# Patient Record
Sex: MALE | Race: WHITE | Employment: OTHER | URBAN - NONMETROPOLITAN AREA
[De-identification: names, ages, dates, MRNs, and addresses within clinical notes are randomized per-mention and may not be internally consistent; named-entity substitution may affect disease eponyms.]

---

## 2017-01-10 RX ORDER — METOPROLOL TARTRATE 50 MG/1
TABLET, FILM COATED ORAL
Qty: 180 TABLET | Refills: 0 | Status: ON HOLD | OUTPATIENT
Start: 2017-01-10 | End: 2019-07-24 | Stop reason: SDUPTHER

## 2017-03-15 ENCOUNTER — OFFICE VISIT (OUTPATIENT)
Dept: CARDIOLOGY | Age: 82
End: 2017-03-15

## 2017-03-15 VITALS
BODY MASS INDEX: 32.96 KG/M2 | WEIGHT: 210 LBS | SYSTOLIC BLOOD PRESSURE: 128 MMHG | DIASTOLIC BLOOD PRESSURE: 62 MMHG | HEIGHT: 67 IN | HEART RATE: 76 BPM

## 2017-03-15 DIAGNOSIS — I50.22 CHRONIC SYSTOLIC (CONGESTIVE) HEART FAILURE (HCC): Primary | ICD-10-CM

## 2017-03-15 DIAGNOSIS — I10 ESSENTIAL HYPERTENSION: ICD-10-CM

## 2017-03-15 DIAGNOSIS — I48.0 PAROXYSMAL ATRIAL FIBRILLATION (HCC): ICD-10-CM

## 2017-03-15 PROCEDURE — G8417 CALC BMI ABV UP PARAM F/U: HCPCS | Performed by: PHYSICIAN ASSISTANT

## 2017-03-15 PROCEDURE — 99213 OFFICE O/P EST LOW 20 MIN: CPT | Performed by: PHYSICIAN ASSISTANT

## 2017-03-15 PROCEDURE — G8484 FLU IMMUNIZE NO ADMIN: HCPCS | Performed by: PHYSICIAN ASSISTANT

## 2017-03-15 PROCEDURE — 1036F TOBACCO NON-USER: CPT | Performed by: PHYSICIAN ASSISTANT

## 2017-03-15 PROCEDURE — 1123F ACP DISCUSS/DSCN MKR DOCD: CPT | Performed by: PHYSICIAN ASSISTANT

## 2017-03-15 PROCEDURE — G8427 DOCREV CUR MEDS BY ELIG CLIN: HCPCS | Performed by: PHYSICIAN ASSISTANT

## 2017-03-15 PROCEDURE — 4040F PNEUMOC VAC/ADMIN/RCVD: CPT | Performed by: PHYSICIAN ASSISTANT

## 2017-03-15 RX ORDER — OXYCODONE HYDROCHLORIDE AND ACETAMINOPHEN 5; 325 MG/1; MG/1
1 TABLET ORAL EVERY 4 HOURS PRN
COMMUNITY
End: 2018-03-16 | Stop reason: SDUPTHER

## 2017-03-15 RX ORDER — TRIAMCINOLONE ACETONIDE 1 MG/G
CREAM TOPICAL 2 TIMES DAILY
COMMUNITY
End: 2017-07-13 | Stop reason: ALTCHOICE

## 2017-03-15 RX ORDER — INDOMETHACIN 50 MG/1
50 CAPSULE ORAL PRN
Status: ON HOLD | COMMUNITY
End: 2017-07-17 | Stop reason: HOSPADM

## 2017-03-27 ENCOUNTER — OFFICE VISIT (OUTPATIENT)
Dept: CARDIOLOGY | Age: 82
End: 2017-03-27

## 2017-03-27 VITALS
SYSTOLIC BLOOD PRESSURE: 116 MMHG | BODY MASS INDEX: 32.96 KG/M2 | HEART RATE: 72 BPM | WEIGHT: 210 LBS | DIASTOLIC BLOOD PRESSURE: 64 MMHG | HEIGHT: 67 IN

## 2017-03-27 DIAGNOSIS — I10 ESSENTIAL HYPERTENSION: Primary | ICD-10-CM

## 2017-03-27 DIAGNOSIS — I48.20 CHRONIC ATRIAL FIBRILLATION (HCC): ICD-10-CM

## 2017-03-27 DIAGNOSIS — I25.10 CORONARY ARTERY DISEASE INVOLVING NATIVE CORONARY ARTERY OF NATIVE HEART WITHOUT ANGINA PECTORIS: ICD-10-CM

## 2017-03-27 PROCEDURE — 99214 OFFICE O/P EST MOD 30 MIN: CPT | Performed by: NUCLEAR MEDICINE

## 2017-03-27 PROCEDURE — G8427 DOCREV CUR MEDS BY ELIG CLIN: HCPCS | Performed by: NUCLEAR MEDICINE

## 2017-03-27 PROCEDURE — G8598 ASA/ANTIPLAT THER USED: HCPCS | Performed by: NUCLEAR MEDICINE

## 2017-03-27 PROCEDURE — 1123F ACP DISCUSS/DSCN MKR DOCD: CPT | Performed by: NUCLEAR MEDICINE

## 2017-03-27 PROCEDURE — G8484 FLU IMMUNIZE NO ADMIN: HCPCS | Performed by: NUCLEAR MEDICINE

## 2017-03-27 PROCEDURE — 4040F PNEUMOC VAC/ADMIN/RCVD: CPT | Performed by: NUCLEAR MEDICINE

## 2017-03-27 PROCEDURE — G8417 CALC BMI ABV UP PARAM F/U: HCPCS | Performed by: NUCLEAR MEDICINE

## 2017-03-27 PROCEDURE — 1036F TOBACCO NON-USER: CPT | Performed by: NUCLEAR MEDICINE

## 2017-06-19 ENCOUNTER — TELEPHONE (OUTPATIENT)
Dept: CARDIOLOGY | Age: 82
End: 2017-06-19

## 2017-07-05 ENCOUNTER — TELEPHONE (OUTPATIENT)
Dept: CARDIOLOGY | Age: 82
End: 2017-07-05

## 2017-07-12 ENCOUNTER — TELEPHONE (OUTPATIENT)
Dept: CARDIOLOGY | Age: 82
End: 2017-07-12

## 2017-07-17 ENCOUNTER — ANESTHESIA (OUTPATIENT)
Dept: ENDOSCOPY | Age: 82
End: 2017-07-17
Payer: MEDICARE

## 2017-07-17 ENCOUNTER — HOSPITAL ENCOUNTER (OUTPATIENT)
Age: 82
Setting detail: OUTPATIENT SURGERY
Discharge: HOME | End: 2017-07-17
Attending: INTERNAL MEDICINE | Admitting: INTERNAL MEDICINE
Payer: MEDICARE

## 2017-07-17 ENCOUNTER — ANESTHESIA EVENT (OUTPATIENT)
Dept: ENDOSCOPY | Age: 82
End: 2017-07-17
Payer: MEDICARE

## 2017-07-17 VITALS
BODY MASS INDEX: 32.25 KG/M2 | HEART RATE: 78 BPM | TEMPERATURE: 96.8 F | SYSTOLIC BLOOD PRESSURE: 130 MMHG | HEIGHT: 68 IN | DIASTOLIC BLOOD PRESSURE: 66 MMHG | OXYGEN SATURATION: 97 % | WEIGHT: 212.8 LBS | RESPIRATION RATE: 20 BRPM

## 2017-07-17 VITALS — SYSTOLIC BLOOD PRESSURE: 95 MMHG | DIASTOLIC BLOOD PRESSURE: 45 MMHG | OXYGEN SATURATION: 100 %

## 2017-07-17 PROCEDURE — 7100000001 HC PACU RECOVERY - ADDTL 15 MIN: Performed by: INTERNAL MEDICINE

## 2017-07-17 PROCEDURE — 7100000000 HC PACU RECOVERY - FIRST 15 MIN: Performed by: INTERNAL MEDICINE

## 2017-07-17 PROCEDURE — 3609010400 HC COLONOSCOPY POLYPECTOMY HOT BIOPSY: Performed by: INTERNAL MEDICINE

## 2017-07-17 PROCEDURE — 6360000002 HC RX W HCPCS: Performed by: NURSE ANESTHETIST, CERTIFIED REGISTERED

## 2017-07-17 PROCEDURE — 3700000000 HC ANESTHESIA ATTENDED CARE: Performed by: INTERNAL MEDICINE

## 2017-07-17 PROCEDURE — 2580000003 HC RX 258: Performed by: INTERNAL MEDICINE

## 2017-07-17 PROCEDURE — 3700000001 HC ADD 15 MINUTES (ANESTHESIA): Performed by: INTERNAL MEDICINE

## 2017-07-17 PROCEDURE — 88305 TISSUE EXAM BY PATHOLOGIST: CPT

## 2017-07-17 PROCEDURE — 2500000003 HC RX 250 WO HCPCS: Performed by: NURSE ANESTHETIST, CERTIFIED REGISTERED

## 2017-07-17 PROCEDURE — 3609013000 HC EGD TRANSORAL CONTROL BLEEDING ANY METHOD: Performed by: INTERNAL MEDICINE

## 2017-07-17 RX ORDER — LIDOCAINE HYDROCHLORIDE 20 MG/ML
INJECTION, SOLUTION EPIDURAL; INFILTRATION; INTRACAUDAL; PERINEURAL PRN
Status: DISCONTINUED | OUTPATIENT
Start: 2017-07-17 | End: 2017-07-17 | Stop reason: SDUPTHER

## 2017-07-17 RX ORDER — SODIUM CHLORIDE 450 MG/100ML
INJECTION, SOLUTION INTRAVENOUS CONTINUOUS
Status: CANCELLED | OUTPATIENT
Start: 2017-07-17

## 2017-07-17 RX ORDER — SODIUM CHLORIDE 450 MG/100ML
INJECTION, SOLUTION INTRAVENOUS CONTINUOUS
Status: DISCONTINUED | OUTPATIENT
Start: 2017-07-17 | End: 2017-07-17 | Stop reason: HOSPADM

## 2017-07-17 RX ORDER — PROPOFOL 10 MG/ML
INJECTION, EMULSION INTRAVENOUS PRN
Status: DISCONTINUED | OUTPATIENT
Start: 2017-07-17 | End: 2017-07-17 | Stop reason: SDUPTHER

## 2017-07-17 RX ORDER — ASPIRIN 81 MG/1
81 TABLET ORAL DAILY
Qty: 1 TABLET | Refills: 0 | Status: SHIPPED | OUTPATIENT
Start: 2017-07-21

## 2017-07-17 RX ADMIN — LIDOCAINE HYDROCHLORIDE 80 MG: 20 INJECTION, SOLUTION EPIDURAL; INFILTRATION; INTRACAUDAL; PERINEURAL at 11:10

## 2017-07-17 RX ADMIN — PROPOFOL 300 MG: 10 INJECTION, EMULSION INTRAVENOUS at 11:10

## 2017-07-17 RX ADMIN — SODIUM CHLORIDE: 4.5 INJECTION, SOLUTION INTRAVENOUS at 10:01

## 2017-07-17 ASSESSMENT — PAIN SCALES - GENERAL
PAINLEVEL_OUTOF10: 0
PAINLEVEL_OUTOF10: 0

## 2017-07-17 ASSESSMENT — PAIN - FUNCTIONAL ASSESSMENT: PAIN_FUNCTIONAL_ASSESSMENT: 0-10

## 2017-09-11 PROBLEM — S81.802A OPEN WOUND OF LEFT LOWER LEG: Status: ACTIVE | Noted: 2017-09-11

## 2017-09-11 PROBLEM — R60.0 BILATERAL LEG EDEMA: Status: ACTIVE | Noted: 2017-09-11

## 2017-09-11 PROBLEM — I73.9 PAD (PERIPHERAL ARTERY DISEASE) (HCC): Status: ACTIVE | Noted: 2017-09-11

## 2017-09-20 ENCOUNTER — HOSPITAL ENCOUNTER (OUTPATIENT)
Dept: WOUND CARE | Age: 82
Discharge: HOME | End: 2017-09-20
Payer: MEDICARE

## 2017-09-20 VITALS
HEIGHT: 68 IN | OXYGEN SATURATION: 97 % | TEMPERATURE: 98 F | WEIGHT: 225 LBS | RESPIRATION RATE: 18 BRPM | BODY MASS INDEX: 34.1 KG/M2 | DIASTOLIC BLOOD PRESSURE: 60 MMHG | HEART RATE: 75 BPM | SYSTOLIC BLOOD PRESSURE: 111 MMHG

## 2017-09-20 DIAGNOSIS — R60.0 BILATERAL LEG EDEMA: Primary | ICD-10-CM

## 2017-09-20 DIAGNOSIS — S81.802A OPEN WOUND OF LEFT LOWER LEG, INITIAL ENCOUNTER: ICD-10-CM

## 2017-09-20 PROCEDURE — 29580 STRAPPING UNNA BOOT: CPT

## 2017-09-20 PROCEDURE — A6456 ZINC PASTE BAND W >=3"<5"/YD: HCPCS

## 2017-09-20 PROCEDURE — G0463 HOSPITAL OUTPT CLINIC VISIT: HCPCS

## 2017-09-20 PROCEDURE — A6206 CONTACT LAYER <= 16 SQ IN: HCPCS

## 2017-09-20 PROCEDURE — A6197 ALGINATE DRSG >16 <=48 SQ IN: HCPCS

## 2017-09-20 PROCEDURE — A6454 SELF-ADHER BAND W>=3" <5"/YD: HCPCS

## 2017-09-22 ENCOUNTER — HOSPITAL ENCOUNTER (OUTPATIENT)
Dept: WOUND CARE | Age: 82
Discharge: HOME | End: 2017-09-22
Payer: MEDICARE

## 2017-09-22 VITALS
RESPIRATION RATE: 18 BRPM | TEMPERATURE: 98.3 F | DIASTOLIC BLOOD PRESSURE: 49 MMHG | OXYGEN SATURATION: 93 % | HEART RATE: 72 BPM | SYSTOLIC BLOOD PRESSURE: 95 MMHG

## 2017-09-22 DIAGNOSIS — R60.0 BILATERAL LEG EDEMA: ICD-10-CM

## 2017-09-22 DIAGNOSIS — S81.802A OPEN WOUND OF LEFT LOWER LEG, INITIAL ENCOUNTER: ICD-10-CM

## 2017-09-22 PROCEDURE — 29580 STRAPPING UNNA BOOT: CPT

## 2017-09-22 PROCEDURE — A6454 SELF-ADHER BAND W>=3" <5"/YD: HCPCS

## 2017-09-22 PROCEDURE — A6456 ZINC PASTE BAND W >=3"<5"/YD: HCPCS

## 2017-09-22 PROCEDURE — A6197 ALGINATE DRSG >16 <=48 SQ IN: HCPCS

## 2017-09-22 ASSESSMENT — PAIN SCALES - GENERAL: PAINLEVEL_OUTOF10: 0

## 2017-09-25 ENCOUNTER — HOSPITAL ENCOUNTER (OUTPATIENT)
Dept: WOUND CARE | Age: 82
Discharge: HOME | End: 2017-09-25
Payer: MEDICARE

## 2017-09-25 VITALS
OXYGEN SATURATION: 97 % | DIASTOLIC BLOOD PRESSURE: 62 MMHG | SYSTOLIC BLOOD PRESSURE: 128 MMHG | BODY MASS INDEX: 34.1 KG/M2 | RESPIRATION RATE: 18 BRPM | TEMPERATURE: 97.6 F | HEART RATE: 66 BPM | WEIGHT: 225 LBS | HEIGHT: 68 IN

## 2017-09-25 DIAGNOSIS — I83.019 VENOUS ULCER OF RIGHT LEG (HCC): Primary | ICD-10-CM

## 2017-09-25 DIAGNOSIS — R60.0 BILATERAL LEG EDEMA: ICD-10-CM

## 2017-09-25 DIAGNOSIS — I83.029 VENOUS ULCER OF LEFT LEG (HCC): ICD-10-CM

## 2017-09-25 DIAGNOSIS — L97.929 VENOUS ULCER OF LEFT LEG (HCC): ICD-10-CM

## 2017-09-25 DIAGNOSIS — S81.802A OPEN WOUND OF LEFT LOWER LEG, INITIAL ENCOUNTER: ICD-10-CM

## 2017-09-25 DIAGNOSIS — L97.919 VENOUS ULCER OF RIGHT LEG (HCC): Primary | ICD-10-CM

## 2017-09-25 PROCEDURE — A6456 ZINC PASTE BAND W >=3"<5"/YD: HCPCS

## 2017-09-25 PROCEDURE — A6454 SELF-ADHER BAND W>=3" <5"/YD: HCPCS

## 2017-09-25 PROCEDURE — 29580 STRAPPING UNNA BOOT: CPT

## 2017-09-27 ENCOUNTER — OFFICE VISIT (OUTPATIENT)
Dept: UROLOGY | Age: 82
End: 2017-09-27
Payer: MEDICARE

## 2017-09-27 VITALS
WEIGHT: 231 LBS | DIASTOLIC BLOOD PRESSURE: 60 MMHG | BODY MASS INDEX: 37.12 KG/M2 | HEIGHT: 66 IN | SYSTOLIC BLOOD PRESSURE: 118 MMHG

## 2017-09-27 DIAGNOSIS — R33.9 URINARY RETENTION: Primary | ICD-10-CM

## 2017-09-27 LAB — POST VOID RESIDUAL (PVR): 147 ML

## 2017-09-27 PROCEDURE — 99213 OFFICE O/P EST LOW 20 MIN: CPT | Performed by: NURSE PRACTITIONER

## 2017-09-27 PROCEDURE — 1123F ACP DISCUSS/DSCN MKR DOCD: CPT | Performed by: NURSE PRACTITIONER

## 2017-09-27 PROCEDURE — 1036F TOBACCO NON-USER: CPT | Performed by: NURSE PRACTITIONER

## 2017-09-27 PROCEDURE — G8427 DOCREV CUR MEDS BY ELIG CLIN: HCPCS | Performed by: NURSE PRACTITIONER

## 2017-09-27 PROCEDURE — G8598 ASA/ANTIPLAT THER USED: HCPCS | Performed by: NURSE PRACTITIONER

## 2017-09-27 PROCEDURE — G8417 CALC BMI ABV UP PARAM F/U: HCPCS | Performed by: NURSE PRACTITIONER

## 2017-09-27 PROCEDURE — 4040F PNEUMOC VAC/ADMIN/RCVD: CPT | Performed by: NURSE PRACTITIONER

## 2017-09-27 PROCEDURE — 51798 US URINE CAPACITY MEASURE: CPT | Performed by: NURSE PRACTITIONER

## 2017-09-27 RX ORDER — INDOMETHACIN 50 MG/1
50 CAPSULE ORAL 3 TIMES DAILY PRN
Status: ON HOLD | COMMUNITY
End: 2018-07-30 | Stop reason: SINTOL

## 2017-09-27 RX ORDER — TAMSULOSIN HYDROCHLORIDE 0.4 MG/1
0.4 CAPSULE ORAL DAILY
Qty: 90 CAPSULE | Refills: 3 | Status: SHIPPED | OUTPATIENT
Start: 2017-09-27 | End: 2018-12-29 | Stop reason: SDUPTHER

## 2017-09-27 ASSESSMENT — ENCOUNTER SYMPTOMS
NAUSEA: 0
ABDOMINAL PAIN: 0
VOMITING: 0

## 2017-09-28 ENCOUNTER — HOSPITAL ENCOUNTER (OUTPATIENT)
Dept: WOUND CARE | Age: 82
Discharge: HOME | End: 2017-09-28
Payer: MEDICARE

## 2017-09-28 VITALS
RESPIRATION RATE: 18 BRPM | OXYGEN SATURATION: 99 % | SYSTOLIC BLOOD PRESSURE: 114 MMHG | HEIGHT: 68 IN | HEART RATE: 62 BPM | TEMPERATURE: 97.5 F | WEIGHT: 225 LBS | BODY MASS INDEX: 34.1 KG/M2 | DIASTOLIC BLOOD PRESSURE: 57 MMHG

## 2017-09-28 DIAGNOSIS — S81.802A OPEN WOUND OF LEFT LOWER LEG, INITIAL ENCOUNTER: ICD-10-CM

## 2017-09-28 DIAGNOSIS — R60.0 BILATERAL LEG EDEMA: ICD-10-CM

## 2017-09-28 PROCEDURE — A6456 ZINC PASTE BAND W >=3"<5"/YD: HCPCS

## 2017-09-28 PROCEDURE — 29580 STRAPPING UNNA BOOT: CPT

## 2017-09-28 PROCEDURE — A6454 SELF-ADHER BAND W>=3" <5"/YD: HCPCS

## 2017-10-02 ENCOUNTER — HOSPITAL ENCOUNTER (OUTPATIENT)
Dept: WOUND CARE | Age: 82
Discharge: HOME | End: 2017-10-02
Payer: MEDICARE

## 2017-10-02 VITALS
TEMPERATURE: 97.8 F | OXYGEN SATURATION: 94 % | SYSTOLIC BLOOD PRESSURE: 150 MMHG | RESPIRATION RATE: 20 BRPM | HEART RATE: 70 BPM | DIASTOLIC BLOOD PRESSURE: 65 MMHG

## 2017-10-02 DIAGNOSIS — R60.0 BILATERAL LEG EDEMA: ICD-10-CM

## 2017-10-02 DIAGNOSIS — S81.802A OPEN WOUND OF LEFT LOWER LEG, INITIAL ENCOUNTER: ICD-10-CM

## 2017-10-02 PROCEDURE — 29580 STRAPPING UNNA BOOT: CPT

## 2017-10-02 PROCEDURE — A6456 ZINC PASTE BAND W >=3"<5"/YD: HCPCS

## 2017-10-02 PROCEDURE — A6454 SELF-ADHER BAND W>=3" <5"/YD: HCPCS

## 2017-10-02 ASSESSMENT — PAIN SCALES - GENERAL: PAINLEVEL_OUTOF10: 0

## 2017-10-02 NOTE — IP AVS SNAPSHOT
After Visit Summary  (Discharge Instructions)    Medication List for Home    Based on the information you provided to us as well as any changes during this visit, the following is your updated medication list.  Compare this with your prescription bottles at home. If you have any questions or concerns, contact your primary care physician's office. Daily Medication List (This medication list can be shared with any healthcare provider who is helping you manage your medications)      ASK your doctor about these medications if you have questions        Last Dose    Next Dose Due AM NOON PM NIGHT    allopurinol 300 MG tablet   Commonly known as:  ZYLOPRIM   Take 300 mg by mouth daily                                         aspirin 81 MG EC tablet   Take 1 tablet by mouth daily                                         CALCIUM 600/VITAMIN D PO   Take 1 tablet by mouth 2 times daily                                         cloNIDine 0.1 MG tablet   Commonly known as:  CATAPRES   Take 0.1 mg by mouth as needed for High Blood Pressure Takes if BP is greater than 175                                         cyclobenzaprine 10 MG tablet   Commonly known as:  FLEXERIL   Take 10 mg by mouth every 8 hours as needed. docusate sodium 100 MG capsule   Commonly known as:  COLACE   Take 300 mg by mouth 2 times daily                                         furosemide 40 MG tablet   Commonly known as:  LASIX   Take 1 tablet by mouth daily                                         hydrOXYzine 25 MG tablet   Commonly known as:  ATARAX   Take 25 mg by mouth 4 times daily as needed for Itching                                         indapamide 1.25 MG tablet   Commonly known as:  LOZOL   Take 1.25 mg by mouth every morning.                                          indomethacin 50 MG capsule   Commonly known as:  INDOCIN   Take 50 mg by mouth 2 times daily (with meals) levothyroxine 50 MCG tablet   Commonly known as:  SYNTHROID   Take 100 mcg by mouth Daily                                         loperamide 2 MG capsule   Commonly known as:  IMODIUM   Take 2 mg by mouth as needed for Diarrhea                                         magnesium oxide 400 (241.3 Mg) MG Tabs tablet   Commonly known as:  MAG-OX   Take 1 tablet by mouth daily                                         metoprolol tartrate 50 MG tablet   Commonly known as:  LOPRESSOR   TAKE 1 TABLET TWICE A DAY                                         NONFORMULARY   Indications: KAYLA RED                                         oxyCODONE-acetaminophen 5-325 MG per tablet   Commonly known as:  PERCOCET   Take 1 tablet by mouth every 4 hours as needed for Pain .                                         potassium chloride 10 MEQ extended release tablet   Commonly known as:  K-TAB   Take 4 tablets by mouth daily                                         pravastatin 80 MG tablet   Commonly known as:  PRAVACHOL   Take 80 mg by mouth nightly. quinapril 40 MG tablet   Commonly known as:  ACCUPRIL   Take 40 mg by mouth daily. rivaroxaban 20 MG Tabs tablet   Commonly known as:  XARELTO   TAKE 1 TABLET DAILY                                         tamsulosin 0.4 MG capsule   Commonly known as:  FLOMAX   Take 1 capsule by mouth daily                                         VITAMIN B 12 PO   Take by mouth                                         vitamin C 500 MG tablet   Take 500 mg by mouth daily. Allergies as of 10/2/2017        Reactions    Pcn [Penicillins] Swelling      Immunizations as of 10/2/2017     No immunizations on file.       Last Vitals          Most Recent Value    Temp  97.8 °F (36.6 °C)    Pulse  70    Resp  20    BP  (!)  150/65         After Visit Summary This summary was created for you. Thank you for entrusting your care to us. The following information includes details about your hospital/visit stay along with steps you should take to help with your recovery once you leave the hospital.  In this packet, you will find information about the topics listed below:    · Instructions about your medications including a list of your home medications  · A summary of your hospital visit  · Follow-up appointments once you have left the hospital  · Your care plan at home      You may receive a survey regarding the care you received during your stay. Your input is valuable to us. We encourage you to complete and return your survey in the envelope provided. We hope you will choose us in the future for your healthcare needs. Patient Information     Patient Name ARNIE Dowd 1935      Care Provided at:     Name Address Phone       [address] [phone]            Your Visit    Here you will find information about your visit, including the reason for your visit. Please take this sheet with you when you visit your doctor or other health care provider in the future. It will help determine the best possible medical care for you at that time. If you have any questions once you leave the hospital, please call the department phone number listed below. Why you were here     Your primary diagnosis was:  Not on File      Visit Information     Date & Time Department Dept. Phone    10/2/2017 Angely Walker [phone]       Follow-up Appointments    Below is a list of your follow-up and future appointments. This may not be a complete list as you may have made appointments directly with providers that we are not aware of or your providers may have made some for you. Please call your providers to confirm appointments. This section may also include educational information about certain health topics that may be of help to you. Discharge Instructions       Visit Discharge/Physician Orders:   -Keep unna boots dry and clean. Wound Location: Bilateral Lower Legs     Do not shower, take baths or get wound wet, unless otherwise instructed by your Wound Care doctor. Keep all dressings clean & dry. Dressing orders:    Left Leg: Aquacel Ag applied to wound bed. Leg wrapped with unna boot and coban (start at base of toes and wrap up to 1-2 inches below knee). Right Leg: Leg wrapped with unna boot and coban (start at base of toes and wrap up to 1-2 inches below knee). *If unna boot becomes too tight- raise/elevate legs above the level of the heart for 15-20 minutes if swelling does not go down then carefully cut off unna boot and call clinic or go to local ER or family physician. If unna boot becomes wet or starts to roll down then carefully remove unna boot and call clinic. Follow up with Dr. Meng Tipton on Wednesday October 4th, 2017  At 10:45 am    Keep next scheduled appointment. Please give 24 hour notice if unable to keep appointment. [phone]    If you experience any of the following, please call the Wound Care Service during business hours: [phone]. *Increase in pain   *Temperature over 101   *Increase in drainage from your wound or a foul odor   *Uncontrolled swelling   *Need for compression bandage changes due to slippage, breakthrough drainage    If you need medical attention outside of business hours, please contact your Primary Care Doctor or go to the nearest emergency room. Important information for a smoker       SMOKING: QUIT SMOKING. THIS IS THE MOST IMPORTANT ACTION YOU CAN TAKE TO IMPROVE YOUR CURRENT AND FUTURE HEALTH. Call the [address] [place] [address] [facility] [place] NOW ([phone])    Smoking harms nonsmokers.  When nonsmokers are around people who smoke, they absorb nicotine, carbon monoxide, and other ingredients of tobacco smoke. DO NOT SMOKE AROUND CHILDREN     Children exposed to secondhand smoke are at an increased risk of:  Sudden Infant Death Syndrome (SIDS), acute respiratory infections, inflammation of the middle ear, and severe asthma. Over a longer time, it causes heart disease and lung cancer. There is no safe level of exposure to secondhand smoke. MyChart Signup     Our records indicate that you have declined MyChart signup. View your information online  ? Review your current list of  medications, immunization, and allergies. ? Review your future test results online . ? Review your discharge instructions provided by your caregivers at discharge    Certain functionality such as prescription refills, scheduling appointments or sending messages to your provider are not activated if your provider does not use [email or web address] in his/her office    For questions regarding your MyChart account call [phone]. If you have a clinical question, please call your doctor's office. The information on all pages of the After Visit Summary has been reviewed with me, the patient and/or responsible adult, by my health care provider(s). I had the opportunity to ask questions regarding this information. I understand I should dispose of my armband safely at home to protect my health information. A complete copy of the After Visit Summary has been given to me, the patient and/or responsible adult.            Patient Signature/Responsible Adult:____________________    Clinician Signature:_____________________    Date:_____________________    Time:_____________________

## 2017-10-02 NOTE — PLAN OF CARE
Problem: Wound:  Goal: Will show signs of wound healing; wound closure and no evidence of infection  Will show signs of wound healing; wound closure and no evidence of infection   Outcome: Ongoing  Patient presents to wound clinic for follow up of bilateral lower leg wounds. Right leg wound is healed. No s/s of infection. Patient afebrile. Aquacel ag applied to open wounds on left lower leg and bilateral lower legs wrapped with unna boot and coban- leave intact until f/u appt. Follow up with Dr. Meng Tipton on Wednesday. Comments:   Care plan reviewed with patient and wife. Patient and wife verbalize understanding of the plan of care and contribute to goal setting.

## 2017-10-04 ENCOUNTER — HOSPITAL ENCOUNTER (OUTPATIENT)
Dept: WOUND CARE | Age: 82
Discharge: HOME | End: 2017-10-04
Payer: MEDICARE

## 2017-10-04 VITALS
SYSTOLIC BLOOD PRESSURE: 144 MMHG | HEART RATE: 72 BPM | RESPIRATION RATE: 18 BRPM | TEMPERATURE: 98 F | DIASTOLIC BLOOD PRESSURE: 68 MMHG | OXYGEN SATURATION: 98 %

## 2017-10-04 DIAGNOSIS — S81.802D OPEN WOUND OF LEFT LOWER LEG, SUBSEQUENT ENCOUNTER: Primary | ICD-10-CM

## 2017-10-04 PROCEDURE — 29580 STRAPPING UNNA BOOT: CPT

## 2017-10-04 PROCEDURE — A6456 ZINC PASTE BAND W >=3"<5"/YD: HCPCS

## 2017-10-04 PROCEDURE — A6454 SELF-ADHER BAND W>=3" <5"/YD: HCPCS

## 2017-10-04 NOTE — IP AVS SNAPSHOT
After Visit Summary  (Discharge Instructions)    Medication List for Home    Based on the information you provided to us as well as any changes during this visit, the following is your updated medication list.  Compare this with your prescription bottles at home. If you have any questions or concerns, contact your primary care physician's office. Daily Medication List (This medication list can be shared with any healthcare provider who is helping you manage your medications)      ASK your doctor about these medications if you have questions        Last Dose    Next Dose Due AM NOON PM NIGHT    allopurinol 300 MG tablet   Commonly known as:  ZYLOPRIM   Take 300 mg by mouth daily                                         aspirin 81 MG EC tablet   Take 1 tablet by mouth daily                                         CALCIUM 600/VITAMIN D PO   Take 1 tablet by mouth 2 times daily                                         cloNIDine 0.1 MG tablet   Commonly known as:  CATAPRES   Take 0.1 mg by mouth as needed for High Blood Pressure Takes if BP is greater than 175                                         cyclobenzaprine 10 MG tablet   Commonly known as:  FLEXERIL   Take 10 mg by mouth every 8 hours as needed. docusate sodium 100 MG capsule   Commonly known as:  COLACE   Take 300 mg by mouth 2 times daily                                         furosemide 40 MG tablet   Commonly known as:  LASIX   Take 1 tablet by mouth daily                                         hydrOXYzine 25 MG tablet   Commonly known as:  ATARAX   Take 25 mg by mouth 4 times daily as needed for Itching                                         indapamide 1.25 MG tablet   Commonly known as:  LOZOL   Take 1.25 mg by mouth every morning.                                          indomethacin 50 MG capsule   Commonly known as:  INDOCIN   Take 50 mg by mouth 2 times daily (with meals) This summary was created for you. Thank you for entrusting your care to us. The following information includes details about your hospital/visit stay along with steps you should take to help with your recovery once you leave the hospital.  In this packet, you will find information about the topics listed below:    · Instructions about your medications including a list of your home medications  · A summary of your hospital visit  · Follow-up appointments once you have left the hospital  · Your care plan at home      You may receive a survey regarding the care you received during your stay. Your input is valuable to us. We encourage you to complete and return your survey in the envelope provided. We hope you will choose us in the future for your healthcare needs. Patient Information     Patient Name ARNIE BIRCH 1935      Care Provided at:     Name Address Phone       [address] [phone]            Your Visit    Here you will find information about your visit, including the reason for your visit. Please take this sheet with you when you visit your doctor or other health care provider in the future. It will help determine the best possible medical care for you at that time. If you have any questions once you leave the hospital, please call the department phone number listed below. Why you were here     Your primary diagnosis was:  Not on File      Visit Information     Date & Time Department Dept. Phone    10/4/2017 Angely Walker [phone]       Follow-up Appointments    Below is a list of your follow-up and future appointments. This may not be a complete list as you may have made appointments directly with providers that we are not aware of or your providers may have made some for you. Please call your providers to confirm appointments. It is important to keep your appointments. Please bring your current insurance card, photo ID, co-pay, and all medication bottles to your appointment. If self-pay, payment is expected at the time of service. Future Appointments     10/9/2017 1:00 PM     Appointment with [address]ano at [address] ([phone])   [place] [address] [address]       10/12/2017 1:00 PM     Appointment with [address] Bejarano at [address] ([phone])   [place] [address] [address]       10/16/2017 1:00 PM     Appointment with [address] Bejarano at [address] ([phone])   [place] [address] [address]       10/18/2017 11:00 AM     Appointment with Marvin Figueroa MD at [address] ([phone])   Please arrive 15 minutes prior to appointment time, bring insurance card and photo ID.    [place] [address] [address]  [address] [address]       11/2/2017 11:45 AM     Appointment with Kristen Aly MD at [facility] ([phone])   Please arrive 15 minutes prior to appointment, bring photo ID and insurance card. Please arrive 15 minutes prior to appointment, bring photo ID and insurance card. [address] [facility] [id number]       11/16/2017 2:30 PM     Appointment with Jaquan Naylor MD at [place] [address]. ([phone])   Please arrive 15 minutes prior to appointment time, bring insurance card and photo ID. [address]  [address] [facility]       10/3/2018 12:45 PM     Appointment with Ace Brown NP at [facility] ([phone])   Please arrive 15 minutes prior to appointment time, bring insurance card and photo ID. Please arrive 15 minutes prior to appointment time, bring insurance card and photo ID.   [address]  [facility].  [address] [place] [id number]         Preventive Care        Date Due    Tetanus Combination Vaccine (1 - Tdap) 6/13/1954    Zoster Vaccine 6/13/1997 Pneumococcal Vaccines (two) for all adults aged 72 and over (1 of 2 - PCV13) 6/13/2002    Yearly Flu Vaccine (1) 9/1/2017                 Care Plan Once You Return Home    This section includes instructions you will need to follow once you leave the hospital.  Your care team will discuss these with you, so you and those caring for you know how to best care for your health needs at home. This section may also include educational information about certain health topics that may be of help to you. Discharge Instructions       Visit Discharge/Physician Orders: PT evaluation    Wound Location: lt lower leg    Cleanse wound with normal saline. Do not shower, take baths or get wound wet, unless otherwise instructed by your Wound Care doctor. Keep all dressings clean & dry. Dressing orders:   Left Leg: Aquacel Ag applied to wound bed. Leg wrapped with unna boot, cast padding, and coban (start at base of toes and wrap up to 1-2 inches below knee). Change twice per week in clinic. Right Leg: may wear compression sock    *If unna boot becomes too tight- raise/elevate legs above the level of the heart for 15-20 minutes if swelling does not go down then carefully cut off unna boot and call clinic or go to local ER or family physician. If unna boot becomes wet or starts to roll down then carefully remove unna boot and call clinic. Follow up visit 2 Weeks. Oct 18 at 11:00 am - Dr. Sheyla Galdamez  RN visits: Oct 9 at 1:00 pm                  Oct 12 at 1:00 pm                  Oct 16 at 1:00 pm    Keep next scheduled appointment. Please give 24 hour notice if unable to keep appointment. [phone]    If you experience any of the following, please call the Wound Care Service during business hours: [phone].    *Increase in pain   *Temperature over 101   *Increase in drainage from your wound or a foul odor   *Uncontrolled swelling   *Need for compression bandage changes due to slippage, breakthrough drainage

## 2017-10-04 NOTE — PROGRESS NOTES
[facility]          Progress Note and Procedure Note      Osmar Lewis RECORD NUMBER:  [id number]  AGE: [de-identified] y.o. GENDER: male  : 1935  EPISODE DATE:  10/4/2017    Subjective:     SUBJECTIVE     Chief Complaint   Patient presents with    Wound Check     BLE wounds           HISTORY OF PRESENT ILLNESS   He was seen for follow up visit/  Has left leg wound. With multiple superficial wound. he is on compression wrap. He wants to be referred to physical therapy due to weakness on both lower extremities.   He denies any fever or chills    PAST MEDICAL HISTORY         Diagnosis Date    Atrial fibrillation (HCC)     Bell's palsy     CAD (coronary artery disease)     Cancer (HCC)     skin CA removed with dry ice    DDD (degenerative disc disease)     DVT (deep venous thrombosis) (HCC)     Gout     Hernia     Hx of blood clots     left leg DVT    Hyperlipidemia     Hypertension     Irregular cardiac rhythm 2016    Movement disorder     back pain    UTI (urinary tract infection)          PAST SURGICAL HISTORY     Past Surgical History:   Procedure Laterality Date    BACK SURGERY      lower    CARDIAC CATHETERIZATION      ok    CARDIAC CATHETERIZATION      ok    CHOLECYSTECTOMY      COLON SURGERY      6-8 in of descending colon removed    COLOSTOMY      COLOSTOMY      reversed    EYE SURGERY      band    HERNIA REPAIR      NECK SURGERY  2016    three   [address] OTHER SURGICAL HISTORY  2016    ACDF C4-5    [place] COLSC FLX W/REMOVAL LESION BY HOT BX FORCEPS Left 2017    COLONOSCOPY POLYPECTOMY HOT BIOPSY performed by Elías Shaffer MD at  [facility]    UPPER GASTROINTESTINAL ENDOSCOPY N/A 2017    EGD CONTROL HEMORRHAGE performed by Elías Shaffer MD at  [facility]     FAMILY HISTORY         Family History   Problem Relation Age of Onset    Diabetes Mother     Cancer Father     Heart Disease Father     COPD Sister SOCIAL HISTORY       Social History   Substance Use Topics    Smoking status: Former Smoker     Quit date: 8/14/1991    Smokeless tobacco: Never Used    Alcohol use No     ALLERGIES         Allergies   Allergen Reactions    Pcn [Penicillins] Swelling     MEDICATIONS         Current Outpatient Prescriptions on File Prior to Encounter   Medication Sig Dispense Refill    Cyanocobalamin (VITAMIN B 12 PO) Take by mouth      indomethacin (INDOCIN) 50 MG capsule Take 50 mg by mouth 2 times daily (with meals)      NONFORMULARY Indications: KAYLA RED      tamsulosin (FLOMAX) 0.4 MG capsule Take 1 capsule by mouth daily 90 capsule 3    aspirin 81 MG EC tablet Take 1 tablet by mouth daily 1 tablet 0    rivaroxaban (XARELTO) 20 MG TABS tablet TAKE 1 TABLET DAILY 90 tablet 3    Calcium Carbonate-Vitamin D (CALCIUM 600/VITAMIN D PO) Take 1 tablet by mouth 2 times daily      oxyCODONE-acetaminophen (PERCOCET) 5-325 MG per tablet Take 1 tablet by mouth every 4 hours as needed for Pain .       metoprolol tartrate (LOPRESSOR) 50 MG tablet TAKE 1 TABLET TWICE A  tablet 0    furosemide (LASIX) 40 MG tablet Take 1 tablet by mouth daily 90 tablet 2    levothyroxine (SYNTHROID) 50 MCG tablet Take 100 mcg by mouth Daily       cloNIDine (CATAPRES) 0.1 MG tablet Take 0.1 mg by mouth as needed for High Blood Pressure Takes if BP is greater than 175      hydrOXYzine (ATARAX) 25 MG tablet Take 25 mg by mouth 4 times daily as needed for Itching      loperamide (IMODIUM) 2 MG capsule Take 2 mg by mouth as needed for Diarrhea      potassium chloride (K-TAB) 10 MEQ extended release tablet Take 4 tablets by mouth daily 360 tablet 3    docusate sodium (COLACE) 100 MG capsule Take 300 mg by mouth 2 times daily       magnesium oxide (MAG-OX) 400 (241.3 MG) MG TABS tablet Take 1 tablet by mouth daily 30 tablet 0    allopurinol (ZYLOPRIM) 300 MG tablet Take 300 mg by mouth daily      quinapril (ACCUPRIL) 40 MG tablet Take 40 mg by mouth daily.  pravastatin (PRAVACHOL) 80 MG tablet Take 80 mg by mouth nightly.  indapamide (LOZOL) 1.25 MG tablet Take 1.25 mg by mouth every morning.  cyclobenzaprine (FLEXERIL) 10 MG tablet Take 10 mg by mouth every 8 hours as needed.  Ascorbic Acid (VITAMIN C) 500 MG tablet Take 500 mg by mouth daily. No current facility-administered medications on file prior to encounter. REVIEW OF SYSTEMS:   Noncontributory    PHYSICAL EXAM      oral temperature is 98 °F (36.7 °C). His blood pressure is 144/68 (abnormal) and his pulse is 72. His respiration is 18 and oxygen saturation is 98%. Wt Readings from Last 3 Encounters:   09/28/17 225 lb (102.1 kg)   09/27/17 231 lb (104.8 kg)   09/25/17 225 lb (102.1 kg)       General Appearance  Awake, alert, oriented,  not  In acute distress  HEENT - normocephalic, atraumatic, pink conjunctiva,  anicteric sclera  Neck - Supple, no mass  Neurologic - Oriented  Skin - No bruising or bleeding  Extremities -no edema on both lower extremities, he has open superficial wound on the left leg. Pulses are weak on both lower extremities  Wound 09/20/17 Leg Left; Lower #1 (Active)   Wound Type Wound 10/2/2017  1:18 PM   Dressing Status Intact 10/4/2017 11:07 AM   Dressing Changed Changed/New 10/4/2017 11:07 AM   Wound Cleansed Rinsed/Irrigated with saline 10/4/2017 11:07 AM   Wound Length (cm) 4.3 cm 10/4/2017 11:07 AM   Wound Width (cm) 4 cm 10/4/2017 11:07 AM   Wound Depth (cm)  0.1 10/4/2017 11:07 AM   Calculated Wound Size (cm^2) (l*w) 17.2 cm^2 10/4/2017 11:07 AM   Change in Wound Size % (l*w) -473.33 10/4/2017 11:07 AM   Wound Assessment Yellow;Pink 10/2/2017  1:18 PM   Margins Attached edges 10/4/2017 11:07 AM   [place]oda-wound Assessment Dry;Clean;Edema 10/4/2017 11:07 AM   [place]%Wound Bed 10 10/4/2017 11:07 AM   Red%Wound Bed 30 9/20/2017  9:13 AM   Yellow%Wound Bed 30 10/4/2017 11:07 AM   Other%Wound Bed 60 10/4/2017 11:07 AM N98.223F           Plan:     Patient examined and evaluated    Treatment: No orders of the defined types were placed in this encounter. Please see attached Discharge Instructions    Written patient dismissal instructions given to patient and signed by patient or POA. Discharge Instructions       Visit Discharge/Physician Orders: PT evaluation    Home Care:    Wound Location: lt lo  Cleanse wound with normal saline. Do not shower, take baths or get wound wet, unless otherwise instructed by your Wound Care doctor. Keep all dressings clean & dry. Dressing orders:   Left Leg: Aquacel Ag applied to wound bed. Leg wrapped with unna boot, cast padding, and coban (start at base of toes and wrap up to 1-2 inches below knee). Right Leg: may wear compression sock    *If unna boot becomes too tight- raise/elevate legs above the level of the heart for 15-20 minutes if swelling does not go down then carefully cut off unna boot and call clinic or go to local ER or family physician. If unna boot becomes wet or starts to roll down then carefully remove unna boot and call clinic. Follow up visit 2 Weeks. Keep next scheduled appointment. Please give 24 hour notice if unable to keep appointment. [phone]    If you experience any of the following, please call the Wound Care Service during business hours: [phone]. *Increase in pain   *Temperature over 101   *Increase in drainage from your wound or a foul odor   *Uncontrolled swelling   *Need for compression bandage changes due to slippage, breakthrough drainage    If you need medical attention outside of business hours, please contact your Primary Care Doctor or go to the nearest emergency room.                    Electronically signed by Harvey Key MD on 10/4/2017 at 11:23 AM

## 2017-10-04 NOTE — IP AVS SNAPSHOT
Patient Information     Patient Name Osmar Guillermo 1935         This is your updated medication list to keep with you all times      ASK your doctor about these medications     allopurinol 300 MG tablet   Commonly known as:  ZYLOPRIM       aspirin 81 MG EC tablet   Take 1 tablet by mouth daily       CALCIUM 600/VITAMIN D PO       cloNIDine 0.1 MG tablet   Commonly known as:  CATAPRES       cyclobenzaprine 10 MG tablet   Commonly known as:  FLEXERIL       docusate sodium 100 MG capsule   Commonly known as:  COLACE       furosemide 40 MG tablet   Commonly known as:  LASIX   Take 1 tablet by mouth daily       hydrOXYzine 25 MG tablet   Commonly known as:  ATARAX       indapamide 1.25 MG tablet   Commonly known as:  LOZOL       indomethacin 50 MG capsule   Commonly known as:  INDOCIN       levothyroxine 50 MCG tablet   Commonly known as:  SYNTHROID       loperamide 2 MG capsule   Commonly known as:  IMODIUM       magnesium oxide 400 (241.3 Mg) MG Tabs tablet   Commonly known as:  MAG-OX   Take 1 tablet by mouth daily       metoprolol tartrate 50 MG tablet   Commonly known as:  LOPRESSOR   TAKE 1 TABLET TWICE A DAY       NONFORMULARY       oxyCODONE-acetaminophen 5-325 MG per tablet   Commonly known as:  PERCOCET       potassium chloride 10 MEQ extended release tablet   Commonly known as:  K-TAB   Take 4 tablets by mouth daily       pravastatin 80 MG tablet   Commonly known as:  PRAVACHOL       quinapril 40 MG tablet   Commonly known as:  ACCUPRIL       rivaroxaban 20 MG Tabs tablet   Commonly known as:  XARELTO   TAKE 1 TABLET DAILY       tamsulosin 0.4 MG capsule   Commonly known as:  FLOMAX   Take 1 capsule by mouth daily       VITAMIN B 12 PO       vitamin C 500 MG tablet

## 2017-10-09 ENCOUNTER — HOSPITAL ENCOUNTER (OUTPATIENT)
Dept: WOUND CARE | Age: 82
Discharge: HOME | End: 2017-10-09
Payer: MEDICARE

## 2017-10-09 VITALS
TEMPERATURE: 97.7 F | OXYGEN SATURATION: 96 % | HEART RATE: 62 BPM | DIASTOLIC BLOOD PRESSURE: 63 MMHG | RESPIRATION RATE: 18 BRPM | SYSTOLIC BLOOD PRESSURE: 134 MMHG

## 2017-10-09 PROCEDURE — 29580 STRAPPING UNNA BOOT: CPT

## 2017-10-12 ENCOUNTER — HOSPITAL ENCOUNTER (OUTPATIENT)
Dept: WOUND CARE | Age: 82
Discharge: HOME | End: 2017-10-12
Payer: MEDICARE

## 2017-10-12 ENCOUNTER — HOSPITAL ENCOUNTER (OUTPATIENT)
Dept: PHYSICAL THERAPY | Age: 82
Setting detail: THERAPIES SERIES
Discharge: HOME | End: 2017-10-12
Payer: MEDICARE

## 2017-10-12 PROCEDURE — A6454 SELF-ADHER BAND W>=3" <5"/YD: HCPCS

## 2017-10-12 PROCEDURE — G8979 MOBILITY GOAL STATUS: HCPCS

## 2017-10-12 PROCEDURE — 97162 PT EVAL MOD COMPLEX 30 MIN: CPT

## 2017-10-12 PROCEDURE — 29580 STRAPPING UNNA BOOT: CPT

## 2017-10-12 PROCEDURE — A6456 ZINC PASTE BAND W >=3"<5"/YD: HCPCS

## 2017-10-12 PROCEDURE — G8978 MOBILITY CURRENT STATUS: HCPCS

## 2017-10-12 ASSESSMENT — PAIN DESCRIPTION - ORIENTATION: ORIENTATION: LOWER;RIGHT;LEFT

## 2017-10-12 ASSESSMENT — PAIN DESCRIPTION - PAIN TYPE: TYPE: CHRONIC PAIN

## 2017-10-12 ASSESSMENT — PAIN DESCRIPTION - LOCATION: LOCATION: BACK

## 2017-10-12 NOTE — PROGRESS NOTES
Present: Yes  Comments: Patient has follow up with Dr. Sandra Forbes next Wednesday 10/18/17. Subjective: Patient reports that he is wobbly when he stands or walks. He can only walk a little bit like a few steps (5-6 steps) Patient is able to walk <10 feet with quad cane but legs get weak. Wife reports that leg strength has not come back since low back surgery fall of 10/12/17. Patient has had to use 2 wheeled walker or quad cane for ambulation due to increasing weakness in legs. He was doing aquatherapy up to last Spring but had to stop going to aquatherapy due to wound on left lower leg. Patient has had a fall when his feet slipped on carpet 2 months ago. But no recent fall. \"I am really careful\". Pain:  Patient Currently in Pain: Yes  Pain Assessment: 0-10  Pain Type: Chronic pain  Pain Location: Back  Pain Orientation: Lower, Right, Left  Pain Radiating Towards: bilateral low back pain lumbar level. Intermittent numbness bilateral lower extremities to feet. Effect of Pain on Daily Activities: Notes increased back pain with standing and walking. Pain wakes him up at night and has to sit at side of bed. Sitting only position that does not bother his back. Social/Functional History:    Lives With: Spouse  Type of Home: House  Home Layout: Multi-level  Home Access: Level entry  Home Equipment: Quad cane     Bathroom Shower/Tub: Tub/Shower unit, Shower chair with back  Bathroom Toilet: Standard  Bathroom Equipment: Grab bars in shower  Bathroom Accessibility: Accessible       ADL Assistance: Needs assistance  Bath: Moderate assistance  Homemaking Assistance: Needs assistance       Active : No  Occupation: Retired  Leisure & Hobbies: draws    Objective                            Observation/Palpation  Observation: Patient limited with walking so place in w/c to take into PT clinic. Patient did bring quad cane with him. Not unna boot on left lower leg.   Compression stocking on right lower Patient is seated in a hard, armless chair   1 SITTING BALANCE 1 - Steady, safe   2. RISES FROM CHAIR 0 - Unable to without help   3. ATTEMPTS TO RISE 0 - Unable to without help   4. IMMEDIATE STANDING BALANCE (FIRST 5 SECONDS) 0 - Unsteady (staggers, moves feet, trunk sway)   5. STANDING BALANCE 1 - Steady but wide stance and uses support   6. NUDGED 0 - Begins to fall   7. EYES CLOSED 0 - Unsteady   8. TURNING 360 DEGREES 0 - Discontinuous steps and 0 - Unsteady (grabs,staggers)   9. SITTING DOWN 0 - Unsafe (misjudged distance,falls into chair)   BALANCE SCORE 2/16       GAIT SECTION Patient stands with therapist, walks across room (+/- aids), fist at usual pace, then at rapid pace. 1.  INDICATION OF GAIT (Immediately after told to \"go\" 0 - Any hesitancy or multiple attempts   2. STEP LENGTH AND HEIGHT 1 - Step through Right and 1 - Step through Left   3. FOOT CLEARANCE no heelstrike   4. STEP SYMMETRY 1 - Right and left step length appear equal   5. STEP CONTINUITY 1 - Steps appear continuous   6. PATH 1 - Mild/moderate deviation or uses walking aid   7. TRUNK 0 - Marked sway or uses walking aid   8. WALKING TIME 1 - Heels almost touching while walking   GAIT SCORE 6/12   TOTAL SCORE 8/28   Risk Indicators:  Less than/equal to 18 = high risk  19-23 Moderate risk  Greater than/equal to 24 = low risk              Activity Tolerance:  Activity Tolerance: Patient limited by fatigue    Assessment: Body structures, Functions, Activity limitations: Decreased functional mobility , Decreased ADL status, Decreased ROM, Decreased strength, Decreased safe awareness, Decreased endurance, Decreased balance  Assessment: Patient demonstrates high risk for fall due to decreased strength, balance and endurance. Note patient requires +1 ambulation with RW and transfers. Note decreased gait speed with gait deviations and limited endurance and decreased LE strength. Tinetti score 8/28.   Will follow 2x per week for presentation, and decision making during this evaluation, the evaluation of Corey Pennington  is of medium complexity. Goals:  Patient goals : To be able to walk and stand better. To be less wobbly and improve strength. Short term goals  Time Frame for Short term goals: 3 weeks   Short term goal 1: Patient to demonstrate increased LE strength at hip and knee m to 4/5 with ability to do chair sit to stand test 3x ini 15 seconds with use of UEs on armrests of chair. Short term goal 2: Patient to demonstrate increased LUE strength from 3+/5 to 4-/5 with ability to lift arm and use more efficiently when completing transfers. Short term goal 3: Patient to demonstrate increased walking distance using roller walker SBA of one person for 50 feet. . Gait speed from 0.38m/sec to 0.5m/second   Short term goal 4: Tinetti balance and gait score improved from 8/28 to 12/28. Short term goal 5: Patient modified independent in bed mobility supine<>sit in order to get in and out of bed with more ease and less assistance. Long term goals  Time Frame for Long term goals : 6 weeks   Long term goal 1: Patient to demonstrate increased walking distance using roller walker SBA of one person for 75 feet. GAit speed to 0.8 m/second  Long term goal 2: Tinetti balance and giat score improved from 12/28 to 15 /28. Long term goal 3: Patient independent in home ex program in order to meet above goals    PT G-Codes  Functional Assessment Tool Used: Tinetti   Score: 8/28  Functional Limitation: Mobility: Walking and moving around  Mobility: Walking and Moving Around Current Status (): At least 60 percent but less than 80 percent impaired, limited or restricted  Mobility: Walking and Moving Around Goal Status ():  At least 40 percent but less than 60 percent impaired, limited or restricted    Jimmy Reyes, [address] [place] [address]

## 2017-10-12 NOTE — PLAN OF CARE
Problem: Wound:  Goal: Will show signs of wound healing; wound closure and no evidence of infection  Will show signs of wound healing; wound closure and no evidence of infection  Outcome: Ongoing  Pt presents to clinic with ongoing care of left lower leg. No s/s of infection. Afebrile. Wound smaller in size. Aquacel ag applied to wound bed followed by jeremy Souza. Pt to leave dry and intact until follow up appt on Monday (nurse visit). Pt follows up with Dr. Tanja Mohs on Wednesday. Comments: Care plan reviewed with patient and pt wife. Patient and pt wife verbalize understanding of the plan of care and contribute to goal setting.

## 2017-10-16 ENCOUNTER — HOSPITAL ENCOUNTER (OUTPATIENT)
Dept: WOUND CARE | Age: 82
Discharge: HOME | End: 2017-10-16
Payer: MEDICARE

## 2017-10-16 ENCOUNTER — HOSPITAL ENCOUNTER (OUTPATIENT)
Dept: PHYSICAL THERAPY | Age: 82
Setting detail: THERAPIES SERIES
Discharge: HOME | End: 2017-10-16
Payer: MEDICARE

## 2017-10-16 VITALS
DIASTOLIC BLOOD PRESSURE: 66 MMHG | RESPIRATION RATE: 18 BRPM | TEMPERATURE: 97.8 F | HEIGHT: 68 IN | SYSTOLIC BLOOD PRESSURE: 136 MMHG | OXYGEN SATURATION: 95 % | WEIGHT: 225 LBS | HEART RATE: 70 BPM | BODY MASS INDEX: 34.1 KG/M2

## 2017-10-16 PROCEDURE — A6454 SELF-ADHER BAND W>=3" <5"/YD: HCPCS

## 2017-10-16 PROCEDURE — 97110 THERAPEUTIC EXERCISES: CPT

## 2017-10-16 PROCEDURE — 29580 STRAPPING UNNA BOOT: CPT

## 2017-10-16 PROCEDURE — A6456 ZINC PASTE BAND W >=3"<5"/YD: HCPCS

## 2017-10-16 PROCEDURE — A6197 ALGINATE DRSG >16 <=48 SQ IN: HCPCS

## 2017-10-16 NOTE — PROGRESS NOTES
[place]     Time In: 1231  Time Out: 1851  Minutes: 50  Timed Code Treatment Minutes: 44 Minutes     Date: 10/16/2017  Patient Name: Elizabeth Juarez,  Gender:  male        CSN: [id number]   : 1935  ([de-identified] y.o.)       Referring Practitioner: Dr. Rafael Tompkins. Alan      Diagnosis: Deconditioning with weakness in extremities  Treatment Diagnosis: deconditioned with BUE/BLE weakness effecting walking, balance. Additional Pertinent Hx: low back surgery, neck surgery 3x. open wound on left lower leg with Unna boot on leg since 17. Afib, CAD, DDD. General:  PT Visit Information  Onset Date: 10/12/17  PT Insurance Information: Medicare $3700 cap per calendar year for PT and ST. $3700 cap per calendar year for OT. aquatic therapy covered, modalities covered. HP, CP and iontophoresis not covered. no precert  Total # of Visits to Date: 2  Plan of Care/Certification Expiration Date: 17  Progress Note Counter: 2/10 for PN               Subjective:  Family / Caregiver Present: Yes  Comments: Patient has follow up with Dr. Steph Allison next Wednesday 10/18/17. Subjective: Pt reporting only having pain when he is up and walking and has no pain while sitting. Pain:  Patient Currently in Pain: Denies (7/10 in low back when up and walking or standing)         Objective  Exercises  Exercise 1: NuStep seat9/arm10 Level 1 x 5 minutes  Exercise 2: Seated on edge of mat table: LAQ, marching, hamstring curl and hip abd with red theraband, hip add with ball x 10  Exercise 3: Sit to stand from chair x 5 with cues for hand placement. Exercise 4: // bars marching x 10 and 3 way hip x 5 each. Patient unable to perform a heel raise. Exercise 5: Gait with rolling walker CGA x 1 with another following in a w/c. 80-90 feet x 2 Pt needing cues to keep head up during gait but had good speed and step length. Patient modified independent in bed mobility supine<>sit in order to get in and out of bed with more ease and less assistance. Long term goals  Time Frame for Long term goals : 6 weeks   Long term goal 1: Patient to demonstrate increased walking distance using roller walker SBA of one person for 75 feet. GAit speed to 0.8 m/second  Long term goal 2: Tinetti balance and giat score improved from 12/28 to 15 /28.    Long term goal 3: Patient independent in home ex program in order to meet above goals         Kenneth Lopez, [id number]

## 2017-10-18 ENCOUNTER — HOSPITAL ENCOUNTER (OUTPATIENT)
Dept: WOUND CARE | Age: 82
Discharge: HOME | End: 2017-10-18
Payer: MEDICARE

## 2017-10-18 VITALS
RESPIRATION RATE: 18 BRPM | SYSTOLIC BLOOD PRESSURE: 132 MMHG | OXYGEN SATURATION: 99 % | DIASTOLIC BLOOD PRESSURE: 66 MMHG | TEMPERATURE: 97.7 F | HEART RATE: 72 BPM

## 2017-10-18 DIAGNOSIS — I73.9 PAD (PERIPHERAL ARTERY DISEASE) (HCC): Primary | ICD-10-CM

## 2017-10-18 DIAGNOSIS — S81.802D OPEN WOUND OF LEFT LOWER LEG, SUBSEQUENT ENCOUNTER: ICD-10-CM

## 2017-10-18 DIAGNOSIS — R60.0 BILATERAL LEG EDEMA: ICD-10-CM

## 2017-10-18 PROCEDURE — 29580 STRAPPING UNNA BOOT: CPT

## 2017-10-18 PROCEDURE — A6456 ZINC PASTE BAND W >=3"<5"/YD: HCPCS

## 2017-10-18 PROCEDURE — A6454 SELF-ADHER BAND W>=3" <5"/YD: HCPCS

## 2017-10-18 ASSESSMENT — PAIN SCALES - GENERAL: PAINLEVEL_OUTOF10: 0

## 2017-10-18 NOTE — PROGRESS NOTES
[facility]          Progress Note and Procedure Note      Osmar Lewis RECORD NUMBER:  [id number]  AGE: [de-identified] y.o. GENDER: male  : 1935  EPISODE DATE:  10/18/2017    Subjective:     SUBJECTIVE     Chief Complaint   Patient presents with    Wound Check     left lower leg wound           HISTORY OF PRESENT ILLNESS   He was seen for follow up visit  He Has left leg wound. The wound is slowly getting smaller. He has no pain. He reports of having sensitivities Skin has been scratching. He is doing physical therapy.     PAST MEDICAL HISTORY         Diagnosis Date    Atrial fibrillation ([address]     Bell's palsy     CAD (coronary artery disease)     Cancer (HCC)     skin CA removed with dry ice    DDD (degenerative disc disease)     DVT (deep venous thrombosis) (HCC)     Gout     Hernia     Hx of blood clots     left leg DVT    Hyperlipidemia     Hypertension     Irregular cardiac rhythm 2016    Movement disorder     back pain    UTI (urinary tract infection)          PAST SURGICAL HISTORY     Past Surgical History:   Procedure Laterality Date    BACK SURGERY      lower    CARDIAC CATHETERIZATION      ok    CARDIAC CATHETERIZATION      ok    CHOLECYSTECTOMY      COLON SURGERY      6-8 in of descending colon removed    COLOSTOMY      COLOSTOMY      reversed    EYE SURGERY      band    HERNIA REPAIR      NECK SURGERY  2016    three   [address] OTHER SURGICAL HISTORY  2016    ACDF C4-5    RI COLSC FLX W/REMOVAL LESION BY HOT BX FORCEPS Left 2017    COLONOSCOPY POLYPECTOMY HOT BIOPSY performed by Bert Kaba MD at [facility]    UPPER GASTROINTESTINAL ENDOSCOPY N/A 2017    EGD CONTROL HEMORRHAGE performed by Bert Kaba MD at [facility]     FAMILY HISTORY         Family History   Problem Relation Age of Onset    Diabetes Mother     Cancer Father     Heart Disease Father     COPD Sister      SOCIAL HISTORY  indapamide (LOZOL) 1.25 MG tablet Take 1.25 mg by mouth every morning.  cyclobenzaprine (FLEXERIL) 10 MG tablet Take 10 mg by mouth every 8 hours as needed.  Ascorbic Acid (VITAMIN C) 500 MG tablet Take 500 mg by mouth daily.  loperamide (IMODIUM) 2 MG capsule Take 2 mg by mouth as needed for Diarrhea       No current facility-administered medications on file prior to encounter. REVIEW OF SYSTEMS:   Noncontributory    PHYSICAL EXAM      oral temperature is 97.7 °F (36.5 °C). His blood pressure is 132/66 and his pulse is 72. His respiration is 18 and oxygen saturation is 99%. Wt Readings from Last 3 Encounters:   10/16/17 225 lb (102.1 kg)   09/28/17 225 lb (102.1 kg)   09/27/17 231 lb (104.8 kg)       General Appearance  Awake, alert, oriented,  not  In acute distress  HEENT - normocephalic, atraumatic, pink conjunctiva,  anicteric sclera  Neck - Supple, no mass  Neurologic - Oriented  Skin - No bruising or bleeding  Extremities -no edema on both lower extremities, he has open  wound on the left leg. Pulses are weak on both lower extremities. The wound measurement is smaller. Wound 09/20/17 Leg Left; Lower #1 (Active)   Wound Type Wound 10/18/2017 11:14 AM   Dressing Status Intact 10/18/2017 11:14 AM   Dressing Changed Changed/New 10/18/2017 11:14 AM   Wound Cleansed Rinsed/Irrigated with saline 10/16/2017  1:40 PM   Wound Length (cm) 3.8 cm 10/18/2017 11:14 AM   Wound Width (cm) 3.6 cm 10/18/2017 11:14 AM   Wound Depth (cm)  0.05 10/18/2017 11:14 AM   Calculated Wound Size (cm^2) (l*w) 13.68 cm^2 10/18/2017 11:14 AM   Change in Wound Size % (l*w) -356 10/18/2017 11:14 AM   Wound Assessment Yellow;Red 10/18/2017 11:14 AM   Margins Attached edges 10/18/2017 11:14 AM   Rhoda-wound Assessment Dry 10/18/2017 11:14 AM   Aspers%Wound Bed 10 10/12/2017  1:35 PM   Red%Wound Bed 20 10/18/2017 11:14 AM   Yellow%Wound Bed 10 10/18/2017 11:14 AM   Other%Wound Bed 70 10/18/2017 11:14 AM Drainage Amount Scant 10/18/2017 11:14 AM   Drainage Description Serosanguinous 10/18/2017 11:14 AM   Odor None 10/18/2017 11:14 AM   Op First Treatment Date 09/20/17 9/20/2017  9:13 AM   Number of days: 28             Assessment:   Nonhealing leg wound: We'll continue compression wrap. Follow-up visit will be scheduled. Continue physical therapy   Patient Active Problem List   Diagnosis Code    Cervical spinal cord compression ([facility] G95.20    Essential hypertension I10    Leukocytosis D72.829    Hypokalemia E87.6    Spinal stenosis, lumbar region, with neurogenic claudication M48.062    Idiopathic hypotension I95.0    Chronic atrial fibrillation ([facility]) I48.2    Hypokalemia E87.6    Prostatism N40.0    Elevated TSH R94.6    PAD (peripheral artery disease) ([facility]) I73.9    Bilateral leg edema R60.0    Open wound of left lower leg S81.802A           Plan:     Patient examined and evaluated    Treatment: No orders of the defined types were placed in this encounter. Please see attached Discharge Instructions    Written patient dismissal instructions given to patient and signed by patient or POA. Discharge Instructions       Visit Discharge/Physician Orders:  - Try hydrocortisone to dry itching spots for a few days. Can get over the counter.  -Make appt with family doctor to review medications for skin reaction.  -Keep unna boot dry and clean. Wound Location: Bilateral Lower Legs     Do not shower, take baths or get wound wet, unless otherwise instructed by your Wound Care doctor. Keep all dressings clean & dry. Dressing orders:    Left Leg: Aquacel Ag applied to wound bed. Leg wrapped with unna boot and coban (start at base of toes and wrap up to 1-2 inches below knee). Change twice weekly in wound clinic. Right Leg: Wear compression hose to right lower leg.     *If unna boot becomes too tight- raise/elevate legs above the level of the heart for 15-20 minutes if swelling does not go down then carefully cut off unna boot and call clinic or go to local ER or family physician. If unna boot becomes wet or starts to roll down then carefully remove unna boot and call clinic. Appointments :     Nurse Visits:  - Monday October 23rd, 2017 at  - Thursday October 26th, 2017 at  - Monday October 30th, 2017 at   - Thursday November 2nd, 2017 at  - Monday November 6th, 2017 at  - Thursday November 9th, 2017 at  - Monday November 13th, 2017 at     Follow up with Dr. Jackie Valencia: Wednesday November 15th, 2017 at       Keep next scheduled appointment. Please give 24 hour notice if unable to keep appointment. [phone]    If you experience any of the following, please call the Wound Care Service during business hours: [phone]. *Increase in pain   *Temperature over 101   *Increase in drainage from your wound or a foul odor   *Uncontrolled swelling   *Need for compression bandage changes due to slippage, breakthrough drainage    If you need medical attention outside of business hours, please contact your Primary Care Doctor or go to the nearest emergency room.                    Electronically signed by Herber Turner MD on 10/18/2017 at 11:42 AM

## 2017-10-19 ENCOUNTER — HOSPITAL ENCOUNTER (OUTPATIENT)
Dept: PHYSICAL THERAPY | Age: 82
Setting detail: THERAPIES SERIES
Discharge: HOME | End: 2017-10-19
Payer: MEDICARE

## 2017-10-19 PROCEDURE — 97110 THERAPEUTIC EXERCISES: CPT

## 2017-10-19 ASSESSMENT — PAIN DESCRIPTION - LOCATION: LOCATION: BACK

## 2017-10-19 ASSESSMENT — PAIN DESCRIPTION - PAIN TYPE: TYPE: CHRONIC PAIN

## 2017-10-19 ASSESSMENT — PAIN SCALES - GENERAL: PAINLEVEL_OUTOF10: 5

## 2017-10-19 ASSESSMENT — PAIN DESCRIPTION - ORIENTATION: ORIENTATION: LOWER

## 2017-10-19 NOTE — PROGRESS NOTES
unsteady. Exercise 7: Rocker Board F/B x 10 and Balance 30 seconds with bilateral UE support and CGA. Activity Tolerance:  Activity Tolerance: Patient Tolerated treatment well    Assessment:  Assessment: Progressed with more walking and standing exercises with good tolerance. Pt needing few cues for less UE support while in // bars and during gait with fair compliance. Pt denies pain during session but did report having more fatigue at end of session. Prognosis: Fair       Patient Education:  Patient Education: Monitor fatigue/response to session. Plan:  Times per week: 2x per week  Plan weeks: 6 weeks  Specific instructions for Next Treatment: Nustep, LE strengthening seated position LAQS, leg curls with theraband, sit to stand transitions. Balance in // bars and LE strengthening. Gait training with roller walker with +2 assist.    Current Treatment Recommendations: Strengthening, ROM, Balance Training, Functional Mobility Training, Transfer Training, Home Exercise Program, Gait Training, Endurance Training, Patient/Caregiver Education & Training    Goals:  Patient goals : To be able to walk and stand better. To be less wobbly and improve strength. Short term goals  Time Frame for Short term goals: 3 weeks   Short term goal 1: Patient to demonstrate increased LE strength at hip and knee m to 4/5 with ability to do chair sit to stand test 3x ini 15 seconds with use of UEs on armrests of chair. Short term goal 2: Patient to demonstrate increased LUE strength from 3+/5 to 4-/5 with ability to lift arm and use more efficiently when completing transfers. Short term goal 3: Patient to demonstrate increased walking distance using roller walker SBA of one person for 50 feet. . Gait speed from 0.38m/sec to 0.5m/second   Short term goal 4: Tinetti balance and gait score improved from 8/28 to 12/28.    Short term goal 5: Patient modified independent in bed mobility supine<>sit in order to get in and out of bed with more ease and less assistance. Long term goals  Time Frame for Long term goals : 6 weeks   Long term goal 1: Patient to demonstrate increased walking distance using roller walker SBA of one person for 75 feet. GAit speed to 0.8 m/second  Long term goal 2: Tinetti balance and giat score improved from 12/28 to 15 /28.    Long term goal 3: Patient independent in home ex program in order to meet above goals         Ngozi Parson, [id number]

## 2017-10-23 ENCOUNTER — HOSPITAL ENCOUNTER (OUTPATIENT)
Dept: WOUND CARE | Age: 82
Discharge: HOME | End: 2017-10-23
Payer: MEDICARE

## 2017-10-23 VITALS
TEMPERATURE: 98.2 F | DIASTOLIC BLOOD PRESSURE: 59 MMHG | RESPIRATION RATE: 18 BRPM | HEART RATE: 77 BPM | OXYGEN SATURATION: 97 % | SYSTOLIC BLOOD PRESSURE: 137 MMHG

## 2017-10-23 DIAGNOSIS — I73.9 PAD (PERIPHERAL ARTERY DISEASE) (HCC): ICD-10-CM

## 2017-10-23 DIAGNOSIS — R60.0 BILATERAL LEG EDEMA: ICD-10-CM

## 2017-10-23 DIAGNOSIS — S81.802D OPEN WOUND OF LEFT LOWER LEG, SUBSEQUENT ENCOUNTER: ICD-10-CM

## 2017-10-23 PROCEDURE — 29580 STRAPPING UNNA BOOT: CPT

## 2017-10-23 PROCEDURE — A6454 SELF-ADHER BAND W>=3" <5"/YD: HCPCS

## 2017-10-23 PROCEDURE — A6446 CONFORM BAND S W>=3" <5"/YD: HCPCS

## 2017-10-23 ASSESSMENT — PAIN SCALES - GENERAL: PAINLEVEL_OUTOF10: 0

## 2017-10-23 NOTE — PLAN OF CARE
Problem: Wound:  Goal: Will show signs of wound healing; wound closure and no evidence of infection  Will show signs of wound healing; wound closure and no evidence of infection  Outcome: Ongoing  Pt presents to clinic with ongoing care of left leg wound. Wound measures smaller in size. No s/s of infection. Pt afebrile. No odor noted from wound bed. Aquacel ag applied to wound bed followed by jeremy Souza. Twice weekly dressing changes in wound clinic. Pt to follow up on Thursday for dsg change. Comments: Care plan reviewed with patient. Patient verbalizes understanding of the plan of care and contribute to goal setting.

## 2017-10-24 ENCOUNTER — HOSPITAL ENCOUNTER (OUTPATIENT)
Dept: PHYSICAL THERAPY | Age: 82
Setting detail: THERAPIES SERIES
Discharge: HOME | End: 2017-10-24
Payer: MEDICARE

## 2017-10-24 PROCEDURE — 97110 THERAPEUTIC EXERCISES: CPT

## 2017-10-24 ASSESSMENT — PAIN DESCRIPTION - LOCATION: LOCATION: BACK

## 2017-10-24 ASSESSMENT — PAIN DESCRIPTION - PAIN TYPE: TYPE: CHRONIC PAIN

## 2017-10-24 ASSESSMENT — PAIN DESCRIPTION - ORIENTATION: ORIENTATION: LOWER

## 2017-10-24 ASSESSMENT — PAIN SCALES - GENERAL: PAINLEVEL_OUTOF10: 7

## 2017-10-24 NOTE — PROGRESS NOTES
with blue foam in chair x 5 without  hands  Exercise 5: Gait with rolling walker CGA x 1 with another following in a w/c. `25feet x 1 and 20 feet x1  Pt needing cues to keep head up during gait  and to relax shoulders with fair compliance. Exercise 8: issued and instructed in seated home ex program.          Activity Tolerance:  Activity Tolerance: Patient Tolerated treatment well  Activity Tolerance: Reported of back pain while sitting on Nustep seat following cycling on it. Assessment: Body structures, Functions, Activity limitations: Decreased functional mobility , Decreased ADL status, Decreased ROM, Decreased strength, Decreased safe awareness, Decreased endurance, Decreased balance  Assessment: Concentrated on home ex program today and issued printed ex program of seated ex for patient to follow 2x per day. Patient demonstrated decreased walking distance today with tremoring noted at UEs and fatigue at LEs. Prognosis: Fair  Discharge Recommendations: Continue to assess pending progress    Patient Education:  Patient Education: Issued home ex program of seated ex covered in session. Patient to do these 2x per day. Plan:  Times per week: 2x per week  Plan weeks: 6 weeks  Specific instructions for Next Treatment: Nustep, LE strengthening seated position LAQS, leg curls with theraband, sit to stand transitions. Balance in // bars and LE strengthening. Gait training with roller walker with +2 assist.    Current Treatment Recommendations: Strengthening, ROM, Balance Training, Functional Mobility Training, Transfer Training, Home Exercise Program, Gait Training, Endurance Training, Patient/Caregiver Education & Training    Goals:  Patient goals : To be able to walk and stand better. To be less wobbly and improve strength.      Short term goals  Time Frame for Short term goals: 3 weeks   Short term goal 1: Patient to demonstrate increased LE strength at hip and knee m to 4/5 with ability to do chair sit to stand test 3x ini 15 seconds with use of UEs on armrests of chair. Short term goal 2: Patient to demonstrate increased LUE strength from 3+/5 to 4-/5 with ability to lift arm and use more efficiently when completing transfers. Short term goal 3: Patient to demonstrate increased walking distance using roller walker SBA of one person for 50 feet. . Gait speed from 0.38m/sec to 0.5m/second   Short term goal 4: Tinetti balance and gait score improved from 8/28 to 12/28. Short term goal 5: Patient modified independent in bed mobility supine<>sit in order to get in and out of bed with more ease and less assistance. Long term goals  Time Frame for Long term goals : 6 weeks   Long term goal 1: Patient to demonstrate increased walking distance using roller walker SBA of one person for 75 feet. GAit speed to 0.8 m/second  Long term goal 2: Tinetti balance and giat score improved from 12/28 to 15 /28.    Long term goal 3: Patient independent in home ex program in order to meet above [place] [id number] [place], [address] [facility]

## 2017-10-26 ENCOUNTER — HOSPITAL ENCOUNTER (OUTPATIENT)
Dept: WOUND CARE | Age: 82
Discharge: HOME | End: 2017-10-26
Payer: MEDICARE

## 2017-10-26 VITALS
DIASTOLIC BLOOD PRESSURE: 67 MMHG | BODY MASS INDEX: 34.25 KG/M2 | HEART RATE: 70 BPM | RESPIRATION RATE: 18 BRPM | TEMPERATURE: 97.6 F | WEIGHT: 226 LBS | HEIGHT: 68 IN | OXYGEN SATURATION: 99 % | SYSTOLIC BLOOD PRESSURE: 139 MMHG

## 2017-10-26 DIAGNOSIS — S81.802D OPEN WOUND OF LEFT LOWER LEG, SUBSEQUENT ENCOUNTER: ICD-10-CM

## 2017-10-26 DIAGNOSIS — I73.9 PAD (PERIPHERAL ARTERY DISEASE) (HCC): ICD-10-CM

## 2017-10-26 DIAGNOSIS — R60.0 BILATERAL LEG EDEMA: ICD-10-CM

## 2017-10-26 PROCEDURE — A6197 ALGINATE DRSG >16 <=48 SQ IN: HCPCS

## 2017-10-26 PROCEDURE — 29581 APPL MULTLAYER CMPRN SYS LEG: CPT

## 2017-10-26 PROCEDURE — 29580 STRAPPING UNNA BOOT: CPT

## 2017-10-26 PROCEDURE — A6454 SELF-ADHER BAND W>=3" <5"/YD: HCPCS

## 2017-10-26 PROCEDURE — A6456 ZINC PASTE BAND W >=3"<5"/YD: HCPCS

## 2017-10-26 NOTE — PLAN OF CARE
Problem: Wound:  Goal: Will show signs of wound healing; wound closure and no evidence of infection  Will show signs of wound healing; wound closure and no evidence of infection  Outcome: Ongoing  Pt presents to clinic with ongoing care of left lower leg wound. No s/s of infection. Pt afebrile. Aquacel ag applied to wound bed followed by unna boot and coban from base of toes to 2 inches below bend of knee. Pt to follow up on Monday for unna boot change. Pt wears compression stocking to right lower leg. Comments: Care plan reviewed with patient and pt wife. Patient and pt wife verbalize understanding of the plan of care and contribute to goal setting.

## 2017-10-27 ENCOUNTER — HOSPITAL ENCOUNTER (OUTPATIENT)
Dept: PHYSICAL THERAPY | Age: 82
Setting detail: THERAPIES SERIES
Discharge: HOME | End: 2017-10-27
Payer: MEDICARE

## 2017-10-27 PROCEDURE — 97110 THERAPEUTIC EXERCISES: CPT

## 2017-10-27 ASSESSMENT — PAIN SCALES - GENERAL: PAINLEVEL_OUTOF10: 7

## 2017-10-27 ASSESSMENT — PAIN DESCRIPTION - ORIENTATION: ORIENTATION: LOWER

## 2017-10-27 ASSESSMENT — PAIN DESCRIPTION - PAIN TYPE: TYPE: CHRONIC PAIN

## 2017-10-27 ASSESSMENT — PAIN DESCRIPTION - LOCATION: LOCATION: BACK

## 2017-10-27 NOTE — PROGRESS NOTES
[place]     Time In: 5022  Time Out: 1510  Minutes: 55  Timed Code Treatment Minutes: 55 Minutes     Date: 10/27/2017  Patient Name: Anthony Ng,  Gender:  male        CSN: [id number]   : 1935  ([de-identified] y.o.)       Referring Practitioner: Dr. Myesha Barrow. Alan      Diagnosis: Deconditioning with weakness in extremities  Treatment Diagnosis: deconditioned with BUE/BLE weakness effecting walking, balance. Additional Pertinent Hx: low back surgery, neck surgery 3x. open wound on left lower leg with Unna boot on leg since 17. Afib, CAD, DDD. General:  PT Visit Information  Onset Date: 10/12/17  PT Insurance Information: Medicare $3700 cap per calendar year for PT and ST. $3700 cap per calendar year for OT. aquatic therapy covered, modalities covered. HP, CP and iontophoresis not covered. no precert  Total # of Visits to Date: 5  Plan of Care/Certification Expiration Date: 17  Progress Note Counter: 5/10 for PN                Subjective:  Patient assessed for rehabilitation services?: Yes  Family / Caregiver Present: Yes  Comments: Patient has follow up with  [facility] next Wednesday  11/15/18      Subjective: Patient and wfe report that he has had a rash on his back, shoulders, and legs. It is getting worse. He has an appoitnment with dermatologist on 17. Patient feels therapy is going well. He still has difficulty standing or walking for any distance due to back pain. Endurance is better.       Pain:  Patient Currently in Pain: Yes  Pain Assessment: 0-10  Pain Level: 7 (walking and standing can increase upto 9/10 )  Pain Type: Chronic pain  Pain Location: Back  Pain Orientation: Lower      Objective         Exercises  Exercise 2: Seated  LAQ 2# wt x10,  ,hamstring curl red band x10 and hip abd with red theraband x15, hip add with ball x 15, seated hip extension with theraband red at ball of foot and pushing down toward floor. Exercise 3: Sit to stand from w/c  with blue foam in chair x 5 without  hands with therapist SBA to CGA   Exercise 5: Gait with rolling walker CGA x 1 with another following in a w/c. `50 feet x1, 170 feet x1. Pt needing cues to keep head up during gait  and to relax shoulders with fair compliance. Exercise 9: standing reaching arms overhead x5 and maintaining balance. 2 persons SBA   Exercise 10: UE strengthening:  red band horizontal abduction, shoulder extension, rows, bicep curls x10 , shoulder flexion x10 and forward press x10          Activity Tolerance:  Activity Tolerance: Patient Tolerated treatment well  Activity Tolerance: able to progress to UE strengthening program with theraband. Limited standing ex due to back pain. Assessment: Body structures, Functions, Activity limitations: Decreased functional mobility , Decreased ADL status, Decreased ROM, Decreased strength, Decreased safe awareness, Decreased endurance, Decreased balance  Assessment: Progressed session with UE strengthening ex with red theraband and LE strengthening with theraband with leg press. Added 2# wt for LAQs. Ambulated 170 feet x1 with roller walker with CGA of one person and +1 following with w/c. Noting improved endurance today. Prognosis: Fair  Discharge Recommendations: Continue to assess pending progress    Patient Education:  Patient Education: Issued home ex program for UE strengthening with red theraband in seated position. Patient to continue also with seated LE strengthening ex as issued previous session. Plan:  Times per week: 2x per week  Plan weeks: 6 weeks  Specific instructions for Next Treatment: Nustep, LE strengthening seated position LAQS, leg curls with theraband, sit to stand transitions. Balance in // bars and progress in UE/  LE strengthening.  Gait training with roller walker with +2 assist.    Current Treatment Recommendations:

## 2017-10-30 ENCOUNTER — HOSPITAL ENCOUNTER (OUTPATIENT)
Dept: WOUND CARE | Age: 82
Discharge: HOME | End: 2017-10-30
Payer: MEDICARE

## 2017-10-30 ENCOUNTER — HOSPITAL ENCOUNTER (OUTPATIENT)
Dept: PHYSICAL THERAPY | Age: 82
Setting detail: THERAPIES SERIES
Discharge: HOME | End: 2017-10-30
Payer: MEDICARE

## 2017-10-30 VITALS
HEART RATE: 75 BPM | WEIGHT: 226 LBS | RESPIRATION RATE: 16 BRPM | BODY MASS INDEX: 34.36 KG/M2 | TEMPERATURE: 98 F | SYSTOLIC BLOOD PRESSURE: 135 MMHG | DIASTOLIC BLOOD PRESSURE: 79 MMHG

## 2017-10-30 DIAGNOSIS — S81.802A OPEN WOUND OF LEFT LOWER LEG, INITIAL ENCOUNTER: Primary | ICD-10-CM

## 2017-10-30 PROCEDURE — 97110 THERAPEUTIC EXERCISES: CPT

## 2017-10-30 PROCEDURE — 29580 STRAPPING UNNA BOOT: CPT

## 2017-10-30 PROCEDURE — A6456 ZINC PASTE BAND W >=3"<5"/YD: HCPCS

## 2017-10-30 ASSESSMENT — PAIN SCALES - GENERAL: PAINLEVEL_OUTOF10: 7

## 2017-11-02 ENCOUNTER — OFFICE VISIT (OUTPATIENT)
Dept: CARDIOLOGY CLINIC | Age: 82
End: 2017-11-02
Payer: MEDICARE

## 2017-11-02 VITALS
DIASTOLIC BLOOD PRESSURE: 90 MMHG | HEART RATE: 76 BPM | SYSTOLIC BLOOD PRESSURE: 152 MMHG | HEIGHT: 68 IN | BODY MASS INDEX: 33.83 KG/M2 | WEIGHT: 223.2 LBS

## 2017-11-02 DIAGNOSIS — I10 ESSENTIAL HYPERTENSION: ICD-10-CM

## 2017-11-02 DIAGNOSIS — I48.0 PAROXYSMAL ATRIAL FIBRILLATION (HCC): ICD-10-CM

## 2017-11-02 DIAGNOSIS — I25.10 CORONARY ARTERY DISEASE INVOLVING NATIVE CORONARY ARTERY OF NATIVE HEART WITHOUT ANGINA PECTORIS: Primary | ICD-10-CM

## 2017-11-02 PROCEDURE — 4040F PNEUMOC VAC/ADMIN/RCVD: CPT | Performed by: NUCLEAR MEDICINE

## 2017-11-02 PROCEDURE — G8427 DOCREV CUR MEDS BY ELIG CLIN: HCPCS | Performed by: NUCLEAR MEDICINE

## 2017-11-02 PROCEDURE — G8417 CALC BMI ABV UP PARAM F/U: HCPCS | Performed by: NUCLEAR MEDICINE

## 2017-11-02 PROCEDURE — G8598 ASA/ANTIPLAT THER USED: HCPCS | Performed by: NUCLEAR MEDICINE

## 2017-11-02 PROCEDURE — 1123F ACP DISCUSS/DSCN MKR DOCD: CPT | Performed by: NUCLEAR MEDICINE

## 2017-11-02 PROCEDURE — G8484 FLU IMMUNIZE NO ADMIN: HCPCS | Performed by: NUCLEAR MEDICINE

## 2017-11-02 PROCEDURE — 1036F TOBACCO NON-USER: CPT | Performed by: NUCLEAR MEDICINE

## 2017-11-02 PROCEDURE — 99213 OFFICE O/P EST LOW 20 MIN: CPT | Performed by: NUCLEAR MEDICINE

## 2017-11-02 NOTE — PROGRESS NOTES
date: 8/14/1991    Smokeless tobacco: Never Used    Alcohol use No      Current Outpatient Prescriptions   Medication Sig Dispense Refill    Cyanocobalamin (VITAMIN B 12 PO) Take by mouth      indomethacin (INDOCIN) 50 MG capsule Take 50 mg by mouth 2 times daily (with meals)      NONFORMULARY Indications: KAYLA RED      tamsulosin (FLOMAX) 0.4 MG capsule Take 1 capsule by mouth daily 90 capsule 3    aspirin 81 MG EC tablet Take 1 tablet by mouth daily 1 tablet 0    rivaroxaban (XARELTO) 20 MG TABS tablet TAKE 1 TABLET DAILY 90 tablet 3    Calcium Carbonate-Vitamin D (CALCIUM 600/VITAMIN D PO) Take 1 tablet by mouth 2 times daily      oxyCODONE-acetaminophen (PERCOCET) 5-325 MG per tablet Take 1 tablet by mouth every 4 hours as needed for Pain .  metoprolol tartrate (LOPRESSOR) 50 MG tablet TAKE 1 TABLET TWICE A  tablet 0    furosemide (LASIX) 40 MG tablet Take 1 tablet by mouth daily 90 tablet 2    levothyroxine (SYNTHROID) 50 MCG tablet Take 100 mcg by mouth Daily       hydrOXYzine (ATARAX) 25 MG tablet Take 25 mg by mouth 4 times daily as needed for Itching      loperamide (IMODIUM) 2 MG capsule Take 2 mg by mouth as needed for Diarrhea      potassium chloride (K-TAB) 10 MEQ extended release tablet Take 4 tablets by mouth daily 360 tablet 3    docusate sodium (COLACE) 100 MG capsule Take 300 mg by mouth 2 times daily       magnesium oxide (MAG-OX) 400 (241.3 MG) MG TABS tablet Take 1 tablet by mouth daily 30 tablet 0    allopurinol (ZYLOPRIM) 300 MG tablet Take 300 mg by mouth daily      quinapril (ACCUPRIL) 40 MG tablet Take 40 mg by mouth daily.  pravastatin (PRAVACHOL) 80 MG tablet Take 80 mg by mouth nightly.  indapamide (LOZOL) 1.25 MG tablet Take 1.25 mg by mouth every morning.  cyclobenzaprine (FLEXERIL) 10 MG tablet Take 10 mg by mouth every 8 hours as needed.  Ascorbic Acid (VITAMIN C) 500 MG tablet Take 500 mg by mouth daily.         cloNIDine (CATAPRES) 0.1 MG tablet Take 0.1 mg by mouth as needed for High Blood Pressure Takes if BP is greater than 175       No current facility-administered medications for this visit. Allergies   Allergen Reactions    Pcn [Penicillins] Swelling     Health Maintenance   Topic Date Due    DTaP/Tdap/Td vaccine (1 - Tdap) [birth year]/1956    Zostavax vaccine  06/13/1997    Pneumococcal low/med risk (1 of 2 - PCV13) 06/13/2002    Flu vaccine (1) 09/01/2017       Subjective:  Review of Systems  General:   No fever, no chills, some fatigue or weight loss  Pulmonary:    some dyspnea, no wheezing  Cardiac:    Denies recent chest pain,   GI:     No nausea or vomiting, no abdominal pain  Neuro:     No dizziness or light headedness,   Musculoskeletal:  No recent active issues  Extremities:   No edema, good peripheral pulses      Objective:  Physical Exam  BP (!) 152/90   Pulse 76   Ht 5' 8\" (1.727 m)   Wt 223 lb 3.2 oz (101.2 kg)   BMI 33.94 kg/m²   General:   Well developed, well nourished  Lungs:    Clear to auscultation  Heart:    Normal S1 S2, Slight murmur. no rubs, no gallops  Abdomen:   Soft, non tender, no organomegalies, positive bowel sounds  Extremities:   No edema, no cyanosis, good peripheral pulses  Neurological:   Awake, alert, oriented. No obvious focal deficits  Musculoskelatal:  No obvious deformities    Assessment:     1. Coronary artery disease involving native coronary artery of native heart without angina pectoris     2. Essential hypertension     3. Paroxysmal atrial fibrillation (HCC)     cardiac stable for now     Plan:  No Follow-up on file. As above  Continue risk factor modification and medical management  Thank you for allowing me to participate in the care of your patient. Please don't hesitate to contact me regarding any further issues related to the patient care    Orders Placed:  No orders of the defined types were placed in this encounter.       Medications Prescribed:  No orders of the defined types were placed in this encounter. Discussed use, benefit, and side effects of prescribed medications. All patient questions answered. Pt voiced understanding. Instructed to continue current medications, diet and exercise. Continue risk factor modification and medical management. Patient agreed with treatment plan. Follow up as directed.     Electronically signed by Karyna Reyes MD on 11/2/2017 at 11:44 AM

## 2017-11-03 ENCOUNTER — HOSPITAL ENCOUNTER (OUTPATIENT)
Dept: PHYSICAL THERAPY | Age: 82
Setting detail: THERAPIES SERIES
Discharge: HOME | End: 2017-11-03
Payer: MEDICARE

## 2017-11-03 PROCEDURE — 97110 THERAPEUTIC EXERCISES: CPT

## 2017-11-03 ASSESSMENT — PAIN DESCRIPTION - PAIN TYPE: TYPE: CHRONIC PAIN

## 2017-11-03 ASSESSMENT — PAIN DESCRIPTION - LOCATION: LOCATION: BACK

## 2017-11-03 ASSESSMENT — PAIN SCALES - GENERAL: PAINLEVEL_OUTOF10: 7

## 2017-11-03 ASSESSMENT — PAIN DESCRIPTION - ORIENTATION: ORIENTATION: LOWER

## 2017-11-03 NOTE — PROGRESS NOTES
1 SITTING BALANCE 1 - Steady, safe   2. RISES FROM CHAIR 1 - Able, uses arms to help   3. ATTEMPTS TO RISE 1 - Able, requires > 1 attempt   4. IMMEDIATE STANDING BALANCE (FIRST 5 SECONDS) 1 - Steady but wide stance and uses support   5. STANDING BALANCE 2 - Narrow stance without support   6. NUDGED 2 - Steady   7. EYES CLOSED 0 - Unsteady   8. TURNING 360 DEGREES 0   9. SITTING DOWN 1 - Uses arms or not a smooth motion   BALANCE SCORE 9/16       GAIT SECTION Patient stands with therapist, walks across room (+/- aids), fist at usual pace, then at rapid pace. 1.  INDICATION OF GAIT (Immediately after told to \"go\" 1 - No hesitancy   2. STEP LENGTH AND HEIGHT 1 - Step through Right and 1 - Step through Left   3. FOOT CLEARANCE 1 - Left foot clears floor and 1 - Right foot clears floor   4. STEP SYMMETRY 1 - Right and left step length appear equal   5. STEP CONTINUITY 1 - Steps appear continuous   6. PATH 1 - Mild/moderate deviation or uses walking aid   7. TRUNK 0 - Marked sway or uses walking aid   8. WALKING TIME 1 - Heels almost touching while walking   GAIT SCORE 9/12   TOTAL SCORE 18/28   Risk Indicators:  Less than/equal to 18 = high risk  19-23 Moderate risk  Greater than/equal to 24 = low risk            Exercises  Exercise 1: NuStep seat9/arm10 Level 2 x  6  minutes         Activity Tolerance:  Activity Tolerance: Patient Tolerated treatment well    Assessment: Body structures, Functions, Activity limitations: Decreased functional mobility , Decreased ADL status, Decreased ROM, Decreased strength, Decreased safe awareness, Decreased endurance, Decreased balance  Assessment: progress note today. Tinetti score improved to 18/28. Note increased LE strength with increased sit to stand transfers, increased gait speed with roller walker. Will follow wx per week for 3 more weeks. Patient Education:  Patient Education: Continue HEP as issued. Will follow 3 more weeks. modified independent in bed mobility supine<>sit in order to get in and out of bed with more ease and less assistance. Long term goals  Time Frame for Long term goals : 6 weeks ONGOING Address at 6 weeks. Long term goal 1: Patient to demonstrate increased walking distance using roller walker SBA of one person for 75 feet. GAit speed to 0.8 m/second  Long term goal 2: Tinetti balance and gait score improved from 12/28 to 15 /28. GOAL MET Tinetti 18/28. Revise goal: Tinetti balance and gait score to 20/28.    Long term goal 3: Patient independent in home ex program in order to meet above [place] [id number] [place], [address]

## 2017-11-06 ENCOUNTER — HOSPITAL ENCOUNTER (OUTPATIENT)
Dept: WOUND CARE | Age: 82
Discharge: HOME | End: 2017-11-06
Payer: MEDICARE

## 2017-11-06 ENCOUNTER — HOSPITAL ENCOUNTER (OUTPATIENT)
Dept: PHYSICAL THERAPY | Age: 82
Setting detail: THERAPIES SERIES
Discharge: HOME | End: 2017-11-06
Payer: MEDICARE

## 2017-11-06 VITALS
DIASTOLIC BLOOD PRESSURE: 77 MMHG | RESPIRATION RATE: 16 BRPM | SYSTOLIC BLOOD PRESSURE: 164 MMHG | TEMPERATURE: 98.2 F | HEART RATE: 78 BPM | OXYGEN SATURATION: 99 %

## 2017-11-06 DIAGNOSIS — R60.0 BILATERAL LEG EDEMA: ICD-10-CM

## 2017-11-06 DIAGNOSIS — S81.802D OPEN WOUND OF LEFT LOWER LEG, SUBSEQUENT ENCOUNTER: ICD-10-CM

## 2017-11-06 DIAGNOSIS — I73.9 PAD (PERIPHERAL ARTERY DISEASE) (HCC): ICD-10-CM

## 2017-11-06 PROCEDURE — A6456 ZINC PASTE BAND W >=3"<5"/YD: HCPCS

## 2017-11-06 PROCEDURE — A6454 SELF-ADHER BAND W>=3" <5"/YD: HCPCS

## 2017-11-06 PROCEDURE — 97110 THERAPEUTIC EXERCISES: CPT

## 2017-11-06 PROCEDURE — 29580 STRAPPING UNNA BOOT: CPT

## 2017-11-06 ASSESSMENT — PAIN SCALES - GENERAL
PAINLEVEL_OUTOF10: 0
PAINLEVEL_OUTOF10: 7

## 2017-11-06 NOTE — PROGRESS NOTES
clinic with quad cane,  used rolling walker in clinic,  took him out to car with wheelchair. no LOB, buckling legs in clinic         Activity Tolerance:  Activity Tolerance: Patient Tolerated treatment well    Assessment:  Assessment: very talkactive,   min c/os fatigue legs after rx       Patient Education:  Patient Education: continue home ex                      Plan:  Times per week: 2x per week  Plan weeks: 6 weeks  Specific instructions for Next Treatment: Nustep, LE strengthening seated position LAQS, leg curls with theraband, sit to stand transitions. Balance in // bars and progress in UE/  LE strengthening. Gait training with roller walker with +2 assist.    Current Treatment Recommendations: Strengthening, ROM, Balance Training, Functional Mobility Training, Transfer Training, Home Exercise Program, Gait Training, Endurance Training, Patient/Caregiver Education & Training  Plan Comment: continue home per POC    Goals:  Patient goals : To be able to walk and stand better. To be less wobbly and improve strength. GOAL PARTIALLY MET Patient improving with increased walking, increased balance. Notes legs wobbly if standing or walking too long. Short term goals  Time Frame for Short term goals: 3 weeks 11/3/17. Short term goal 1: Patient to demonstrate increased LE strength at hip and knee m to 4/5 with ability to do chair sit to stand test 3x ini 15 seconds with use of UEs on armrests of chair. GOAL MET Patient able to stand 3x in 15 seconds form w/c with 2 nch cusion and Strength at LEs 4/5 hip and knee m.  see long term goal for revision. Revise Chair stand test to 5x in 15 seconds. using armrest of w/c with 2 inch cusion. Short term goal 2: Patient to demonstrate increased  RUE strength from 3+/5 to 4-/5 with ability to lift arm and use more efficiently when completing transfers. GOAL NOT MET right shoulder m 3+/5, left shoulder 4/5 to 5/5.    Short term goal 3: Patient to demonstrate increased walking distance using roller walker SBA of one person for 50 feet. . Gait speed from 0.38m/sec to 0.5m/second  GOAL MET . 76 m/sec for gait speed. using roller walker. Patient is ambulating max distances from 1670 feet to 200 feet. Short term goal 4: Tinetti balance and gait score improved from 8/28 to 12/28. GOAL MET exceeded goal Tinettin 18/28. see long term goal   Short term goal 5: Patient modified independent in bed mobility supine<>sit in order to get in and out of bed with more ease and less assistance. Long term goals  Time Frame for Long term goals : 6 weeks ONGOING Address at 6 weeks. Long term goal 1: Patient to demonstrate increased walking distance using roller walker SBA of one person for 75 feet. GAit speed to 0.8 m/second  Long term goal 2: Tinetti balance and gait score improved from 12/28 to 15 /28. GOAL MET Tinetti 18/28. Revise goal: Tinetti balance and gait score to 20/28.    Long term goal 3: Patient independent in home ex program in order to meet above goals         Tai Hernandez PTA

## 2017-11-06 NOTE — PLAN OF CARE
Problem: Wound:  Goal: Will show signs of wound healing; wound closure and no evidence of infection  Will show signs of wound healing; wound closure and no evidence of infection  Outcome: Ongoing  Pt presents to clinic with ongoing care of left leg wound. Wound smaller in size. No s/s of infection. Afebrile. Small open area noted to right medial foot. Pt states he see's podiatrist for that. Pt had been using betadine to wound area per podiatrist. Aquacel ag, unna boot, coban applied to right lower leg wound. Assisted pt with application and education on compression hose application to right lower leg. Pt to follow up on Thursday for unna boot change. Follows up on Nov 13th with  [facility]. Comments: Care plan reviewed with patient and pt wife. Patient and pt wife verbalize understanding of the plan of care and contribute to goal setting.

## 2017-11-09 ENCOUNTER — HOSPITAL ENCOUNTER (OUTPATIENT)
Dept: PHYSICAL THERAPY | Age: 82
Setting detail: THERAPIES SERIES
Discharge: HOME | End: 2017-11-09
Payer: MEDICARE

## 2017-11-09 PROCEDURE — 97110 THERAPEUTIC EXERCISES: CPT

## 2017-11-09 ASSESSMENT — PAIN DESCRIPTION - LOCATION: LOCATION: BACK

## 2017-11-09 NOTE — PROGRESS NOTES
[facility] [place]     Time In: 1489  Time Out: 6687  Minutes: 47  Timed Code Treatment Minutes: 52 Minutes     Date: 2017  Patient Name: Betina Chicas,  Gender:  male        CSN: [id number]   : 1935  ([de-identified] y.o.)       Referring Practitioner: Dr. Kristie Jason. Alan      Diagnosis: Deconditioning with weakness in extremities  Treatment Diagnosis: deconditioned with BUE/BLE weakness effecting walking, balance. Additional Pertinent Hx: low back surgery, neck surgery 3x. open wound on left lower leg with Unna boot on leg since 17. Afib, CAD, DDD. General:  PT Visit Information  Onset Date: 10/12/17  PT Insurance Information: Medicare $3700 cap per calendar year for PT and ST. $3700 cap per calendar year for OT. aquatic therapy covered, modalities covered. HP, CP and iontophoresis not covered. no precert  Total # of Visits to Date: 5  Plan of Care/Certification Expiration Date: 17  Progress Note Counter: 5 for PN. Subjective:  Family / Caregiver Present: Yes  Comments: Patient has follow up with Dr. Swetha Pope next Wednesday  11/15/18      Subjective: Pt reporting that he had a rough night last night but did not give any details when asked. When asked about pain pt report he always has pain but did not rate pain. Reporting HEP going well. Pain:  Patient Currently in Pain: Yes  Pain Level:  (would not give pain level)  Pain Location: Back      Objective  Exercises  Exercise 1: NuStep seat9/arm10 Level 2 x  6  minutes  Exercise 2: Seated  LAQ 2# wt x15,  ,hamstring curl red band x15 and hip abd with red theraband x15, hip add with ball x 15, seated hip extension with theraband red at ball of foot and pushing down toward floor.    Exercise 3: Sit to stand from chair  with blue foam in chair x 5 without  hands with therapist SBA to CGA of 2  Exercise 5: Gait with rolling walker CGA with increased walking, increased balance. Notes legs wobbly if standing or walking too long. Short term goals  Time Frame for Short term goals: 3 weeks 11/3/17. Short term goal 1: Patient to demonstrate increased LE strength at hip and knee m to 4/5 with ability to do chair sit to stand test 3x ini 15 seconds with use of UEs on armrests of chair. GOAL MET Patient able to stand 3x in 15 seconds form w/c with 2 nch cusion and Strength at LEs 4/5 hip and knee m.  see long term goal for revision. Revise Chair stand test to 5x in 15 seconds. using armrest of w/c with 2 inch cusion. Short term goal 2: Patient to demonstrate increased  RUE strength from 3+/5 to 4-/5 with ability to lift arm and use more efficiently when completing transfers. GOAL NOT MET right shoulder m 3+/5, left shoulder 4/5 to 5/5. Short term goal 3: Patient to demonstrate increased walking distance using roller walker SBA of one person for 50 feet. . Gait speed from 0.38m/sec to 0.5m/second  GOAL MET . 76 m/sec for gait speed. using roller walker. Patient is ambulating max distances from 1670 feet to 200 feet. Short term goal 4: Tinetti balance and gait score improved from 8/28 to 12/28. GOAL MET exceeded goal Tinettin 18/28. see long term goal   Short term goal 5: Patient modified independent in bed mobility supine<>sit in order to get in and out of bed with more ease and less assistance. Long term goals  Time Frame for Long term goals : 6 weeks ONGOING Address at 6 weeks. Long term goal 1: Patient to demonstrate increased walking distance using roller walker SBA of one person for 75 feet. GAit speed to 0.8 m/second  Long term goal 2: Tinetti balance and gait score improved from 12/28 to 15 /28. GOAL MET Tinetti 18/28. Revise goal: Tinetti balance and gait score to 20/28.    Long term goal 3: Patient independent in home ex program in order to meet above goals         Jeri Olsen, [id number]

## 2017-11-10 ENCOUNTER — HOSPITAL ENCOUNTER (EMERGENCY)
Age: 82
Discharge: HOME | End: 2017-11-10
Payer: MEDICARE

## 2017-11-10 VITALS
HEART RATE: 72 BPM | SYSTOLIC BLOOD PRESSURE: 143 MMHG | BODY MASS INDEX: 34.21 KG/M2 | DIASTOLIC BLOOD PRESSURE: 80 MMHG | OXYGEN SATURATION: 97 % | TEMPERATURE: 96.8 F | RESPIRATION RATE: 18 BRPM | WEIGHT: 225 LBS

## 2017-11-10 DIAGNOSIS — R04.0 EPISTAXIS: Primary | ICD-10-CM

## 2017-11-10 PROCEDURE — 99214 OFFICE O/P EST MOD 30 MIN: CPT

## 2017-11-10 PROCEDURE — 6370000000 HC RX 637 (ALT 250 FOR IP): Performed by: STUDENT IN AN ORGANIZED HEALTH CARE EDUCATION/TRAINING PROGRAM

## 2017-11-10 PROCEDURE — 99282 EMERGENCY DEPT VISIT SF MDM: CPT

## 2017-11-10 PROCEDURE — 99213 OFFICE O/P EST LOW 20 MIN: CPT | Performed by: NURSE PRACTITIONER

## 2017-11-10 RX ORDER — SODIUM CHLORIDE/ALOE VERA
1 GEL (GRAM) NASAL PRN
Qty: 1 TUBE | Refills: 0 | Status: SHIPPED | OUTPATIENT
Start: 2017-11-10 | End: 2019-03-02

## 2017-11-10 RX ADMIN — Medication: at 10:42

## 2017-11-10 ASSESSMENT — ENCOUNTER SYMPTOMS
COUGH: 0
SORE THROAT: 0
BACK PAIN: 0
RHINORRHEA: 0
VOMITING: 0
WHEEZING: 0
EYE REDNESS: 0
DIARRHEA: 0
EYE DISCHARGE: 0
ABDOMINAL PAIN: 0
NAUSEA: 0
SHORTNESS OF BREATH: 0

## 2017-11-10 NOTE — ED TRIAGE NOTES
Pt to room 1 with his wife and c/o waking up with a severe nose bleed at 0200 this morning. He is on Xarelto for A-fib and a hx of blood clots/DVT and has been for a few years and states he has never had any problems with bleeding in the past. He also takes an 81 mg Aspirin daily.

## 2017-11-10 NOTE — ED NOTES
Patient denies any further bleeding after medication placed by CATRACHO Tong.       Justino Leiva, RN  11/10/17 112

## 2017-11-10 NOTE — ED PROVIDER NOTES
[facility]  Urgent Care Encounter      CHIEF COMPLAINT       Chief Complaint   Patient presents with    Epistaxis       Nurses Notes reviewed and I agree except as noted in the HPI. HISTORY OF PRESENT ILLNESS   Osmar QUETA Vargas is a [de-identified] y.o. male who presents Woke @ 0200 with nosebleed from left nares. Tried pressure, ice pack and no relief. Put kleenex packing x 3 and still can't get to stop. On Xarelto for AF x 2 years. The history is provided by the patient. No  was used. Epistaxis   Location:  L nare  Severity:  Severe  Duration:  6 hours  Timing:  Constant  Progression:  Unchanged  Chronicity:  New  Context: anticoagulants and aspirin use    Relieved by:  Applying pressure and nasal tampon  Associated symptoms: blood in oropharynx    Associated symptoms: no cough, no dizziness, no fever and no headaches    Risk factors: no frequent nosebleeds          REVIEW OF SYSTEMS     Review of Systems   Constitutional: Negative for activity change, chills, fatigue and fever. HENT: Positive for nosebleeds. Respiratory: Negative for cough. Cardiovascular: Negative for palpitations. Skin: Positive for wound. Seeing    Neurological: Negative for dizziness, light-headedness and headaches. PAST MEDICAL HISTORY         Diagnosis Date    Atrial fibrillation ([address]     Bell's palsy     CAD (coronary artery disease)     Cancer (HCC)     skin CA removed with dry ice    DDD (degenerative disc disease)     DVT (deep venous thrombosis) (HCC)     Gout     Hernia     Hx of blood clots     left leg DVT    Hyperlipidemia     Hypertension     Irregular cardiac rhythm 07/2016    Movement disorder     back pain    UTI (urinary tract infection)        SURGICAL HISTORY     Patient  has a past surgical history that includes Colon surgery; colostomy; colostomy; Cholecystectomy; Neck surgery (06/29/2016);  Eye surgery; hernia repair; other surgical history (06/29/2016); Cardiac catheterization; Cardiac catheterization; Neck surgery; back surgery; colsc flx w/removal lesion by hot bx forceps (Left, 7/17/2017); and Upper gastrointestinal endoscopy (N/A, 7/17/2017). CURRENT MEDICATIONS       Previous Medications    ALLOPURINOL (ZYLOPRIM) 300 MG TABLET    Take 300 mg by mouth daily    ASCORBIC ACID (VITAMIN C) 500 MG TABLET    Take 500 mg by mouth daily. ASPIRIN 81 MG EC TABLET    Take 1 tablet by mouth daily    CALCIUM CARBONATE-VITAMIN D (CALCIUM 600/VITAMIN D PO)    Take 1 tablet by mouth 2 times daily    CLONIDINE (CATAPRES) 0.1 MG TABLET    Take 0.1 mg by mouth as needed for High Blood Pressure Takes if BP is greater than 175    CRANBERRY 1000 MG CAPS    Take 1 tablet by mouth daily    CYANOCOBALAMIN (VITAMIN B 12 PO)    Take by mouth    CYCLOBENZAPRINE (FLEXERIL) 10 MG TABLET    Take 10 mg by mouth every 8 hours as needed. DOCUSATE SODIUM (COLACE) 100 MG CAPSULE    Take 300 mg by mouth 2 times daily     FUROSEMIDE (LASIX) 40 MG TABLET    Take 1 tablet by mouth daily    HYDROXYZINE (ATARAX) 25 MG TABLET    Take 25 mg by mouth 4 times daily as needed for Itching    INDAPAMIDE (LOZOL) 1.25 MG TABLET    Take 1.25 mg by mouth every morning. INDOMETHACIN (INDOCIN) 50 MG CAPSULE    Take 50 mg by mouth 2 times daily (with meals)    LEVOTHYROXINE (SYNTHROID) 50 MCG TABLET    Take 100 mcg by mouth Daily     LOPERAMIDE (IMODIUM) 2 MG CAPSULE    Take 2 mg by mouth as needed for Diarrhea    MAGNESIUM OXIDE (MAG-OX) 400 (241.3 MG) MG TABS TABLET    Take 1 tablet by mouth daily    METOPROLOL TARTRATE (LOPRESSOR) 50 MG TABLET    TAKE 1 TABLET TWICE A DAY    NONFORMULARY    Indications: KAYLA RED    OXYCODONE-ACETAMINOPHEN (PERCOCET) 5-325 MG PER TABLET    Take 1 tablet by mouth every 4 hours as needed for Pain .     POTASSIUM CHLORIDE (K-TAB) 10 MEQ EXTENDED RELEASE TABLET    Take 4 tablets by mouth daily    PRAVASTATIN (PRAVACHOL) 80 MG TABLET    Take 80 mg by mouth nightly. QUINAPRIL (ACCUPRIL) 40 MG TABLET    Take 40 mg by mouth daily. RIVAROXABAN (XARELTO) 20 MG TABS TABLET    TAKE 1 TABLET DAILY    TAMSULOSIN (FLOMAX) 0.4 MG CAPSULE    Take 1 capsule by mouth daily       ALLERGIES     Patient is is allergic to pcn [penicillins]. FAMILY HISTORY     Patient's family history includes COPD in his sister; Cancer in his father; Diabetes in his mother; Heart Disease in his father. SOCIAL HISTORY     Patient  reports that he quit smoking about 26 years ago. He has never used smokeless tobacco. He reports that he does not drink alcohol or use drugs. PHYSICAL EXAM     ED TRIAGE VITALS  BP: 131/67, Temp: 96.8 °F (36 °C), Pulse: 91, Resp: 18, SpO2: 99 %  Physical Exam   Constitutional: He is oriented to person, place, and time. He appears well-developed and well-nourished. No distress. HENT:   Packing left nares. Mild blood    Neck: Neck supple. Cardiovascular: Normal rate, regular rhythm and normal heart sounds. Pulmonary/Chest: Effort normal and breath sounds normal.   Musculoskeletal: Normal range of motion. Neurological: He is alert and oriented to person, place, and time. Skin: Skin is warm and dry. DIAGNOSTIC RESULTS   Labs:No results found for this visit on 11/10/17.     IMAGING:  No orders to display     URGENT CARE COURSE:     Vitals:    11/10/17 0811   BP: 131/67   Pulse: 91   Resp: 18   Temp: 96.8 °F (36 °C)   TempSrc: Temporal   SpO2: 99%   Weight: 225 lb (102.1 kg)       Medications - No data to display  PROCEDURES:  None  FINAL IMPRESSION      1. Epistaxis        DISPOSITION/PLAN   DISPOSITION Decision to Transfer  PATIENT REFERRED TO:  [facility] [address] [id number]    go now    DISCHARGE MEDICATIONS:  New Prescriptions    No medications on file     Current Discharge Medication List          Ed Vasquez, [address], [address] [facility]  11/10/17 0141

## 2017-11-10 NOTE — ED NOTES
Patient transferred to ED from urgent care for uncontrollable nose bleed.       Samy Nascimento RN  11/10/17 6382

## 2017-11-10 NOTE — ED PROVIDER NOTES
[facility] EMERGENCY DEPT      CHIEF COMPLAINT       Chief Complaint   Patient presents with    Epistaxis       Nurses Notes reviewed and I agree except as noted in the HPI. HISTORY OF PRESENT ILLNESS    Osmar Dickerson is a [de-identified] y.o. male who presents to the ED with complaints of epistaxis. The patient woke up this morning at 2:00 AM with bleeding from the left nostril. The patient's wife packed the nose 3 times and removed 2 clots. The patient went to urgent care this morning, but was advised to come here to the ED for treatment because we have the proper equipment. He presents here with packing still in the nostril. The patient is on Xarelto and Aspirin with a history of DVT. He also has a history of anemia. He is not on any iron supplements. He denies headache, lightheadedness, and dizziness. There are no other complaints or symptoms at this time. Location/Symptom: epistaxis - left nostril  Timing/Onset: 2:00 AM this morning  Context/Setting: on Xarelto and Aspirin, history of anemia   Duration: constant - packing changed 3 times   Modifying Factors: packing   Severity: moderate    REVIEW OF SYSTEMS     Review of Systems   Constitutional: Negative for appetite change, chills, fatigue and fever. HENT: Positive for nosebleeds (left). Negative for congestion, ear pain, rhinorrhea and sore throat. Eyes: Negative for discharge, redness and visual disturbance. Respiratory: Negative for cough, shortness of breath and wheezing. Cardiovascular: Negative for chest pain, palpitations and leg swelling. Gastrointestinal: Negative for abdominal pain, diarrhea, nausea and vomiting. Genitourinary: Negative for decreased urine volume, difficulty urinating and dysuria. Musculoskeletal: Negative for arthralgias, back pain, joint swelling and neck pain. Skin: Negative for pallor and rash. Allergic/Immunologic: Negative for environmental allergies.    Neurological: Negative for dizziness, syncope, weakness, light-headedness and headaches. Hematological: Negative for adenopathy. Psychiatric/Behavioral: Negative for agitation, confusion, dysphoric mood and suicidal ideas. The patient is not nervous/anxious. PAST MEDICAL HISTORY    has a past medical history of Atrial fibrillation ([address] Mclean's palsy; CAD (coronary artery disease); Cancer ([address] DDD (degenerative disc disease); DVT (deep venous thrombosis) ([address] Gout; Hernia; Hx of blood clots; Hyperlipidemia; Hypertension; Irregular cardiac rhythm; Movement disorder; and UTI (urinary tract infection). SURGICAL HISTORY      has a past surgical history that includes Colon surgery; colostomy; colostomy; Cholecystectomy; Neck surgery (06/29/2016); Eye surgery; hernia repair; other surgical history (06/29/2016); Cardiac catheterization; Cardiac catheterization; Neck surgery; back surgery; colsc flx w/removal lesion by hot bx forceps (Left, 7/17/2017); and Upper gastrointestinal endoscopy (N/A, 7/17/2017). CURRENT MEDICATIONS       Discharge Medication List as of 11/10/2017 11:28 AM      CONTINUE these medications which have NOT CHANGED    Details   Cranberry 1000 MG CAPS Take 1 tablet by mouth dailyHistorical Med      Cyanocobalamin (VITAMIN B 12 PO) Take by mouthHistorical Med      indomethacin (INDOCIN) 50 MG capsule Take 50 mg by mouth 2 times daily (with meals)Historical Med      NONFORMULARY Indications: KAYLA REDHistorical Med      tamsulosin (FLOMAX) 0.4 MG capsule Take 1 capsule by mouth daily, Disp-90 capsule, R-3Normal      aspirin 81 MG EC tablet Take 1 tablet by mouth daily, Disp-1 tablet, R-0Normal      rivaroxaban (XARELTO) 20 MG TABS tablet TAKE 1 TABLET DAILY, Disp-90 tablet, R-3Normal      Calcium Carbonate-Vitamin D (CALCIUM 600/VITAMIN D PO) Take 1 tablet by mouth 2 times dailyHistorical Med      oxyCODONE-acetaminophen (PERCOCET) 5-325 MG per tablet Take 1 tablet by mouth every 4 hours as needed for Pain . Historical Med metoprolol tartrate (LOPRESSOR) 50 MG tablet TAKE 1 TABLET TWICE A DAY, Disp-180 tablet, R-0      furosemide (LASIX) 40 MG tablet Take 1 tablet by mouth daily, Disp-90 tablet, R-2      levothyroxine (SYNTHROID) 50 MCG tablet Take 100 mcg by mouth Daily Historical Med      hydrOXYzine (ATARAX) 25 MG tablet Take 25 mg by mouth 4 times daily as needed for Itching      loperamide (IMODIUM) 2 MG capsule Take 2 mg by mouth as needed for Diarrhea      potassium chloride (K-TAB) 10 MEQ extended release tablet Take 4 tablets by mouth daily, Disp-360 tablet, R-3      docusate sodium (COLACE) 100 MG capsule Take 300 mg by mouth 2 times daily Historical Med      magnesium oxide (MAG-OX) 400 (241.3 MG) MG TABS tablet Take 1 tablet by mouth daily, Disp-30 tablet, R-0      allopurinol (ZYLOPRIM) 300 MG tablet Take 300 mg by mouth daily      quinapril (ACCUPRIL) 40 MG tablet Take 40 mg by mouth daily. pravastatin (PRAVACHOL) 80 MG tablet Take 80 mg by mouth nightly. indapamide (LOZOL) 1.25 MG tablet Take 1.25 mg by mouth every morning. cyclobenzaprine (FLEXERIL) 10 MG tablet Take 10 mg by mouth every 8 hours as needed. Ascorbic Acid (VITAMIN C) 500 MG tablet Take 500 mg by mouth daily. cloNIDine (CATAPRES) 0.1 MG tablet Take 0.1 mg by mouth as needed for High Blood Pressure Takes if BP is greater than 175             ALLERGIES     is allergic to pcn [penicillins]. FAMILY HISTORY     indicated that his mother is . He indicated that his father is . He indicated that his sister is . family history includes COPD in his sister; Cancer in his father; Diabetes in his mother; Heart Disease in his father. SOCIAL HISTORY      reports that he quit smoking about 26 years ago. He has never used smokeless tobacco. He reports that he does not drink alcohol or use drugs. PHYSICAL EXAM     INITIAL VITALS:  weight is 225 lb (102.1 kg).  His temporal temperature is 96.8 °F (36 °C). His blood pressure is 143/80 (abnormal) and his pulse is 72. His respiration is 18 and oxygen saturation is 97%. Physical Exam   Constitutional: He is oriented to person, place, and time. He appears well-developed and well-nourished. HENT:   Head: Normocephalic and atraumatic. Right Ear: External ear normal.   Left Ear: External ear normal.   Dry blood present in the right nare. No active bleeding from the right. Bright, red patch of raw skin on septum anterior. No active bleeding from the left. Eyes: Conjunctivae are normal. Right eye exhibits no discharge. Left eye exhibits no discharge. No scleral icterus. Neck: Normal range of motion. Neck supple. No JVD present. Cardiovascular: Normal rate, regular rhythm and normal heart sounds. Pulmonary/Chest: Effort normal. No stridor. No respiratory distress. Abdominal: Soft. He exhibits no distension. Musculoskeletal: Normal range of motion. He exhibits no edema. Neurological: He is alert and oriented to person, place, and time. He exhibits normal muscle tone. GCS eye subscore is 4. GCS verbal subscore is 5. GCS motor subscore is 6. Skin: Skin is warm and dry. He is not diaphoretic. No erythema. Psychiatric: He has a normal mood and affect. His behavior is normal.   Nursing note and vitals reviewed. DIFFERENTIAL DIAGNOSIS:   Including but not limited to epistaxis, anemia     DIAGNOSTIC RESULTS     EKG: All EKG's are interpreted by the Emergency Department Physician who either signs or Co-signs this chart in the absence of a cardiologist.    RADIOLOGY: non-plain film images(s) such as CT, Ultrasound and MRI are read by the radiologist.  Plain radiographic images are visualized and preliminarily interpreted by the emergency physician unless otherwise stated below.     No orders to display       LABS:   Labs Reviewed - No data to display    EMERGENCY DEPARTMENT COURSE:   Vitals:    Vitals:    11/10/17 0811 11/10/17 0950 11/10/17 1127   BP: 131/67 (!) 143/84 (!) 143/80   Pulse: 91 77 72   Resp: 18 20 18   Temp: 96.8 °F (36 °C)     TempSrc: Temporal     SpO2: 99% 98% 97%   Weight: 225 lb (102.1 kg)       10:30 PM: Packing removed, clot 5 cm removed. No active bleeding with removal. Used a dose of Afrin in the room, observing to see if the bleeding has stopped. The epistaxis was successfully resolved with pressure and Afrin. The patient will be discharged home with saline nasal gel and remaining Afrin bottle and agrees to follow-up with Dr. Sera Warner as scheduled. I have given the patient strict written and verbal instructions about care at home, follow-up, and sign and symptoms of worsening of condition and they did verbalize understanding. CRITICAL CARE:   None    CONSULTS:  None    PROCEDURES:  None    FINAL IMPRESSION      1. Epistaxis          DISPOSITION/PLAN   Discharge     PATIENT REFERRED TO:  Shruthi Arellano MD  [address]  [facility] [address] [facility]  [phone]      for follow up appointment, already scheduled    [facility] EMERGENCY DEPT  [address] [address]  [phone]    If symptoms worsen      DISCHARGE MEDICATIONS:  Discharge Medication List as of 11/10/2017 11:28 AM      START taking these medications    Details   saline nasal gel (AYR) GEL 1 g by Nasal route as needed (dryness, nosebleed prevention), Nasal, PRN Starting Fri 11/10/2017, Until Wed 11/15/2017, Disp-1 Tube, R-0, Print             (Please note that portions of this note were completed with a voice recognition program.  Efforts were made to edit the dictations but occasionally words are mis-transcribed.)    Scribe:  Signed by: Thomas Jones, 11/10/17 9:37 AM Scribing for and in the presence of Raj Mruphy PA-C.     Provider:  I personally performed the services described in the documentation, reviewed and edited the documentation which was dictated to the scribe in my presence, and it accurately records my words and actions.     Bacilio Ortega PA-C 11/10/17 7:00 PM              Yann Mckeon PA-C  11/10/17 1905

## 2017-11-13 ENCOUNTER — HOSPITAL ENCOUNTER (OUTPATIENT)
Dept: WOUND CARE | Age: 82
Discharge: HOME | End: 2017-11-13
Payer: MEDICARE

## 2017-11-13 ENCOUNTER — HOSPITAL ENCOUNTER (OUTPATIENT)
Dept: PHYSICAL THERAPY | Age: 82
Setting detail: THERAPIES SERIES
Discharge: HOME | End: 2017-11-13
Payer: MEDICARE

## 2017-11-13 VITALS
TEMPERATURE: 96 F | OXYGEN SATURATION: 94 % | SYSTOLIC BLOOD PRESSURE: 113 MMHG | HEART RATE: 77 BPM | DIASTOLIC BLOOD PRESSURE: 54 MMHG | RESPIRATION RATE: 18 BRPM

## 2017-11-13 DIAGNOSIS — I73.9 PAD (PERIPHERAL ARTERY DISEASE) (HCC): ICD-10-CM

## 2017-11-13 DIAGNOSIS — R60.0 BILATERAL LEG EDEMA: ICD-10-CM

## 2017-11-13 DIAGNOSIS — S81.802D OPEN WOUND OF LEFT LOWER LEG, SUBSEQUENT ENCOUNTER: ICD-10-CM

## 2017-11-13 PROCEDURE — A6456 ZINC PASTE BAND W >=3"<5"/YD: HCPCS

## 2017-11-13 PROCEDURE — 29580 STRAPPING UNNA BOOT: CPT

## 2017-11-13 PROCEDURE — 97110 THERAPEUTIC EXERCISES: CPT

## 2017-11-13 PROCEDURE — 97116 GAIT TRAINING THERAPY: CPT

## 2017-11-13 ASSESSMENT — PAIN SCALES - GENERAL
PAINLEVEL_OUTOF10: 0
PAINLEVEL_OUTOF10: 7

## 2017-11-13 ASSESSMENT — PAIN DESCRIPTION - PAIN TYPE: TYPE: CHRONIC PAIN

## 2017-11-13 ASSESSMENT — PAIN DESCRIPTION - LOCATION: LOCATION: BACK

## 2017-11-13 ASSESSMENT — PAIN DESCRIPTION - ORIENTATION: ORIENTATION: LOWER

## 2017-11-13 NOTE — PLAN OF CARE
Problem: Wound:  Goal: Will show signs of wound healing; wound closure and no evidence of infection  Will show signs of wound healing; wound closure and no evidence of infection  Outcome: Ongoing  Pt presents to clinic with ongoing care of left lower leg wound. NO s/s of infection noted. Pt afebrile. Wound smaller in size. Afebrile. Aquacel ag to wound bed followd by unna boot to left lower leg. Pt wears compression hose to right lower leg. Pt to follow up on Wednesday with Dr. Wm Robert. Comments: Care plan reviewed with patient and pt wife. Patient and pt wife verbalize understanding of the plan of care and contribute to goal setting.

## 2017-11-15 ENCOUNTER — HOSPITAL ENCOUNTER (OUTPATIENT)
Dept: PHYSICAL THERAPY | Age: 82
Setting detail: THERAPIES SERIES
Discharge: HOME | End: 2017-11-15
Payer: MEDICARE

## 2017-11-15 ENCOUNTER — HOSPITAL ENCOUNTER (OUTPATIENT)
Dept: WOUND CARE | Age: 82
Discharge: HOME | End: 2017-11-15
Payer: MEDICARE

## 2017-11-15 VITALS
OXYGEN SATURATION: 95 % | DIASTOLIC BLOOD PRESSURE: 54 MMHG | RESPIRATION RATE: 18 BRPM | TEMPERATURE: 98.3 F | SYSTOLIC BLOOD PRESSURE: 110 MMHG | HEART RATE: 79 BPM

## 2017-11-15 DIAGNOSIS — S81.802D OPEN WOUND OF LEFT LOWER LEG, SUBSEQUENT ENCOUNTER: ICD-10-CM

## 2017-11-15 DIAGNOSIS — I73.9 PAD (PERIPHERAL ARTERY DISEASE) (HCC): Primary | ICD-10-CM

## 2017-11-15 PROCEDURE — 97116 GAIT TRAINING THERAPY: CPT

## 2017-11-15 PROCEDURE — 97110 THERAPEUTIC EXERCISES: CPT

## 2017-11-15 PROCEDURE — A6454 SELF-ADHER BAND W>=3" <5"/YD: HCPCS

## 2017-11-15 PROCEDURE — 29580 STRAPPING UNNA BOOT: CPT

## 2017-11-15 PROCEDURE — A6206 CONTACT LAYER <= 16 SQ IN: HCPCS

## 2017-11-15 PROCEDURE — A6456 ZINC PASTE BAND W >=3"<5"/YD: HCPCS

## 2017-11-15 ASSESSMENT — PAIN DESCRIPTION - PAIN TYPE: TYPE: CHRONIC PAIN

## 2017-11-15 ASSESSMENT — PAIN DESCRIPTION - DIRECTION: RADIATING_TOWARDS: R SHOULDER

## 2017-11-15 ASSESSMENT — PAIN SCALES - GENERAL: PAINLEVEL_OUTOF10: 7

## 2017-11-15 ASSESSMENT — PAIN DESCRIPTION - LOCATION: LOCATION: BACK

## 2017-11-15 ASSESSMENT — PAIN DESCRIPTION - ORIENTATION: ORIENTATION: LOWER

## 2017-11-15 NOTE — PROGRESS NOTES
[facility]          Progress Note and Procedure Note      Osmar Lewis RECORD NUMBER:  [id number]  AGE: [de-identified] y.o. GENDER: male  : 1935  EPISODE DATE:  11/15/2017    Subjective:     SUBJECTIVE     Chief Complaint   Patient presents with    Dressing Change     left lower leg           HISTORY OF PRESENT ILLNESS   He was seen for follow up visit  He Has left leg wound. The wound is slowly getting smaller. He has no pain. He has no drainage from the wound.   He denies any fever or chills  PAST MEDICAL HISTORY         Diagnosis Date    Atrial fibrillation (HCC)     Bell's palsy     CAD (coronary artery disease)     Cancer (HCC)     skin CA removed with dry ice    DDD (degenerative disc disease)     DVT (deep venous thrombosis) (HCC)     Gout     Hernia     Hx of blood clots     left leg DVT    Hyperlipidemia     Hypertension     Irregular cardiac rhythm 2016    Movement disorder     back pain    UTI (urinary tract infection)          PAST SURGICAL HISTORY     Past Surgical History:   Procedure Laterality Date    BACK SURGERY      lower    CARDIAC CATHETERIZATION      ok    CARDIAC CATHETERIZATION      ok    CHOLECYSTECTOMY      COLON SURGERY      6-8 in of descending colon removed    COLOSTOMY      COLOSTOMY      reversed    EYE SURGERY      band    HERNIA REPAIR      NECK SURGERY  2016    three   [address] OTHER SURGICAL HISTORY  2016    ACDF C4-5    RI COLSC FLX W/REMOVAL LESION BY HOT BX FORCEPS Left 2017    COLONOSCOPY POLYPECTOMY HOT BIOPSY performed by Chelita Patnoja MD at [facility]    UPPER GASTROINTESTINAL ENDOSCOPY N/A 2017    EGD CONTROL HEMORRHAGE performed by Chelita Pantoja MD at [facility]     FAMILY HISTORY         Family History   Problem Relation Age of Onset    Diabetes Mother     Cancer Father     Heart Disease Father     COPD Sister      SOCIAL HISTORY       Social History   Substance Use allopurinol (ZYLOPRIM) 300 MG tablet Take 300 mg by mouth daily      quinapril (ACCUPRIL) 40 MG tablet Take 40 mg by mouth daily.  pravastatin (PRAVACHOL) 80 MG tablet Take 80 mg by mouth nightly.  indapamide (LOZOL) 1.25 MG tablet Take 1.25 mg by mouth every morning.  cyclobenzaprine (FLEXERIL) 10 MG tablet Take 10 mg by mouth every 8 hours as needed.  Ascorbic Acid (VITAMIN C) 500 MG tablet Take 500 mg by mouth daily. No current facility-administered medications on file prior to encounter. REVIEW OF SYSTEMS:   Noncontributory    PHYSICAL EXAM      oral temperature is 98.3 °F (36.8 °C). His blood pressure is 110/54 (abnormal) and his pulse is 79. His respiration is 18 and oxygen saturation is 95%. Wt Readings from Last 3 Encounters:   11/10/17 225 lb (102.1 kg)   11/02/17 223 lb 3.2 oz (101.2 kg)   10/30/17 226 lb (102.5 kg)       General Appearance  Awake, alert, oriented,  not  In acute distress  HEENT - normocephalic, atraumatic, pink conjunctiva,  anicteric sclera  Neck - Supple, no mass  Neurologic - Oriented  Skin - No bruising or bleeding  Extremities -no edema on both lower extremities, he has open  wound on the left leg. The skin around the shin is irritated related to the compression wrap  Overall the wound is improving, wound measurement was noted  Wound 09/20/17 Leg Left; Lower #1 (Active)   Wound Type Wound 11/15/2017 11:40 AM   Dressing Status Intact 11/15/2017 11:40 AM   Dressing Changed Changed/New 11/15/2017 11:40 AM   Wound Cleansed Rinsed/Irrigated with saline 11/15/2017 11:40 AM   Wound Length (cm) 1 cm 11/15/2017 11:40 AM   Wound Width (cm) 0.7 cm 11/15/2017 11:40 AM   Wound Depth (cm)  0.05 11/15/2017 11:40 AM   Calculated Wound Size (cm^2) (l*w) 0.7 cm^2 11/15/2017 11:40 AM   Change in Wound Size % (l*w) 76.67 11/15/2017 11:40 AM   Wound Assessment Red 11/15/2017 11:40 AM   Drainage Amount Small 11/15/2017 11:40 AM   Drainage Description Serosanguinous 11/15/2017 11:40 AM   Odor None 11/15/2017 11:40 AM   Margins Attached edges 11/13/2017  1:50 PM   Rhoda-wound Assessment Dry 11/15/2017 11:40 AM   [place]%Wound Bed 10 10/12/2017  1:35 PM   Red%Wound Bed 100 11/15/2017 11:40 AM   Yellow%Wound Bed 50 10/30/2017  1:22 PM   Other%Wound Bed 75 10/23/2017  1:22 PM   Op First Treatment Date 09/20/17 9/20/2017  9:13 AM   Number of days: 56               Assessment:   Nonhealing leg wound: We'll continue compression wrap. Will add padding to prevent from getting new wounds related to the wap. Follow-up visit will be scheduled In 2 weeks  Continue physical therapy   Patient Active Problem List   Diagnosis Code    Cervical spinal cord compression ([facility] G95.20    Essential hypertension I10    Leukocytosis D72.829    Hypokalemia E87.6    Spinal stenosis, lumbar region, with neurogenic claudication M48.062    Idiopathic hypotension I95.0    Chronic atrial fibrillation (HCC) I48.2    Hypokalemia E87.6    Prostatism N40.0    Elevated TSH R94.6    PAD (peripheral artery disease) (HCC) I73.9    Bilateral leg edema R60.0    Open wound of left lower leg S81.802A           Plan:     Patient examined and evaluated    Treatment:   Orders Placed This Encounter   Procedures    Misc nursing order (specify)     Standing Status:   Standing     Number of Occurrences:   1     Standing Expiration Date:   1/15/2018     Scheduling Instructions:      Dressing orders:            Left Leg: Remove old unna boot, clean leg with warm soapy water. Pat dry. Cleanse wound with normal saline or wound cleanser and gauze. Pat dry with clean gauze. Apply aquacel Ag to wound bed. Wrap leg with unna boot, cast padding and coban (start at base of toes and wrap up to 1-2 inches below knee). Change weekly in wound clinic. Right Leg: Wear compression hose to right lower leg.      Please see attached Discharge Instructions    Written patient dismissal instructions given to patient and signed by patient or POA. Discharge Instructions       Visit Discharge/Physician Orders:    Wound Location: left lower leg    Cleanse wound with normal saline. Do not shower, take baths or get wound wet, unless otherwise instructed by your Wound Care doctor. Keep all dressings clean & dry. Dressing orders:    Left Leg: Aquacel Ag applied to wound bed. Leg wrapped with unna boot, cast padding and coban (start at base of toes and wrap up to 1-2 inches below knee). Change weekly in wound clinic. Right Leg: Wear compression hose to right lower leg. *If unna boot becomes too tight- raise/elevate legs above the level of the heart for 15-20 minutes if swelling does not go down then carefully cut off unna boot and call clinic or go to local ER or family physician. If unna boot becomes wet or starts to roll down then carefully remove unna boot and call clinic. Appointments :     Nurse Visits: 11/22/17 at     Follow up with Dr. Cong Castanon: 11/29/17 at     Keep next scheduled appointment. Please give 24 hour notice if unable to keep appointment. [phone]    If you experience any of the following, please call the Wound Care Service during business hours: [phone]. *Increase in pain   *Temperature over 101   *Increase in drainage from your wound or a foul odor   *Uncontrolled swelling   *Need for compression bandage changes due to slippage, breakthrough drainage    If you need medical attention outside of business hours, please contact your Primary Care Doctor or go to the nearest emergency room.                          Electronically signed by Rai Guallpa MD on 11/15/2017 at 11:47 AM

## 2017-11-15 NOTE — PROGRESS NOTES
with rolling walker Strong CGA x 1 with another following in a w/c. ~200 feet x1.and pt requested to sit down due ot \"hip hurting. \"- Second trial ~ 130 'x1 -pt easily distratced, not following commands for direction changes/anxious- pt needing many cues to stay inside the walker and yonatan with turning. Exercise 10: UE strengthening:  red band horizontal abduction, shoulder extension, rows, bicep curls x10 , NT>shoulder flexion x10 and forward press x10 )  Exercise 11: sitting EOB, cues for abdominal brace,  holding red ball--rows, trunk twists, flex x15 each  Exercise 13: Brought  quad cane,  used rolling walker in clinic,  no LOB, buckling legs in clinic         Activity Tolerance:  Activity Tolerance: Other, Patient Tolerated treatment well  Activity Tolerance: Pt anxious and did not stay on task-easily distracted. Pt denies hitting head with fall at home. Assessment: Body structures, Functions, Activity limitations: Decreased functional mobility , Decreased ADL status, Decreased ROM, Decreased strength, Decreased safe awareness, Decreased endurance, Decreased balance  Assessment: Pt initially stated he \"fell at wound clinic. \" Later in session, pt and wife stated\" no-he fell at home before wound clinic. I got him up ok at home. \" Clarified \"fall\" and pt stated\" I did it at home. Pt denies hitting his head or any pain complaints, but very anxious and distrcted. Pt did not stay on task  throughout session. Limited exs due to pt and wife talking over each other and just very talkive , in general. Pt required fairly constant tactile and verbal cues to perform exs correctly. Monitor any pain , pace self at home and contact , if any pain concerns. Prognosis: Fair  Discharge Recommendations: Continue to assess pending progress  Fairly constant cues for arm placement sit<>stand.  While therapist was retrieving gait belt, pt's wife stood pt from transport chair and A transferring pt to [facility] without being told to do so. Discussed safety issues of her walking and trasnferrrng  pt from w/c> [employer] without AD and gait belt, yonatan after falling before arriving to therapy. Patient Education:  Patient Education: Continue with HEP and monitoring response to progressions and walking more. Plan:  Times per week: 2x per week  Plan weeks: 6 weeks  Specific instructions for Next Treatment: Nustep, LE strengthening seated position LAQS, leg curls with theraband, sit to stand transitions. Balance in // bars and progress in UE/  LE strengthening. Gait training with roller walker with +2 assist.    Current Treatment Recommendations: Strengthening, ROM, Balance Training, Functional Mobility Training, Transfer Training, Home Exercise Program, Gait Training, Endurance Training, Patient/Caregiver Education & Training  Plan Comment: continue  per POC    Goals:  Patient goals : To be able to walk and stand better. To be less wobbly and improve strength. GOAL PARTIALLY MET Patient improving with increased walking, increased balance. Notes legs wobbly if standing or walking too long. Short term goals  Time Frame for Short term goals: 3 weeks 11/3/17. Short term goal 1: Patient to demonstrate increased LE strength at hip and knee m to 4/5 with ability to do chair sit to stand test 3x ini 15 seconds with use of UEs on armrests of chair. GOAL MET Patient able to stand 3x in 15 seconds form w/c with 2 nch cusion and Strength at LEs 4/5 hip and knee m.  see long term goal for revision. Revise Chair stand test to 5x in 15 seconds. using armrest of w/c with 2 inch cusion. Short term goal 2: Patient to demonstrate increased  RUE strength from 3+/5 to 4-/5 with ability to lift arm and use more efficiently when completing transfers. GOAL NOT MET right shoulder m 3+/5, left shoulder 4/5 to 5/5. Short term goal 3: Patient to demonstrate increased walking distance using roller walker SBA of one person for 50 feet. . Gait speed from 0.38m/sec to 0.5m/second  GOAL MET . 76 m/sec for gait speed. using roller walker. Patient is ambulating max distances from 1670 feet to 200 feet. Short term goal 4: Tinetti balance and gait score improved from 8/28 to 12/28. GOAL MET exceeded goal Tinettin 18/28. see long term goal   Short term goal 5: Patient modified independent in bed mobility supine<>sit in order to get in and out of bed with more ease and less assistance. Long term goals  Time Frame for Long term goals : 6 weeks ONGOING Address at 6 weeks. Long term goal 1: Patient to demonstrate increased walking distance using roller walker SBA of one person for 75 feet. GAit speed to 0.8 m/second  Long term goal 2: Tinetti balance and gait score improved from 12/28 to 15 /28. GOAL MET Tinetti 18/28. Revise goal: Tinetti balance and gait score to 20/28.    Long term goal 3: Patient independent in home ex program in order to meet above goals         Emmit Lines  [id number]

## 2017-11-15 NOTE — PLAN OF CARE
Problem: Wound:  Goal: Will show signs of wound healing; wound closure and no evidence of infection  Will show signs of wound healing; wound closure and no evidence of infection  Outcome: Ongoing  Pt presents to clinic with ongoing care of left lower leg wound. No s/s of infection. Afebrile. Mepitel ag, unna boot, cast padding, coban applied. Pt to come to wound clinic next Wednesday for nurse visit dressing change and 2 week follow up with dr Waldemar Gottron. Plan on bringing compression hose to dr. Waldemar Gottron appt. Comments: Care plan reviewed with patient and pt wife. Patient and pt wife verbalize understanding of the plan of care and contribute to goal setting.

## 2017-11-20 ENCOUNTER — OFFICE VISIT (OUTPATIENT)
Dept: ONCOLOGY | Age: 82
End: 2017-11-20
Payer: MEDICARE

## 2017-11-20 ENCOUNTER — HOSPITAL ENCOUNTER (OUTPATIENT)
Dept: PHYSICAL THERAPY | Age: 82
Setting detail: THERAPIES SERIES
Discharge: HOME | End: 2017-11-20
Payer: MEDICARE

## 2017-11-20 ENCOUNTER — HOSPITAL ENCOUNTER (OUTPATIENT)
Dept: INFUSION THERAPY | Age: 82
Discharge: HOME | End: 2017-11-20
Payer: MEDICARE

## 2017-11-20 VITALS
WEIGHT: 217.4 LBS | TEMPERATURE: 96.9 F | RESPIRATION RATE: 20 BRPM | BODY MASS INDEX: 32.95 KG/M2 | SYSTOLIC BLOOD PRESSURE: 138 MMHG | DIASTOLIC BLOOD PRESSURE: 70 MMHG | HEIGHT: 68 IN | OXYGEN SATURATION: 97 % | HEART RATE: 82 BPM

## 2017-11-20 DIAGNOSIS — D64.9 NORMOCYTIC ANEMIA: Primary | ICD-10-CM

## 2017-11-20 DIAGNOSIS — D64.9 NORMOCYTIC ANEMIA: ICD-10-CM

## 2017-11-20 LAB
BASINOPHIL, AUTOMATED: 1 % (ref 0–12)
EOSINOPHILS RELATIVE PERCENT: 5 % (ref 0–12)
FERRITIN: 54 NG/ML (ref 22–322)
FOLATE: > 20 NG/ML (ref 4.8–24.2)
HCT VFR BLD CALC: 35.4 % (ref 42–52)
HEMOGLOBIN: 11.4 GM/DL (ref 14–18)
IRON: 83 UG/DL (ref 65–195)
LD: 258 U/L (ref 100–190)
LYMPHOCYTES # BLD: 25 % (ref 15–47)
MCH RBC QN AUTO: 27.8 PG (ref 27–31)
MCHC RBC AUTO-ENTMCNC: 32.2 GM/DL (ref 33–37)
MCV RBC AUTO: 86 FL (ref 80–94)
MONOCYTES: 7 % (ref 0–12)
PDW BLD-RTO: 16.2 % (ref 11.5–14.5)
PLATELET # BLD: 303 THOU/MM3 (ref 130–400)
PMV BLD AUTO: 6.8 MCM (ref 7.4–10.4)
RBC # BLD: 4.09 MILL/MM3 (ref 4.7–6.1)
SEG NEUTROPHILS: 63 % (ref 43–75)
VITAMIN B-12: 1968 PG/ML (ref 211–911)
WBC # BLD: 10 THOU/MM3 (ref 4.8–10.8)

## 2017-11-20 PROCEDURE — 1036F TOBACCO NON-USER: CPT | Performed by: INTERNAL MEDICINE

## 2017-11-20 PROCEDURE — 1123F ACP DISCUSS/DSCN MKR DOCD: CPT | Performed by: INTERNAL MEDICINE

## 2017-11-20 PROCEDURE — 99203 OFFICE O/P NEW LOW 30 MIN: CPT | Performed by: INTERNAL MEDICINE

## 2017-11-20 PROCEDURE — G8417 CALC BMI ABV UP PARAM F/U: HCPCS | Performed by: INTERNAL MEDICINE

## 2017-11-20 PROCEDURE — 82607 VITAMIN B-12: CPT

## 2017-11-20 PROCEDURE — 82746 ASSAY OF FOLIC ACID SERUM: CPT

## 2017-11-20 PROCEDURE — 84160 ASSAY OF PROTEIN ANY SOURCE: CPT

## 2017-11-20 PROCEDURE — 82728 ASSAY OF FERRITIN: CPT

## 2017-11-20 PROCEDURE — G8598 ASA/ANTIPLAT THER USED: HCPCS | Performed by: INTERNAL MEDICINE

## 2017-11-20 PROCEDURE — 83615 LACTATE (LD) (LDH) ENZYME: CPT

## 2017-11-20 PROCEDURE — 84165 PROTEIN E-PHORESIS SERUM: CPT

## 2017-11-20 PROCEDURE — 82784 ASSAY IGA/IGD/IGG/IGM EACH: CPT

## 2017-11-20 PROCEDURE — 86334 IMMUNOFIX E-PHORESIS SERUM: CPT

## 2017-11-20 PROCEDURE — G8484 FLU IMMUNIZE NO ADMIN: HCPCS | Performed by: INTERNAL MEDICINE

## 2017-11-20 PROCEDURE — 4040F PNEUMOC VAC/ADMIN/RCVD: CPT | Performed by: INTERNAL MEDICINE

## 2017-11-20 PROCEDURE — 36415 COLL VENOUS BLD VENIPUNCTURE: CPT

## 2017-11-20 PROCEDURE — 83540 ASSAY OF IRON: CPT

## 2017-11-20 PROCEDURE — G8427 DOCREV CUR MEDS BY ELIG CLIN: HCPCS | Performed by: INTERNAL MEDICINE

## 2017-11-20 PROCEDURE — 85025 COMPLETE CBC W/AUTO DIFF WBC: CPT

## 2017-11-20 PROCEDURE — 99211 OFF/OP EST MAY X REQ PHY/QHP: CPT

## 2017-11-20 RX ORDER — RANITIDINE 150 MG/1
150 TABLET ORAL DAILY PRN
COMMUNITY
End: 2018-11-09 | Stop reason: ALTCHOICE

## 2017-11-20 NOTE — PROGRESS NOTES
Joan Manuel 62  PHYSICAL THERAPY MISSED TREATMENT NOTE  OUTPATIENT REHABILITATION    Date: 2017  Patient Name: Skylar Tavarez        MRN: [id number]   : 1935  ([de-identified] y.o.)  Gender: male           PT Visit Information  Canceled Appointment: 1    REASON FOR MISSED TREATMENT:  Patient cancelled appointment today due to being at [address] [place] [address] appointment. Re evaluation rescheduled for next week.  Brook Gautam, [address]

## 2017-11-20 NOTE — PROGRESS NOTES
Date    BACK SURGERY      lower    CARDIAC CATHETERIZATION      ok    CARDIAC CATHETERIZATION      ok    CATARACT REMOVAL Bilateral 2010    CHOLECYSTECTOMY      COLON SURGERY      6-8 in of descending colon removed    COLOSTOMY      COLOSTOMY      reversed    EYE SURGERY      band    HERNIA REPAIR      NECK SURGERY  06/29/2016    three   [address] OTHER SURGICAL HISTORY  06/29/2016    ACDF C4-5    [place] COLSC FLX W/REMOVAL LESION BY HOT BX FORCEPS Left 7/17/2017    COLONOSCOPY POLYPECTOMY HOT BIOPSY performed by Carri Stevens MD at [facility]    UPPER GASTROINTESTINAL ENDOSCOPY N/A 7/17/2017    EGD CONTROL HEMORRHAGE performed by Carri Stevens MD at [facility]      Family History   Problem Relation Age of Onset    Diabetes Mother     Cancer Father     Heart Disease Father     COPD Sister     Cancer Sister     High Blood Pressure Sister       Social History   Substance Use Topics    Smoking status: Former Smoker     Quit date: 8/14/1991    Smokeless tobacco: Never Used    Alcohol use No      Current Outpatient Prescriptions   Medication Sig Dispense Refill    GALO PO Take 800 mg by mouth 2 times daily Cherry Gel Cap      ranitidine (ZANTAC) 150 MG tablet Take 150 mg by mouth daily as needed for Heartburn      Pseudoephedrine HCl (SUDAFED PO) Take by mouth as needed      Cranberry 1000 MG CAPS Take 1 tablet by mouth daily      Cyanocobalamin (VITAMIN B 12 PO) Take by mouth      indomethacin (INDOCIN) 50 MG capsule Take 50 mg by mouth 3 times daily as needed (for gout) With food      NONFORMULARY Take 1 capsule by mouth daily       tamsulosin (FLOMAX) 0.4 MG capsule Take 1 capsule by mouth daily 90 capsule 3    aspirin 81 MG EC tablet Take 1 tablet by mouth daily 1 tablet 0    rivaroxaban (XARELTO) 20 MG TABS tablet TAKE 1 TABLET DAILY (Patient taking differently: nightly TAKE 1 TABLET DAILY) 90 tablet 3    Calcium Carbonate-Vitamin D (CALCIUM 600/VITAMIN D PO) Standing Status:   Future     Number of Occurrences:   1     Standing Expiration Date:   11/20/2018        Medications Prescribed:   No orders of the defined types were placed in this encounter. Discussed use, benefit, and side effects of prescribed medications. All patient questions answered. Pt voiced understanding. Instructed to continue current medications, diet and exercise. Patient agreed with treatment plan. Follow up as directed.     Electronically signed by Shruthi Arellano MD on 11/20/17 at 1:16 PM

## 2017-11-22 ENCOUNTER — HOSPITAL ENCOUNTER (OUTPATIENT)
Dept: WOUND CARE | Age: 82
Discharge: HOME | End: 2017-11-22
Payer: MEDICARE

## 2017-11-22 VITALS
OXYGEN SATURATION: 98 % | RESPIRATION RATE: 20 BRPM | TEMPERATURE: 97 F | DIASTOLIC BLOOD PRESSURE: 53 MMHG | SYSTOLIC BLOOD PRESSURE: 111 MMHG | HEART RATE: 67 BPM

## 2017-11-22 DIAGNOSIS — I73.9 PAD (PERIPHERAL ARTERY DISEASE) (HCC): ICD-10-CM

## 2017-11-22 DIAGNOSIS — S81.802D OPEN WOUND OF LEFT LOWER LEG, SUBSEQUENT ENCOUNTER: ICD-10-CM

## 2017-11-22 PROCEDURE — A6456 ZINC PASTE BAND W >=3"<5"/YD: HCPCS

## 2017-11-22 PROCEDURE — A6454 SELF-ADHER BAND W>=3" <5"/YD: HCPCS

## 2017-11-22 PROCEDURE — 29580 STRAPPING UNNA BOOT: CPT

## 2017-11-22 ASSESSMENT — PAIN SCALES - GENERAL: PAINLEVEL_OUTOF10: 0

## 2017-11-23 LAB — IMMUNOFIXATION WITH QUANT: NORMAL

## 2017-11-28 ENCOUNTER — HOSPITAL ENCOUNTER (OUTPATIENT)
Dept: PHYSICAL THERAPY | Age: 82
Setting detail: THERAPIES SERIES
Discharge: HOME | End: 2017-11-28
Payer: MEDICARE

## 2017-11-28 PROCEDURE — G8980 MOBILITY D/C STATUS: HCPCS

## 2017-11-28 PROCEDURE — G8979 MOBILITY GOAL STATUS: HCPCS

## 2017-11-28 PROCEDURE — 97110 THERAPEUTIC EXERCISES: CPT

## 2017-11-28 ASSESSMENT — PAIN SCALES - GENERAL: PAINLEVEL_OUTOF10: 5

## 2017-11-28 ASSESSMENT — PAIN DESCRIPTION - LOCATION: LOCATION: BACK;SHOULDER

## 2017-11-28 ASSESSMENT — PAIN DESCRIPTION - PAIN TYPE: TYPE: CHRONIC PAIN

## 2017-11-28 ASSESSMENT — PAIN DESCRIPTION - ORIENTATION: ORIENTATION: RIGHT

## 2017-11-28 NOTE — PROGRESS NOTES
FROM CHAIR 1 - Able, uses arms to help   3. ATTEMPTS TO RISE 2 - Able to rise, 1 attempt   4. IMMEDIATE STANDING BALANCE (FIRST 5 SECONDS) 2 - Steady without walker or other support   5. STANDING BALANCE 1 - Steady but wide stance and uses support   6. NUDGED 2 - Steady   7. EYES CLOSED 0 - Unsteady   8. TURNING 360 DEGREES 0 - Discontinuous steps and 0 - Unsteady (grabs,staggers)   9. SITTING DOWN 1 - Uses arms or not a smooth motion   BALANCE SCORE 8/16       GAIT SECTION Patient stands with therapist, walks across room (+/- aids), fist at usual pace, then at rapid pace. 1.  INDICATION OF GAIT (Immediately after told to \"go\" 1 - No hesitancy   2. STEP LENGTH AND HEIGHT 1 - Step through Right and 1 - Step through Left   3. FOOT CLEARANCE 1 - Left foot clears floor and 1 - Right foot clears floor   4. STEP SYMMETRY 1 - Right and left step length appear equal   5. STEP CONTINUITY 1 - Steps appear continuous   6. PATH 1 - Mild/moderate deviation or uses walking aid   7. TRUNK 0 - Marked sway or uses walking aid   8. WALKING TIME 1 - Heels almost touching while walking   GAIT SCORE 10/12   TOTAL SCORE 18/28   Risk Indicators:  Less than/equal to 18 = high risk  19-23 Moderate risk  Greater than/equal to 24 = low risk                         Activity Tolerance:  Activity Tolerance: Patient Tolerated treatment well  Activity Tolerance: Note good progress in sessions. Refer to goal summary for status    Assessment:  Assessment: Note Tinetti score 18/28, Gait speed 0.86 m/seconds,  Walking distance with roller walker 220 feet with SBA of one person. Patient independent in ex program.  Will discharge at this time. Prognosis: Good       Patient Education:  Patient Education: Continue with HEP and monitoring response to progressions and walking                       Plan:  Plan Comment: Discharge from PT. Patient to continue with home ex program.     Goals:  Patient goals :  To be able to walk and stand program in order to meet above goals GOAL MET      PT G-Codes  Functional Assessment Tool Used: Tinetti  Score: 18/28  Functional Limitation: Mobility: Walking and moving around  Mobility: Walking and Moving Around Goal Status (): At least 40 percent but less than 60 percent impaired, limited or restricted  Mobility: Walking and Moving Around Discharge Status ():  At least 20 percent but less than 40 percent impaired, limited or restricted    josseline [facility], [address] [facility]

## 2017-11-29 ENCOUNTER — HOSPITAL ENCOUNTER (OUTPATIENT)
Dept: WOUND CARE | Age: 82
Discharge: HOME | End: 2017-11-29
Payer: MEDICARE

## 2017-11-29 VITALS
DIASTOLIC BLOOD PRESSURE: 71 MMHG | SYSTOLIC BLOOD PRESSURE: 137 MMHG | OXYGEN SATURATION: 98 % | RESPIRATION RATE: 20 BRPM | TEMPERATURE: 97.5 F | HEART RATE: 70 BPM

## 2017-11-29 PROCEDURE — 99212 OFFICE O/P EST SF 10 MIN: CPT

## 2017-11-29 ASSESSMENT — PAIN DESCRIPTION - PAIN TYPE: TYPE: ACUTE PAIN

## 2017-11-29 NOTE — PLAN OF CARE
Problem: Wound:  Goal: Will show signs of wound healing; wound closure and no evidence of infection  Will show signs of wound healing; wound closure and no evidence of infection   Outcome: Completed Date Met: 11/29/17  Follow up visit. Wound improving. No dressing needed. Compression hose applied to lt lower leg. D/C'd from service this date. Comments: Care plan reviewed with patient and wife. Patient and wife verbalize understanding of the plan of care and contribute to goal setting.

## 2017-12-04 ENCOUNTER — HOSPITAL ENCOUNTER (OUTPATIENT)
Dept: INFUSION THERAPY | Age: 82
Discharge: HOME | End: 2017-12-04
Payer: MEDICARE

## 2017-12-04 ENCOUNTER — OFFICE VISIT (OUTPATIENT)
Dept: ONCOLOGY | Age: 82
End: 2017-12-04
Payer: MEDICARE

## 2017-12-04 VITALS
RESPIRATION RATE: 18 BRPM | HEART RATE: 79 BPM | DIASTOLIC BLOOD PRESSURE: 75 MMHG | HEIGHT: 68 IN | WEIGHT: 224.6 LBS | TEMPERATURE: 97.3 F | SYSTOLIC BLOOD PRESSURE: 119 MMHG | OXYGEN SATURATION: 97 % | BODY MASS INDEX: 34.04 KG/M2

## 2017-12-04 DIAGNOSIS — D64.9 NORMOCYTIC ANEMIA: Primary | ICD-10-CM

## 2017-12-04 PROCEDURE — 1123F ACP DISCUSS/DSCN MKR DOCD: CPT | Performed by: INTERNAL MEDICINE

## 2017-12-04 PROCEDURE — G8484 FLU IMMUNIZE NO ADMIN: HCPCS | Performed by: INTERNAL MEDICINE

## 2017-12-04 PROCEDURE — 1036F TOBACCO NON-USER: CPT | Performed by: INTERNAL MEDICINE

## 2017-12-04 PROCEDURE — 99211 OFF/OP EST MAY X REQ PHY/QHP: CPT

## 2017-12-04 PROCEDURE — 99213 OFFICE O/P EST LOW 20 MIN: CPT | Performed by: INTERNAL MEDICINE

## 2017-12-04 PROCEDURE — 4040F PNEUMOC VAC/ADMIN/RCVD: CPT | Performed by: INTERNAL MEDICINE

## 2017-12-04 PROCEDURE — G8427 DOCREV CUR MEDS BY ELIG CLIN: HCPCS | Performed by: INTERNAL MEDICINE

## 2017-12-04 PROCEDURE — G8417 CALC BMI ABV UP PARAM F/U: HCPCS | Performed by: INTERNAL MEDICINE

## 2017-12-04 PROCEDURE — G8598 ASA/ANTIPLAT THER USED: HCPCS | Performed by: INTERNAL MEDICINE

## 2017-12-20 ASSESSMENT — ENCOUNTER SYMPTOMS
NAUSEA: 0
BLOOD IN STOOL: 0
CONSTIPATION: 1
EYE DISCHARGE: 0
SORE THROAT: 0
RECTAL PAIN: 0
SHORTNESS OF BREATH: 0
CONSTIPATION: 1
BLOOD IN STOOL: 0
DIARRHEA: 0
COLOR CHANGE: 0
SHORTNESS OF BREATH: 0
COUGH: 0
CHEST TIGHTNESS: 0
BACK PAIN: 1
WHEEZING: 0
COUGH: 0
EYE DISCHARGE: 0
FACIAL SWELLING: 0
VOMITING: 0
FACIAL SWELLING: 0
ABDOMINAL PAIN: 0
COLOR CHANGE: 0
WHEEZING: 0
CHEST TIGHTNESS: 0
NAUSEA: 0
SORE THROAT: 0
TROUBLE SWALLOWING: 0
BACK PAIN: 1
RECTAL PAIN: 0
TROUBLE SWALLOWING: 0
DIARRHEA: 0
ABDOMINAL DISTENTION: 0
ABDOMINAL PAIN: 0
VOMITING: 0
ABDOMINAL DISTENTION: 0

## 2017-12-20 NOTE — PROGRESS NOTES
 Movement disorder     back pain    Myocardial infarct     Thyroid disease     UTI (urinary tract infection)       Past Surgical History:   Procedure Laterality Date    BACK SURGERY      lower    CARDIAC CATHETERIZATION      ok    CARDIAC CATHETERIZATION      ok    CATARACT REMOVAL Bilateral 2010    CHOLECYSTECTOMY      COLON SURGERY      6-8 in of descending colon removed    COLOSTOMY      COLOSTOMY      reversed    EYE SURGERY      band    HERNIA REPAIR      NECK SURGERY  06/29/2016    three   [address] OTHER SURGICAL HISTORY  06/29/2016    ACDF C4-5    [place] COLSC FLX W/REMOVAL LESION BY HOT BX FORCEPS Left 7/17/2017    COLONOSCOPY POLYPECTOMY HOT BIOPSY performed by Val Gustafson MD at [address] [facility] [address] ENDOSCOPY N/A 7/17/2017    EGD CONTROL HEMORRHAGE performed by Val Gustafson MD at [address] [facility]      Family History   Problem Relation Age of Onset    Diabetes Mother     Cancer Father     Heart Disease Father     COPD Sister     Cancer Sister     High Blood Pressure Sister       Social History   Substance Use Topics    Smoking status: Former Smoker     Quit date: 8/14/1991    Smokeless tobacco: Never Used    Alcohol use No      Current Outpatient Prescriptions   Medication Sig Dispense Refill    GALO PO Take 800 mg by mouth 2 times daily Cherry Gel Cap      ranitidine (ZANTAC) 150 MG tablet Take 150 mg by mouth daily as needed for Heartburn      Pseudoephedrine HCl (SUDAFED PO) Take by mouth as needed      Cranberry 1000 MG CAPS Take 1 tablet by mouth daily      Cyanocobalamin (VITAMIN B 12 PO) Take by mouth      indomethacin (INDOCIN) 50 MG capsule Take 50 mg by mouth 3 times daily as needed (for gout) With food      NONFORMULARY Take 1 capsule by mouth daily       tamsulosin (FLOMAX) 0.4 MG capsule Take 1 capsule by mouth daily 90 capsule 3    aspirin 81 MG EC tablet Take 1 tablet by mouth daily 1 tablet 0    rivaroxaban (XARELTO) 20 MG TABS tablet TAKE 1 TABLET DAILY (Patient taking differently: nightly TAKE 1 TABLET DAILY) 90 tablet 3    Calcium Carbonate-Vitamin D (CALCIUM 600/VITAMIN D PO) Take 1 tablet by mouth 2 times daily      oxyCODONE-acetaminophen (PERCOCET) 5-325 MG per tablet Take 1 tablet by mouth every 4 hours as needed for Pain .  metoprolol tartrate (LOPRESSOR) 50 MG tablet TAKE 1 TABLET TWICE A  tablet 0    furosemide (LASIX) 40 MG tablet Take 1 tablet by mouth daily 90 tablet 2    levothyroxine (SYNTHROID) 50 MCG tablet Take 100 mcg by mouth Daily       cloNIDine (CATAPRES) 0.1 MG tablet Take 0.1 mg by mouth as needed for High Blood Pressure Takes if BP is greater than 175      hydrOXYzine (ATARAX) 25 MG tablet Take 25 mg by mouth 4 times daily as needed for Itching      loperamide (IMODIUM) 2 MG capsule Take 2 mg by mouth as needed for Diarrhea      potassium chloride (K-TAB) 10 MEQ extended release tablet Take 4 tablets by mouth daily 360 tablet 3    docusate sodium (COLACE) 100 MG capsule Take 300 mg by mouth 2 times daily       magnesium oxide (MAG-OX) 400 (241.3 MG) MG TABS tablet Take 1 tablet by mouth daily 30 tablet 0    allopurinol (ZYLOPRIM) 300 MG tablet Take 300 mg by mouth daily      quinapril (ACCUPRIL) 40 MG tablet Take 40 mg by mouth daily.  pravastatin (PRAVACHOL) 80 MG tablet Take 80 mg by mouth nightly.  indapamide (LOZOL) 1.25 MG tablet Take 1.25 mg by mouth every morning.  cyclobenzaprine (FLEXERIL) 10 MG tablet Take 10 mg by mouth every 8 hours as needed.  Ascorbic Acid (VITAMIN C) 500 MG tablet Take 500 mg by mouth daily.  saline nasal gel (AYR) GEL 1 g by Nasal route as needed (dryness, nosebleed prevention) 1 Tube 0     No current facility-administered medications for this visit.        Allergies   Allergen Reactions    Pcn [Penicillins] Swelling      Health Maintenance   Topic Date Due    DTaP/Tdap/Td vaccine (1 - Tdap) 06[birth year]/1956   

## 2018-03-02 DIAGNOSIS — D64.9 ANEMIA, UNSPECIFIED TYPE: Primary | ICD-10-CM

## 2018-03-05 ENCOUNTER — OFFICE VISIT (OUTPATIENT)
Dept: ONCOLOGY | Age: 83
End: 2018-03-05
Payer: MEDICARE

## 2018-03-05 ENCOUNTER — HOSPITAL ENCOUNTER (OUTPATIENT)
Dept: INFUSION THERAPY | Age: 83
Discharge: HOME | End: 2018-03-05
Payer: MEDICARE

## 2018-03-05 VITALS
WEIGHT: 235.8 LBS | TEMPERATURE: 96.8 F | SYSTOLIC BLOOD PRESSURE: 135 MMHG | BODY MASS INDEX: 35.74 KG/M2 | HEIGHT: 68 IN | DIASTOLIC BLOOD PRESSURE: 71 MMHG | HEART RATE: 66 BPM | RESPIRATION RATE: 18 BRPM | OXYGEN SATURATION: 96 %

## 2018-03-05 DIAGNOSIS — D64.9 ANEMIA, UNSPECIFIED TYPE: ICD-10-CM

## 2018-03-05 DIAGNOSIS — D64.9 ANEMIA, UNSPECIFIED TYPE: Primary | ICD-10-CM

## 2018-03-05 LAB
BASINOPHIL, AUTOMATED: 0 % (ref 0–12)
EOSINOPHILS RELATIVE PERCENT: 7 % (ref 0–12)
HCT VFR BLD CALC: 31.8 % (ref 42–52)
HEMOGLOBIN: 10.4 GM/DL (ref 14–18)
LYMPHOCYTES # BLD: 26 % (ref 15–47)
MCH RBC QN AUTO: 30.3 PG (ref 27–31)
MCHC RBC AUTO-ENTMCNC: 32.6 GM/DL (ref 33–37)
MCV RBC AUTO: 93 FL (ref 80–94)
MONOCYTES: 8 % (ref 0–12)
PDW BLD-RTO: 14.2 % (ref 11.5–14.5)
PLATELET # BLD: 217 THOU/MM3 (ref 130–400)
PMV BLD AUTO: 7 FL (ref 7.4–10.4)
RBC # BLD: 3.43 MILL/MM3 (ref 4.7–6.1)
SEG NEUTROPHILS: 59 % (ref 43–75)
WBC # BLD: 8.1 THOU/MM3 (ref 4.8–10.8)

## 2018-03-05 PROCEDURE — G8484 FLU IMMUNIZE NO ADMIN: HCPCS | Performed by: INTERNAL MEDICINE

## 2018-03-05 PROCEDURE — 85025 COMPLETE CBC W/AUTO DIFF WBC: CPT

## 2018-03-05 PROCEDURE — 1036F TOBACCO NON-USER: CPT | Performed by: INTERNAL MEDICINE

## 2018-03-05 PROCEDURE — 99213 OFFICE O/P EST LOW 20 MIN: CPT | Performed by: INTERNAL MEDICINE

## 2018-03-05 PROCEDURE — 4040F PNEUMOC VAC/ADMIN/RCVD: CPT | Performed by: INTERNAL MEDICINE

## 2018-03-05 PROCEDURE — 36415 COLL VENOUS BLD VENIPUNCTURE: CPT

## 2018-03-05 PROCEDURE — G8417 CALC BMI ABV UP PARAM F/U: HCPCS | Performed by: INTERNAL MEDICINE

## 2018-03-05 PROCEDURE — G8427 DOCREV CUR MEDS BY ELIG CLIN: HCPCS | Performed by: INTERNAL MEDICINE

## 2018-03-05 PROCEDURE — 1123F ACP DISCUSS/DSCN MKR DOCD: CPT | Performed by: INTERNAL MEDICINE

## 2018-03-05 PROCEDURE — 99211 OFF/OP EST MAY X REQ PHY/QHP: CPT

## 2018-03-05 ASSESSMENT — ENCOUNTER SYMPTOMS
NAUSEA: 0
RECTAL PAIN: 0
ABDOMINAL DISTENTION: 0
ABDOMINAL PAIN: 0
BACK PAIN: 1
FACIAL SWELLING: 0
WHEEZING: 0
EYE DISCHARGE: 0
CHEST TIGHTNESS: 0
VOMITING: 0
DIARRHEA: 0
BLOOD IN STOOL: 0
SHORTNESS OF BREATH: 0
SORE THROAT: 0
COLOR CHANGE: 0
COUGH: 0
TROUBLE SWALLOWING: 0
CONSTIPATION: 1

## 2018-03-05 NOTE — PROGRESS NOTES
leg DVT    Hyperlipidemia     Hypertension     Irregular cardiac rhythm 07/2016    Movement disorder     back pain    Myocardial infarct     Thyroid disease     UTI (urinary tract infection)       Past Surgical History:   Procedure Laterality Date    BACK SURGERY      lower    CARDIAC CATHETERIZATION      ok    CARDIAC CATHETERIZATION      ok    CATARACT REMOVAL Bilateral 2010    CHOLECYSTECTOMY      COLON SURGERY      6-8 in of descending colon removed    COLOSTOMY      COLOSTOMY      reversed    EYE SURGERY      band    HERNIA REPAIR      NECK SURGERY  06/29/2016    three   [address] OTHER SURGICAL HISTORY  06/29/2016    ACDF C4-5    [place] COLSC FLX W/REMOVAL LESION BY HOT BX FORCEPS Left 7/17/2017    COLONOSCOPY POLYPECTOMY HOT BIOPSY performed by Julianne Oliva MD at [facility] ENDOSCOPY N/A 7/17/2017    EGD CONTROL HEMORRHAGE performed by Julianne Oliva MD at [facility]      Family History   Problem Relation Age of Onset    Diabetes Mother     Cancer Father     Heart Disease Father     COPD Sister     Cancer Sister     High Blood Pressure Sister       Social History   Substance Use Topics    Smoking status: Former Smoker     Quit date: 8/14/1991    Smokeless tobacco: Never Used    Alcohol use No      Current Outpatient Prescriptions   Medication Sig Dispense Refill    GALO PO Take 800 mg by mouth 2 times daily Cherry Gel Cap      ranitidine (ZANTAC) 150 MG tablet Take 150 mg by mouth daily as needed for Heartburn      Pseudoephedrine HCl (SUDAFED PO) Take by mouth as needed      Cranberry 1000 MG CAPS Take 1 tablet by mouth daily      Cyanocobalamin (VITAMIN B 12 PO) Take by mouth      indomethacin (INDOCIN) 50 MG capsule Take 50 mg by mouth 3 times daily as needed (for gout) With food      NONFORMULARY Take 1 capsule by mouth daily       tamsulosin (FLOMAX) 0.4 MG capsule Take 1 capsule by mouth daily 90 capsule 3    aspirin 81 MG EC tablet Take 1 tablet by mouth daily 1 tablet 0    rivaroxaban (XARELTO) 20 MG TABS tablet TAKE 1 TABLET DAILY (Patient taking differently: nightly TAKE 1 TABLET DAILY) 90 tablet 3    Calcium Carbonate-Vitamin D (CALCIUM 600/VITAMIN D PO) Take 1 tablet by mouth 2 times daily      oxyCODONE-acetaminophen (PERCOCET) 5-325 MG per tablet Take 1 tablet by mouth every 4 hours as needed for Pain .  metoprolol tartrate (LOPRESSOR) 50 MG tablet TAKE 1 TABLET TWICE A  tablet 0    furosemide (LASIX) 40 MG tablet Take 1 tablet by mouth daily 90 tablet 2    levothyroxine (SYNTHROID) 50 MCG tablet Take 100 mcg by mouth Daily       cloNIDine (CATAPRES) 0.1 MG tablet Take 0.1 mg by mouth as needed for High Blood Pressure Takes if BP is greater than 175      hydrOXYzine (ATARAX) 25 MG tablet Take 25 mg by mouth 4 times daily as needed for Itching      loperamide (IMODIUM) 2 MG capsule Take 2 mg by mouth as needed for Diarrhea      potassium chloride (K-TAB) 10 MEQ extended release tablet Take 4 tablets by mouth daily 360 tablet 3    docusate sodium (COLACE) 100 MG capsule Take 300 mg by mouth 2 times daily       magnesium oxide (MAG-OX) 400 (241.3 MG) MG TABS tablet Take 1 tablet by mouth daily 30 tablet 0    allopurinol (ZYLOPRIM) 300 MG tablet Take 300 mg by mouth daily      quinapril (ACCUPRIL) 40 MG tablet Take 40 mg by mouth daily.  pravastatin (PRAVACHOL) 80 MG tablet Take 80 mg by mouth nightly.  indapamide (LOZOL) 1.25 MG tablet Take 1.25 mg by mouth every morning.  cyclobenzaprine (FLEXERIL) 10 MG tablet Take 10 mg by mouth every 8 hours as needed.  Ascorbic Acid (VITAMIN C) 500 MG tablet Take 500 mg by mouth daily.  saline nasal gel (AYR) GEL 1 g by Nasal route as needed (dryness, nosebleed prevention) 1 Tube 0     No current facility-administered medications for this visit.        Allergies   Allergen Reactions    Pcn [Penicillins] [facility] with normal MCV of 86, and normal platelet count. Iron studies showed normal ferritin, normal iron,  normal folate and normal vitamin B12. Most likely his normocytic anemia is secondary to chronic disease. Hypothyroidism can lead to normocytic anemia. In addition if the patient has low grade myelodysplastic features he is still blood transfusion independent. I will hold on bone marrow biopsy. The patient will follow up with his PCP I will see him only on an as needed basis    Plan:   Return if symptoms worsen or fail to improve. Orders Placed:   No orders of the defined types were placed in this encounter. Medications Prescribed:   No orders of the defined types were placed in this encounter. Discussed use, benefit, and side effects of prescribed medications. All patient questions answered. Pt voiced understanding. Instructed to continue current medications, diet and exercise. Patient agreed with treatment plan. Follow up as directed.     Electronically signed by Marilin Laguna MD on 11/20/17 at 1:16 PM

## 2018-03-16 ENCOUNTER — TELEPHONE (OUTPATIENT)
Dept: CARDIOLOGY CLINIC | Age: 83
End: 2018-03-16

## 2018-03-16 ENCOUNTER — OFFICE VISIT (OUTPATIENT)
Dept: PHYSICAL MEDICINE AND REHAB | Age: 83
End: 2018-03-16
Payer: MEDICARE

## 2018-03-16 VITALS
SYSTOLIC BLOOD PRESSURE: 149 MMHG | BODY MASS INDEX: 34.86 KG/M2 | HEART RATE: 72 BPM | WEIGHT: 230 LBS | DIASTOLIC BLOOD PRESSURE: 77 MMHG | HEIGHT: 68 IN

## 2018-03-16 DIAGNOSIS — M47.816 LUMBAR SPONDYLOSIS: Primary | ICD-10-CM

## 2018-03-16 DIAGNOSIS — G89.4 CHRONIC PAIN SYNDROME: ICD-10-CM

## 2018-03-16 PROCEDURE — 99205 OFFICE O/P NEW HI 60 MIN: CPT | Performed by: PAIN MEDICINE

## 2018-03-16 PROCEDURE — G8417 CALC BMI ABV UP PARAM F/U: HCPCS | Performed by: PAIN MEDICINE

## 2018-03-16 PROCEDURE — G8484 FLU IMMUNIZE NO ADMIN: HCPCS | Performed by: PAIN MEDICINE

## 2018-03-16 PROCEDURE — 4040F PNEUMOC VAC/ADMIN/RCVD: CPT | Performed by: PAIN MEDICINE

## 2018-03-16 PROCEDURE — 1036F TOBACCO NON-USER: CPT | Performed by: PAIN MEDICINE

## 2018-03-16 PROCEDURE — 1123F ACP DISCUSS/DSCN MKR DOCD: CPT | Performed by: PAIN MEDICINE

## 2018-03-16 PROCEDURE — G8427 DOCREV CUR MEDS BY ELIG CLIN: HCPCS | Performed by: PAIN MEDICINE

## 2018-03-16 RX ORDER — OXYCODONE HYDROCHLORIDE AND ACETAMINOPHEN 5; 325 MG/1; MG/1
1 TABLET ORAL EVERY 8 HOURS PRN
Qty: 90 TABLET | Refills: 0 | Status: SHIPPED | OUTPATIENT
Start: 2018-03-16 | End: 2018-05-02 | Stop reason: SDUPTHER

## 2018-03-16 ASSESSMENT — ENCOUNTER SYMPTOMS
DIARRHEA: 1
COLOR CHANGE: 0
ABDOMINAL PAIN: 0
SHORTNESS OF BREATH: 0
PHOTOPHOBIA: 0
NAUSEA: 0
COUGH: 0
RHINORRHEA: 0
SORE THROAT: 0
BACK PAIN: 1
VOMITING: 0
SINUS PRESSURE: 0
CONSTIPATION: 1
CHEST TIGHTNESS: 0
WHEEZING: 0
EYE PAIN: 0

## 2018-03-16 NOTE — TELEPHONE ENCOUNTER
PATIENT IS HAVING A LUMBAR FACET WITH DR Dora Arciniega. THEY ARE ASKING TO HOLD XARELTO AND ASA    CAN PATIENT BE CLEARED?

## 2018-03-19 ENCOUNTER — TELEPHONE (OUTPATIENT)
Dept: PHYSICAL MEDICINE AND REHAB | Age: 83
End: 2018-03-19

## 2018-03-22 ENCOUNTER — APPOINTMENT (OUTPATIENT)
Dept: CT IMAGING | Age: 83
End: 2018-03-22
Payer: MEDICARE

## 2018-03-22 ENCOUNTER — HOSPITAL ENCOUNTER (EMERGENCY)
Age: 83
Discharge: LEFT BEFORE DISCHARGE | End: 2018-03-22
Attending: EMERGENCY MEDICINE
Payer: MEDICARE

## 2018-03-22 VITALS
OXYGEN SATURATION: 98 % | DIASTOLIC BLOOD PRESSURE: 71 MMHG | HEART RATE: 81 BPM | WEIGHT: 225 LBS | RESPIRATION RATE: 19 BRPM | SYSTOLIC BLOOD PRESSURE: 156 MMHG | BODY MASS INDEX: 34.21 KG/M2 | TEMPERATURE: 97.9 F

## 2018-03-22 DIAGNOSIS — M54.6 ACUTE BILATERAL THORACIC BACK PAIN: Primary | ICD-10-CM

## 2018-03-22 DIAGNOSIS — R77.8 ELEVATED TROPONIN: ICD-10-CM

## 2018-03-22 LAB
ANION GAP SERPL CALCULATED.3IONS-SCNC: 14 MEQ/L (ref 8–16)
ANISOCYTOSIS: ABNORMAL
BASOPHILS # BLD: 0.2 %
BASOPHILS ABSOLUTE: 0 THOU/MM3 (ref 0–0.1)
BUN BLDV-MCNC: 26 MG/DL (ref 7–22)
CALCIUM SERPL-MCNC: 10 MG/DL (ref 8.5–10.5)
CHLORIDE BLD-SCNC: 99 MEQ/L (ref 98–111)
CO2: 27 MEQ/L (ref 23–33)
CREAT SERPL-MCNC: 0.8 MG/DL (ref 0.4–1.2)
EKG ATRIAL RATE: 70 BPM
EKG Q-T INTERVAL: 418 MS
EKG QRS DURATION: 102 MS
EKG QTC CALCULATION (BAZETT): 482 MS
EKG R AXIS: 64 DEGREES
EKG T AXIS: 42 DEGREES
EKG VENTRICULAR RATE: 80 BPM
EOSINOPHIL # BLD: 3.7 %
EOSINOPHILS ABSOLUTE: 0.4 THOU/MM3 (ref 0–0.4)
GFR SERPL CREATININE-BSD FRML MDRD: > 90 ML/MIN/1.73M2
GLUCOSE BLD-MCNC: 117 MG/DL (ref 70–108)
HCT VFR BLD CALC: 34.5 % (ref 42–52)
HEMOGLOBIN: 11.1 GM/DL (ref 14–18)
LYMPHOCYTES # BLD: 15.3 %
LYMPHOCYTES ABSOLUTE: 1.8 THOU/MM3 (ref 1–4.8)
MCH RBC QN AUTO: 30.1 PG (ref 27–31)
MCHC RBC AUTO-ENTMCNC: 32.1 GM/DL (ref 33–37)
MCV RBC AUTO: 93.5 FL (ref 80–94)
MONOCYTES # BLD: 8.5 %
MONOCYTES ABSOLUTE: 1 THOU/MM3 (ref 0.4–1.3)
NUCLEATED RED BLOOD CELLS: 0 /100 WBC
OSMOLALITY CALCULATION: 285.2 MOSMOL/KG (ref 275–300)
PDW BLD-RTO: 18.4 % (ref 11.5–14.5)
PLATELET # BLD: 323 THOU/MM3 (ref 130–400)
PMV BLD AUTO: 7.2 FL (ref 7.4–10.4)
POTASSIUM SERPL-SCNC: 4.6 MEQ/L (ref 3.5–5.2)
RBC # BLD: 3.69 MILL/MM3 (ref 4.7–6.1)
SEG NEUTROPHILS: 72.3 %
SEGMENTED NEUTROPHILS ABSOLUTE COUNT: 8.6 THOU/MM3 (ref 1.8–7.7)
SODIUM BLD-SCNC: 140 MEQ/L (ref 135–145)
TROPONIN T: 0.02 NG/ML
TROPONIN T: 0.04 NG/ML
WBC # BLD: 11.9 THOU/MM3 (ref 4.8–10.8)

## 2018-03-22 PROCEDURE — 99214 OFFICE O/P EST MOD 30 MIN: CPT | Performed by: NURSE PRACTITIONER

## 2018-03-22 PROCEDURE — 76376 3D RENDER W/INTRP POSTPROCES: CPT

## 2018-03-22 PROCEDURE — 71250 CT THORAX DX C-: CPT

## 2018-03-22 PROCEDURE — 93010 ELECTROCARDIOGRAM REPORT: CPT | Performed by: NUCLEAR MEDICINE

## 2018-03-22 PROCEDURE — 84484 ASSAY OF TROPONIN QUANT: CPT

## 2018-03-22 PROCEDURE — 6370000000 HC RX 637 (ALT 250 FOR IP): Performed by: EMERGENCY MEDICINE

## 2018-03-22 PROCEDURE — 6360000002 HC RX W HCPCS: Performed by: EMERGENCY MEDICINE

## 2018-03-22 PROCEDURE — 96372 THER/PROPH/DIAG INJ SC/IM: CPT

## 2018-03-22 PROCEDURE — 85025 COMPLETE CBC W/AUTO DIFF WBC: CPT

## 2018-03-22 PROCEDURE — 99284 EMERGENCY DEPT VISIT MOD MDM: CPT

## 2018-03-22 PROCEDURE — 99215 OFFICE O/P EST HI 40 MIN: CPT

## 2018-03-22 PROCEDURE — 36415 COLL VENOUS BLD VENIPUNCTURE: CPT

## 2018-03-22 PROCEDURE — 93005 ELECTROCARDIOGRAM TRACING: CPT | Performed by: EMERGENCY MEDICINE

## 2018-03-22 PROCEDURE — 80048 BASIC METABOLIC PNL TOTAL CA: CPT

## 2018-03-22 RX ORDER — CYCLOBENZAPRINE HCL 10 MG
5 TABLET ORAL 3 TIMES DAILY
Status: DISCONTINUED | OUTPATIENT
Start: 2018-03-22 | End: 2018-03-22

## 2018-03-22 RX ORDER — ASPIRIN 81 MG/1
324 TABLET, CHEWABLE ORAL ONCE
Status: COMPLETED | OUTPATIENT
Start: 2018-03-22 | End: 2018-03-22

## 2018-03-22 RX ORDER — ORPHENADRINE CITRATE 30 MG/ML
60 INJECTION INTRAMUSCULAR; INTRAVENOUS ONCE
Status: COMPLETED | OUTPATIENT
Start: 2018-03-22 | End: 2018-03-22

## 2018-03-22 RX ORDER — OXYCODONE HYDROCHLORIDE AND ACETAMINOPHEN 5; 325 MG/1; MG/1
1 TABLET ORAL ONCE
Status: COMPLETED | OUTPATIENT
Start: 2018-03-22 | End: 2018-03-22

## 2018-03-22 RX ORDER — OXYCODONE HYDROCHLORIDE AND ACETAMINOPHEN 5; 325 MG/1; MG/1
1 TABLET ORAL ONCE
Status: DISCONTINUED | OUTPATIENT
Start: 2018-03-22 | End: 2018-03-22 | Stop reason: SDUPTHER

## 2018-03-22 RX ADMIN — OXYCODONE HYDROCHLORIDE AND ACETAMINOPHEN 1 TABLET: 5; 325 TABLET ORAL at 21:31

## 2018-03-22 RX ADMIN — ASPIRIN 81 MG 243 MG: 81 TABLET ORAL at 19:24

## 2018-03-22 RX ADMIN — ORPHENADRINE CITRATE 60 MG: 30 INJECTION INTRAMUSCULAR; INTRAVENOUS at 18:11

## 2018-03-22 ASSESSMENT — PAIN DESCRIPTION - PAIN TYPE
TYPE: ACUTE PAIN

## 2018-03-22 ASSESSMENT — PAIN DESCRIPTION - LOCATION
LOCATION: BACK
LOCATION: CHEST;BACK

## 2018-03-22 ASSESSMENT — ENCOUNTER SYMPTOMS
COUGH: 0
VOMITING: 0
STRIDOR: 0
ABDOMINAL PAIN: 0
NAUSEA: 0
EYE REDNESS: 0
BACK PAIN: 1
SHORTNESS OF BREATH: 0
EYE DISCHARGE: 0
COLOR CHANGE: 0
WHEEZING: 0
SORE THROAT: 0
DIARRHEA: 0
EYE PAIN: 0
CONSTIPATION: 0
RHINORRHEA: 0

## 2018-03-22 ASSESSMENT — PAIN SCALES - GENERAL
PAINLEVEL_OUTOF10: 7
PAINLEVEL_OUTOF10: 7
PAINLEVEL_OUTOF10: 10

## 2018-03-22 ASSESSMENT — PAIN DESCRIPTION - ORIENTATION: ORIENTATION: LEFT

## 2018-03-22 ASSESSMENT — PAIN DESCRIPTION - DESCRIPTORS: DESCRIPTORS: RADIATING;DULL

## 2018-03-22 ASSESSMENT — PAIN DESCRIPTION - FREQUENCY: FREQUENCY: CONTINUOUS

## 2018-03-23 ENCOUNTER — TELEPHONE (OUTPATIENT)
Dept: CARDIOLOGY CLINIC | Age: 83
End: 2018-03-23

## 2018-03-23 LAB
EKG ATRIAL RATE: 340 BPM
EKG Q-T INTERVAL: 420 MS
EKG QRS DURATION: 102 MS
EKG QTC CALCULATION (BAZETT): 490 MS
EKG R AXIS: 39 DEGREES
EKG T AXIS: 30 DEGREES
EKG VENTRICULAR RATE: 82 BPM

## 2018-03-23 PROCEDURE — 93010 ELECTROCARDIOGRAM REPORT: CPT | Performed by: NUCLEAR MEDICINE

## 2018-03-26 ENCOUNTER — OFFICE VISIT (OUTPATIENT)
Dept: CARDIOLOGY CLINIC | Age: 83
End: 2018-03-26
Payer: MEDICARE

## 2018-03-26 VITALS
SYSTOLIC BLOOD PRESSURE: 105 MMHG | DIASTOLIC BLOOD PRESSURE: 64 MMHG | HEIGHT: 68 IN | HEART RATE: 76 BPM | WEIGHT: 229 LBS | BODY MASS INDEX: 34.71 KG/M2

## 2018-03-26 DIAGNOSIS — I48.0 PAROXYSMAL ATRIAL FIBRILLATION (HCC): ICD-10-CM

## 2018-03-26 DIAGNOSIS — R06.02 SOB (SHORTNESS OF BREATH): Primary | ICD-10-CM

## 2018-03-26 DIAGNOSIS — R07.89 ATYPICAL CHEST PAIN: ICD-10-CM

## 2018-03-26 DIAGNOSIS — R77.8 ELEVATED TROPONIN: ICD-10-CM

## 2018-03-26 PROCEDURE — 4040F PNEUMOC VAC/ADMIN/RCVD: CPT | Performed by: PHYSICIAN ASSISTANT

## 2018-03-26 PROCEDURE — 99213 OFFICE O/P EST LOW 20 MIN: CPT | Performed by: PHYSICIAN ASSISTANT

## 2018-03-26 PROCEDURE — 1036F TOBACCO NON-USER: CPT | Performed by: PHYSICIAN ASSISTANT

## 2018-03-26 PROCEDURE — G8484 FLU IMMUNIZE NO ADMIN: HCPCS | Performed by: PHYSICIAN ASSISTANT

## 2018-03-26 PROCEDURE — G8417 CALC BMI ABV UP PARAM F/U: HCPCS | Performed by: PHYSICIAN ASSISTANT

## 2018-03-26 PROCEDURE — 1123F ACP DISCUSS/DSCN MKR DOCD: CPT | Performed by: PHYSICIAN ASSISTANT

## 2018-03-26 PROCEDURE — G8427 DOCREV CUR MEDS BY ELIG CLIN: HCPCS | Performed by: PHYSICIAN ASSISTANT

## 2018-03-26 NOTE — PROGRESS NOTES
Allergen Reactions    Pcn [Penicillins] Swelling       Current Outpatient Prescriptions   Medication Sig Dispense Refill    oxyCODONE-acetaminophen (PERCOCET) 5-325 MG per tablet Take 1 tablet by mouth every 8 hours as needed for Pain for up to 30 days Fill date 3/25/2018. 90 tablet 0    CHERRY PO Take 800 mg by mouth 2 times daily Cherry Gel Cap      ranitidine (ZANTAC) 150 MG tablet Take 150 mg by mouth daily as needed for Heartburn      Pseudoephedrine HCl (SUDAFED PO) Take by mouth as needed      Cranberry 1000 MG CAPS Take 1 tablet by mouth daily      indomethacin (INDOCIN) 50 MG capsule Take 50 mg by mouth 3 times daily as needed (for gout) With food      NONFORMULARY Take 1 capsule by mouth daily       tamsulosin (FLOMAX) 0.4 MG capsule Take 1 capsule by mouth daily 90 capsule 3    aspirin 81 MG EC tablet Take 1 tablet by mouth daily 1 tablet 0    rivaroxaban (XARELTO) 20 MG TABS tablet TAKE 1 TABLET DAILY (Patient taking differently: nightly TAKE 1 TABLET DAILY) 90 tablet 3    Calcium Carbonate-Vitamin D (CALCIUM 600/VITAMIN D PO) Take 1 tablet by mouth 2 times daily      metoprolol tartrate (LOPRESSOR) 50 MG tablet TAKE 1 TABLET TWICE A  tablet 0    furosemide (LASIX) 40 MG tablet Take 1 tablet by mouth daily 90 tablet 2    levothyroxine (SYNTHROID) 50 MCG tablet Take 100 mcg by mouth Daily       cloNIDine (CATAPRES) 0.1 MG tablet Take 0.1 mg by mouth as needed for High Blood Pressure Takes if BP is greater than 175      hydrOXYzine (ATARAX) 25 MG tablet Take 25 mg by mouth 4 times daily as needed for Itching      loperamide (IMODIUM) 2 MG capsule Take 2 mg by mouth as needed for Diarrhea      potassium chloride (K-TAB) 10 MEQ extended release tablet Take 4 tablets by mouth daily 360 tablet 3    docusate sodium (COLACE) 100 MG capsule Take 300 mg by mouth 2 times daily       magnesium oxide (MAG-OX) 400 (241.3 MG) MG TABS tablet Take 1 tablet by mouth daily 30 tablet 0    Standing Status:   Future     Standing Expiration Date:   3/26/2019     Order Specific Question:   Reason for Exam?     Answer:   Shortness of breath     Continue Dr Chau Campbell current treatment plan    We will do a Lexiscan stress test    Continue same current medications and with constant vigilance to changes in symptoms and also any potential side effects. Return for care if become worse or seek medical attention immediately. The patient is educated on symptoms of heart disease that include chest pain and passing out, dizziness, etc and to report them if there is any change or go to emergency room.        Follow up with Dr Arthur Sanderson as scheduled or sooner if needed  (Please note that portions of this note were completed with a voice recognition program.  Efforts were made to edit the dictation but occasionally words are mis-transcribed.)      Nazanin Wilkins PA-C

## 2018-04-02 RX ORDER — LEVOTHYROXINE SODIUM 0.1 MG/1
100 TABLET ORAL DAILY
COMMUNITY

## 2018-04-06 ENCOUNTER — HOSPITAL ENCOUNTER (OUTPATIENT)
Dept: NON INVASIVE DIAGNOSTICS | Age: 83
Discharge: HOME | End: 2018-04-06
Payer: MEDICARE

## 2018-04-06 DIAGNOSIS — R06.02 SOB (SHORTNESS OF BREATH): ICD-10-CM

## 2018-04-06 PROCEDURE — A9500 TC99M SESTAMIBI: HCPCS | Performed by: NUCLEAR MEDICINE

## 2018-04-06 PROCEDURE — 93017 CV STRESS TEST TRACING ONLY: CPT

## 2018-04-06 PROCEDURE — 6360000002 HC RX W HCPCS

## 2018-04-06 PROCEDURE — 3430000000 HC RX DIAGNOSTIC RADIOPHARMACEUTICAL: Performed by: NUCLEAR MEDICINE

## 2018-04-06 PROCEDURE — 78452 HT MUSCLE IMAGE SPECT MULT: CPT

## 2018-04-06 RX ADMIN — Medication 9.5 MILLICURIE: at 13:25

## 2018-04-06 RX ADMIN — Medication 32.5 MILLICURIE: at 14:15

## 2018-04-09 ENCOUNTER — APPOINTMENT (OUTPATIENT)
Dept: GENERAL RADIOLOGY | Age: 83
End: 2018-04-09
Attending: PAIN MEDICINE
Payer: MEDICARE

## 2018-04-09 ENCOUNTER — ANESTHESIA (OUTPATIENT)
Dept: OPERATING ROOM | Age: 83
End: 2018-04-09
Payer: MEDICARE

## 2018-04-09 ENCOUNTER — HOSPITAL ENCOUNTER (OUTPATIENT)
Age: 83
Setting detail: OUTPATIENT SURGERY
Discharge: HOME | End: 2018-04-09
Attending: PAIN MEDICINE | Admitting: PAIN MEDICINE
Payer: MEDICARE

## 2018-04-09 ENCOUNTER — ANESTHESIA EVENT (OUTPATIENT)
Dept: OPERATING ROOM | Age: 83
End: 2018-04-09
Payer: MEDICARE

## 2018-04-09 VITALS
WEIGHT: 223.4 LBS | RESPIRATION RATE: 16 BRPM | DIASTOLIC BLOOD PRESSURE: 66 MMHG | OXYGEN SATURATION: 94 % | SYSTOLIC BLOOD PRESSURE: 118 MMHG | HEART RATE: 68 BPM | TEMPERATURE: 97.7 F | HEIGHT: 68 IN | BODY MASS INDEX: 33.86 KG/M2

## 2018-04-09 VITALS
OXYGEN SATURATION: 98 % | DIASTOLIC BLOOD PRESSURE: 56 MMHG | SYSTOLIC BLOOD PRESSURE: 97 MMHG | TEMPERATURE: 97.7 F | RESPIRATION RATE: 19 BRPM

## 2018-04-09 PROCEDURE — 3209999900 FLUORO FOR SURGICAL PROCEDURES

## 2018-04-09 PROCEDURE — 6360000002 HC RX W HCPCS: Performed by: PAIN MEDICINE

## 2018-04-09 PROCEDURE — 7100000010 HC PHASE II RECOVERY - FIRST 15 MIN: Performed by: PAIN MEDICINE

## 2018-04-09 PROCEDURE — 3700000001 HC ADD 15 MINUTES (ANESTHESIA): Performed by: PAIN MEDICINE

## 2018-04-09 PROCEDURE — 3700000000 HC ANESTHESIA ATTENDED CARE: Performed by: PAIN MEDICINE

## 2018-04-09 PROCEDURE — 3600000058 HC PAIN LEVEL 5 BASE: Performed by: PAIN MEDICINE

## 2018-04-09 PROCEDURE — 64495 INJ PARAVERT F JNT L/S 3 LEV: CPT | Performed by: PAIN MEDICINE

## 2018-04-09 PROCEDURE — 64493 INJ PARAVERT F JNT L/S 1 LEV: CPT | Performed by: PAIN MEDICINE

## 2018-04-09 PROCEDURE — 6360000002 HC RX W HCPCS: Performed by: NURSE ANESTHETIST, CERTIFIED REGISTERED

## 2018-04-09 PROCEDURE — 3600000059 HC PAIN LEVEL 5 ADDL 15 MIN: Performed by: PAIN MEDICINE

## 2018-04-09 PROCEDURE — 2500000003 HC RX 250 WO HCPCS: Performed by: PAIN MEDICINE

## 2018-04-09 PROCEDURE — 7100000011 HC PHASE II RECOVERY - ADDTL 15 MIN: Performed by: PAIN MEDICINE

## 2018-04-09 PROCEDURE — 64494 INJ PARAVERT F JNT L/S 2 LEV: CPT | Performed by: PAIN MEDICINE

## 2018-04-09 RX ORDER — LIDOCAINE HYDROCHLORIDE 10 MG/ML
INJECTION, SOLUTION INFILTRATION; PERINEURAL PRN
Status: DISCONTINUED | OUTPATIENT
Start: 2018-04-09 | End: 2018-04-09 | Stop reason: HOSPADM

## 2018-04-09 RX ORDER — BUPIVACAINE HYDROCHLORIDE 2.5 MG/ML
INJECTION, SOLUTION EPIDURAL; INFILTRATION; INTRACAUDAL PRN
Status: DISCONTINUED | OUTPATIENT
Start: 2018-04-09 | End: 2018-04-09 | Stop reason: HOSPADM

## 2018-04-09 RX ORDER — BETAMETHASONE SODIUM PHOSPHATE AND BETAMETHASONE ACETATE 3; 3 MG/ML; MG/ML
INJECTION, SUSPENSION INTRA-ARTICULAR; INTRALESIONAL; INTRAMUSCULAR; SOFT TISSUE PRN
Status: DISCONTINUED | OUTPATIENT
Start: 2018-04-09 | End: 2018-04-09 | Stop reason: HOSPADM

## 2018-04-09 RX ADMIN — PROPOFOL 150 MG: 10 INJECTION, EMULSION INTRAVENOUS at 13:47

## 2018-04-09 ASSESSMENT — PULMONARY FUNCTION TESTS
PIF_VALUE: 0

## 2018-04-09 ASSESSMENT — PAIN SCALES - GENERAL
PAINLEVEL_OUTOF10: 0
PAINLEVEL_OUTOF10: 0

## 2018-04-09 ASSESSMENT — PAIN - FUNCTIONAL ASSESSMENT: PAIN_FUNCTIONAL_ASSESSMENT: 0-10

## 2018-04-10 ENCOUNTER — TELEPHONE (OUTPATIENT)
Dept: CARDIOLOGY CLINIC | Age: 83
End: 2018-04-10

## 2018-05-02 ENCOUNTER — OFFICE VISIT (OUTPATIENT)
Dept: PHYSICAL MEDICINE AND REHAB | Age: 83
End: 2018-05-02
Payer: MEDICARE

## 2018-05-02 VITALS
SYSTOLIC BLOOD PRESSURE: 136 MMHG | HEART RATE: 72 BPM | WEIGHT: 223.33 LBS | HEIGHT: 68 IN | BODY MASS INDEX: 33.85 KG/M2 | DIASTOLIC BLOOD PRESSURE: 66 MMHG

## 2018-05-02 DIAGNOSIS — G89.4 CHRONIC PAIN SYNDROME: ICD-10-CM

## 2018-05-02 DIAGNOSIS — M47.816 LUMBAR SPONDYLOSIS: Primary | ICD-10-CM

## 2018-05-02 PROCEDURE — 99213 OFFICE O/P EST LOW 20 MIN: CPT | Performed by: NURSE PRACTITIONER

## 2018-05-02 PROCEDURE — 4040F PNEUMOC VAC/ADMIN/RCVD: CPT | Performed by: NURSE PRACTITIONER

## 2018-05-02 PROCEDURE — G8417 CALC BMI ABV UP PARAM F/U: HCPCS | Performed by: NURSE PRACTITIONER

## 2018-05-02 PROCEDURE — G8427 DOCREV CUR MEDS BY ELIG CLIN: HCPCS | Performed by: NURSE PRACTITIONER

## 2018-05-02 PROCEDURE — 1036F TOBACCO NON-USER: CPT | Performed by: NURSE PRACTITIONER

## 2018-05-02 PROCEDURE — 1123F ACP DISCUSS/DSCN MKR DOCD: CPT | Performed by: NURSE PRACTITIONER

## 2018-05-02 RX ORDER — OXYCODONE HYDROCHLORIDE AND ACETAMINOPHEN 5; 325 MG/1; MG/1
1 TABLET ORAL EVERY 8 HOURS PRN
Qty: 90 TABLET | Refills: 0 | Status: SHIPPED | OUTPATIENT
Start: 2018-05-02 | End: 2018-06-05 | Stop reason: SDUPTHER

## 2018-05-02 ASSESSMENT — ENCOUNTER SYMPTOMS: BACK PAIN: 1

## 2018-05-15 ENCOUNTER — HOSPITAL ENCOUNTER (OUTPATIENT)
Age: 83
Setting detail: OUTPATIENT SURGERY
Discharge: HOME | End: 2018-05-15
Attending: PAIN MEDICINE | Admitting: PAIN MEDICINE
Payer: MEDICARE

## 2018-05-15 ENCOUNTER — ANESTHESIA EVENT (OUTPATIENT)
Dept: OPERATING ROOM | Age: 83
End: 2018-05-15
Payer: MEDICARE

## 2018-05-15 ENCOUNTER — ANESTHESIA (OUTPATIENT)
Dept: OPERATING ROOM | Age: 83
End: 2018-05-15
Payer: MEDICARE

## 2018-05-15 ENCOUNTER — APPOINTMENT (OUTPATIENT)
Dept: GENERAL RADIOLOGY | Age: 83
End: 2018-05-15
Attending: PAIN MEDICINE
Payer: MEDICARE

## 2018-05-15 VITALS
OXYGEN SATURATION: 97 % | SYSTOLIC BLOOD PRESSURE: 114 MMHG | RESPIRATION RATE: 18 BRPM | DIASTOLIC BLOOD PRESSURE: 55 MMHG

## 2018-05-15 VITALS
OXYGEN SATURATION: 90 % | RESPIRATION RATE: 16 BRPM | SYSTOLIC BLOOD PRESSURE: 72 MMHG | BODY MASS INDEX: 33.71 KG/M2 | HEIGHT: 68 IN | WEIGHT: 222.4 LBS | DIASTOLIC BLOOD PRESSURE: 38 MMHG | TEMPERATURE: 97 F | HEART RATE: 64 BPM

## 2018-05-15 PROCEDURE — 7100000011 HC PHASE II RECOVERY - ADDTL 15 MIN: Performed by: PAIN MEDICINE

## 2018-05-15 PROCEDURE — 64493 INJ PARAVERT F JNT L/S 1 LEV: CPT | Performed by: PAIN MEDICINE

## 2018-05-15 PROCEDURE — 64495 INJ PARAVERT F JNT L/S 3 LEV: CPT | Performed by: PAIN MEDICINE

## 2018-05-15 PROCEDURE — 6360000002 HC RX W HCPCS: Performed by: PAIN MEDICINE

## 2018-05-15 PROCEDURE — 6360000002 HC RX W HCPCS: Performed by: NURSE ANESTHETIST, CERTIFIED REGISTERED

## 2018-05-15 PROCEDURE — 3209999900 FLUORO FOR SURGICAL PROCEDURES

## 2018-05-15 PROCEDURE — 3700000001 HC ADD 15 MINUTES (ANESTHESIA): Performed by: PAIN MEDICINE

## 2018-05-15 PROCEDURE — 64494 INJ PARAVERT F JNT L/S 2 LEV: CPT | Performed by: PAIN MEDICINE

## 2018-05-15 PROCEDURE — 2500000003 HC RX 250 WO HCPCS: Performed by: PAIN MEDICINE

## 2018-05-15 PROCEDURE — 3700000000 HC ANESTHESIA ATTENDED CARE: Performed by: PAIN MEDICINE

## 2018-05-15 PROCEDURE — 7100000010 HC PHASE II RECOVERY - FIRST 15 MIN: Performed by: PAIN MEDICINE

## 2018-05-15 PROCEDURE — 3600000059 HC PAIN LEVEL 5 ADDL 15 MIN: Performed by: PAIN MEDICINE

## 2018-05-15 PROCEDURE — 3600000058 HC PAIN LEVEL 5 BASE: Performed by: PAIN MEDICINE

## 2018-05-15 RX ORDER — FENTANYL CITRATE 50 UG/ML
INJECTION, SOLUTION INTRAMUSCULAR; INTRAVENOUS PRN
Status: DISCONTINUED | OUTPATIENT
Start: 2018-05-15 | End: 2018-05-15 | Stop reason: SDUPTHER

## 2018-05-15 RX ORDER — PROPOFOL 10 MG/ML
INJECTION, EMULSION INTRAVENOUS PRN
Status: DISCONTINUED | OUTPATIENT
Start: 2018-05-15 | End: 2018-05-15 | Stop reason: SDUPTHER

## 2018-05-15 RX ORDER — BUPIVACAINE HYDROCHLORIDE 2.5 MG/ML
INJECTION, SOLUTION EPIDURAL; INFILTRATION; INTRACAUDAL PRN
Status: DISCONTINUED | OUTPATIENT
Start: 2018-05-15 | End: 2018-05-15 | Stop reason: HOSPADM

## 2018-05-15 RX ORDER — LIDOCAINE HYDROCHLORIDE 10 MG/ML
INJECTION, SOLUTION INFILTRATION; PERINEURAL PRN
Status: DISCONTINUED | OUTPATIENT
Start: 2018-05-15 | End: 2018-05-15 | Stop reason: HOSPADM

## 2018-05-15 RX ORDER — BETAMETHASONE SODIUM PHOSPHATE AND BETAMETHASONE ACETATE 3; 3 MG/ML; MG/ML
INJECTION, SUSPENSION INTRA-ARTICULAR; INTRALESIONAL; INTRAMUSCULAR; SOFT TISSUE PRN
Status: DISCONTINUED | OUTPATIENT
Start: 2018-05-15 | End: 2018-05-15 | Stop reason: HOSPADM

## 2018-05-15 RX ADMIN — PROPOFOL 30 MG: 10 INJECTION, EMULSION INTRAVENOUS at 12:40

## 2018-05-15 RX ADMIN — FENTANYL CITRATE 25 MCG: 50 INJECTION INTRAMUSCULAR; INTRAVENOUS at 12:43

## 2018-05-15 RX ADMIN — PROPOFOL 70 MG: 10 INJECTION, EMULSION INTRAVENOUS at 12:43

## 2018-05-15 RX ADMIN — FENTANYL CITRATE 25 MCG: 50 INJECTION INTRAMUSCULAR; INTRAVENOUS at 12:40

## 2018-05-15 ASSESSMENT — PAIN SCALES - WONG BAKER: WONGBAKER_NUMERICALRESPONSE: 0

## 2018-05-15 ASSESSMENT — PAIN - FUNCTIONAL ASSESSMENT: PAIN_FUNCTIONAL_ASSESSMENT: 0-10

## 2018-05-22 ENCOUNTER — HOSPITAL ENCOUNTER (OUTPATIENT)
Dept: INTERVENTIONAL RADIOLOGY/VASCULAR | Age: 83
Discharge: HOME | End: 2018-05-22
Payer: MEDICARE

## 2018-05-22 DIAGNOSIS — I73.9 PVD (PERIPHERAL VASCULAR DISEASE) (HCC): ICD-10-CM

## 2018-05-22 DIAGNOSIS — R20.0 BILATERAL LEG NUMBNESS: ICD-10-CM

## 2018-05-22 PROCEDURE — 93925 LOWER EXTREMITY STUDY: CPT

## 2018-06-05 ENCOUNTER — TELEPHONE (OUTPATIENT)
Dept: CARDIOLOGY CLINIC | Age: 83
End: 2018-06-05

## 2018-06-05 ENCOUNTER — OFFICE VISIT (OUTPATIENT)
Dept: PHYSICAL MEDICINE AND REHAB | Age: 83
End: 2018-06-05
Payer: MEDICARE

## 2018-06-05 VITALS
SYSTOLIC BLOOD PRESSURE: 103 MMHG | WEIGHT: 225 LBS | HEART RATE: 71 BPM | HEIGHT: 68 IN | BODY MASS INDEX: 34.1 KG/M2 | DIASTOLIC BLOOD PRESSURE: 58 MMHG

## 2018-06-05 DIAGNOSIS — G89.4 CHRONIC PAIN SYNDROME: ICD-10-CM

## 2018-06-05 DIAGNOSIS — M47.816 SPONDYLOSIS OF LUMBAR REGION WITHOUT MYELOPATHY OR RADICULOPATHY: Primary | ICD-10-CM

## 2018-06-05 DIAGNOSIS — M47.816 LUMBAR SPONDYLOSIS: ICD-10-CM

## 2018-06-05 PROCEDURE — G8427 DOCREV CUR MEDS BY ELIG CLIN: HCPCS | Performed by: NURSE PRACTITIONER

## 2018-06-05 PROCEDURE — 1036F TOBACCO NON-USER: CPT | Performed by: NURSE PRACTITIONER

## 2018-06-05 PROCEDURE — 4040F PNEUMOC VAC/ADMIN/RCVD: CPT | Performed by: NURSE PRACTITIONER

## 2018-06-05 PROCEDURE — 1123F ACP DISCUSS/DSCN MKR DOCD: CPT | Performed by: NURSE PRACTITIONER

## 2018-06-05 PROCEDURE — 99213 OFFICE O/P EST LOW 20 MIN: CPT | Performed by: NURSE PRACTITIONER

## 2018-06-05 PROCEDURE — G8417 CALC BMI ABV UP PARAM F/U: HCPCS | Performed by: NURSE PRACTITIONER

## 2018-06-05 RX ORDER — OXYCODONE HYDROCHLORIDE AND ACETAMINOPHEN 5; 325 MG/1; MG/1
1 TABLET ORAL EVERY 8 HOURS PRN
Qty: 90 TABLET | Refills: 0 | Status: SHIPPED | OUTPATIENT
Start: 2018-06-08 | End: 2018-07-05 | Stop reason: SDUPTHER

## 2018-06-05 ASSESSMENT — ENCOUNTER SYMPTOMS: BACK PAIN: 1

## 2018-06-07 RX ORDER — SODIUM CHLORIDE 450 MG/100ML
INJECTION, SOLUTION INTRAVENOUS CONTINUOUS
Status: CANCELLED | OUTPATIENT
Start: 2018-06-07

## 2018-06-07 RX ORDER — MIDAZOLAM HYDROCHLORIDE 1 MG/ML
1 INJECTION INTRAMUSCULAR; INTRAVENOUS ONCE
Status: CANCELLED | OUTPATIENT
Start: 2018-06-07 | End: 2018-06-07

## 2018-06-07 RX ORDER — FENTANYL CITRATE 50 UG/ML
50 INJECTION, SOLUTION INTRAMUSCULAR; INTRAVENOUS ONCE
Status: CANCELLED | OUTPATIENT
Start: 2018-06-07 | End: 2018-06-07

## 2018-06-08 ENCOUNTER — HOSPITAL ENCOUNTER (OUTPATIENT)
Dept: INTERVENTIONAL RADIOLOGY/VASCULAR | Age: 83
Discharge: HOME | End: 2018-06-08
Attending: RADIOLOGY | Admitting: RADIOLOGY
Payer: MEDICARE

## 2018-06-08 VITALS
HEIGHT: 68 IN | WEIGHT: 220 LBS | SYSTOLIC BLOOD PRESSURE: 138 MMHG | RESPIRATION RATE: 16 BRPM | DIASTOLIC BLOOD PRESSURE: 70 MMHG | TEMPERATURE: 97.9 F | OXYGEN SATURATION: 99 % | BODY MASS INDEX: 33.34 KG/M2 | HEART RATE: 68 BPM

## 2018-06-08 DIAGNOSIS — I73.9 PVD (PERIPHERAL VASCULAR DISEASE) (HCC): ICD-10-CM

## 2018-06-08 LAB
CREAT SERPL-MCNC: 0.7 MG/DL (ref 0.4–1.2)
GFR SERPL CREATININE-BSD FRML MDRD: > 90 ML/MIN/1.73M2
HCT VFR BLD CALC: 32.8 % (ref 42–52)
HEMOGLOBIN: 10.8 GM/DL (ref 14–18)
MCH RBC QN AUTO: 30.9 PG (ref 27–31)
MCHC RBC AUTO-ENTMCNC: 33 GM/DL (ref 33–37)
MCV RBC AUTO: 93.6 FL (ref 80–94)
PDW BLD-RTO: 18.4 % (ref 11.5–14.5)
PLATELET # BLD: 243 THOU/MM3 (ref 130–400)
PMV BLD AUTO: 7.7 FL (ref 7.4–10.4)
RBC # BLD: 3.5 MILL/MM3 (ref 4.7–6.1)
WBC # BLD: 7.9 THOU/MM3 (ref 4.8–10.8)

## 2018-06-08 PROCEDURE — 2580000003 HC RX 258: Performed by: RADIOLOGY

## 2018-06-08 PROCEDURE — 75716 ARTERY X-RAYS ARMS/LEGS: CPT | Performed by: RADIOLOGY

## 2018-06-08 PROCEDURE — 6370000000 HC RX 637 (ALT 250 FOR IP)

## 2018-06-08 PROCEDURE — 2500000003 HC RX 250 WO HCPCS

## 2018-06-08 PROCEDURE — 36246 INS CATH ABD/L-EXT ART 2ND: CPT | Performed by: RADIOLOGY

## 2018-06-08 PROCEDURE — 36415 COLL VENOUS BLD VENIPUNCTURE: CPT

## 2018-06-08 PROCEDURE — 6370000000 HC RX 637 (ALT 250 FOR IP): Performed by: RADIOLOGY

## 2018-06-08 PROCEDURE — 6360000002 HC RX W HCPCS: Performed by: RADIOLOGY

## 2018-06-08 PROCEDURE — C1769 GUIDE WIRE: HCPCS

## 2018-06-08 PROCEDURE — 6360000004 HC RX CONTRAST MEDICATION: Performed by: RADIOLOGY

## 2018-06-08 PROCEDURE — 85027 COMPLETE CBC AUTOMATED: CPT

## 2018-06-08 PROCEDURE — C1760 CLOSURE DEV, VASC: HCPCS

## 2018-06-08 PROCEDURE — 82565 ASSAY OF CREATININE: CPT

## 2018-06-08 PROCEDURE — C1894 INTRO/SHEATH, NON-LASER: HCPCS

## 2018-06-08 PROCEDURE — G0269 OCCLUSIVE DEVICE IN VEIN ART: HCPCS | Performed by: RADIOLOGY

## 2018-06-08 PROCEDURE — 6360000002 HC RX W HCPCS

## 2018-06-08 PROCEDURE — 75625 CONTRAST EXAM ABDOMINL AORTA: CPT | Performed by: RADIOLOGY

## 2018-06-08 RX ORDER — MIDAZOLAM HYDROCHLORIDE 1 MG/ML
1 INJECTION INTRAMUSCULAR; INTRAVENOUS ONCE
Status: COMPLETED | OUTPATIENT
Start: 2018-06-08 | End: 2018-06-08

## 2018-06-08 RX ORDER — SODIUM CHLORIDE 450 MG/100ML
INJECTION, SOLUTION INTRAVENOUS CONTINUOUS
Status: DISCONTINUED | OUTPATIENT
Start: 2018-06-08 | End: 2018-06-08 | Stop reason: HOSPADM

## 2018-06-08 RX ORDER — DIAPER,BRIEF,INFANT-TODD,DISP
EACH MISCELLANEOUS ONCE
Status: COMPLETED | OUTPATIENT
Start: 2018-06-08 | End: 2018-06-08

## 2018-06-08 RX ORDER — FENTANYL CITRATE 50 UG/ML
50 INJECTION, SOLUTION INTRAMUSCULAR; INTRAVENOUS ONCE
Status: COMPLETED | OUTPATIENT
Start: 2018-06-08 | End: 2018-06-08

## 2018-06-08 RX ADMIN — BACITRACIN ZINC 0.9 G: 500 OINTMENT TOPICAL at 08:50

## 2018-06-08 RX ADMIN — SODIUM CHLORIDE: 4.5 INJECTION, SOLUTION INTRAVENOUS at 07:17

## 2018-06-08 RX ADMIN — IOVERSOL 110 ML: 678 INJECTION INTRA-ARTERIAL; INTRAVENOUS at 08:50

## 2018-06-08 RX ADMIN — MIDAZOLAM 1 MG: 1 INJECTION INTRAMUSCULAR; INTRAVENOUS at 08:22

## 2018-06-08 RX ADMIN — FENTANYL CITRATE 50 MCG: 50 INJECTION, SOLUTION INTRAMUSCULAR; INTRAVENOUS at 08:23

## 2018-06-08 ASSESSMENT — PAIN SCALES - GENERAL
PAINLEVEL_OUTOF10: 7
PAINLEVEL_OUTOF10: 7

## 2018-06-08 ASSESSMENT — PAIN DESCRIPTION - LOCATION: LOCATION: GENERALIZED

## 2018-06-08 ASSESSMENT — PAIN DESCRIPTION - DESCRIPTORS: DESCRIPTORS: ACHING

## 2018-06-08 ASSESSMENT — PAIN DESCRIPTION - PAIN TYPE: TYPE: CHRONIC PAIN

## 2018-06-09 ENCOUNTER — HOSPITAL ENCOUNTER (EMERGENCY)
Age: 83
Discharge: HOME | End: 2018-06-09
Payer: MEDICARE

## 2018-06-09 VITALS
OXYGEN SATURATION: 97 % | HEIGHT: 68 IN | BODY MASS INDEX: 34.1 KG/M2 | WEIGHT: 225 LBS | SYSTOLIC BLOOD PRESSURE: 137 MMHG | HEART RATE: 98 BPM | TEMPERATURE: 98.1 F | RESPIRATION RATE: 16 BRPM | DIASTOLIC BLOOD PRESSURE: 62 MMHG

## 2018-06-09 DIAGNOSIS — H65.00 ACUTE SEROUS OTITIS MEDIA, RECURRENCE NOT SPECIFIED, UNSPECIFIED LATERALITY: Primary | ICD-10-CM

## 2018-06-09 DIAGNOSIS — J02.9 ACUTE PHARYNGITIS, UNSPECIFIED ETIOLOGY: ICD-10-CM

## 2018-06-09 PROCEDURE — 99213 OFFICE O/P EST LOW 20 MIN: CPT

## 2018-06-09 PROCEDURE — 99213 OFFICE O/P EST LOW 20 MIN: CPT | Performed by: NURSE PRACTITIONER

## 2018-06-09 RX ORDER — AZITHROMYCIN 250 MG/1
TABLET, FILM COATED ORAL
Qty: 6 TABLET | Refills: 0 | Status: SHIPPED | OUTPATIENT
Start: 2018-06-09 | End: 2018-07-12 | Stop reason: ALTCHOICE

## 2018-06-09 ASSESSMENT — ENCOUNTER SYMPTOMS
PHOTOPHOBIA: 0
ABDOMINAL DISTENTION: 0
EYE DISCHARGE: 0
CHEST TIGHTNESS: 0
COUGH: 1
APNEA: 0
SINUS PRESSURE: 1
BACK PAIN: 0
CONSTIPATION: 0
NAUSEA: 0
FACIAL SWELLING: 0
EYE PAIN: 0
SORE THROAT: 1
DIARRHEA: 0
SHORTNESS OF BREATH: 0

## 2018-06-09 ASSESSMENT — PAIN DESCRIPTION - LOCATION: LOCATION: THROAT

## 2018-06-09 ASSESSMENT — PAIN DESCRIPTION - DESCRIPTORS: DESCRIPTORS: SORE

## 2018-06-09 ASSESSMENT — PAIN SCALES - GENERAL: PAINLEVEL_OUTOF10: 7

## 2018-06-09 ASSESSMENT — PAIN DESCRIPTION - PAIN TYPE: TYPE: ACUTE PAIN

## 2018-06-11 ENCOUNTER — TELEPHONE (OUTPATIENT)
Dept: CARDIOLOGY CLINIC | Age: 83
End: 2018-06-11

## 2018-06-11 DIAGNOSIS — I48.20 CHRONIC ATRIAL FIBRILLATION (HCC): ICD-10-CM

## 2018-06-11 DIAGNOSIS — I73.9 PAD (PERIPHERAL ARTERY DISEASE) (HCC): Primary | ICD-10-CM

## 2018-06-12 ENCOUNTER — HOSPITAL ENCOUNTER (OUTPATIENT)
Age: 83
Setting detail: OUTPATIENT SURGERY
Discharge: HOME | End: 2018-06-12
Attending: PAIN MEDICINE | Admitting: PAIN MEDICINE
Payer: MEDICARE

## 2018-06-12 ENCOUNTER — ANESTHESIA (OUTPATIENT)
Dept: OPERATING ROOM | Age: 83
End: 2018-06-12
Payer: MEDICARE

## 2018-06-12 ENCOUNTER — APPOINTMENT (OUTPATIENT)
Dept: GENERAL RADIOLOGY | Age: 83
End: 2018-06-12
Attending: PAIN MEDICINE
Payer: MEDICARE

## 2018-06-12 ENCOUNTER — ANESTHESIA EVENT (OUTPATIENT)
Dept: OPERATING ROOM | Age: 83
End: 2018-06-12
Payer: MEDICARE

## 2018-06-12 VITALS
DIASTOLIC BLOOD PRESSURE: 68 MMHG | OXYGEN SATURATION: 97 % | RESPIRATION RATE: 17 BRPM | SYSTOLIC BLOOD PRESSURE: 142 MMHG

## 2018-06-12 VITALS
HEIGHT: 68 IN | TEMPERATURE: 97.3 F | OXYGEN SATURATION: 96 % | BODY MASS INDEX: 35.01 KG/M2 | SYSTOLIC BLOOD PRESSURE: 154 MMHG | HEART RATE: 67 BPM | RESPIRATION RATE: 18 BRPM | DIASTOLIC BLOOD PRESSURE: 72 MMHG | WEIGHT: 231 LBS

## 2018-06-12 PROCEDURE — 7100000000 HC PACU RECOVERY - FIRST 15 MIN: Performed by: PAIN MEDICINE

## 2018-06-12 PROCEDURE — 7100000010 HC PHASE II RECOVERY - FIRST 15 MIN: Performed by: PAIN MEDICINE

## 2018-06-12 PROCEDURE — 3600000056 HC PAIN LEVEL 4 BASE: Performed by: PAIN MEDICINE

## 2018-06-12 PROCEDURE — 6360000002 HC RX W HCPCS: Performed by: PAIN MEDICINE

## 2018-06-12 PROCEDURE — 7100000001 HC PACU RECOVERY - ADDTL 15 MIN: Performed by: PAIN MEDICINE

## 2018-06-12 PROCEDURE — 3600000057 HC PAIN LEVEL 4 ADDL 15 MIN: Performed by: PAIN MEDICINE

## 2018-06-12 PROCEDURE — 2500000003 HC RX 250 WO HCPCS: Performed by: NURSE ANESTHETIST, CERTIFIED REGISTERED

## 2018-06-12 PROCEDURE — 7100000011 HC PHASE II RECOVERY - ADDTL 15 MIN: Performed by: PAIN MEDICINE

## 2018-06-12 PROCEDURE — 2580000003 HC RX 258: Performed by: NURSE ANESTHETIST, CERTIFIED REGISTERED

## 2018-06-12 PROCEDURE — 64636 DESTROY L/S FACET JNT ADDL: CPT | Performed by: PAIN MEDICINE

## 2018-06-12 PROCEDURE — 3700000000 HC ANESTHESIA ATTENDED CARE: Performed by: PAIN MEDICINE

## 2018-06-12 PROCEDURE — 2500000003 HC RX 250 WO HCPCS: Performed by: PAIN MEDICINE

## 2018-06-12 PROCEDURE — 3700000001 HC ADD 15 MINUTES (ANESTHESIA): Performed by: PAIN MEDICINE

## 2018-06-12 PROCEDURE — 3209999900 FLUORO FOR SURGICAL PROCEDURES

## 2018-06-12 PROCEDURE — 64635 DESTROY LUMB/SAC FACET JNT: CPT | Performed by: PAIN MEDICINE

## 2018-06-12 PROCEDURE — 6360000002 HC RX W HCPCS: Performed by: NURSE ANESTHETIST, CERTIFIED REGISTERED

## 2018-06-12 RX ORDER — LIDOCAINE HYDROCHLORIDE 10 MG/ML
INJECTION, SOLUTION EPIDURAL; INFILTRATION; INTRACAUDAL; PERINEURAL PRN
Status: DISCONTINUED | OUTPATIENT
Start: 2018-06-12 | End: 2018-06-12 | Stop reason: HOSPADM

## 2018-06-12 RX ORDER — BUPIVACAINE HYDROCHLORIDE 2.5 MG/ML
INJECTION, SOLUTION EPIDURAL; INFILTRATION; INTRACAUDAL PRN
Status: DISCONTINUED | OUTPATIENT
Start: 2018-06-12 | End: 2018-06-12 | Stop reason: HOSPADM

## 2018-06-12 RX ORDER — FENTANYL CITRATE 50 UG/ML
INJECTION, SOLUTION INTRAMUSCULAR; INTRAVENOUS PRN
Status: DISCONTINUED | OUTPATIENT
Start: 2018-06-12 | End: 2018-06-12 | Stop reason: SDUPTHER

## 2018-06-12 RX ORDER — PROPOFOL 10 MG/ML
INJECTION, EMULSION INTRAVENOUS PRN
Status: DISCONTINUED | OUTPATIENT
Start: 2018-06-12 | End: 2018-06-12 | Stop reason: SDUPTHER

## 2018-06-12 RX ORDER — BETAMETHASONE SODIUM PHOSPHATE AND BETAMETHASONE ACETATE 3; 3 MG/ML; MG/ML
INJECTION, SUSPENSION INTRA-ARTICULAR; INTRALESIONAL; INTRAMUSCULAR; SOFT TISSUE PRN
Status: DISCONTINUED | OUTPATIENT
Start: 2018-06-12 | End: 2018-06-12 | Stop reason: HOSPADM

## 2018-06-12 RX ORDER — 0.9 % SODIUM CHLORIDE 0.9 %
VIAL (ML) INJECTION PRN
Status: DISCONTINUED | OUTPATIENT
Start: 2018-06-12 | End: 2018-06-12 | Stop reason: SDUPTHER

## 2018-06-12 RX ORDER — LIDOCAINE HYDROCHLORIDE 10 MG/ML
INJECTION, SOLUTION INFILTRATION; PERINEURAL PRN
Status: DISCONTINUED | OUTPATIENT
Start: 2018-06-12 | End: 2018-06-12 | Stop reason: SDUPTHER

## 2018-06-12 RX ADMIN — PROPOFOL 50 MG: 10 INJECTION, EMULSION INTRAVENOUS at 12:33

## 2018-06-12 RX ADMIN — SODIUM CHLORIDE 5 ML: 9 INJECTION, SOLUTION INTRAMUSCULAR; INTRAVENOUS; SUBCUTANEOUS at 12:33

## 2018-06-12 RX ADMIN — LIDOCAINE HYDROCHLORIDE 20 MG: 10 INJECTION, SOLUTION INFILTRATION; PERINEURAL at 12:33

## 2018-06-12 RX ADMIN — SODIUM CHLORIDE 10 ML: 9 INJECTION, SOLUTION INTRAMUSCULAR; INTRAVENOUS; SUBCUTANEOUS at 12:36

## 2018-06-12 RX ADMIN — FENTANYL CITRATE 50 MCG: 50 INJECTION INTRAMUSCULAR; INTRAVENOUS at 12:21

## 2018-06-12 RX ADMIN — SODIUM CHLORIDE 5 ML: 9 INJECTION, SOLUTION INTRAMUSCULAR; INTRAVENOUS; SUBCUTANEOUS at 12:21

## 2018-06-12 RX ADMIN — FENTANYL CITRATE 50 MCG: 50 INJECTION INTRAMUSCULAR; INTRAVENOUS at 12:36

## 2018-06-12 ASSESSMENT — PULMONARY FUNCTION TESTS
PIF_VALUE: 0

## 2018-06-12 ASSESSMENT — PAIN - FUNCTIONAL ASSESSMENT: PAIN_FUNCTIONAL_ASSESSMENT: 0-10

## 2018-06-12 ASSESSMENT — PAIN SCALES - GENERAL: PAINLEVEL_OUTOF10: 0

## 2018-07-05 ENCOUNTER — OFFICE VISIT (OUTPATIENT)
Dept: PHYSICAL MEDICINE AND REHAB | Age: 83
End: 2018-07-05
Payer: MEDICARE

## 2018-07-05 VITALS — DIASTOLIC BLOOD PRESSURE: 57 MMHG | SYSTOLIC BLOOD PRESSURE: 114 MMHG | HEART RATE: 62 BPM

## 2018-07-05 DIAGNOSIS — G89.4 CHRONIC PAIN SYNDROME: ICD-10-CM

## 2018-07-05 DIAGNOSIS — M47.816 SPONDYLOSIS OF LUMBAR REGION WITHOUT MYELOPATHY OR RADICULOPATHY: Primary | ICD-10-CM

## 2018-07-05 DIAGNOSIS — M47.816 LUMBAR SPONDYLOSIS: ICD-10-CM

## 2018-07-05 PROCEDURE — 1036F TOBACCO NON-USER: CPT | Performed by: NURSE PRACTITIONER

## 2018-07-05 PROCEDURE — 99213 OFFICE O/P EST LOW 20 MIN: CPT | Performed by: NURSE PRACTITIONER

## 2018-07-05 PROCEDURE — 1123F ACP DISCUSS/DSCN MKR DOCD: CPT | Performed by: NURSE PRACTITIONER

## 2018-07-05 PROCEDURE — G8427 DOCREV CUR MEDS BY ELIG CLIN: HCPCS | Performed by: NURSE PRACTITIONER

## 2018-07-05 PROCEDURE — 4040F PNEUMOC VAC/ADMIN/RCVD: CPT | Performed by: NURSE PRACTITIONER

## 2018-07-05 PROCEDURE — G8417 CALC BMI ABV UP PARAM F/U: HCPCS | Performed by: NURSE PRACTITIONER

## 2018-07-05 RX ORDER — OXYCODONE HYDROCHLORIDE AND ACETAMINOPHEN 5; 325 MG/1; MG/1
1 TABLET ORAL EVERY 8 HOURS PRN
Qty: 90 TABLET | Refills: 0 | Status: SHIPPED | OUTPATIENT
Start: 2018-07-10 | End: 2018-08-21 | Stop reason: SDUPTHER

## 2018-07-05 ASSESSMENT — ENCOUNTER SYMPTOMS: BACK PAIN: 1

## 2018-07-05 NOTE — PROGRESS NOTES
Medical History  Osmar  has a past medical history of Atrial fibrillation ([facility] [address] Mclean's palsy; CAD (coronary artery disease); Cancer ([facility] [address] DDD (degenerative disc disease); DVT (deep venous thrombosis) ([facility] [address] GERD (gastroesophageal reflux disease); Gout; Hernia; Hx of blood clots; Hyperlipidemia; Hypertension; Irregular cardiac rhythm; Movement disorder; Myocardial infarct; Thyroid disease; and UTI (urinary tract infection). Past Surgical History  The patient  has a past surgical history that includes Colon surgery; colostomy; colostomy; Cholecystectomy; Neck surgery (06/29/2016); Eye surgery; hernia repair; other surgical history (06/29/2016); Cardiac catheterization; Cardiac catheterization; Neck surgery; back surgery; pr colsc flx w/removal lesion by hot bx forceps (Left, 7/17/2017); Upper gastrointestinal endoscopy (N/A, 7/17/2017); Cataract removal (Bilateral, 2010); other surgical history (Bilateral, 04/09/2018); pr inj dx/ther agnt paravert facet joint, lumbar/sac, 2nd level (Bilateral, 4/9/2018); Nerve Surgery (Bilateral, 05/15/2018); pr inj dx/ther agnt paravert facet joint, lumbar/sac, 2nd level (Bilateral, 5/15/2018); and pr radiofreq neurotomy lumbar or sacral, w image guide,ea addl level (Left, 6/12/2018). Family History  This patient's family history includes COPD in his sister; Cancer in his father and sister; Diabetes in his mother; Heart Disease in his father; High Blood Pressure in his sister. Social History  Osmar  reports that he quit smoking about 26 years ago. He has never used smokeless tobacco. He reports that he does not drink alcohol or use drugs. Medications    Current Outpatient Prescriptions:     [START ON 7/10/2018] oxyCODONE-acetaminophen (PERCOCET) 5-325 MG per tablet, Take 1 tablet by mouth every 8 hours as needed for Pain for up to 30 days. ., Disp: 90 tablet, Rfl: 0    azithromycin (ZITHROMAX Z-CHRISTIANO) 250 MG tablet, 2 tablets day 1 then1 tablet days 2 - 5., Disp: 6 tablet, inappropriate. His speech is not rapid and/or pressured, not delayed, not tangential and not slurred. He is not agitated, not aggressive, not hyperactive, not slowed, not withdrawn, not actively hallucinating and not combative. Thought content is not paranoid and not delusional. Cognition and memory are not impaired. He does not express impulsivity or inappropriate judgment. He does not exhibit a depressed mood. He expresses no homicidal and no suicidal ideation. He expresses no suicidal plans and no homicidal plans. He is communicative. He exhibits normal recent memory and normal remote memory. He is attentive. Nursing note and vitals reviewed. CORAL test: negative   Yeomans test: negative   Gaenslen test: negative      Assessment:     1. Spondylosis of lumbar region without myelopathy or radiculopathy    2. Chronic pain syndrome    3. Lumbar spondylosis            Plan:      · OARRS reviewed. · Discussed long term side effects of medications. · Discussed tolerance, dependency and addiction. · Previous UDS reviewed  · Patient told can not receive any pain medications from any other source. · Discussed with patient that they may not be pain free. · No evidence of abuse, diversion or aberrant behavior. · Medications and/or procedures improve function and quality of life. · Procedure notes reviewed in detail. · Receiving good relief in left lower back from L-RFA left side. Pain is mostly in right lower back. · Plan L-Facet RFA @ L3-4, L4-5, L5-S1 Right Side. For longer term pain relief. Procedure and risks discussed in detail with patient. · Medications are effective, patient is compliant. Continue Percocet 5/325 TID prn. Ordered refill   · Will need to be cleared to be off blood thinners 5 days prior to procedure.      Previous Treatments tried:  · PT: Yes,  any benefit? No, how many weeks? 6, last date done: several months ago  · NSAIDs: Yes,  any benefit?  Yes,  how long

## 2018-07-06 ENCOUNTER — TELEPHONE (OUTPATIENT)
Dept: PHYSICAL MEDICINE AND REHAB | Age: 83
End: 2018-07-06

## 2018-07-12 ENCOUNTER — OFFICE VISIT (OUTPATIENT)
Dept: CARDIOLOGY CLINIC | Age: 83
End: 2018-07-12
Payer: MEDICARE

## 2018-07-12 VITALS
BODY MASS INDEX: 35.46 KG/M2 | HEART RATE: 70 BPM | WEIGHT: 234 LBS | SYSTOLIC BLOOD PRESSURE: 118 MMHG | HEIGHT: 68 IN | DIASTOLIC BLOOD PRESSURE: 70 MMHG

## 2018-07-12 DIAGNOSIS — I73.9 PAD (PERIPHERAL ARTERY DISEASE) (HCC): Primary | ICD-10-CM

## 2018-07-12 DIAGNOSIS — I25.10 CORONARY ARTERY DISEASE INVOLVING NATIVE CORONARY ARTERY OF NATIVE HEART WITHOUT ANGINA PECTORIS: ICD-10-CM

## 2018-07-12 DIAGNOSIS — I48.20 CHRONIC ATRIAL FIBRILLATION (HCC): ICD-10-CM

## 2018-07-12 DIAGNOSIS — Z01.818 PRE-OP EXAM: ICD-10-CM

## 2018-07-12 PROCEDURE — 93000 ELECTROCARDIOGRAM COMPLETE: CPT | Performed by: NUCLEAR MEDICINE

## 2018-07-12 PROCEDURE — 1123F ACP DISCUSS/DSCN MKR DOCD: CPT | Performed by: NUCLEAR MEDICINE

## 2018-07-12 PROCEDURE — 1101F PT FALLS ASSESS-DOCD LE1/YR: CPT | Performed by: NUCLEAR MEDICINE

## 2018-07-12 PROCEDURE — 99213 OFFICE O/P EST LOW 20 MIN: CPT | Performed by: NUCLEAR MEDICINE

## 2018-07-12 PROCEDURE — G8427 DOCREV CUR MEDS BY ELIG CLIN: HCPCS | Performed by: NUCLEAR MEDICINE

## 2018-07-12 PROCEDURE — G8417 CALC BMI ABV UP PARAM F/U: HCPCS | Performed by: NUCLEAR MEDICINE

## 2018-07-12 PROCEDURE — G8598 ASA/ANTIPLAT THER USED: HCPCS | Performed by: NUCLEAR MEDICINE

## 2018-07-12 PROCEDURE — 4040F PNEUMOC VAC/ADMIN/RCVD: CPT | Performed by: NUCLEAR MEDICINE

## 2018-07-12 PROCEDURE — 1036F TOBACCO NON-USER: CPT | Performed by: NUCLEAR MEDICINE

## 2018-07-12 NOTE — PROGRESS NOTES
[facility]  [facility]  [address] [facility].  [address] [facility] [address]  Dept: [phone]  Dept Fax: [phone]  Loc: [phone]    Visit Date: 7/12/2018    Wilmer Carmona is a 82 y.o. male who presents today for:  Chief Complaint   Patient presents with    Cardiac Clearance    Atrial Fibrillation    Coronary Artery Disease    Hypertension   severe back issues  Lately with DVT   Seeing Kala for opinion   known moderate CAD  Cath 2014  Stress test lately was okay   No chest pain   Does have dyspnea but very limited  Known A fib         HPI:  HPI  Past Medical History:   Diagnosis Date    Atrial fibrillation ([address]     Bell's palsy     CAD (coronary artery disease)     Cancer ([facility]     skin CA removed with dry ice    DDD (degenerative disc disease)     DVT (deep venous thrombosis) (HCC)     GERD (gastroesophageal reflux disease)     Gout     Hernia     Hx of blood clots     left leg DVT    Hyperlipidemia     Hypertension     Irregular cardiac rhythm 07/2016    Movement disorder     back pain    Myocardial infarct     Thyroid disease     UTI (urinary tract infection)       Past Surgical History:   Procedure Laterality Date    BACK SURGERY      lower    CARDIAC CATHETERIZATION      ok    CARDIAC CATHETERIZATION      ok    CATARACT REMOVAL Bilateral 2010    CHOLECYSTECTOMY      COLON SURGERY      6-8 in of descending colon removed    COLOSTOMY      COLOSTOMY      reversed    EYE SURGERY      band    EYE SURGERY  2010    bilatera cataracts    HERNIA REPAIR      NECK SURGERY  06/29/2016    three    NECK SURGERY      NERVE SURGERY Bilateral 05/15/2018    LUMBAR FACET MBB L3-4, L4-5, L5-S1    OTHER SURGICAL HISTORY  06/29/2016    ACDF C4-5    OTHER SURGICAL HISTORY Bilateral 04/09/2018    Lumbar facet medial branch block at L3-4, L45, L5-S1    HI COLSC FLX W/REMOVAL LESION BY HOT BX FORCEPS Left 7/17/2017    COLONOSCOPY POLYPECTOMY HOT BIOPSY performed by Jarad Colbert MD at [facility] DX/THER AGNT PARAVERT FACET JOINT, LUMBAR/SAC, 2ND LEVEL Bilateral 4/9/2018    LUMBAR FACET MBB @ L3-4,L4-5 and L5-S1, BILATERAL performed by Rubi Alvarado MD at [address] [facility] DX/THER AGNT PARAVERT FACET JOINT, LUMBAR/SAC, 2ND LEVEL Bilateral 5/15/2018    bilateral L-facet MBB # 2 @ L3-4, L4-5, L5-S1. performed by Rubi Alvarado MD at [address] [place] [address] IMAGE [address] [facility] LEVEL Left 6/12/2018    LUMBAR RFA  L3-4,  L4-5,  L5-S1  LEFT SIDE performed by Rubi Alvarado MD at [address] [facility] N/A 7/17/2017    EGD CONTROL HEMORRHAGE performed by Jarad Colbert MD at [facility]     Family History   Problem Relation Age of Onset    Diabetes Mother     Cancer Father     Heart Disease Father     COPD Sister     Cancer Sister     High Blood Pressure Sister      Social History   Substance Use Topics    Smoking status: Former Smoker     Quit date: 8/14/1991    Smokeless tobacco: Never Used    Alcohol use No      Current Outpatient Prescriptions   Medication Sig Dispense Refill    Fexofenadine HCl (ALLEGRA PO) Take by mouth as needed      Calcium Carbonate Antacid (TUMS PO) Take by mouth as needed      oxyCODONE-acetaminophen (PERCOCET) 5-325 MG per tablet Take 1 tablet by mouth every 8 hours as needed for Pain for up to 30 days. . 90 tablet 0    levothyroxine (SYNTHROID) 100 MCG tablet Take 100 mcg by mouth Daily      magnesium hydroxide (MILK OF MAGNESIA) 400 MG/5ML suspension Take 30 mLs by mouth daily as needed for Constipation      ranitidine (ZANTAC) 150 MG tablet Take 150 mg by mouth daily as needed for Heartburn      Pseudoephedrine HCl (SUDAFED PO) Take by mouth as needed      Cranberry 1000 MG CAPS Take 500 mg by mouth 2 times daily       indomethacin (INDOCIN) 50 MG capsule Take 50 mg by mouth 3 times Answer: Other       Medications Prescribed:  No orders of the defined types were placed in this encounter. Discussed use, benefit, and side effects of prescribed medications. All patient questions answered. Pt voiced understanding. Instructed to continue current medications, diet and exercise. Continue risk factor modification and medical management. Patient agreed with treatment plan. Follow up as directed.     Electronically signed by Joel Bagley MD on 7/12/2018 at 1:22 PM

## 2018-07-17 ENCOUNTER — OFFICE VISIT (OUTPATIENT)
Dept: CARDIOTHORACIC SURGERY | Age: 83
End: 2018-07-17
Payer: MEDICARE

## 2018-07-17 VITALS
HEART RATE: 70 BPM | HEIGHT: 68 IN | SYSTOLIC BLOOD PRESSURE: 131 MMHG | BODY MASS INDEX: 35.58 KG/M2 | WEIGHT: 234.8 LBS | DIASTOLIC BLOOD PRESSURE: 78 MMHG

## 2018-07-17 DIAGNOSIS — I70.235 ATHEROSCLEROSIS OF NATIVE ARTERY OF BOTH LOWER EXTREMITIES WITH BILATERAL ULCERATION OF OTHER PART OF FEET (HCC): Primary | ICD-10-CM

## 2018-07-17 DIAGNOSIS — E11.52 TYPE 2 DIABETES MELLITUS WITH DIABETIC PERIPHERAL ANGIOPATHY AND GANGRENE, WITHOUT LONG-TERM CURRENT USE OF INSULIN (HCC): ICD-10-CM

## 2018-07-17 DIAGNOSIS — I70.245 ATHEROSCLEROSIS OF NATIVE ARTERY OF BOTH LOWER EXTREMITIES WITH BILATERAL ULCERATION OF OTHER PART OF FEET (HCC): Primary | ICD-10-CM

## 2018-07-17 DIAGNOSIS — I73.9 PAD (PERIPHERAL ARTERY DISEASE) (HCC): ICD-10-CM

## 2018-07-17 DIAGNOSIS — I35.1: ICD-10-CM

## 2018-07-17 DIAGNOSIS — I51.7: ICD-10-CM

## 2018-07-17 PROCEDURE — 1036F TOBACCO NON-USER: CPT | Performed by: THORACIC SURGERY (CARDIOTHORACIC VASCULAR SURGERY)

## 2018-07-17 PROCEDURE — G8598 ASA/ANTIPLAT THER USED: HCPCS | Performed by: THORACIC SURGERY (CARDIOTHORACIC VASCULAR SURGERY)

## 2018-07-17 PROCEDURE — 1101F PT FALLS ASSESS-DOCD LE1/YR: CPT | Performed by: THORACIC SURGERY (CARDIOTHORACIC VASCULAR SURGERY)

## 2018-07-17 PROCEDURE — G8417 CALC BMI ABV UP PARAM F/U: HCPCS | Performed by: THORACIC SURGERY (CARDIOTHORACIC VASCULAR SURGERY)

## 2018-07-17 PROCEDURE — 4040F PNEUMOC VAC/ADMIN/RCVD: CPT | Performed by: THORACIC SURGERY (CARDIOTHORACIC VASCULAR SURGERY)

## 2018-07-17 PROCEDURE — G8427 DOCREV CUR MEDS BY ELIG CLIN: HCPCS | Performed by: THORACIC SURGERY (CARDIOTHORACIC VASCULAR SURGERY)

## 2018-07-17 PROCEDURE — 1123F ACP DISCUSS/DSCN MKR DOCD: CPT | Performed by: THORACIC SURGERY (CARDIOTHORACIC VASCULAR SURGERY)

## 2018-07-17 PROCEDURE — 99204 OFFICE O/P NEW MOD 45 MIN: CPT | Performed by: THORACIC SURGERY (CARDIOTHORACIC VASCULAR SURGERY)

## 2018-07-18 ASSESSMENT — ENCOUNTER SYMPTOMS
ALLERGIC/IMMUNOLOGIC NEGATIVE: 1
RESPIRATORY NEGATIVE: 1
GASTROINTESTINAL NEGATIVE: 1
EYES NEGATIVE: 1

## 2018-07-18 NOTE — PROGRESS NOTES
arteries veins nerves stroke cardiovascular event. Potential benefits and alternatives were also explained. Mr. Ekta Mckee very well appeared to understand, all of his questions were answered to his satisfaction and he is agreeable        Current Outpatient Prescriptions:     Fexofenadine HCl (ALLEGRA PO), Take by mouth as needed, Disp: , Rfl:     Calcium Carbonate Antacid (TUMS PO), Take by mouth as needed, Disp: , Rfl:     oxyCODONE-acetaminophen (PERCOCET) 5-325 MG per tablet, Take 1 tablet by mouth every 8 hours as needed for Pain for up to 30 days. ., Disp: 90 tablet, Rfl: 0    levothyroxine (SYNTHROID) 100 MCG tablet, Take 100 mcg by mouth Daily, Disp: , Rfl:     magnesium hydroxide (MILK OF MAGNESIA) 400 MG/5ML suspension, Take 30 mLs by mouth daily as needed for Constipation, Disp: , Rfl:     ranitidine (ZANTAC) 150 MG tablet, Take 150 mg by mouth daily as needed for Heartburn, Disp: , Rfl:     Pseudoephedrine HCl (SUDAFED PO), Take by mouth as needed, Disp: , Rfl:     Cranberry 1000 MG CAPS, Take 500 mg by mouth 2 times daily , Disp: , Rfl:     indomethacin (INDOCIN) 50 MG capsule, Take 50 mg by mouth 3 times daily as needed (for gout) With food, Disp: , Rfl:     NONFORMULARY, Take 1 capsule by mouth daily , Disp: , Rfl:     tamsulosin (FLOMAX) 0.4 MG capsule, Take 1 capsule by mouth daily, Disp: 90 capsule, Rfl: 3    aspirin 81 MG EC tablet, Take 1 tablet by mouth daily, Disp: 1 tablet, Rfl: 0    rivaroxaban (XARELTO) 20 MG TABS tablet, TAKE 1 TABLET DAILY, Disp: 90 tablet, Rfl: 3    Calcium Carbonate-Vitamin D (CALCIUM 600/VITAMIN D PO), Take 1 tablet by mouth 2 times daily, Disp: , Rfl:     metoprolol tartrate (LOPRESSOR) 50 MG tablet, TAKE 1 TABLET TWICE A DAY, Disp: 180 tablet, Rfl: 0    furosemide (LASIX) 40 MG tablet, Take 1 tablet by mouth daily, Disp: 90 tablet, Rfl: 2    cloNIDine (CATAPRES) 0.1 MG tablet, Take 0.1 mg by mouth as needed for High Blood Pressure Takes if BP is or use drugs. Family History  Osmar family history includes COPD in his sister; Cancer in his father and sister; Diabetes in his mother; Heart Disease in his father; High Blood Pressure in his sister. There is no family history of bicuspid aortic valve, aneurysms, heart transplant, pacemakers, defibrillators, or sudden cardiac death.       Past Surgical History   Past Surgical History:   Procedure Laterality Date    BACK SURGERY      lower    CARDIAC CATHETERIZATION      ok    CARDIAC CATHETERIZATION      ok    CATARACT REMOVAL Bilateral 2010    CHOLECYSTECTOMY      COLON SURGERY      6-8 in of descending colon removed    COLOSTOMY      COLOSTOMY      reversed    EYE SURGERY      band    EYE SURGERY  2010    bilatera cataracts    HERNIA REPAIR      NECK SURGERY  06/29/2016    three    NECK SURGERY      NERVE SURGERY Bilateral 05/15/2018    LUMBAR FACET MBB L3-4, L4-5, L5-S1    OTHER SURGICAL HISTORY  06/29/2016    ACDF C4-5    OTHER SURGICAL HISTORY Bilateral 04/09/2018    Lumbar facet medial branch block at L3-4, L45, L5-S1    MA COLSC FLX W/REMOVAL LESION BY HOT BX FORCEPS Left 7/17/2017    COLONOSCOPY POLYPECTOMY HOT BIOPSY performed by Dorsey Runner, MD at [facility]    MA INJ DX/THER AGNT PARAVERT FACET JOINT, LUMBAR/SAC, 2ND LEVEL Bilateral 4/9/2018    LUMBAR FACET MBB @ L3-4,L4-5 and L5-S1, BILATERAL performed by Ryann Barahona MD at [address] [place] [address] DX/THER AGNT PARAVERT FACET JOINT, LUMBAR/SAC, 2ND LEVEL Bilateral 5/15/2018    bilateral L-facet MBB # 2 @ L3-4, L4-5, L5-S1. performed by Ryann Barahona MD at [address] [facility] [address] IMAGE [address] [facility] LEVEL Left 6/12/2018    LUMBAR RFA  L3-4,  L4-5,  L5-S1  LEFT SIDE performed by Ryann Barahona MD at [address] [facility] N/A 7/17/2017    EGD CONTROL HEMORRHAGE performed by Dorsey Runner, MD at [facility] Subjective:     Review of Systems   Constitutional: Negative. HENT: Negative. Eyes: Negative. Respiratory: Negative. Cardiovascular: Negative. Gastrointestinal: Negative. Endocrine: Negative. Genitourinary: Negative. Musculoskeletal: Negative. Skin: Negative. Allergic/Immunologic: Negative. Neurological: Negative. Hematological: Negative. Psychiatric/Behavioral: Negative. Objective:     /78 (Site: Left Arm, Position: Sitting, Cuff Size: Medium Adult)   Pulse 70   Ht 5' 8\" (1.727 m)   Wt 234 lb 12.8 oz (106.5 kg)   BMI 35.70 kg/m²     Wt Readings from Last 3 Encounters:   07/17/18 234 lb 12.8 oz (106.5 kg)   07/12/18 234 lb (106.1 kg)   06/12/18 231 lb (104.8 kg)     BP Readings from Last 3 Encounters:   07/17/18 131/78   07/12/18 118/70   07/05/18 (!) 114/57       Physical Exam   Constitutional: He is oriented to person, place, and time. He appears well-developed and well-nourished. No distress. HENT:   Head: Normocephalic and atraumatic. Right Ear: External ear normal.   Left Ear: External ear normal.   Nose: Nose normal.   Mouth/Throat: Oropharynx is clear and moist. No oropharyngeal exudate. Eyes: Conjunctivae and EOM are normal. Pupils are equal, round, and reactive to light. Right eye exhibits no discharge. Left eye exhibits no discharge. No scleral icterus. Neck: Normal range of motion. Neck supple. No JVD present. No tracheal deviation present. No thyromegaly present. Cardiovascular: Normal rate, regular rhythm, S1 normal, S2 normal and normal heart sounds. Exam reveals no gallop, no S3, no S4, no distant heart sounds and no friction rub. No murmur heard. Pulses:       Carotid pulses are 2+ on the right side, and 2+ on the left side. Radial pulses are 2+ on the right side, and 2+ on the left side. Femoral pulses are 2+ on the right side, and 2+ on the left side.        Dorsalis pedis pulses are 0 on the right side, and 0 on the left side. Posterior tibial pulses are 0 on the right side, and 0 on the left side. Pulmonary/Chest: Effort normal and breath sounds normal. No stridor. No respiratory distress. He has no wheezes. He has no rales. He exhibits no tenderness. Abdominal: Soft. Bowel sounds are normal. He exhibits no distension and no mass. There is no tenderness. There is no rebound and no guarding. Musculoskeletal: Normal range of motion. He exhibits no edema, tenderness or deformity. Lymphadenopathy:     He has no cervical adenopathy. Neurological: He is alert and oriented to person, place, and time. He has normal reflexes. No cranial nerve deficit. He exhibits normal muscle tone. Coordination normal.   Skin: Skin is warm and dry. No rash noted. He is not diaphoretic. No erythema. No pallor. Psychiatric: He has a normal mood and affect.  His behavior is normal. Judgment and thought content normal.   3 mm sangeeta perforation on the left medial first MTPJ, non healing  Bilateral lower left hyperpigmentation  Right foot 2nd web skin erosion    Lab Results   Component Value Date    WBC 7.9 06/08/2018    RBC 3.50 06/08/2018    RBC 3.53 09/29/2016    HGB 10.8 06/08/2018    HCT 32.8 06/08/2018    MCV 93.6 06/08/2018    MCH 30.9 06/08/2018    MCHC 33.0 06/08/2018    RDW 18.4 06/08/2018     06/08/2018    MPV 7.7 06/08/2018       Lab Results   Component Value Date     03/22/2018    K 4.6 03/22/2018    CL 99 03/22/2018    CO2 27 03/22/2018    BUN 26 03/22/2018    LABALBU 3.3 03/01/2017    CREATININE 0.7 06/08/2018    CALCIUM 10.0 03/22/2018    LABGLOM >90 06/08/2018    GLUCOSE 117 03/22/2018    GLUCOSE 103 09/29/2016       Lab Results   Component Value Date    ALKPHOS 163 03/01/2017    ALT 15 03/01/2017    AST 22 03/01/2017    PROT 7.0 03/01/2017    BILITOT 0.3 03/01/2017    BILIDIR <0.2 03/01/2017    LABALBU 3.3 03/01/2017       Lab Results   Component Value Date    MG 1.9 03/01/2017       Lab Results leaflets are somewhat thickened. Mild-to-moderate aortic regurgitation is noted. Mild mitral regurgitation is present. Signature      ----------------------------------------------------------------   Electronically signed by Prisca Ashley MD (Interpreting   physician) on 07/03/2016 at 07:43 AM   ----------------------------------------------------------------      Findings      Mitral Valve   Mild mitral regurgitation is present. Aortic Valve   The aortic valve leaflets were not well visualized. Aortic valve appears tricuspid. Aortic valve leaflets are somewhat thickened. Mild-to-moderate aortic regurgitation is noted. Tricuspid Valve   Mild tricuspid regurgitation. Pulmonic Valve   The pulmonic valve was not well visualized . Left Atrium   Left atrial size was normal.      Left Ventricle   There was mild global hypokinesis of the left ventricle. Ejection fraction is visually estimated at 45%. Right Atrium   Right atrial size was normal.      Right Ventricle   The right ventricular size was normal with normal systolic function and   wall thickness. Pericardial Effusion   The pericardium was normal in appearance with no evidence of a pericardial   effusion. Pleural Effusion   No evidence of pleural effusion. Aorta / Great Vessels   -Aortic root dimension within normal limits.   -The Pulmonary artery is within normal limits. -IVC size is within normal limits with normal respiratory phasic changes.      M-Mode/2D Measurements & Calculations      LV Diastolic    LV Systolic Dimension: 4.5   AV Cusp Separation: 2 cmLA   Dimension: 5.7  cm                           Dimension: 3.1 cmAO Root   cm              LV Volume Diastolic: 184 ml  Dimension: 3.3 cm   LV FS:21.1 %    LV Volume Systolic: 09.3 ml   LV PW           LV EDV/LV EDV Index: 300   Diastolic: 0.8  OD/17 X^3NM ESV/LV ESV   cm              Index: 92.4 ml/43 m^2        RV Diastolic Dimension: 1.8 orders of the defined types were placed in this encounter. Severe left leg below knee ischemia with tissue loss  Left popliteal artery arteriosclerotic occlusion  NIDDM  Obesity  CAD  Chronic venous stasis both lower legs  PVD  Plan: Left Fem-Tibial bypass cadaver vein homograft for limb salvage    Return in about 2 weeks (around 7/31/2018) for left SFA-Tibial bypass cadaver vein.       Electronically signed by Kailey Reece MD   7/18/2018 at 12:33 AM

## 2018-07-19 ENCOUNTER — ANESTHESIA EVENT (OUTPATIENT)
Dept: OPERATING ROOM | Age: 83
End: 2018-07-19
Payer: MEDICARE

## 2018-07-19 ENCOUNTER — APPOINTMENT (OUTPATIENT)
Dept: GENERAL RADIOLOGY | Age: 83
End: 2018-07-19
Attending: PAIN MEDICINE
Payer: MEDICARE

## 2018-07-19 ENCOUNTER — HOSPITAL ENCOUNTER (OUTPATIENT)
Age: 83
Setting detail: OUTPATIENT SURGERY
Discharge: HOME | End: 2018-07-19
Attending: PAIN MEDICINE | Admitting: PAIN MEDICINE
Payer: MEDICARE

## 2018-07-19 ENCOUNTER — ANESTHESIA (OUTPATIENT)
Dept: OPERATING ROOM | Age: 83
End: 2018-07-19
Payer: MEDICARE

## 2018-07-19 VITALS
RESPIRATION RATE: 14 BRPM | OXYGEN SATURATION: 97 % | SYSTOLIC BLOOD PRESSURE: 117 MMHG | DIASTOLIC BLOOD PRESSURE: 58 MMHG

## 2018-07-19 VITALS
DIASTOLIC BLOOD PRESSURE: 58 MMHG | SYSTOLIC BLOOD PRESSURE: 100 MMHG | BODY MASS INDEX: 34.98 KG/M2 | RESPIRATION RATE: 14 BRPM | HEIGHT: 68 IN | HEART RATE: 62 BPM | OXYGEN SATURATION: 96 % | WEIGHT: 230.8 LBS | TEMPERATURE: 96.7 F

## 2018-07-19 PROCEDURE — 64636 DESTROY L/S FACET JNT ADDL: CPT | Performed by: PAIN MEDICINE

## 2018-07-19 PROCEDURE — 6360000002 HC RX W HCPCS: Performed by: NURSE ANESTHETIST, CERTIFIED REGISTERED

## 2018-07-19 PROCEDURE — 7100000010 HC PHASE II RECOVERY - FIRST 15 MIN: Performed by: PAIN MEDICINE

## 2018-07-19 PROCEDURE — 7100000011 HC PHASE II RECOVERY - ADDTL 15 MIN: Performed by: PAIN MEDICINE

## 2018-07-19 PROCEDURE — 2709999900 HC NON-CHARGEABLE SUPPLY: Performed by: PAIN MEDICINE

## 2018-07-19 PROCEDURE — 3600000057 HC PAIN LEVEL 4 ADDL 15 MIN: Performed by: PAIN MEDICINE

## 2018-07-19 PROCEDURE — 3700000000 HC ANESTHESIA ATTENDED CARE: Performed by: PAIN MEDICINE

## 2018-07-19 PROCEDURE — 2500000003 HC RX 250 WO HCPCS: Performed by: PAIN MEDICINE

## 2018-07-19 PROCEDURE — 6360000002 HC RX W HCPCS: Performed by: PAIN MEDICINE

## 2018-07-19 PROCEDURE — 3600000056 HC PAIN LEVEL 4 BASE: Performed by: PAIN MEDICINE

## 2018-07-19 PROCEDURE — 3209999900 FLUORO FOR SURGICAL PROCEDURES

## 2018-07-19 PROCEDURE — 64635 DESTROY LUMB/SAC FACET JNT: CPT | Performed by: PAIN MEDICINE

## 2018-07-19 PROCEDURE — 3700000001 HC ADD 15 MINUTES (ANESTHESIA): Performed by: PAIN MEDICINE

## 2018-07-19 RX ORDER — LIDOCAINE HYDROCHLORIDE 10 MG/ML
INJECTION, SOLUTION EPIDURAL; INFILTRATION; INTRACAUDAL; PERINEURAL PRN
Status: DISCONTINUED | OUTPATIENT
Start: 2018-07-19 | End: 2018-07-19 | Stop reason: HOSPADM

## 2018-07-19 RX ORDER — PROPOFOL 10 MG/ML
INJECTION, EMULSION INTRAVENOUS PRN
Status: DISCONTINUED | OUTPATIENT
Start: 2018-07-19 | End: 2018-07-19 | Stop reason: SDUPTHER

## 2018-07-19 RX ORDER — BETAMETHASONE SODIUM PHOSPHATE AND BETAMETHASONE ACETATE 3; 3 MG/ML; MG/ML
INJECTION, SUSPENSION INTRA-ARTICULAR; INTRALESIONAL; INTRAMUSCULAR; SOFT TISSUE PRN
Status: DISCONTINUED | OUTPATIENT
Start: 2018-07-19 | End: 2018-07-19 | Stop reason: HOSPADM

## 2018-07-19 RX ORDER — BUPIVACAINE HYDROCHLORIDE 2.5 MG/ML
INJECTION, SOLUTION EPIDURAL; INFILTRATION; INTRACAUDAL PRN
Status: DISCONTINUED | OUTPATIENT
Start: 2018-07-19 | End: 2018-07-19 | Stop reason: HOSPADM

## 2018-07-19 RX ORDER — FENTANYL CITRATE 50 UG/ML
INJECTION, SOLUTION INTRAMUSCULAR; INTRAVENOUS PRN
Status: DISCONTINUED | OUTPATIENT
Start: 2018-07-19 | End: 2018-07-19 | Stop reason: SDUPTHER

## 2018-07-19 RX ADMIN — FENTANYL CITRATE 50 MCG: 50 INJECTION INTRAMUSCULAR; INTRAVENOUS at 13:20

## 2018-07-19 RX ADMIN — PROPOFOL 50 MG: 10 INJECTION, EMULSION INTRAVENOUS at 13:23

## 2018-07-19 RX ADMIN — FENTANYL CITRATE 50 MCG: 50 INJECTION INTRAMUSCULAR; INTRAVENOUS at 13:14

## 2018-07-19 ASSESSMENT — PAIN DESCRIPTION - DESCRIPTORS
DESCRIPTORS: CONSTANT
DESCRIPTORS: CONSTANT

## 2018-07-19 ASSESSMENT — PAIN DESCRIPTION - ONSET: ONSET: ON-GOING

## 2018-07-19 ASSESSMENT — PAIN SCALES - GENERAL: PAINLEVEL_OUTOF10: 5

## 2018-07-19 ASSESSMENT — PAIN - FUNCTIONAL ASSESSMENT: PAIN_FUNCTIONAL_ASSESSMENT: 0-10

## 2018-07-19 ASSESSMENT — PAIN DESCRIPTION - LOCATION: LOCATION: BACK

## 2018-07-19 ASSESSMENT — PAIN DESCRIPTION - FREQUENCY: FREQUENCY: CONTINUOUS

## 2018-07-19 ASSESSMENT — PAIN DESCRIPTION - PAIN TYPE: TYPE: CHRONIC PAIN

## 2018-07-19 ASSESSMENT — PAIN DESCRIPTION - ORIENTATION: ORIENTATION: LOWER

## 2018-07-19 NOTE — ANESTHESIA POSTPROCEDURE EVALUATION
Department of Anesthesiology  Postprocedure Note    Patient: Robert Sandra  MRN: [id number]  YOB: 1935  Date of evaluation: 7/19/2018  Time:  2:37 PM     Procedure Summary     Date:  07/19/18 Room / Location:  [facility] 03 / [address] [facility]    Anesthesia Start:  1311 Anesthesia Stop:  5099    Procedure:  LUMBAR RFA  L3-4,  L4-5,  L5-S1  RIGHT SIDE (Right ) Diagnosis:  (LUMBAR SPONDYLOSIS)    Surgeon:  Tatiana Agudelo MD Responsible Provider:  Jose Christie MD    Anesthesia Type:  MAC ASA Status:  3          Anesthesia Type: MAC    Rebecca Phase I:      Rebecca Phase II: Rebecca Score: 9    Last vitals: Reviewed and per EMR flowsheets.        Anesthesia Post Evaluation    Patient location during evaluation: PACU  Patient participation: complete - patient participated  Level of consciousness: awake  Airway patency: patent  Nausea & Vomiting: no vomiting and no nausea  Complications: no  Cardiovascular status: hemodynamically stable  Respiratory status: acceptable  Hydration status: stable

## 2018-07-19 NOTE — PROGRESS NOTES
1417 Patient to car via w/c, noticed softball-sized wet area in the middle and at the top of his shorts; which were worn back to surgery. Back is without active leaking or oozing from surgery site. Patient denies spilling drink or ice in bed. Wife states \"the sheet had a wet spot where he laid under the gown he took off\". Bed examined, softball-sized wet area noted where the patient laid, approximately in the lumbar region. [place] on the sheet is clear with a light cherry dot in the center, with light cherry coloring also noted around the entire edge of the [place]. Dr Nasreen Ho out to look at the bed sheet and also to assess the patient. No active leaking or oozing noted, site clear, without swelling or obvious complications. Patient denies a headache, any complaints or needs. Dr Nasreen Ho states it's  \"ok\" for the patient to continue home, instructed patient and wife to call the office if any issues arise, understanding voiced. Patient pleasant.

## 2018-07-19 NOTE — PROGRESS NOTES
26 Wife to side, very talkative and pleasant. 1340 Snacks given, denies further needs. 1400 Dressing for discharge with assist from wife. Ice pack given. 1417 Discharge instructions given, understanding voiced, questions answered.

## 2018-07-19 NOTE — H&P
[address] [address]  History and Physical Update    Pt Name: Tomasa Lobo  MRN: [id number]  YOB: 1935  Date of evaluation: 7/19/2018      I have examined the patient and reviewed the H&P/Consult and there are no changes to the patient or plans.         Electronically signed by Keisha Johnson MD on 7/19/2018 at 11:57 AM

## 2018-07-19 NOTE — ANESTHESIA PRE PROCEDURE
sodium (COLACE) 100 MG capsule Take 300 mg by mouth 2 times daily    Yes Historical Provider, MD   allopurinol (ZYLOPRIM) 300 MG tablet Take 300 mg by mouth daily   Yes Historical Provider, MD   quinapril (ACCUPRIL) 40 MG tablet Take 40 mg by mouth daily. Yes Historical Provider, MD   pravastatin (PRAVACHOL) 80 MG tablet Take 80 mg by mouth nightly. Yes Historical Provider, MD   indapamide (LOZOL) 1.25 MG tablet Take 1.25 mg by mouth every morning. Yes Historical Provider, MD   cyclobenzaprine (FLEXERIL) 10 MG tablet Take 10 mg by mouth every 8 hours as needed. Yes Historical Provider, MD   Fexofenadine HCl (ALLEGRA PO) Take by mouth as needed    Historical Provider, MD   ranitidine (ZANTAC) 150 MG tablet Take 150 mg by mouth daily as needed for Heartburn    Historical Provider, MD   Cranberry 1000 MG CAPS Take 500 mg by mouth 2 times daily     Historical Provider, MD   indomethacin (INDOCIN) 50 MG capsule Take 50 mg by mouth 3 times daily as needed (for gout) With food    Historical Provider, MD   NONFORMULARY Take 1 capsule by mouth daily     Historical Provider, MD   aspirin 81 MG EC tablet Take 1 tablet by mouth daily 7/21/17   Mari Chapa MD   rivaroxaban (XARELTO) 20 MG TABS tablet TAKE 1 TABLET DAILY 7/20/17   Mari Chapa MD   Calcium Carbonate-Vitamin D (CALCIUM 600/VITAMIN D PO) Take 1 tablet by mouth 2 times daily    Historical Provider, MD   cloNIDine (CATAPRES) 0.1 MG tablet Take 0.1 mg by mouth as needed for High Blood Pressure Takes if BP is greater than 175    Historical Provider, MD   magnesium oxide (MAG-OX) 400 (241.3 MG) MG TABS tablet Take 1 tablet by mouth daily 7/4/16   Amari Hanley PA-C   Ascorbic Acid (VITAMIN C) 500 MG tablet Take 500 mg by mouth daily. Historical Provider, MD       Current medications:    No current facility-administered medications for this encounter. Allergies:     Allergies   Allergen Reactions    Pcn [Penicillins] Swelling

## 2018-07-19 NOTE — OP NOTE
Pre-Procedure Note    Patient Name: Robert Sandra   YOB: 1935  Medical Record Number: [id number]  Date: 7/6/2018    Indication:  Lower back pain  Consent: On file. Vital Signs:   Vitals:    07/19/18 1211   BP: 136/64   Pulse: 73   Resp: 16   Temp: 97.6 °F (36.4 °C)   SpO2: 97%       Past Medical History:   has a past medical history of Atrial fibrillation ([facility] Mclean's palsy; CAD (coronary artery disease); Cancer ([facility] DDD (degenerative disc disease); DVT (deep venous thrombosis) ([facility] GERD (gastroesophageal reflux disease); Gout; Hernia; Hx of blood clots; Hyperlipidemia; Hypertension; Irregular cardiac rhythm; Movement disorder; Myocardial infarct; Thyroid disease; and UTI (urinary tract infection). Past Surgical History:   has a past surgical history that includes Colon surgery; colostomy; colostomy; Cholecystectomy; Neck surgery (06/29/2016); Eye surgery; hernia repair; other surgical history (06/29/2016); Cardiac catheterization; Cardiac catheterization; Neck surgery; back surgery; pr colsc flx w/removal lesion by hot bx forceps (Left, 7/17/2017); Upper gastrointestinal endoscopy (N/A, 7/17/2017); Cataract removal (Bilateral, 2010); other surgical history (Bilateral, 04/09/2018); pr inj dx/ther agnt paravert facet joint, lumbar/sac, 2nd level (Bilateral, 4/9/2018); Nerve Surgery (Bilateral, 05/15/2018); pr inj dx/ther agnt paravert facet joint, lumbar/sac, 2nd level (Bilateral, 5/15/2018); pr radiofreq neurotomy lumbar or sacral, w image guide,ea addl level (Left, 6/12/2018); and eye surgery (2010). Pre-Sedation Documentation and Exam:   Vital signs have been reviewed (see flow sheet for vitals).      Sedation/ Anesthesia Plan:   MAC    Patient is an appropriate candidate for plan of sedation: yes    Preoperative Diagnosis:  L-spondylosis     Post-Op Dx: as above    Procedure Performed:  :Radiofrequency ablation of median branches at the levels of L3-4,L4-5 and L5-S1 right under

## 2018-07-24 ENCOUNTER — TELEPHONE (OUTPATIENT)
Dept: CARDIOLOGY CLINIC | Age: 83
End: 2018-07-24

## 2018-07-24 ENCOUNTER — HOSPITAL ENCOUNTER (OUTPATIENT)
Dept: INTERVENTIONAL RADIOLOGY/VASCULAR | Age: 83
Discharge: HOME | End: 2018-07-24
Payer: MEDICARE

## 2018-07-24 DIAGNOSIS — I70.235 ATHEROSCLEROSIS OF NATIVE ARTERY OF BOTH LOWER EXTREMITIES WITH BILATERAL ULCERATION OF OTHER PART OF FEET (HCC): ICD-10-CM

## 2018-07-24 DIAGNOSIS — I70.245 ATHEROSCLEROSIS OF NATIVE ARTERY OF BOTH LOWER EXTREMITIES WITH BILATERAL ULCERATION OF OTHER PART OF FEET (HCC): ICD-10-CM

## 2018-07-24 PROCEDURE — 93971 EXTREMITY STUDY: CPT

## 2018-07-25 ENCOUNTER — TELEPHONE (OUTPATIENT)
Dept: CARDIOTHORACIC SURGERY | Age: 83
End: 2018-07-25

## 2018-07-27 ENCOUNTER — PREP FOR PROCEDURE (OUTPATIENT)
Dept: CARDIOTHORACIC SURGERY | Age: 83
End: 2018-07-27

## 2018-07-27 ENCOUNTER — HOSPITAL ENCOUNTER (OUTPATIENT)
Dept: NON INVASIVE DIAGNOSTICS | Age: 83
Discharge: HOME | DRG: 253 | End: 2018-07-27
Payer: MEDICARE

## 2018-07-27 DIAGNOSIS — I51.7: ICD-10-CM

## 2018-07-27 DIAGNOSIS — I35.1: ICD-10-CM

## 2018-07-27 LAB
LV EF: 40 %
LVEF MODALITY: NORMAL

## 2018-07-27 PROCEDURE — 93306 TTE W/DOPPLER COMPLETE: CPT

## 2018-07-27 RX ORDER — SODIUM CHLORIDE 0.9 % (FLUSH) 0.9 %
10 SYRINGE (ML) INJECTION EVERY 12 HOURS SCHEDULED
Status: CANCELLED | OUTPATIENT
Start: 2018-07-27 | End: 2019-07-27

## 2018-07-27 RX ORDER — SODIUM CHLORIDE 0.9 % (FLUSH) 0.9 %
10 SYRINGE (ML) INJECTION PRN
Status: CANCELLED | OUTPATIENT
Start: 2018-07-27 | End: 2019-07-27

## 2018-07-30 ENCOUNTER — HOSPITAL ENCOUNTER (INPATIENT)
Age: 83
LOS: 2 days | Discharge: HOME | DRG: 253 | End: 2018-08-01
Attending: THORACIC SURGERY (CARDIOTHORACIC VASCULAR SURGERY) | Admitting: THORACIC SURGERY (CARDIOTHORACIC VASCULAR SURGERY)
Payer: MEDICARE

## 2018-07-30 ENCOUNTER — ANESTHESIA EVENT (OUTPATIENT)
Dept: OPERATING ROOM | Age: 83
DRG: 253 | End: 2018-07-30
Payer: MEDICARE

## 2018-07-30 ENCOUNTER — TELEPHONE (OUTPATIENT)
Dept: CARDIOTHORACIC SURGERY | Age: 83
End: 2018-07-30

## 2018-07-30 ENCOUNTER — ANESTHESIA (OUTPATIENT)
Dept: OPERATING ROOM | Age: 83
DRG: 253 | End: 2018-07-30
Payer: MEDICARE

## 2018-07-30 VITALS
SYSTOLIC BLOOD PRESSURE: 85 MMHG | TEMPERATURE: 97.5 F | DIASTOLIC BLOOD PRESSURE: 49 MMHG | OXYGEN SATURATION: 100 % | RESPIRATION RATE: 16 BRPM

## 2018-07-30 PROBLEM — I70.203 ATHEROSCLEROSIS OF NATIVE ARTERY OF BOTH LOWER EXTREMITIES (HCC): Status: ACTIVE | Noted: 2018-07-30

## 2018-07-30 PROBLEM — T82.898A: Status: ACTIVE | Noted: 2018-07-30

## 2018-07-30 LAB
ABO: NORMAL
ANION GAP SERPL CALCULATED.3IONS-SCNC: 13 MEQ/L (ref 8–16)
ANION GAP SERPL CALCULATED.3IONS-SCNC: 15 MEQ/L (ref 8–16)
ANTIBODY SCREEN: NORMAL
APTT: 30.7 SECONDS (ref 22–38)
BUN BLDV-MCNC: 17 MG/DL (ref 7–22)
BUN BLDV-MCNC: 19 MG/DL (ref 7–22)
CALCIUM SERPL-MCNC: 8.4 MG/DL (ref 8.5–10.5)
CALCIUM SERPL-MCNC: 9.6 MG/DL (ref 8.5–10.5)
CHLORIDE BLD-SCNC: 102 MEQ/L (ref 98–111)
CHLORIDE BLD-SCNC: 104 MEQ/L (ref 98–111)
CO2: 20 MEQ/L (ref 23–33)
CO2: 27 MEQ/L (ref 23–33)
CREAT SERPL-MCNC: 0.7 MG/DL (ref 0.4–1.2)
CREAT SERPL-MCNC: 0.8 MG/DL (ref 0.4–1.2)
ERYTHROCYTE [DISTWIDTH] IN BLOOD BY AUTOMATED COUNT: 17.3 % (ref 11.5–14.5)
ERYTHROCYTE [DISTWIDTH] IN BLOOD BY AUTOMATED COUNT: 17.7 % (ref 11.5–14.5)
ERYTHROCYTE [DISTWIDTH] IN BLOOD BY AUTOMATED COUNT: 60.6 FL (ref 35–45)
ERYTHROCYTE [DISTWIDTH] IN BLOOD BY AUTOMATED COUNT: 61.7 FL (ref 35–45)
GFR SERPL CREATININE-BSD FRML MDRD: > 90 ML/MIN/1.73M2
GFR SERPL CREATININE-BSD FRML MDRD: > 90 ML/MIN/1.73M2
GLUCOSE BLD-MCNC: 121 MG/DL (ref 70–108)
GLUCOSE BLD-MCNC: 170 MG/DL (ref 70–108)
HCT VFR BLD CALC: 34.1 % (ref 42–52)
HCT VFR BLD CALC: 39.8 % (ref 42–52)
HEMOGLOBIN: 11.3 GM/DL (ref 14–18)
HEMOGLOBIN: 13 GM/DL (ref 14–18)
INR BLD: 1.23 (ref 0.85–1.13)
MAGNESIUM: 1.7 MG/DL (ref 1.6–2.4)
MCH RBC QN AUTO: 31.3 PG (ref 26–33)
MCH RBC QN AUTO: 31.6 PG (ref 26–33)
MCHC RBC AUTO-ENTMCNC: 32.7 GM/DL (ref 32.2–35.5)
MCHC RBC AUTO-ENTMCNC: 33.1 GM/DL (ref 32.2–35.5)
MCV RBC AUTO: 95.3 FL (ref 80–94)
MCV RBC AUTO: 95.9 FL (ref 80–94)
MRSA SCREEN RT-PCR: NEGATIVE
PLATELET # BLD: 184 THOU/MM3 (ref 130–400)
PLATELET # BLD: 221 THOU/MM3 (ref 130–400)
PMV BLD AUTO: 9.5 FL (ref 9.4–12.4)
PMV BLD AUTO: 9.7 FL (ref 9.4–12.4)
POTASSIUM REFLEX MAGNESIUM: 3.7 MEQ/L (ref 3.5–5.2)
POTASSIUM REFLEX MAGNESIUM: 4.6 MEQ/L (ref 3.5–5.2)
RBC # BLD: 3.58 MILL/MM3 (ref 4.7–6.1)
RBC # BLD: 4.15 MILL/MM3 (ref 4.7–6.1)
RH FACTOR: NORMAL
SODIUM BLD-SCNC: 139 MEQ/L (ref 135–145)
SODIUM BLD-SCNC: 142 MEQ/L (ref 135–145)
WBC # BLD: 13.2 THOU/MM3 (ref 4.8–10.8)
WBC # BLD: 9.8 THOU/MM3 (ref 4.8–10.8)

## 2018-07-30 PROCEDURE — 87641 MR-STAPH DNA AMP PROBE: CPT

## 2018-07-30 PROCEDURE — 2580000003 HC RX 258: Performed by: THORACIC SURGERY (CARDIOTHORACIC VASCULAR SURGERY)

## 2018-07-30 PROCEDURE — 83735 ASSAY OF MAGNESIUM: CPT

## 2018-07-30 PROCEDURE — 3600000004 HC SURGERY LEVEL 4 BASE: Performed by: THORACIC SURGERY (CARDIOTHORACIC VASCULAR SURGERY)

## 2018-07-30 PROCEDURE — 6360000002 HC RX W HCPCS: Performed by: THORACIC SURGERY (CARDIOTHORACIC VASCULAR SURGERY)

## 2018-07-30 PROCEDURE — 85610 PROTHROMBIN TIME: CPT

## 2018-07-30 PROCEDURE — 6360000002 HC RX W HCPCS: Performed by: ANESTHESIOLOGY

## 2018-07-30 PROCEDURE — 3700000001 HC ADD 15 MINUTES (ANESTHESIA): Performed by: THORACIC SURGERY (CARDIOTHORACIC VASCULAR SURGERY)

## 2018-07-30 PROCEDURE — 36415 COLL VENOUS BLD VENIPUNCTURE: CPT

## 2018-07-30 PROCEDURE — 2709999900 HC NON-CHARGEABLE SUPPLY: Performed by: THORACIC SURGERY (CARDIOTHORACIC VASCULAR SURGERY)

## 2018-07-30 PROCEDURE — 6370000000 HC RX 637 (ALT 250 FOR IP): Performed by: NURSE PRACTITIONER

## 2018-07-30 PROCEDURE — 86901 BLOOD TYPING SEROLOGIC RH(D): CPT

## 2018-07-30 PROCEDURE — 2500000003 HC RX 250 WO HCPCS: Performed by: NURSE ANESTHETIST, CERTIFIED REGISTERED

## 2018-07-30 PROCEDURE — C1768 GRAFT, VASCULAR: HCPCS | Performed by: THORACIC SURGERY (CARDIOTHORACIC VASCULAR SURGERY)

## 2018-07-30 PROCEDURE — 041L0JN BYPASS LEFT FEMORAL ARTERY TO POSTERIOR TIBIAL ARTERY WITH SYNTHETIC SUBSTITUTE, OPEN APPROACH: ICD-10-PCS | Performed by: THORACIC SURGERY (CARDIOTHORACIC VASCULAR SURGERY)

## 2018-07-30 PROCEDURE — 7100000001 HC PACU RECOVERY - ADDTL 15 MIN: Performed by: THORACIC SURGERY (CARDIOTHORACIC VASCULAR SURGERY)

## 2018-07-30 PROCEDURE — 86900 BLOOD TYPING SEROLOGIC ABO: CPT

## 2018-07-30 PROCEDURE — 85027 COMPLETE CBC AUTOMATED: CPT

## 2018-07-30 PROCEDURE — 6360000002 HC RX W HCPCS

## 2018-07-30 PROCEDURE — 86850 RBC ANTIBODY SCREEN: CPT

## 2018-07-30 PROCEDURE — 88305 TISSUE EXAM BY PATHOLOGIST: CPT

## 2018-07-30 PROCEDURE — 2000000000 HC ICU R&B

## 2018-07-30 PROCEDURE — 2780000010 HC IMPLANT OTHER: Performed by: THORACIC SURGERY (CARDIOTHORACIC VASCULAR SURGERY)

## 2018-07-30 PROCEDURE — 3700000000 HC ANESTHESIA ATTENDED CARE: Performed by: THORACIC SURGERY (CARDIOTHORACIC VASCULAR SURGERY)

## 2018-07-30 PROCEDURE — 3600000014 HC SURGERY LEVEL 4 ADDTL 15MIN: Performed by: THORACIC SURGERY (CARDIOTHORACIC VASCULAR SURGERY)

## 2018-07-30 PROCEDURE — 2580000003 HC RX 258: Performed by: NURSE ANESTHETIST, CERTIFIED REGISTERED

## 2018-07-30 PROCEDURE — 2720000010 HC SURG SUPPLY STERILE: Performed by: THORACIC SURGERY (CARDIOTHORACIC VASCULAR SURGERY)

## 2018-07-30 PROCEDURE — 7100000000 HC PACU RECOVERY - FIRST 15 MIN: Performed by: THORACIC SURGERY (CARDIOTHORACIC VASCULAR SURGERY)

## 2018-07-30 PROCEDURE — 2709999900 HC NON-CHARGEABLE SUPPLY

## 2018-07-30 PROCEDURE — C1757 CATH, THROMBECTOMY/EMBOLECT: HCPCS | Performed by: THORACIC SURGERY (CARDIOTHORACIC VASCULAR SURGERY)

## 2018-07-30 PROCEDURE — 6370000000 HC RX 637 (ALT 250 FOR IP): Performed by: THORACIC SURGERY (CARDIOTHORACIC VASCULAR SURGERY)

## 2018-07-30 PROCEDURE — 80048 BASIC METABOLIC PNL TOTAL CA: CPT

## 2018-07-30 PROCEDURE — 2500000003 HC RX 250 WO HCPCS: Performed by: THORACIC SURGERY (CARDIOTHORACIC VASCULAR SURGERY)

## 2018-07-30 PROCEDURE — 6360000002 HC RX W HCPCS: Performed by: NURSE ANESTHETIST, CERTIFIED REGISTERED

## 2018-07-30 PROCEDURE — 85730 THROMBOPLASTIN TIME PARTIAL: CPT

## 2018-07-30 PROCEDURE — 04CN0ZZ EXTIRPATION OF MATTER FROM LEFT POPLITEAL ARTERY, OPEN APPROACH: ICD-10-PCS | Performed by: THORACIC SURGERY (CARDIOTHORACIC VASCULAR SURGERY)

## 2018-07-30 PROCEDURE — 87081 CULTURE SCREEN ONLY: CPT

## 2018-07-30 PROCEDURE — 87086 URINE CULTURE/COLONY COUNT: CPT

## 2018-07-30 DEVICE — IMPLANTABLE DEVICE: Type: IMPLANTABLE DEVICE | Site: FEMUR | Status: FUNCTIONAL

## 2018-07-30 RX ORDER — ASPIRIN 81 MG/1
81 TABLET ORAL DAILY
Status: DISCONTINUED | OUTPATIENT
Start: 2018-07-30 | End: 2018-07-30 | Stop reason: SDUPTHER

## 2018-07-30 RX ORDER — OXYCODONE HYDROCHLORIDE 5 MG/1
5 TABLET ORAL EVERY 4 HOURS PRN
Status: DISCONTINUED | OUTPATIENT
Start: 2018-07-30 | End: 2018-08-01 | Stop reason: HOSPADM

## 2018-07-30 RX ORDER — ONDANSETRON 2 MG/ML
4 INJECTION INTRAMUSCULAR; INTRAVENOUS EVERY 6 HOURS PRN
Status: DISCONTINUED | OUTPATIENT
Start: 2018-07-30 | End: 2018-07-31 | Stop reason: RX

## 2018-07-30 RX ORDER — MORPHINE SULFATE 2 MG/ML
2 INJECTION, SOLUTION INTRAMUSCULAR; INTRAVENOUS
Status: DISCONTINUED | OUTPATIENT
Start: 2018-07-30 | End: 2018-08-01 | Stop reason: HOSPADM

## 2018-07-30 RX ORDER — ALLOPURINOL 300 MG/1
300 TABLET ORAL DAILY
Status: DISCONTINUED | OUTPATIENT
Start: 2018-07-30 | End: 2018-08-01 | Stop reason: HOSPADM

## 2018-07-30 RX ORDER — PROPOFOL 10 MG/ML
INJECTION, EMULSION INTRAVENOUS PRN
Status: DISCONTINUED | OUTPATIENT
Start: 2018-07-30 | End: 2018-07-30 | Stop reason: SDUPTHER

## 2018-07-30 RX ORDER — PROMETHAZINE HYDROCHLORIDE 25 MG/ML
12.5 INJECTION, SOLUTION INTRAMUSCULAR; INTRAVENOUS
Status: DISCONTINUED | OUTPATIENT
Start: 2018-07-30 | End: 2018-07-30 | Stop reason: HOSPADM

## 2018-07-30 RX ORDER — SODIUM CHLORIDE 0.9 % (FLUSH) 0.9 %
10 SYRINGE (ML) INJECTION PRN
Status: DISCONTINUED | OUTPATIENT
Start: 2018-07-30 | End: 2018-07-30 | Stop reason: HOSPADM

## 2018-07-30 RX ORDER — SODIUM CHLORIDE 0.9 % (FLUSH) 0.9 %
10 SYRINGE (ML) INJECTION EVERY 12 HOURS SCHEDULED
Status: DISCONTINUED | OUTPATIENT
Start: 2018-07-30 | End: 2018-07-30 | Stop reason: HOSPADM

## 2018-07-30 RX ORDER — DOCUSATE SODIUM 100 MG/1
100 CAPSULE, LIQUID FILLED ORAL 2 TIMES DAILY
Status: DISCONTINUED | OUTPATIENT
Start: 2018-07-30 | End: 2018-08-01 | Stop reason: HOSPADM

## 2018-07-30 RX ORDER — OXYCODONE HYDROCHLORIDE 5 MG/1
10 TABLET ORAL EVERY 4 HOURS PRN
Status: DISCONTINUED | OUTPATIENT
Start: 2018-07-30 | End: 2018-08-01 | Stop reason: HOSPADM

## 2018-07-30 RX ORDER — FENTANYL CITRATE 50 UG/ML
INJECTION, SOLUTION INTRAMUSCULAR; INTRAVENOUS
Status: DISPENSED
Start: 2018-07-30 | End: 2018-07-31

## 2018-07-30 RX ORDER — LIDOCAINE HYDROCHLORIDE 20 MG/ML
INJECTION, SOLUTION INFILTRATION; PERINEURAL PRN
Status: DISCONTINUED | OUTPATIENT
Start: 2018-07-30 | End: 2018-07-30 | Stop reason: SDUPTHER

## 2018-07-30 RX ORDER — DEXAMETHASONE SODIUM PHOSPHATE 4 MG/ML
INJECTION, SOLUTION INTRA-ARTICULAR; INTRALESIONAL; INTRAMUSCULAR; INTRAVENOUS; SOFT TISSUE PRN
Status: DISCONTINUED | OUTPATIENT
Start: 2018-07-30 | End: 2018-07-30 | Stop reason: SDUPTHER

## 2018-07-30 RX ORDER — SODIUM CHLORIDE, SODIUM LACTATE, POTASSIUM CHLORIDE, CALCIUM CHLORIDE 600; 310; 30; 20 MG/100ML; MG/100ML; MG/100ML; MG/100ML
INJECTION, SOLUTION INTRAVENOUS CONTINUOUS PRN
Status: DISCONTINUED | OUTPATIENT
Start: 2018-07-30 | End: 2018-07-30 | Stop reason: SDUPTHER

## 2018-07-30 RX ORDER — SODIUM CHLORIDE 9 MG/ML
INJECTION, SOLUTION INTRAVENOUS CONTINUOUS
Status: DISCONTINUED | OUTPATIENT
Start: 2018-07-30 | End: 2018-07-31

## 2018-07-30 RX ORDER — FENTANYL CITRATE 50 UG/ML
50 INJECTION, SOLUTION INTRAMUSCULAR; INTRAVENOUS EVERY 5 MIN PRN
Status: DISCONTINUED | OUTPATIENT
Start: 2018-07-30 | End: 2018-07-30 | Stop reason: HOSPADM

## 2018-07-30 RX ORDER — NEOSTIGMINE METHYLSULFATE 1 MG/ML
INJECTION, SOLUTION INTRAVENOUS PRN
Status: DISCONTINUED | OUTPATIENT
Start: 2018-07-30 | End: 2018-07-30 | Stop reason: SDUPTHER

## 2018-07-30 RX ORDER — SODIUM CHLORIDE 0.9 % (FLUSH) 0.9 %
10 SYRINGE (ML) INJECTION PRN
Status: DISCONTINUED | OUTPATIENT
Start: 2018-07-30 | End: 2018-08-01 | Stop reason: HOSPADM

## 2018-07-30 RX ORDER — MORPHINE SULFATE 2 MG/ML
INJECTION, SOLUTION INTRAMUSCULAR; INTRAVENOUS
Status: COMPLETED
Start: 2018-07-30 | End: 2018-07-30

## 2018-07-30 RX ORDER — SODIUM CHLORIDE 9 MG/ML
INJECTION, SOLUTION INTRAVENOUS CONTINUOUS PRN
Status: DISCONTINUED | OUTPATIENT
Start: 2018-07-30 | End: 2018-07-30 | Stop reason: SDUPTHER

## 2018-07-30 RX ORDER — PRAVASTATIN SODIUM 80 MG/1
80 TABLET ORAL NIGHTLY
Status: DISCONTINUED | OUTPATIENT
Start: 2018-07-30 | End: 2018-08-01 | Stop reason: HOSPADM

## 2018-07-30 RX ORDER — LEVOTHYROXINE SODIUM 0.1 MG/1
100 TABLET ORAL DAILY
Status: DISCONTINUED | OUTPATIENT
Start: 2018-07-31 | End: 2018-08-01 | Stop reason: HOSPADM

## 2018-07-30 RX ORDER — ACETAMINOPHEN 325 MG/1
650 TABLET ORAL EVERY 4 HOURS PRN
Status: DISCONTINUED | OUTPATIENT
Start: 2018-07-30 | End: 2018-08-01 | Stop reason: HOSPADM

## 2018-07-30 RX ORDER — HYDROXYZINE HYDROCHLORIDE 25 MG/1
25 TABLET, FILM COATED ORAL 4 TIMES DAILY PRN
Status: DISCONTINUED | OUTPATIENT
Start: 2018-07-30 | End: 2018-08-01 | Stop reason: HOSPADM

## 2018-07-30 RX ORDER — METOPROLOL TARTRATE 50 MG/1
50 TABLET, FILM COATED ORAL 2 TIMES DAILY
Status: DISCONTINUED | OUTPATIENT
Start: 2018-07-30 | End: 2018-08-01 | Stop reason: HOSPADM

## 2018-07-30 RX ORDER — GLYCOPYRROLATE 1 MG/5 ML
SYRINGE (ML) INTRAVENOUS PRN
Status: DISCONTINUED | OUTPATIENT
Start: 2018-07-30 | End: 2018-07-30 | Stop reason: SDUPTHER

## 2018-07-30 RX ORDER — SODIUM CHLORIDE 0.9 % (FLUSH) 0.9 %
10 SYRINGE (ML) INJECTION EVERY 12 HOURS SCHEDULED
Status: DISCONTINUED | OUTPATIENT
Start: 2018-07-30 | End: 2018-08-01 | Stop reason: HOSPADM

## 2018-07-30 RX ORDER — TAMSULOSIN HYDROCHLORIDE 0.4 MG/1
0.4 CAPSULE ORAL DAILY
Status: DISCONTINUED | OUTPATIENT
Start: 2018-07-30 | End: 2018-08-01 | Stop reason: HOSPADM

## 2018-07-30 RX ORDER — FENTANYL CITRATE 50 UG/ML
INJECTION, SOLUTION INTRAMUSCULAR; INTRAVENOUS PRN
Status: DISCONTINUED | OUTPATIENT
Start: 2018-07-30 | End: 2018-07-30 | Stop reason: SDUPTHER

## 2018-07-30 RX ORDER — SODIUM CHLORIDE, SODIUM LACTATE, POTASSIUM CHLORIDE, CALCIUM CHLORIDE 600; 310; 30; 20 MG/100ML; MG/100ML; MG/100ML; MG/100ML
INJECTION, SOLUTION INTRAVENOUS CONTINUOUS
Status: DISCONTINUED | OUTPATIENT
Start: 2018-07-30 | End: 2018-07-31

## 2018-07-30 RX ORDER — ROCURONIUM BROMIDE 10 MG/ML
INJECTION, SOLUTION INTRAVENOUS PRN
Status: DISCONTINUED | OUTPATIENT
Start: 2018-07-30 | End: 2018-07-30 | Stop reason: SDUPTHER

## 2018-07-30 RX ORDER — HEPARIN SODIUM 1000 [USP'U]/ML
INJECTION, SOLUTION INTRAVENOUS; SUBCUTANEOUS PRN
Status: DISCONTINUED | OUTPATIENT
Start: 2018-07-30 | End: 2018-07-30 | Stop reason: SDUPTHER

## 2018-07-30 RX ORDER — LABETALOL HYDROCHLORIDE 5 MG/ML
5 INJECTION, SOLUTION INTRAVENOUS EVERY 10 MIN PRN
Status: DISCONTINUED | OUTPATIENT
Start: 2018-07-30 | End: 2018-07-30 | Stop reason: HOSPADM

## 2018-07-30 RX ORDER — FAMOTIDINE 20 MG/1
20 TABLET, FILM COATED ORAL 2 TIMES DAILY
Status: DISCONTINUED | OUTPATIENT
Start: 2018-07-30 | End: 2018-08-01 | Stop reason: HOSPADM

## 2018-07-30 RX ORDER — MEPERIDINE HYDROCHLORIDE 25 MG/ML
12.5 INJECTION INTRAMUSCULAR; INTRAVENOUS; SUBCUTANEOUS EVERY 5 MIN PRN
Status: DISCONTINUED | OUTPATIENT
Start: 2018-07-30 | End: 2018-07-30 | Stop reason: HOSPADM

## 2018-07-30 RX ORDER — FENTANYL CITRATE 50 UG/ML
25 INJECTION, SOLUTION INTRAMUSCULAR; INTRAVENOUS EVERY 5 MIN PRN
Status: DISCONTINUED | OUTPATIENT
Start: 2018-07-30 | End: 2018-07-30 | Stop reason: HOSPADM

## 2018-07-30 RX ADMIN — FENTANYL CITRATE 50 MCG: 50 INJECTION INTRAMUSCULAR; INTRAVENOUS at 08:10

## 2018-07-30 RX ADMIN — SODIUM CHLORIDE: 9 INJECTION, SOLUTION INTRAVENOUS at 06:45

## 2018-07-30 RX ADMIN — DEXAMETHASONE SODIUM PHOSPHATE 8 MG: 4 INJECTION, SOLUTION INTRAMUSCULAR; INTRAVENOUS at 08:30

## 2018-07-30 RX ADMIN — OXYCODONE HYDROCHLORIDE 5 MG: 5 TABLET ORAL at 22:45

## 2018-07-30 RX ADMIN — VANCOMYCIN HYDROCHLORIDE 1500 MG: 5 INJECTION, POWDER, LYOPHILIZED, FOR SOLUTION INTRAVENOUS at 21:16

## 2018-07-30 RX ADMIN — Medication 0.4 MG: at 11:45

## 2018-07-30 RX ADMIN — DOCUSATE SODIUM 100 MG: 100 CAPSULE, LIQUID FILLED ORAL at 21:16

## 2018-07-30 RX ADMIN — MORPHINE SULFATE 2 MG: 2 INJECTION, SOLUTION INTRAMUSCULAR; INTRAVENOUS at 17:11

## 2018-07-30 RX ADMIN — PHENYLEPHRINE HYDROCHLORIDE 100 MCG: 10 INJECTION INTRAVENOUS at 08:55

## 2018-07-30 RX ADMIN — FENTANYL CITRATE 50 MCG: 50 INJECTION, SOLUTION INTRAMUSCULAR; INTRAVENOUS at 12:35

## 2018-07-30 RX ADMIN — TAMSULOSIN HYDROCHLORIDE 0.4 MG: 0.4 CAPSULE ORAL at 17:16

## 2018-07-30 RX ADMIN — ROCURONIUM BROMIDE 10 MG: 10 INJECTION INTRAVENOUS at 11:05

## 2018-07-30 RX ADMIN — ROCURONIUM BROMIDE 50 MG: 10 INJECTION INTRAVENOUS at 08:10

## 2018-07-30 RX ADMIN — FENTANYL CITRATE 50 MCG: 50 INJECTION, SOLUTION INTRAMUSCULAR; INTRAVENOUS at 12:40

## 2018-07-30 RX ADMIN — ROCURONIUM BROMIDE 10 MG: 10 INJECTION INTRAVENOUS at 10:04

## 2018-07-30 RX ADMIN — Medication 10 ML: at 21:16

## 2018-07-30 RX ADMIN — PHENYLEPHRINE HYDROCHLORIDE 100 MCG: 10 INJECTION INTRAVENOUS at 08:50

## 2018-07-30 RX ADMIN — HEPARIN SODIUM 5000 UNITS: 1000 INJECTION, SOLUTION INTRAVENOUS; SUBCUTANEOUS at 11:05

## 2018-07-30 RX ADMIN — SODIUM CHLORIDE, POTASSIUM CHLORIDE, SODIUM LACTATE AND CALCIUM CHLORIDE: 600; 310; 30; 20 INJECTION, SOLUTION INTRAVENOUS at 12:55

## 2018-07-30 RX ADMIN — PHENYLEPHRINE HYDROCHLORIDE 100 MCG: 10 INJECTION INTRAVENOUS at 08:27

## 2018-07-30 RX ADMIN — ROCURONIUM BROMIDE 10 MG: 10 INJECTION INTRAVENOUS at 09:40

## 2018-07-30 RX ADMIN — OXYCODONE HYDROCHLORIDE 10 MG: 5 TABLET ORAL at 14:15

## 2018-07-30 RX ADMIN — FENTANYL CITRATE 25 MCG: 50 INJECTION INTRAMUSCULAR; INTRAVENOUS at 11:45

## 2018-07-30 RX ADMIN — METOPROLOL TARTRATE 50 MG: 50 TABLET, FILM COATED ORAL at 21:16

## 2018-07-30 RX ADMIN — SODIUM CHLORIDE, POTASSIUM CHLORIDE, SODIUM LACTATE AND CALCIUM CHLORIDE: 600; 310; 30; 20 INJECTION, SOLUTION INTRAVENOUS at 16:11

## 2018-07-30 RX ADMIN — VANCOMYCIN HYDROCHLORIDE 1000 G: 1 INJECTION, POWDER, LYOPHILIZED, FOR SOLUTION INTRAVENOUS at 08:25

## 2018-07-30 RX ADMIN — PROPOFOL 100 MG: 10 INJECTION, EMULSION INTRAVENOUS at 08:10

## 2018-07-30 RX ADMIN — NEOSTIGMINE METHYLSULFATE 3 MG: 1 INJECTION, SOLUTION INTRAVENOUS at 11:45

## 2018-07-30 RX ADMIN — HYDROXYZINE HYDROCHLORIDE 25 MG: 25 TABLET ORAL at 21:16

## 2018-07-30 RX ADMIN — FAMOTIDINE 20 MG: 20 TABLET ORAL at 21:16

## 2018-07-30 RX ADMIN — PRAVASTATIN SODIUM 80 MG: 80 TABLET ORAL at 21:16

## 2018-07-30 RX ADMIN — ALLOPURINOL 300 MG: 300 TABLET ORAL at 17:16

## 2018-07-30 RX ADMIN — SODIUM CHLORIDE, POTASSIUM CHLORIDE, SODIUM LACTATE AND CALCIUM CHLORIDE: 600; 310; 30; 20 INJECTION, SOLUTION INTRAVENOUS at 08:10

## 2018-07-30 RX ADMIN — OXYCODONE HYDROCHLORIDE 10 MG: 5 TABLET ORAL at 18:36

## 2018-07-30 RX ADMIN — HEPARIN SODIUM 10000 UNITS: 1000 INJECTION, SOLUTION INTRAVENOUS; SUBCUTANEOUS at 09:40

## 2018-07-30 RX ADMIN — Medication 0.2 MG: at 08:30

## 2018-07-30 RX ADMIN — PHENYLEPHRINE HYDROCHLORIDE 50 MCG/MIN: 10 INJECTION, SOLUTION INTRAMUSCULAR; INTRAVENOUS; SUBCUTANEOUS at 08:55

## 2018-07-30 RX ADMIN — FENTANYL CITRATE 25 MCG: 50 INJECTION INTRAMUSCULAR; INTRAVENOUS at 11:35

## 2018-07-30 RX ADMIN — LIDOCAINE HYDROCHLORIDE 50 MG: 20 INJECTION, SOLUTION INFILTRATION; PERINEURAL at 08:10

## 2018-07-30 RX ADMIN — PHENYLEPHRINE HYDROCHLORIDE 100 MCG: 10 INJECTION INTRAVENOUS at 08:45

## 2018-07-30 RX ADMIN — PHENYLEPHRINE HYDROCHLORIDE 100 MCG: 10 INJECTION INTRAVENOUS at 08:19

## 2018-07-30 RX ADMIN — SODIUM CHLORIDE: 9 INJECTION, SOLUTION INTRAVENOUS at 08:10

## 2018-07-30 ASSESSMENT — PULMONARY FUNCTION TESTS
PIF_VALUE: 19
PIF_VALUE: 19
PIF_VALUE: 20
PIF_VALUE: 18
PIF_VALUE: 14
PIF_VALUE: 18
PIF_VALUE: 21
PIF_VALUE: 19
PIF_VALUE: 21
PIF_VALUE: 20
PIF_VALUE: 19
PIF_VALUE: 14
PIF_VALUE: 19
PIF_VALUE: 22
PIF_VALUE: 19
PIF_VALUE: 20
PIF_VALUE: 19
PIF_VALUE: 20
PIF_VALUE: 16
PIF_VALUE: 19
PIF_VALUE: 19
PIF_VALUE: 20
PIF_VALUE: 19
PIF_VALUE: 2
PIF_VALUE: 20
PIF_VALUE: 19
PIF_VALUE: 20
PIF_VALUE: 19
PIF_VALUE: 19
PIF_VALUE: 14
PIF_VALUE: 4
PIF_VALUE: 19
PIF_VALUE: 21
PIF_VALUE: 19
PIF_VALUE: 22
PIF_VALUE: 4
PIF_VALUE: 19
PIF_VALUE: 22
PIF_VALUE: 20
PIF_VALUE: 1
PIF_VALUE: 18
PIF_VALUE: 16
PIF_VALUE: 1
PIF_VALUE: 19
PIF_VALUE: 20
PIF_VALUE: 19
PIF_VALUE: 20
PIF_VALUE: 19
PIF_VALUE: 2
PIF_VALUE: 18
PIF_VALUE: 19
PIF_VALUE: 24
PIF_VALUE: 19
PIF_VALUE: 6
PIF_VALUE: 14
PIF_VALUE: 19
PIF_VALUE: 20
PIF_VALUE: 19
PIF_VALUE: 20
PIF_VALUE: 20
PIF_VALUE: 19
PIF_VALUE: 20
PIF_VALUE: 19
PIF_VALUE: 6
PIF_VALUE: 19
PIF_VALUE: 9
PIF_VALUE: 19
PIF_VALUE: 20
PIF_VALUE: 19
PIF_VALUE: 19
PIF_VALUE: 3
PIF_VALUE: 20
PIF_VALUE: 19
PIF_VALUE: 4
PIF_VALUE: 19
PIF_VALUE: 20
PIF_VALUE: 19
PIF_VALUE: 20
PIF_VALUE: 20
PIF_VALUE: 2
PIF_VALUE: 21
PIF_VALUE: 19
PIF_VALUE: 14
PIF_VALUE: 20
PIF_VALUE: 4
PIF_VALUE: 20
PIF_VALUE: 19
PIF_VALUE: 19
PIF_VALUE: 20
PIF_VALUE: 19
PIF_VALUE: 16
PIF_VALUE: 19
PIF_VALUE: 19
PIF_VALUE: 13
PIF_VALUE: 19
PIF_VALUE: 19
PIF_VALUE: 5
PIF_VALUE: 19
PIF_VALUE: 23
PIF_VALUE: 19
PIF_VALUE: 19
PIF_VALUE: 4
PIF_VALUE: 19
PIF_VALUE: 20
PIF_VALUE: 19
PIF_VALUE: 20
PIF_VALUE: 19
PIF_VALUE: 4
PIF_VALUE: 19
PIF_VALUE: 20
PIF_VALUE: 20
PIF_VALUE: 19
PIF_VALUE: 20
PIF_VALUE: 19
PIF_VALUE: 20
PIF_VALUE: 19
PIF_VALUE: 20
PIF_VALUE: 19
PIF_VALUE: 4
PIF_VALUE: 19
PIF_VALUE: 0
PIF_VALUE: 19
PIF_VALUE: 19
PIF_VALUE: 16
PIF_VALUE: 19
PIF_VALUE: 5
PIF_VALUE: 19
PIF_VALUE: 20
PIF_VALUE: 18
PIF_VALUE: 19
PIF_VALUE: 19
PIF_VALUE: 3
PIF_VALUE: 19
PIF_VALUE: 19
PIF_VALUE: 22
PIF_VALUE: 22
PIF_VALUE: 19
PIF_VALUE: 19
PIF_VALUE: 14
PIF_VALUE: 19
PIF_VALUE: 20
PIF_VALUE: 3
PIF_VALUE: 20
PIF_VALUE: 19
PIF_VALUE: 2
PIF_VALUE: 20
PIF_VALUE: 19
PIF_VALUE: 18
PIF_VALUE: 19
PIF_VALUE: 20
PIF_VALUE: 5
PIF_VALUE: 19
PIF_VALUE: 19
PIF_VALUE: 4
PIF_VALUE: 19
PIF_VALUE: 19
PIF_VALUE: 20
PIF_VALUE: 19
PIF_VALUE: 21
PIF_VALUE: 20
PIF_VALUE: 19
PIF_VALUE: 20
PIF_VALUE: 18
PIF_VALUE: 13
PIF_VALUE: 20
PIF_VALUE: 19
PIF_VALUE: 20
PIF_VALUE: 20
PIF_VALUE: 19
PIF_VALUE: 19
PIF_VALUE: 20
PIF_VALUE: 18
PIF_VALUE: 19
PIF_VALUE: 19
PIF_VALUE: 20
PIF_VALUE: 19
PIF_VALUE: 19
PIF_VALUE: 2
PIF_VALUE: 20
PIF_VALUE: 19
PIF_VALUE: 19
PIF_VALUE: 22
PIF_VALUE: 19
PIF_VALUE: 4
PIF_VALUE: 3
PIF_VALUE: 1
PIF_VALUE: 19
PIF_VALUE: 20
PIF_VALUE: 1
PIF_VALUE: 14
PIF_VALUE: 19
PIF_VALUE: 25
PIF_VALUE: 19
PIF_VALUE: 19
PIF_VALUE: 2
PIF_VALUE: 16
PIF_VALUE: 19
PIF_VALUE: 19

## 2018-07-30 ASSESSMENT — PAIN - FUNCTIONAL ASSESSMENT: PAIN_FUNCTIONAL_ASSESSMENT: 0-10

## 2018-07-30 ASSESSMENT — PAIN SCALES - GENERAL
PAINLEVEL_OUTOF10: 8
PAINLEVEL_OUTOF10: 5
PAINLEVEL_OUTOF10: 10
PAINLEVEL_OUTOF10: 8
PAINLEVEL_OUTOF10: 6
PAINLEVEL_OUTOF10: 9
PAINLEVEL_OUTOF10: 6
PAINLEVEL_OUTOF10: 5
PAINLEVEL_OUTOF10: 0
PAINLEVEL_OUTOF10: 2
PAINLEVEL_OUTOF10: 6
PAINLEVEL_OUTOF10: 8
PAINLEVEL_OUTOF10: 10
PAINLEVEL_OUTOF10: 5

## 2018-07-30 ASSESSMENT — PAIN DESCRIPTION - LOCATION: LOCATION: KNEE

## 2018-07-30 ASSESSMENT — PAIN DESCRIPTION - DESCRIPTORS
DESCRIPTORS: ACHING;SQUEEZING
DESCRIPTORS: DISCOMFORT;ACHING
DESCRIPTORS: ACHING;BURNING

## 2018-07-30 ASSESSMENT — PAIN DESCRIPTION - ORIENTATION
ORIENTATION: LEFT
ORIENTATION: LEFT

## 2018-07-30 ASSESSMENT — PAIN DESCRIPTION - PAIN TYPE
TYPE: SURGICAL PAIN
TYPE: SURGICAL PAIN

## 2018-07-30 NOTE — BRIEF OP NOTE
Brief Postoperative Note    Osmar Johnson  YOB: 1935  [id number]    Pre-operative Diagnosis: total occlusion left popliteal artery with left foot tissue loss impending limb loss, Type 2 DM, Non obstructive CAD    Post-operative Diagnosis: Same    Procedure: Left SFA to Anterior tibial artery bypass for limb salvage, exploration of left popliteal artery and Benjamin thrombectomy, exploration of left posterior tibial artery    Anesthesia: General    Surgeons/Assistants: Dory Morales MD/ Cristhian Mason [id number]    Estimated Blood Loss: 824     Complications: None    Specimens: Was Obtained: thrombus from left popliteal    Findings: Left AT admitted a 2.0 mm probe, walls slightly thickened but soft compressible. Left popliteal had organized clot, thickened walls but compressible, upon removal of organized clot distally there was weak arterial backflow. Benjamin wouldn't go cephalad. Cadaver vein was of good quality 6mm prox 4 mm distal. Left PT heavily calcified mid calf.     Electronically signed by Ben Mclean MD on 7/30/2018 at 12:18 PM  Dictation Job ID [id number]

## 2018-07-30 NOTE — PROGRESS NOTES
[address] PACU. SEE FLOW SHEET. [address] SEE MAR  1300 STAT LABS SENT DRAWN FROM [facility] PER S. Heena Duffy RN  8008 REPORT CALLED TO ICU. Alexia Alvarenga NOTIFIED. FAMILY LEFT TO GO TO ICU WAITING ROOM   1345 TO ICU ON MONITOR WITH O2 AND RN IN STABLE CONDITION.

## 2018-07-30 NOTE — ANESTHESIA PRE PROCEDURE
removed    COLOSTOMY      COLOSTOMY      reversed    EYE SURGERY      band    EYE SURGERY  2010    bilatera cataracts    HERNIA REPAIR      NECK SURGERY  06/29/2016    three    NECK SURGERY      NERVE SURGERY Bilateral 05/15/2018    LUMBAR FACET MBB L3-4, L4-5, L5-S1    OTHER SURGICAL HISTORY  06/29/2016    ACDF C4-5    OTHER SURGICAL HISTORY Bilateral 04/09/2018    Lumbar facet medial branch block at L3-4, L45, L5-S1    DC COLSC FLX W/REMOVAL LESION BY HOT BX FORCEPS Left 7/17/2017    COLONOSCOPY POLYPECTOMY HOT BIOPSY performed [facility]    DC INJ DX/THER AGNT PARAVERT FACET JOINT, LUMBAR/SAC, 2ND LEVEL Bilateral 4/9/2018    LUMBAR FACET MBB @ L3-4,L4-5 and L5-S1, BILATERAL performed by Keisha Johnson MD at [address] [place] [address] DX/THER AGNT PARAVERT FACET JOINT, LUMBAR/SAC, 2ND LEVEL Bilateral 5/15/2018    bilateral L-facet MBB # 2 @ L3-4, L4-5, L5-S1. performed by Keisha Johnson MD at [address] [place] [address],  IMAGE GUIDE,EA ADDL LEVEL Left 6/12/2018    LUMBAR RFA  L3-4,  L4-5,  L5-S1  LEFT SIDE performed by Keisha Johnson MD at [address] [place] [address],  IMAGE [id number] LEVEL Right 7/19/2018    LUMBAR RFA  L3-4,  L4-5,  L5-S1  RIGHT SIDE performed by Keisha Johnson MD at [address] [place] [address] N/A 7/17/2017    EGD CONTROL HEMORRHAGE performed by Yayo Brown MD at [facility]       Social History:    Social History   Substance Use Topics    Smoking status: Former Smoker     Quit date: 8/14/1991    Smokeless tobacco: Never Used    Alcohol use No                                Counseling given: Not Answered      Vital Signs (Current):   Vitals:    07/30/18 0612 07/30/18 0704   BP: 139/62    Pulse: 68    Resp: 17    Temp: 97.8 °F (36.6 °C)    TempSrc: Temporal    SpO2: 96%    Weight: 230 lb (104.3

## 2018-07-30 NOTE — H&P
[facility] PROFESSIONAL SERVS  [address]/CV SURG  [address]  [address]  [facility]  Dept: [phone]  Dept Fax: [phone]  Loc: [phone]     Visit Date: 7/17/2018     Mr. Viviana Torres is a [age] [facility] y.o. male  who presented for:       Chief Complaint   Patient presents with    Other       PAD; PVD    New Patient       referral from Dr Ricarda Bazzi Follow Up After Procedure       angiogram 6/8/18         HPI:   HPI : [age] [facility] y/o male with a 3 mm sangeeta mal perforan's ulcer on his left medial distal first metatarsal with severe left foot and calf claudication, also has right foot 2nd web ulceration. Both legs show boot distribution hyperpigmentation and left foot has dependent rubor but less than 3 seconds capillary refill on his left great toe. Runoff angiogram was just done showing 2 vessel runoff to right foot and total below knee left popliteal artery occlusion with reconstitution of the left anterior tibial in upper calf and faint left posterior tibial on mid calf. The AT reaches left foot as a DP artery and there is a faint left PT runoff below left ankle into foot. No Hb A1C available but there is a glucose of 117. Therefore has NIDDM. Problem with right foot microvascular disease. Left foot has tissue loss and impending further tissue loss therefore after complete cardiac evaluation will proceed with left leg revascularization for limb salvage. Patient has findings suggestive of chronic venous insufficiency with \"wooden legs\" therefore will revascularize with cadaver vein homograft for limb salvage. Had cardiac cath for abnormal stress test 4 years ago and at that time found with 50% coronary stenoses, had recent Regadenoson stress test this year and it was negative for reversible ischemia.   Mr. Viviana Torres was instructed about the advisability for him to undergo left leg revascularization attempt for limb salvage as well as potential risks like limb loss, tissue loss, loss of limb function bleeding infection damage to arteries veins nerves stroke cardiovascular event. Potential benefits and alternatives were also explained. MrDanilo Liao very well appeared to understand, all of his questions were answered to his satisfaction and he is agreeable          Current Medication      Current Outpatient Prescriptions:     Fexofenadine HCl (ALLEGRA PO), Take by mouth as needed, Disp: , Rfl:     Calcium Carbonate Antacid (TUMS PO), Take by mouth as needed, Disp: , Rfl:     oxyCODONE-acetaminophen (PERCOCET) 5-325 MG per tablet, Take 1 tablet by mouth every 8 hours as needed for Pain for up to 30 days. ., Disp: 90 tablet, Rfl: 0    levothyroxine (SYNTHROID) 100 MCG tablet, Take 100 mcg by mouth Daily, Disp: , Rfl:     magnesium hydroxide (MILK OF MAGNESIA) 400 MG/5ML suspension, Take 30 mLs by mouth daily as needed for Constipation, Disp: , Rfl:     ranitidine (ZANTAC) 150 MG tablet, Take 150 mg by mouth daily as needed for Heartburn, Disp: , Rfl:     Pseudoephedrine HCl (SUDAFED PO), Take by mouth as needed, Disp: , Rfl:     Cranberry 1000 MG CAPS, Take 500 mg by mouth 2 times daily , Disp: , Rfl:     indomethacin (INDOCIN) 50 MG capsule, Take 50 mg by mouth 3 times daily as needed (for gout) With food, Disp: , Rfl:     NONFORMULARY, Take 1 capsule by mouth daily , Disp: , Rfl:     tamsulosin (FLOMAX) 0.4 MG capsule, Take 1 capsule by mouth daily, Disp: 90 capsule, Rfl: 3    aspirin 81 MG EC tablet, Take 1 tablet by mouth daily, Disp: 1 tablet, Rfl: 0    rivaroxaban (XARELTO) 20 MG TABS tablet, TAKE 1 TABLET DAILY, Disp: 90 tablet, Rfl: 3    Calcium Carbonate-Vitamin D (CALCIUM 600/VITAMIN D PO), Take 1 tablet by mouth 2 times daily, Disp: , Rfl:     metoprolol tartrate (LOPRESSOR) 50 MG tablet, TAKE 1 TABLET TWICE A DAY, Disp: 180 tablet, Rfl: 0    furosemide (LASIX) 40 MG tablet, Take 1 tablet by mouth daily, Disp: 90 tablet, Rfl: 2    cloNIDine (CATAPRES) 0.1 MG tablet, Take 0.1 mg by mouth as needed for High He reports that he does not drink alcohol or use drugs.     Family History  Osmar family history includes COPD in his sister; Cancer in his father and sister; Diabetes in his mother; Heart Disease in his father; High Blood Pressure in his sister.     There is no family history of bicuspid aortic valve, aneurysms, heart transplant, pacemakers, defibrillators, or sudden cardiac death.        Past Surgical History   Past Surgical History         Past Surgical History:   Procedure Laterality Date    BACK SURGERY         lower    CARDIAC CATHETERIZATION         ok    CARDIAC CATHETERIZATION         ok    CATARACT REMOVAL Bilateral 2010    CHOLECYSTECTOMY        COLON SURGERY         6-8 in of descending colon removed    COLOSTOMY        COLOSTOMY         reversed    EYE SURGERY         band    EYE SURGERY   2010     bilatera cataracts    HERNIA REPAIR        NECK SURGERY   06/29/2016     three    NECK SURGERY        NERVE SURGERY Bilateral 05/15/2018     LUMBAR FACET MBB L3-4, L4-5, L5-S1    OTHER SURGICAL HISTORY   06/29/2016     ACDF C4-5    OTHER SURGICAL HISTORY Bilateral 04/09/2018     Lumbar facet medial branch block at L3-4, L45, L5-S1    AR COLSC FLX W/REMOVAL LESION BY HOT BX FORCEPS Left 7/17/2017     COLONOSCOPY POLYPECTOMY HOT BIOPSY performed by Teri [facility]on MD at [facility]    AR INJ DX/THER AGNT PARAVERT FACET JOINT, LUMBAR/SAC, 2ND LEVEL Bilateral 4/9/2018     LUMBAR FACET MBB @ L3-4,L4-5 and L5-S1, BILATERAL performed by Niles Day MD at [facility] DX/THER AGNT PARAVERT FACET JOINT, LUMBAR/SAC, 2ND LEVEL Bilateral 5/15/2018     bilateral L-facet MBB # 2 @ L3-4, L4-5, L5-S1. performed by Niles Day MD at [address] [facility] [address] [facility] LEVEL Left 6/12/2018     LUMBAR RFA  L3-4,  L4-5,  L5-S1  LEFT SIDE performed by Niles Day MD at [address] [facility] Conclusions      Summary   There was mild global hypokinesis of the left ventricle. Ejection fraction is visually estimated at 45%. The aortic valve leaflets were not well visualized. Aortic valve appears tricuspid. Aortic valve leaflets are somewhat thickened. Mild-to-moderate aortic regurgitation is noted. Mild mitral regurgitation is present. Signature      ----------------------------------------------------------------   Electronically signed by Eldon Geller MD (Interpreting   physician) on 07/03/2016 at 07:43 AM   ----------------------------------------------------------------      Findings      Mitral Valve   Mild mitral regurgitation is present. Aortic Valve   The aortic valve leaflets were not well visualized. Aortic valve appears tricuspid. Aortic valve leaflets are somewhat thickened. Mild-to-moderate aortic regurgitation is noted. Tricuspid Valve   Mild tricuspid regurgitation. Pulmonic Valve   The pulmonic valve was not well visualized . Left Atrium   Left atrial size was normal.      Left Ventricle   There was mild global hypokinesis of the left ventricle. Ejection fraction is visually estimated at 45%. Right Atrium   Right atrial size was normal.      Right Ventricle   The right ventricular size was normal with normal systolic function and   wall thickness. Pericardial Effusion   The pericardium was normal in appearance with no evidence of a pericardial   effusion. Pleural Effusion   No evidence of pleural effusion. Aorta / Great Vessels   -Aortic root dimension within normal limits.   -The Pulmonary artery is within normal limits. -IVC size is within normal limits with normal respiratory phasic changes.      M-Mode/2D Measurements & Calculations      LV Diastolic    LV Systolic Dimension: 4.5   AV Cusp Separation: 2 cmLA   Dimension: 5.7  cm                           Dimension: 3.1 cmAO Root   cm              LV Volume Diastolic: PAD (peripheral artery disease) ([facility] [address]           Orders Placed This Encounter   Procedures    US DUP LOWER EXTREMITY MAPPING BILAT VENOUS    Hemoglobin A1C    ECHO Complete 2D W Doppler W Color    Echo pharmacological stress test        Encounter Medications    No orders of the defined types were placed in this encounter.            Severe left leg below knee ischemia with tissue loss  Left popliteal artery arteriosclerotic occlusion  NIDDM  Obesity  CAD  Chronic venous stasis both lower legs  PVD  Plan: Left Fem-Tibial bypass cadaver vein homograft for limb salvage     Return in about 2 weeks (around 7/31/2018) for left SFA-Tibial bypass cadaver vein.     Physical exam today unchanged from office visit. Patient is agreeable to proceed with attempts at left lower leg revascularization for limb salvage with current tissue loss. Potential risks of surgery like bleeding infection damage to arteries veins nerves limb loss loss of limb function tissue loss heart attack, stroke to include some. Mr Sergio Mcgovern very well appeared to understand, all of his questions were answered to his satisfaction and he is agreeable.

## 2018-07-31 LAB
ALBUMIN SERPL-MCNC: 3.2 G/DL (ref 3.5–5.1)
ALP BLD-CCNC: 106 U/L (ref 38–126)
ALT SERPL-CCNC: 20 U/L (ref 11–66)
ANION GAP SERPL CALCULATED.3IONS-SCNC: 15 MEQ/L (ref 8–16)
AST SERPL-CCNC: 27 U/L (ref 5–40)
BILIRUB SERPL-MCNC: 0.5 MG/DL (ref 0.3–1.2)
BUN BLDV-MCNC: 15 MG/DL (ref 7–22)
CALCIUM SERPL-MCNC: 8.6 MG/DL (ref 8.5–10.5)
CHLORIDE BLD-SCNC: 102 MEQ/L (ref 98–111)
CO2: 21 MEQ/L (ref 23–33)
CREAT SERPL-MCNC: 0.8 MG/DL (ref 0.4–1.2)
ERYTHROCYTE [DISTWIDTH] IN BLOOD BY AUTOMATED COUNT: 17.2 % (ref 11.5–14.5)
ERYTHROCYTE [DISTWIDTH] IN BLOOD BY AUTOMATED COUNT: 59.5 FL (ref 35–45)
GFR SERPL CREATININE-BSD FRML MDRD: > 90 ML/MIN/1.73M2
GLUCOSE BLD-MCNC: 141 MG/DL (ref 70–108)
HCT VFR BLD CALC: 33.2 % (ref 42–52)
HEMOGLOBIN: 11 GM/DL (ref 14–18)
MCH RBC QN AUTO: 31.3 PG (ref 26–33)
MCHC RBC AUTO-ENTMCNC: 33.1 GM/DL (ref 32.2–35.5)
MCV RBC AUTO: 94.3 FL (ref 80–94)
PLATELET # BLD: 213 THOU/MM3 (ref 130–400)
PMV BLD AUTO: 9.5 FL (ref 9.4–12.4)
POTASSIUM REFLEX MAGNESIUM: 3.8 MEQ/L (ref 3.5–5.2)
RBC # BLD: 3.52 MILL/MM3 (ref 4.7–6.1)
SODIUM BLD-SCNC: 138 MEQ/L (ref 135–145)
TOTAL PROTEIN: 5.9 G/DL (ref 6.1–8)
WBC # BLD: 14.1 THOU/MM3 (ref 4.8–10.8)

## 2018-07-31 PROCEDURE — 36415 COLL VENOUS BLD VENIPUNCTURE: CPT

## 2018-07-31 PROCEDURE — 6360000002 HC RX W HCPCS: Performed by: THORACIC SURGERY (CARDIOTHORACIC VASCULAR SURGERY)

## 2018-07-31 PROCEDURE — 80053 COMPREHEN METABOLIC PANEL: CPT

## 2018-07-31 PROCEDURE — 2580000003 HC RX 258: Performed by: THORACIC SURGERY (CARDIOTHORACIC VASCULAR SURGERY)

## 2018-07-31 PROCEDURE — 6370000000 HC RX 637 (ALT 250 FOR IP): Performed by: NURSE PRACTITIONER

## 2018-07-31 PROCEDURE — 2060000000 HC ICU INTERMEDIATE R&B

## 2018-07-31 PROCEDURE — 2709999900 HC NON-CHARGEABLE SUPPLY

## 2018-07-31 PROCEDURE — 85027 COMPLETE CBC AUTOMATED: CPT

## 2018-07-31 PROCEDURE — 6370000000 HC RX 637 (ALT 250 FOR IP): Performed by: THORACIC SURGERY (CARDIOTHORACIC VASCULAR SURGERY)

## 2018-07-31 RX ORDER — ONDANSETRON 4 MG/1
4 TABLET, ORALLY DISINTEGRATING ORAL EVERY 6 HOURS PRN
Status: DISCONTINUED | OUTPATIENT
Start: 2018-07-31 | End: 2018-08-01 | Stop reason: HOSPADM

## 2018-07-31 RX ORDER — QUINAPRIL 5 1/1
40 TABLET ORAL NIGHTLY
Status: DISCONTINUED | OUTPATIENT
Start: 2018-07-31 | End: 2018-07-31 | Stop reason: CLARIF

## 2018-07-31 RX ORDER — LISINOPRIL 20 MG/1
20 TABLET ORAL NIGHTLY
Status: DISCONTINUED | OUTPATIENT
Start: 2018-07-31 | End: 2018-08-01 | Stop reason: HOSPADM

## 2018-07-31 RX ADMIN — DOCUSATE SODIUM 100 MG: 100 CAPSULE, LIQUID FILLED ORAL at 09:04

## 2018-07-31 RX ADMIN — SODIUM CHLORIDE, POTASSIUM CHLORIDE, SODIUM LACTATE AND CALCIUM CHLORIDE: 600; 310; 30; 20 INJECTION, SOLUTION INTRAVENOUS at 09:42

## 2018-07-31 RX ADMIN — SODIUM CHLORIDE: 9 INJECTION, SOLUTION INTRAVENOUS at 09:04

## 2018-07-31 RX ADMIN — OXYCODONE HYDROCHLORIDE 5 MG: 5 TABLET ORAL at 03:23

## 2018-07-31 RX ADMIN — PRAVASTATIN SODIUM 80 MG: 80 TABLET ORAL at 21:00

## 2018-07-31 RX ADMIN — FAMOTIDINE 20 MG: 20 TABLET ORAL at 09:04

## 2018-07-31 RX ADMIN — ASPIRIN 325 MG: 325 TABLET, DELAYED RELEASE ORAL at 09:04

## 2018-07-31 RX ADMIN — METOPROLOL TARTRATE 50 MG: 50 TABLET, FILM COATED ORAL at 09:04

## 2018-07-31 RX ADMIN — FAMOTIDINE 20 MG: 20 TABLET ORAL at 20:59

## 2018-07-31 RX ADMIN — TAMSULOSIN HYDROCHLORIDE 0.4 MG: 0.4 CAPSULE ORAL at 09:04

## 2018-07-31 RX ADMIN — ALLOPURINOL 300 MG: 300 TABLET ORAL at 09:04

## 2018-07-31 RX ADMIN — DOCUSATE SODIUM 100 MG: 100 CAPSULE, LIQUID FILLED ORAL at 20:59

## 2018-07-31 RX ADMIN — Medication 10 ML: at 21:03

## 2018-07-31 RX ADMIN — METOPROLOL TARTRATE 50 MG: 50 TABLET, FILM COATED ORAL at 21:00

## 2018-07-31 RX ADMIN — SODIUM CHLORIDE, POTASSIUM CHLORIDE, SODIUM LACTATE AND CALCIUM CHLORIDE: 600; 310; 30; 20 INJECTION, SOLUTION INTRAVENOUS at 02:42

## 2018-07-31 RX ADMIN — OXYCODONE HYDROCHLORIDE 10 MG: 5 TABLET ORAL at 11:07

## 2018-07-31 RX ADMIN — OXYCODONE HYDROCHLORIDE 10 MG: 5 TABLET ORAL at 20:57

## 2018-07-31 RX ADMIN — ENOXAPARIN SODIUM 40 MG: 40 INJECTION SUBCUTANEOUS at 09:04

## 2018-07-31 RX ADMIN — LISINOPRIL 20 MG: 20 TABLET ORAL at 21:00

## 2018-07-31 RX ADMIN — HYDROXYZINE HYDROCHLORIDE 25 MG: 25 TABLET ORAL at 03:23

## 2018-07-31 RX ADMIN — LEVOTHYROXINE SODIUM 100 MCG: 100 TABLET ORAL at 07:01

## 2018-07-31 RX ADMIN — OXYCODONE HYDROCHLORIDE 10 MG: 5 TABLET ORAL at 15:40

## 2018-07-31 ASSESSMENT — PAIN DESCRIPTION - DESCRIPTORS
DESCRIPTORS: ACHING;DISCOMFORT

## 2018-07-31 ASSESSMENT — PAIN SCALES - GENERAL
PAINLEVEL_OUTOF10: 4
PAINLEVEL_OUTOF10: 6
PAINLEVEL_OUTOF10: 9
PAINLEVEL_OUTOF10: 6
PAINLEVEL_OUTOF10: 4
PAINLEVEL_OUTOF10: 4
PAINLEVEL_OUTOF10: 8
PAINLEVEL_OUTOF10: 8
PAINLEVEL_OUTOF10: 9
PAINLEVEL_OUTOF10: 9

## 2018-07-31 ASSESSMENT — PAIN DESCRIPTION - FREQUENCY
FREQUENCY: CONTINUOUS
FREQUENCY: INTERMITTENT

## 2018-07-31 ASSESSMENT — PAIN DESCRIPTION - PAIN TYPE
TYPE: SURGICAL PAIN
TYPE: SURGICAL PAIN
TYPE: CHRONIC PAIN
TYPE: SURGICAL PAIN;ACUTE PAIN

## 2018-07-31 ASSESSMENT — PAIN DESCRIPTION - LOCATION
LOCATION: KNEE
LOCATION: BACK

## 2018-07-31 ASSESSMENT — PAIN DESCRIPTION - ORIENTATION
ORIENTATION: LEFT

## 2018-07-31 ASSESSMENT — PAIN DESCRIPTION - ONSET
ONSET: ON-GOING
ONSET: ON-GOING

## 2018-07-31 NOTE — OP NOTE
[address] [place] [address]                                 OPERATIVE REPORT    PATIENT NAME: Case Silva                   :        1935  MED REC NO:   [id number]                           ROOM:       0001  ACCOUNT NO:   [de-identified]                           ADMIT DATE: 2018  PROVIDER:     Dory Morales MD    DATE OF PROCEDURE:  2018    PREOPERATIVE DIAGNOSES:  1. 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 2.  Type 2 diabetes mellitus. 3.  Nonobstructive coronary artery disease. POSTOPERATIVE DIAGNOSES:  1. 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 2.  Type 2 diabetes mellitus. 3.  Nonobstructive coronary artery disease. OPERATION PERFORMED:  1. Left superficial femoral artery to anterior tibial artery, cadaver  saphenous vein homograft for limb salvage bypass. 2.  Exploration of the left popliteal artery and Benjamin thrombectomy of  the left popliteal artery. 3.  Exploration of the left posterior tibial artery. SURGEON:  Dory Morales MD    FIRST ASSISTANT:  Gerald Mejia. DUANE Barrera    ANESTHESIA:  General.    COMPLICATIONS:  None. ESTIMATED BLOOD LOSS:  200 mL. SPECIMENS:  Organized thrombus from the left popliteal artery. FINDINGS:  1. The left anterior tibial artery was soft and admitted at 2-mm probe. The walls were slightly thickened, but soft and compressible. The left  popliteal artery had soft walls, had an organized clot, which was removed  in one piece. The walls were thickened, but compressible. There was upon  removal of the clot, there was weak arterial backflow.   2.  The Benjamin would not go cephalad on the popliteal artery, but it would  go distal.  Cadaver vein was of good quality, 6 mm on the proximal end, 4  mm on the distal end.  The left posterior tibial artery was heavily  calcified on the midcalf. So, therefore a bypass to the posterior tibial  artery could not be done as well. The vein graft was pulled thru the inside of an 8 mm ringed goretex tube graft to prevent mechanical occlusion by the calf muscles. OPERATIVE PROCEDURE:  After adequate monitoring lines were placed, the  patient supine, general endotracheal anesthesia was accomplished. Time out  procedures were completed. The patient was prepped and draped in the  standard fashion for left leg revascularization for limb salvage. Under  strict sterile technique, using 5.5x magnification and headlight, an  incision was done longitudinally on the left anterior tibial compartment  and anterior tibial artery was accessed through a longitudinal muscle  splitting technique in the anterior tibial muscle. The anterior tibial  artery was easily exposed and a Silastic loop was passed around it. Dissection was carried out with Endo Peanut dissector. After this was  done, then another incision was done on the medial aspect of the left upper  tibia and the popliteal artery was accessed in yhe standard fashion. The  vessel had some induration, but was compressible and after this was done,  then the medial incision to expose the popliteal artery was extended  distally and the posterior tibial artery was exposed and the vessel was  found to be heavily calcified and not suitable for bypass. 10,000 units  of intravenous heparin was given and after appropriate recirculation, then  an incision was done on the lower medial thigh to expose the superficial  femoral artery. The superficial femoral artery was exposed, distal to  Olvin's canal after it became the popliteal artery above knee. The vessel  was pulsatile and soft. Thickened walls, but wide open and soft.   After  this was done, then the superficial femoral artery was encircled with an  umbilical tape and dissected and was heel  and the three interrupted 7-0 polypropylene stitches in the apex and  continuous for the sides. Before completion of the suture line, 2-mm probe  was passed proximally and distally into anterior tibial artery and backflow  was allowed. The Benjamin bulldog on the vein graft was flushed to deair  the vein graft. The vascular clamps from the anterior tibial were  released. Traction was applied to the continuous suture line for  hemostasis. The suture line was then completed and tied for hemostasis. Excellent pulsatile flow was obtained through the vein graft. Palpable  anterior tibial artery was noted. After this was done, then the incisions  were then irrigated and closed in layers in a standard fashion after  hemostasis was secured in the field. The patient tolerated the procedure  uneventfully. There were no complications noted.         Lisa Bullock MD    D: 07/30/2018 23:05:52       T: 07/31/2018 1:11:49     [email or web address]  Job#: [id number]     Doc#: [id number]    CC:

## 2018-07-31 NOTE — PROGRESS NOTES
CT/CV Surgery Progress Note    2018 7:39 AM  Surgeon:  Dr. Lauren Viramontes     Procedure:  Left superficial femoral artery to anterior tibial artery, cadaver saphenous vein homograft for limb salvage bypass. Exploration of the left popliteal artery and Benjamin thrombectomy of  the left popliteal artery. Exploration of the left posterior tibial artery.         POD #:   1    Resting comfortably in bed  RA  Hemodynamically stable  Hgb 11,  Cr 0.8        Vital Signs: /72   Pulse 89   Temp 98.8 °F (37.1 °C) (Oral)   Resp 20   Ht 5' 10\" (1.778 m)   Wt 238 lb 12.1 oz (108.3 kg)   SpO2 93%   BMI 34.26 kg/m²    Temp (24hrs), Av.8 °F (36 °C), Min:93.4 °F (34.1 °C), Max:98.8 °F (37.1 °C)      Weight: 238 lb 12.1 oz (108.3 kg)       PULSE OXIMETRY RANGE: SpO2  Av.8 %  Min: 92 %  Max: 100 %  SUPPLEMENTAL O2: O2 Flow Rate (L/min): 2 L/min     Labs:   CBC:     Recent Labs      18   0631  18   1446  18   0623   WBC  9.8  13.2*  14.1*   HGB  13.0*  11.3*  11.0*   HCT  39.8*  34.1*  33.2*   MCV  95.9*  95.3*  94.3*   PLT  221  184  213     BMP: Recent Labs      18   0631  18   1446  18   0623   NA  142  139  138   K  4.6  3.7  3.8   CL  102  104  102   CO2  27  20*  21*   BUN  19  17  15   CREATININE  0.8  0.7  0.8   MG   --   1.7   --      Last HgA1C:  No results found for: LABA1C  PT/INR:   Recent Labs      18   06   INR  1.23*     APTT:   Recent Labs      18   APTT  30.7           Intake/Output Summary (Last 24 hours) at 18 0739  Last data filed at 18 2130   Gross per 24 hour   Intake             4109 ml   Output             1000 ml   Net             3109 ml       Scheduled Meds:    quinapril  40 mg Oral Nightly    sodium chloride flush  10 mL Intravenous 2 times per day    docusate sodium  100 mg Oral BID    famotidine  20 mg Oral BID    enoxaparin  40 mg Subcutaneous Daily    aspirin  325 mg Oral Daily    allopurinol  300 mg Oral discussed in detail with Dr. Barbara Mcgregor, APRN - CNP

## 2018-07-31 NOTE — PROGRESS NOTES
Pt refuses multiple attempts to give pt care. Pt becomes agitated with attempts to give care. Pt alert and oriented but exhibits signs of confusion. Pt states that he will not let us do anything until wife arrives.

## 2018-08-01 VITALS
OXYGEN SATURATION: 97 % | TEMPERATURE: 98.3 F | HEART RATE: 78 BPM | HEIGHT: 70 IN | BODY MASS INDEX: 34.17 KG/M2 | DIASTOLIC BLOOD PRESSURE: 68 MMHG | SYSTOLIC BLOOD PRESSURE: 114 MMHG | WEIGHT: 238.7 LBS | RESPIRATION RATE: 19 BRPM

## 2018-08-01 LAB
MRSA SCREEN: NORMAL
URINE CULTURE, ROUTINE: NORMAL
VRE CULTURE: NORMAL

## 2018-08-01 PROCEDURE — 2580000003 HC RX 258: Performed by: THORACIC SURGERY (CARDIOTHORACIC VASCULAR SURGERY)

## 2018-08-01 PROCEDURE — 97530 THERAPEUTIC ACTIVITIES: CPT

## 2018-08-01 PROCEDURE — 97166 OT EVAL MOD COMPLEX 45 MIN: CPT

## 2018-08-01 PROCEDURE — G8987 SELF CARE CURRENT STATUS: HCPCS

## 2018-08-01 PROCEDURE — 6370000000 HC RX 637 (ALT 250 FOR IP): Performed by: NURSE PRACTITIONER

## 2018-08-01 PROCEDURE — G8979 MOBILITY GOAL STATUS: HCPCS

## 2018-08-01 PROCEDURE — G8978 MOBILITY CURRENT STATUS: HCPCS

## 2018-08-01 PROCEDURE — G8980 MOBILITY D/C STATUS: HCPCS

## 2018-08-01 PROCEDURE — 2709999900 HC NON-CHARGEABLE SUPPLY

## 2018-08-01 PROCEDURE — 97161 PT EVAL LOW COMPLEX 20 MIN: CPT

## 2018-08-01 PROCEDURE — 6360000002 HC RX W HCPCS: Performed by: THORACIC SURGERY (CARDIOTHORACIC VASCULAR SURGERY)

## 2018-08-01 PROCEDURE — 6370000000 HC RX 637 (ALT 250 FOR IP): Performed by: THORACIC SURGERY (CARDIOTHORACIC VASCULAR SURGERY)

## 2018-08-01 PROCEDURE — G8988 SELF CARE GOAL STATUS: HCPCS

## 2018-08-01 RX ORDER — FUROSEMIDE 40 MG/1
40 TABLET ORAL DAILY
Status: DISCONTINUED | OUTPATIENT
Start: 2018-08-01 | End: 2018-08-01 | Stop reason: HOSPADM

## 2018-08-01 RX ADMIN — FUROSEMIDE 40 MG: 40 TABLET ORAL at 10:28

## 2018-08-01 RX ADMIN — Medication 10 ML: at 08:13

## 2018-08-01 RX ADMIN — ENOXAPARIN SODIUM 40 MG: 40 INJECTION SUBCUTANEOUS at 08:12

## 2018-08-01 RX ADMIN — ASPIRIN 325 MG: 325 TABLET, DELAYED RELEASE ORAL at 08:13

## 2018-08-01 RX ADMIN — LEVOTHYROXINE SODIUM 100 MCG: 100 TABLET ORAL at 06:45

## 2018-08-01 RX ADMIN — FAMOTIDINE 20 MG: 20 TABLET ORAL at 08:13

## 2018-08-01 RX ADMIN — ALLOPURINOL 300 MG: 300 TABLET ORAL at 08:13

## 2018-08-01 RX ADMIN — TAMSULOSIN HYDROCHLORIDE 0.4 MG: 0.4 CAPSULE ORAL at 08:13

## 2018-08-01 RX ADMIN — DOCUSATE SODIUM 100 MG: 100 CAPSULE, LIQUID FILLED ORAL at 08:13

## 2018-08-01 RX ADMIN — METOPROLOL TARTRATE 50 MG: 50 TABLET, FILM COATED ORAL at 08:13

## 2018-08-01 RX ADMIN — OXYCODONE HYDROCHLORIDE 10 MG: 5 TABLET ORAL at 04:40

## 2018-08-01 RX ADMIN — HYDROXYZINE HYDROCHLORIDE 25 MG: 25 TABLET ORAL at 10:28

## 2018-08-01 ASSESSMENT — PAIN DESCRIPTION - PROGRESSION: CLINICAL_PROGRESSION: NOT CHANGED

## 2018-08-01 ASSESSMENT — PAIN DESCRIPTION - PAIN TYPE: TYPE: CHRONIC PAIN

## 2018-08-01 ASSESSMENT — PAIN DESCRIPTION - ORIENTATION: ORIENTATION: LOWER

## 2018-08-01 ASSESSMENT — PAIN DESCRIPTION - DESCRIPTORS: DESCRIPTORS: ACHING

## 2018-08-01 ASSESSMENT — PAIN SCALES - GENERAL
PAINLEVEL_OUTOF10: 5
PAINLEVEL_OUTOF10: 8
PAINLEVEL_OUTOF10: 0

## 2018-08-01 ASSESSMENT — PAIN DESCRIPTION - LOCATION: LOCATION: BACK

## 2018-08-01 ASSESSMENT — PAIN DESCRIPTION - FREQUENCY: FREQUENCY: CONTINUOUS

## 2018-08-01 NOTE — PROGRESS NOTES
CLINICAL PHARMACY: DISCHARGE MED RECONCILIATION/REVIEW    [facility]) Select Patient?: No  Total # of Interventions Recommended: 0   -   Total # Interventions Accepted: 0  Intervention Severity:   - Level 1 Intervention Present?: No   - Level 2 #: 0   - Level 3 #: 0   Time Spent (min): 15    Additional Documentation:

## 2018-08-01 NOTE — PROGRESS NOTES
CT/CV Surgery Progress Note    2018 7:40 AM  Surgeon:  Dr. Chapin Castillo     Procedure:  Left superficial femoral artery to anterior tibial artery, cadaver saphenous vein homograft for limb salvage bypass. Exploration of the left popliteal artery and Benjamin thrombectomy of  the left popliteal artery.  Exploration of the left posterior tibial artery.         POD #:     2    Resting comfortably in bed  RA  Hemodynamically stable  LLE +3 edema, will restart Lasix  Pedal pulses with doppler        Vital Signs: BP (!) 145/72   Pulse 82   Temp 98.7 °F (37.1 °C) (Oral)   Resp 18   Ht 5' 10\" (1.778 m)   Wt 238 lb 11.2 oz (108.3 kg)   SpO2 97%   BMI 34.25 kg/m²    Temp (24hrs), Av.3 °F (36.8 °C), Min:97.4 °F (36.3 °C), Max:99.1 °F (37.3 °C)      Weight: 238 lb 11.2 oz (108.3 kg)       PULSE OXIMETRY RANGE: SpO2  Av.2 %  Min: 92 %  Max: 97 %  SUPPLEMENTAL O2: O2 Flow Rate (L/min): 2 L/min     Labs:   CBC:     Recent Labs      18   0631  18   1446  18   0623   WBC  9.8  13.2*  14.1*   HGB  13.0*  11.3*  11.0*   HCT  39.8*  34.1*  33.2*   MCV  95.9*  95.3*  94.3*   PLT  221  184  213     BMP: Recent Labs      18   0631  18   1446  18   0623   NA  142  139  138   K  4.6  3.7  3.8   CL  102  104  102   CO2  27  20*  21*   BUN  19  17  15   CREATININE  0.8  0.7  0.8   MG   --   1.7   --      Last HgA1C:  No results found for: LABA1C  PT/INR:   Recent Labs      18   INR  1.23*     APTT:   Recent Labs      18   APTT  30.7           Intake/Output Summary (Last 24 hours) at 18 0740  Last data filed at 18 0510   Gross per 24 hour   Intake              540 ml   Output              525 ml   Net               15 ml       Scheduled Meds:    furosemide  40 mg Oral Daily    lisinopril  20 mg Oral Nightly    sodium chloride flush  10 mL Intravenous 2 times per day    docusate sodium  100 mg Oral BID    famotidine  20 mg Oral BID    enoxaparin 40 mg Subcutaneous Daily    aspirin  325 mg Oral Daily    allopurinol  300 mg Oral Daily    levothyroxine  100 mcg Oral Daily    metoprolol tartrate  50 mg Oral BID    pravastatin  80 mg Oral Nightly    tamsulosin  0.4 mg Oral Daily       ROS    Exam:   General Appearance: alert ,conversing, in no acute distress  Cardiovascular: normal rate, regular rhythm, normal S1 and S2, no murmurs, rubs, clicks, or gallops  Pulmonary/Chest: clear to auscultation bilaterally- no wheezes, rales or rhonchi, normal air movement, no respiratory distress  Abdomen: soft, non-tender, non-distended, normal bowel sounds, no bruits,   Extremities: no cyanosis, clubbing or edema. Pulses: Radial, DP, and PT pulses intact. Right Leg: DP: doppler, PT: doppler                   Left Leg: DP: doppler, PT: doppler   Skin: warm and dry, no rash or erythema.   LLE with +3 edema, incisions intact with large blister noted  Head: normocephalic and atraumatic  Eyes: pupils equal, round, and reactive to light  Neck: supple and non-tender without mass, no thyromegaly, no JVD   Musculoskeletal: normal range of motion, no joint swelling, deformity or tenderness  Neurological: alert, oriented, normal speech, no focal findings or movement disorder noted    Assessment:     Patient Active Problem List   Diagnosis    Cervical spinal cord compression (HCC)    Essential hypertension    Leukocytosis    Hypokalemia    Spinal stenosis, lumbar region, with neurogenic claudication    Idiopathic hypotension    Chronic atrial fibrillation (HCC)    Hypokalemia    Prostatism    Elevated TSH    [facility]AD [facility]ipheral [facility]    Bilateral leg edema    Open wound of left lower leg    Lumbar spondylosis    Atherosclerosis of native artery of both lower extremities ([facility] [address]    Femoral-tibial bypass graft occlusion, left, initial encounter [facility])         Healthcare Directive: No, patient does not have an advance directive for healthcare treatment       Plan: 8/1/18  1. Restart Lasix  2. Home today  3. Will restart Xarelto 7 days after surgery  4.  Follow up in 3-4 weeks with  Dr. Darci Horn    The plan of care was discussed in detail with Dr. Igor Carmona, APRN - CNP

## 2018-08-01 NOTE — PROGRESS NOTES
[address] [facility] PHYSICAL THERAPY  EVALUATION  [facility] ICU STEPDOWN TELEMETRY 4K - 4K-25/025-A    Time In: 2477  Time Out: 1128  Timed Code Treatment Minutes: 15 Minutes  Minutes: 25          Date: 2018  Patient Name: Maxi Posada,  Gender:  male        MRN: [id number]  : 1935  (82 y.o.)      Referring Practitioner: RICHA Jorgensen APRN-CNP  Diagnosis: Athersclerosis of native A of BLE's  Additional Pertinent Hx: Pt admitted for treatment of LEs. Hx: bypassgraft LLE 2018, a-fib, bell's palsy, DJD, MI.        Past Medical History:   Diagnosis Date    Atrial fibrillation ([address]     Bell's palsy     CAD (coronary artery disease)     Cancer (HCC)     skin CA removed with dry ice    DDD (degenerative disc disease)     DVT (deep venous thrombosis) (HCC)     GERD (gastroesophageal reflux disease)     Gout     Hernia     Hx of blood clots     left leg DVT    Hyperlipidemia     Hypertension     Irregular cardiac rhythm 2016    Movement disorder     back pain    Myocardial infarct     Thyroid disease     UTI (urinary tract infection)      Past Surgical History:   Procedure Laterality Date    BACK SURGERY      lower    CARDIAC CATHETERIZATION      ok    CARDIAC CATHETERIZATION      ok    CATARACT REMOVAL Bilateral     CHOLECYSTECTOMY      COLON SURGERY      6-8 in of descending colon removed    COLOSTOMY      COLOSTOMY      reversed    EYE SURGERY      band    EYE SURGERY  2010    bilatera cataracts    HERNIA REPAIR      NECK SURGERY  2016    three    NECK SURGERY      NERVE SURGERY Bilateral 05/15/2018    LUMBAR FACET MBB L3-4, L4-5, L5-S1    OTHER SURGICAL HISTORY  2016    ACDF C4-5    OTHER SURGICAL HISTORY Bilateral 2018    Lumbar facet medial branch block at L3-4, L45, L5-S1    OR COLSC FLX W/REMOVAL LESION BY HOT BX FORCEPS Left 2017    COLONOSCOPY POLYPECTOMY HOT BIOPSY performed by Brenton Schaffer MD at [facility]    OR INJ DX/THER AGNT PARAVERT FACET JOINT, LUMBAR/SAC, 2ND LEVEL Bilateral 4/9/2018    LUMBAR FACET MBB @ L3-4,L4-5 and L5-S1, BILATERAL performed by Rigoberto Wells MD at [address] [facility] DX/THER AGNT PARAVERT FACET JOINT, LUMBAR/SAC, 2ND LEVEL Bilateral 5/15/2018    bilateral L-facet MBB # 2 @ L3-4, L4-5, L5-S1. performed by Rigoberto Wells MD at [address] [place] [address], W IMAGE [address] [facility] [address] LEVEL Left 6/12/2018    LUMBAR RFA  L3-4,  L4-5,  L5-S1  LEFT SIDE performed by Rigoberto Wells MD at [address] [place] [address], [address] IM[address] [facility] LEVEL Right 7/19/2018    LUMBAR RFA  L3-4,  L4-5,  L5-S1  RIGHT SIDE performed by Rigoberto Wells MD at [address] [facility] N/A 7/17/2017    EGD CONTROL HEMORRHAGE performed by Ling Grubbs MD at [facility]       Restrictions/Precautions:  General Precautions, Fall Risk                            Subjective:  Chart Reviewed: Yes  Patient assessed for rehabilitation services?: Yes  Subjective: Nurse approved session. Pt seated in recliner. Daughter present. Pt was pleasant and cooperative. He stated he is planning on being discharged today. He voiced no concerns about discharge. General:  Overall Orientation Status: Within Functional Limits  Follows Commands: Within Functional Limits    Vision: Within Functional Limits    Hearing: Within functional limits         Pain:   .  Pain Assessment  Pain Level: 0       Social/Functional History:    Lives With: Spouse  Type of Home: House  Home Layout: Multi-level, Bed/Bath upstairs, 1/2 bath on main level (Kitchen/living room on main floor)  Home Access: Stairs to enter with rails  Entrance Stairs - Number of Steps: level entry, then 4 steps with josé miguel rails to main floor. 8 steps to 2nd level with bed/bathroom- josé miguel rails.   Entrance Stairs - Rails: Both  Home 1  Surface: level tile  Device: Rolling Walker  Assistance: Contact guard assistance (initially, then SBA)  Quality of Gait: slow pace encouraged for improved safety. Cue when approaching bed to sit to keep self within the RW  Distance: 40 ft, 6 ft       EXERCISES:  The following exercises were completed to improve functional mobility: seated- josé miguel UE horizontal abduction x10 reps, trunk twists to ea direction x10 reps, marches x10 reps ankle df/pf x10 reps, long arc quads. Activity Tolerance:  Activity Tolerance: Patient Tolerated treatment well    Treatment Initiated: ex noted above, transfer trials noted above. Assessment: Body structures, Functions, Activity limitations: Decreased endurance, Decreased balance  Assessment: Pt was encouraged to allow HH P.T. to see him once home. Pt is limited in endurance and balance. Recommended to use the RW until stronger and with more endurance. Pt was agreeable to having a HEP    Clinical Presentation: Low - Stable and Uncomplicated: based on history, diagnosis and level of assist with evaluation    Decision Making: Moderate Complexitybased on patient assessment and decision making process of determining plan of care and establishing reasonable expectations for measurable functional outcomes    REQUIRES PT FOLLOW UP: No  No Skilled PT:  (Pt scheduled to be discharged home. family to assist)  Discharge Recommendations: Home with Home health PT, Home with assist PRN    Patient Education:  Patient Education: HEP    Equipment Recommendations:   None    Safety:  Type of devices: Gait belt, Nurse notified, Call light within reach, Left in chair (Daughter with pt)    Plan:   Discharge to home with [facility] P.T[facility] Recommended. Family to assist 24/7.     Goals:  Patient goals : Go home  Short term goals  Time Frame for Short term goals: NA due to expected discharge to home today  Long term goals  Time Frame for Long term goals : NA    Evaluation Complexity: Based on the

## 2018-08-01 NOTE — PROGRESS NOTES
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      Restrictions/Precautions:  General Precautions, Fall Risk                            Past Medical History:   Diagnosis Date    Atrial fibrillation ([facility]     Bell's palsy     CAD (coronary artery disease)     Cancer ([facility]     skin CA removed with dry ice    DDD (degenerative disc disease)     DVT (deep venous thrombosis) (HCC)     GERD (gastroesophageal reflux disease)     Gout     Hernia     Hx of blood clots     left leg DVT    Hyperlipidemia     Hypertension     Irregular cardiac rhythm 2016    Movement disorder     back pain    Myocardial infarct     Thyroid disease     UTI (urinary tract infection)      Past Surgical History:   Procedure Laterality Date    BACK SURGERY      lower    CARDIAC CATHETERIZATION      ok    CARDIAC CATHETERIZATION      ok    CATARACT REMOVAL Bilateral     CHOLECYSTECTOMY      COLON SURGERY      6-8 in of descending colon removed    COLOSTOMY      COLOSTOMY      reversed    EYE SURGERY      band    EYE SURGERY      bilatera cataracts    HERNIA REPAIR      NECK SURGERY  2016    three    NECK SURGERY      NERVE SURGERY Bilateral 05/15/2018    LUMBAR FACET MBB Tone: Normotonic    Movements Are Fluid And Coordinated: No  Coordination and Movement description: Decreased speed, Decreased accuracy, Right UE, Left UE (noted when preparing for breakfast/retrieving items on breakfast tray)               ADL  Feeding: Independent (eating breakfast upon departure)  Toileting: Supervision (voiding while seated on BSC; no hygiene completed)  Additional Comments: Pt declined any other ADLs at this time. Bed mobility  Scooting: Supervision (scooting back into chair)    Transfers  Stand to sit: Contact guard assistance (to bedside chair; cues for slow descent into chair and safe hand placement)  Toilet Transfers  Toilet - Technique: Ambulating  Equipment Used: Standard bedside commode  Toilet Transfer: Minimal assistance (for lift at hips)  Toilet Transfers Comments: Minimal cues for safe hand placement    Balance  Sitting Balance: Supervision (sitting unsupported on BSC/chair)  Standing Balance: Contact guard assistance     Time: X~1 minute  Activity: prep for mobility  Comment: RW for support     Functional Mobility  Functional - Mobility Device: Rolling Walker  Activity: Other (short distance in hallway than back to bedside chair)  Assist Level: Contact guard assistance  Functional Mobility Comments: Increased time to complete and increased weight noted through BUE on RW. Slightly unsteady, reporting increased pain in LLE during mobility. Activity Tolerance:  Activity Tolerance: Patient limited by fatigue, Patient limited by pain  Activity Tolerance: Education provided re: discharge recommendations/options, although pt adamant about returning home with outpatient therapy services. Assessment:  Assessment: Pt presents requiring increased assistance for ADLs, mobility, and transfers. Pt will continue to benefit form OT services to increase independence with these tasks/decrease burden of care.    Performance decision making during this evaluation, this patient is of medium complexity. OT G-codes  Functional Assessment Tool Used: [facility]  Functional Limitation: Self care  Self Care Current Status (): At least 40 percent but less than 60 percent impaired, limited or restricted  Self Care Goal Status ():  At least 20 percent but less than 40 percent impaired, limited or restricted  AM-PAC Inpatient Daily Activity Raw Score: 17  AM-PAC Inpatient ADL T-Scale Score : 37.26  ADL Inpatient CMS 0-100% Score: 50.11  ADL Inpatient CMS G-Code Modifier : CK

## 2018-08-01 NOTE — PLAN OF CARE
dressing in place with no redness noted underneath. Will continue to monitor. Problem: Pain:  Goal: Pain level will decrease  Pain level will decrease    Outcome: Ongoing  Patient complains of pain in back and leg. Patients current pain level is a 9/10. Patients pain goal is a 5/10. Patient is receiving PO meds for pain at this time. Comments: Care plan reviewed with patient. Patient verbalize understanding of the plan of care and contribute to goal setting.
- see MAR for details, position change and rest      Comments: Care plan reviewed with patient and wife. Patient and wife verbalize understanding of the plan of care and contribute to goal setting.

## 2018-08-09 ENCOUNTER — HOSPITAL ENCOUNTER (OUTPATIENT)
Dept: WOUND CARE | Age: 83
Discharge: HOME | End: 2018-08-09
Payer: MEDICARE

## 2018-08-09 ENCOUNTER — OFFICE VISIT (OUTPATIENT)
Dept: CARDIOTHORACIC SURGERY | Age: 83
End: 2018-08-09

## 2018-08-09 VITALS
RESPIRATION RATE: 18 BRPM | TEMPERATURE: 98 F | HEART RATE: 72 BPM | SYSTOLIC BLOOD PRESSURE: 118 MMHG | DIASTOLIC BLOOD PRESSURE: 57 MMHG | HEIGHT: 70 IN | OXYGEN SATURATION: 98 %

## 2018-08-09 VITALS — TEMPERATURE: 96.3 F | HEART RATE: 75 BPM | DIASTOLIC BLOOD PRESSURE: 64 MMHG | SYSTOLIC BLOOD PRESSURE: 117 MMHG

## 2018-08-09 DIAGNOSIS — S81.802A WOUND OF LEFT LOWER EXTREMITY, INITIAL ENCOUNTER: ICD-10-CM

## 2018-08-09 DIAGNOSIS — L97.422 DIABETIC ULCER OF LEFT MIDFOOT ASSOCIATED WITH TYPE 2 DIABETES MELLITUS, WITH FAT LAYER EXPOSED (HCC): ICD-10-CM

## 2018-08-09 DIAGNOSIS — I83.029 VENOUS ULCER OF LEFT LEG (HCC): ICD-10-CM

## 2018-08-09 DIAGNOSIS — E11.621 DIABETIC ULCER OF TOE OF RIGHT FOOT ASSOCIATED WITH TYPE 2 DIABETES MELLITUS, WITH FAT LAYER EXPOSED (HCC): ICD-10-CM

## 2018-08-09 DIAGNOSIS — I87.2 VENOUS INSUFFICIENCY OF BOTH LOWER EXTREMITIES: ICD-10-CM

## 2018-08-09 DIAGNOSIS — Z48.89 ENCOUNTER FOR POST SURGICAL WOUND CHECK: Primary | ICD-10-CM

## 2018-08-09 DIAGNOSIS — L97.929 VENOUS ULCER OF LEFT LEG (HCC): ICD-10-CM

## 2018-08-09 DIAGNOSIS — L89.610 DECUBITUS ULCER OF RIGHT HEEL, UNSTAGEABLE (HCC): ICD-10-CM

## 2018-08-09 DIAGNOSIS — E11.621 DIABETIC ULCER OF LEFT MIDFOOT ASSOCIATED WITH TYPE 2 DIABETES MELLITUS, WITH FAT LAYER EXPOSED (HCC): ICD-10-CM

## 2018-08-09 DIAGNOSIS — L97.512 DIABETIC ULCER OF TOE OF RIGHT FOOT ASSOCIATED WITH TYPE 2 DIABETES MELLITUS, WITH FAT LAYER EXPOSED (HCC): ICD-10-CM

## 2018-08-09 PROCEDURE — 97598 DBRDMT OPN WND ADDL 20CM/<: CPT | Performed by: NURSE PRACTITIONER

## 2018-08-09 PROCEDURE — 87184 SC STD DISK METHOD PER PLATE: CPT

## 2018-08-09 PROCEDURE — 99213 OFFICE O/P EST LOW 20 MIN: CPT | Performed by: NURSE PRACTITIONER

## 2018-08-09 PROCEDURE — 97597 DBRDMT OPN WND 1ST 20 CM/<: CPT

## 2018-08-09 PROCEDURE — 87205 SMEAR GRAM STAIN: CPT

## 2018-08-09 PROCEDURE — 2709999900 HC NON-CHARGEABLE SUPPLY

## 2018-08-09 PROCEDURE — 87186 SC STD MICRODIL/AGAR DIL: CPT

## 2018-08-09 PROCEDURE — 87077 CULTURE AEROBIC IDENTIFY: CPT

## 2018-08-09 PROCEDURE — 99024 POSTOP FOLLOW-UP VISIT: CPT | Performed by: PHYSICIAN ASSISTANT

## 2018-08-09 PROCEDURE — 87070 CULTURE OTHR SPECIMN AEROBIC: CPT

## 2018-08-09 PROCEDURE — 97597 DBRDMT OPN WND 1ST 20 CM/<: CPT | Performed by: NURSE PRACTITIONER

## 2018-08-09 RX ORDER — CIPROFLOXACIN 250 MG/1
250 TABLET, FILM COATED ORAL 2 TIMES DAILY
Qty: 20 TABLET | Refills: 0 | Status: SHIPPED | OUTPATIENT
Start: 2018-08-09 | End: 2018-08-19

## 2018-08-09 RX ORDER — CIPROFLOXACIN 250 MG/1
250 TABLET, FILM COATED ORAL 2 TIMES DAILY
Qty: 20 TABLET | Refills: 0 | Status: SHIPPED | OUTPATIENT
Start: 2018-08-09 | End: 2018-08-09

## 2018-08-09 ASSESSMENT — ENCOUNTER SYMPTOMS
BLURRED VISION: 0
COUGH: 0
PHOTOPHOBIA: 0
SPUTUM PRODUCTION: 0
DIARRHEA: 0
HEMOPTYSIS: 0
VOMITING: 0
HEARTBURN: 0
CONSTIPATION: 0
BACK PAIN: 0
ORTHOPNEA: 0
ABDOMINAL PAIN: 0
STRIDOR: 0
DOUBLE VISION: 0
NAUSEA: 0
WHEEZING: 0
SORE THROAT: 0
SHORTNESS OF BREATH: 0

## 2018-08-09 ASSESSMENT — PAIN SCALES - GENERAL: PAINLEVEL_OUTOF10: 0

## 2018-08-09 NOTE — PROGRESS NOTES
Cardiothoracic Follow Up Note      Patient's Name/Date of Birth: Osmar Lindsey / 1935 (82 y.o.)    Date: August 9, 2018    We had the pleasure of seeing Momo Bains in the office today, as you know this is a very pleasant 82y.o. year old male with a history of PVD who underwent Left SFA to Anterior tibial artery bypass for limb salvage, exploration of left popliteal artery and Benjamin thrombectomy, exploration of left posterior tibial artery performed by Dr. Rafat Carpenter on 07/30/18. I spoke with the pt's PCP yesterday afternoon about the L anterior tibial incision. I called the pt and scheduled him an appointment this AM so that I could further evaluate the wound. The pt tells me he does not believe it is infected. He has not experienced any pain, extreme redness, fever, or chills. Wife says they are very good about cleaning the wound. Doppler biphasic pulses noted on bilateral PT and DP.       Chief Complaint   Patient presents with    Wound Check     incision on lower left leg       Past Medical History:  Osmar  has a past medical history of Atrial fibrillation ([address] Mclean's palsy; CAD (coronary artery disease); Cancer ([address] DDD (degenerative disc disease); DVT (deep venous thrombosis) ([address] GERD (gastroesophageal reflux disease); Gout; Hernia; Hx of blood clots; Hyperlipidemia; Hypertension; Irregular cardiac rhythm; Movement disorder; Myocardial infarct ([address] Thyroid disease; and UTI (urinary tract infection). Past Surgical History:  The patient  has a past surgical history that includes Colon surgery; colostomy; colostomy; Cholecystectomy; Neck surgery (06/29/2016); Eye surgery; hernia repair; other surgical history (06/29/2016); Cardiac catheterization; Cardiac catheterization; Neck surgery; back surgery; pr colsc flx w/removal lesion by hot bx forceps (Left, 7/17/2017); Upper gastrointestinal endoscopy (N/A, 7/17/2017);  Cataract removal (Bilateral, 2010); other surgical history (Bilateral, 90 capsule, Rfl: 3    aspirin 81 MG EC tablet, Take 1 tablet by mouth daily, Disp: 1 tablet, Rfl: 0    Calcium Carbonate-Vitamin D (CALCIUM 600/VITAMIN D PO), Take 1 tablet by mouth 2 times daily, Disp: , Rfl:     metoprolol tartrate (LOPRESSOR) 50 MG tablet, TAKE 1 TABLET TWICE A DAY, Disp: 180 tablet, Rfl: 0    furosemide (LASIX) 40 MG tablet, Take 1 tablet by mouth daily, Disp: 90 tablet, Rfl: 2    cloNIDine (CATAPRES) 0.1 MG tablet, Take 0.1 mg by mouth as needed for High Blood Pressure Takes if BP is greater than 175, Disp: , Rfl:     hydrOXYzine (ATARAX) 25 MG tablet, Take 25 mg by mouth 4 times daily as needed for Itching, Disp: , Rfl:     loperamide (IMODIUM) 2 MG capsule, Take 2 mg by mouth as needed for Diarrhea, Disp: , Rfl:     potassium chloride (K-TAB) 10 MEQ extended release tablet, Take 4 tablets by mouth daily (Patient taking differently: Take 20 mEq by mouth 2 times daily ), Disp: 360 tablet, Rfl: 3    docusate sodium (COLACE) 100 MG capsule, Take 300 mg by mouth 2 times daily , Disp: , Rfl:     magnesium oxide (MAG-OX) 400 (241.3 MG) MG TABS tablet, Take 1 tablet by mouth daily, Disp: 30 tablet, Rfl: 0    allopurinol (ZYLOPRIM) 300 MG tablet, Take 300 mg by mouth daily, Disp: , Rfl:     quinapril (ACCUPRIL) 40 MG tablet, Take 40 mg by mouth daily. , Disp: , Rfl:     pravastatin (PRAVACHOL) 80 MG tablet, Take 80 mg by mouth nightly.  , Disp: , Rfl:     indapamide (LOZOL) 1.25 MG tablet, Take 1.25 mg by mouth every morning.  , Disp: , Rfl:     cyclobenzaprine (FLEXERIL) 10 MG tablet, Take 10 mg by mouth every 8 hours as needed. , Disp: , Rfl:     Ascorbic Acid (VITAMIN C) 500 MG tablet, Take 500 mg by mouth daily. , Disp: , Rfl:     Family History: This patient's family history includes COPD in his sister; Cancer in his father and sister; Diabetes in his mother; Heart Disease in his father; High Blood Pressure in his sister.     Social History:  Osmar  reports that he quit smoking placed during the hospital encounter of 03/01/17   XR Chest Standard TWO VW    Narrative PROCEDURE: XR CHEST STANDARD TWO VW    CLINICAL INFORMATION: SOB, . COMPARISON: PA and lateral chest x-ray September 22, 2016. TECHNIQUE: PA and lateral views the chest.    FINDINGS:       Stable moderate cardiomegaly. The mediastinum is not widened. There are no pulmonary infiltrates. The posterior costophrenic angles remain blunted. The lateral costophrenic angle on the right is mildly obscured but to lesser degree than the last study. The left costophrenic angle is clear. The pulmonary vascularity is normal. Redemonstrated is the significant anterior osteophytosis of the lower thoracic spine. No suspicious osseous lesions are present. Impression    1. Decrease in the small right pleural effusion. Stable small left pleural effusion. 2. No other acute interval change. **This report has been created using voice recognition software. It may contain minor errors which are inherent in voice recognition technology. **    Final report electronically signed by Dr. Wayne Montemayor on 3/1/2017 12:35 PM       CXR portable   Results for orders placed during the hospital encounter of 06/27/14   XR Chest Portable    Narrative       [facility]          Patient Name: Ranjan Salas        Patient Type: Emergency           MRN:  [id number]        Gender:  Male                     Account Number:  [de-identified]        Raz Senate:  Brandon Banks    YOB: 1935        M. D.                               Pt Loc:  ED                             R a d i o l o g y   R e p o r t         Accession Number:  Procedure Name:             Exam Date/Time:       [id number]      Chest Mobile Single         6/27/2014 6:30:00 AM         CPT4 code       [id number]           Reason For Exam       CHEST PAIN;CHEST PAIN           REPORT       CHEST FILM         CLINICAL INFORMATION:  70year old with left arm

## 2018-08-09 NOTE — PLAN OF CARE
Problem: Wound:  Goal: Will show signs of wound healing; wound closure and no evidence of infection  Will show signs of wound healing; wound closure and no evidence of infection  Outcome: Ongoing  Patient presented to wound care center with multiple wounds. Patient afebrile and no signs or symptoms of infection noted. Wound on left lower lateral leg  debrided per Estelita Alicia CNP. Culture also taken. DressinAquacel Ag to open left lower lateral leg and left lower medial distal leg and Betadine all other areas including scabbed areas (gauze in between 3rd and 4th toes on right foot) Left Lower Legs Cover with ABD, wrap in Kerlix, wrap with ACE wrap Change every other day  Right Lower Leg apply Tubigrip (Apply In AM and Remove in PM). Patient supplies to be ordered. Patient to begin prescribed antibiotic. Patient to follow up in 1 week. Comments: Care plan reviewed with patient. Patient verbalize understanding of the plan of care and contribute to goal setting.

## 2018-08-09 NOTE — PROGRESS NOTES
TABLET TWICE A  tablet 0    furosemide (LASIX) 40 MG tablet Take 1 tablet by mouth daily 90 tablet 2    cloNIDine (CATAPRES) 0.1 MG tablet Take 0.1 mg by mouth as needed for High Blood Pressure Takes if BP is greater than 175      hydrOXYzine (ATARAX) 25 MG tablet Take 25 mg by mouth 4 times daily as needed for Itching      loperamide (IMODIUM) 2 MG capsule Take 2 mg by mouth as needed for Diarrhea      potassium chloride (K-TAB) 10 MEQ extended release tablet Take 4 tablets by mouth daily (Patient taking differently: Take 10 mEq by mouth daily ) 360 tablet 3    docusate sodium (COLACE) 100 MG capsule Take 300 mg by mouth 2 times daily       allopurinol (ZYLOPRIM) 300 MG tablet Take 300 mg by mouth daily      quinapril (ACCUPRIL) 40 MG tablet Take 40 mg by mouth daily.  pravastatin (PRAVACHOL) 80 MG tablet Take 80 mg by mouth nightly.  indapamide (LOZOL) 1.25 MG tablet Take 1.25 mg by mouth every morning.  cyclobenzaprine (FLEXERIL) 10 MG tablet Take 10 mg by mouth every 8 hours as needed.  ciprofloxacin (CIPRO) 250 MG tablet Take 1 tablet by mouth 2 times daily for 10 days 20 tablet 0    magnesium hydroxide (MILK OF MAGNESIA) 400 MG/5ML suspension Take 30 mLs by mouth daily as needed for Constipation      ranitidine (ZANTAC) 150 MG tablet Take 150 mg by mouth daily as needed for Heartburn      saline nasal gel (AYR) GEL 1 g by Nasal route as needed (dryness, nosebleed prevention) 1 Tube 0    Calcium Carbonate-Vitamin D (CALCIUM 600/VITAMIN D PO) Take 1 tablet by mouth 2 times daily      magnesium oxide (MAG-OX) 400 (241.3 MG) MG TABS tablet Take 1 tablet by mouth daily 30 tablet 0     No current facility-administered medications on file prior to encounter.         REVIEW OF SYSTEMS    Constitutional: negative for chills, fevers and sweats  Eyes: negative for irritation and redness  Ears, nose, mouth, throat, and face: negative for hearing loss and hoarseness  Respiratory: negative for cough and shortness of breath  Cardiovascular: positive for lower extremity edema, negative for chest pain  Gastrointestinal: negative for abdominal pain, nausea and vomiting  Integument/breast: positive for left lower leg ulcer, left lower leg wounds, left medial 1st metatarsal ulcer, right posterior heel ulcer, right lateral 3rd toe ulcer, and right medial 4th toe ulcer  Musculoskeletal:negative for muscle weakness  Neurological: negative for gait problems, memory problems and speech problems  Behavioral/Psych: positive for good mood    Objective:      BP (!) 118/57   Pulse 72   Temp 98 °F (36.7 °C) (Oral)   Resp 18   Ht 5' 10\" (1.778 m)   SpO2 98%     Wt Readings from Last 3 Encounters:   08/01/18 238 lb 11.2 oz (108.3 kg)   07/19/18 230 lb 12.8 oz (104.7 kg)   07/17/18 234 lb 12.8 oz (106.5 kg)       PHYSICAL EXAM    General Appearance: alert and oriented to person, place and time and pale  Skin: warm and dry  Head: normocephalic and atraumatic  Eyes: pupils equal, round, and reactive to light  ENT: hearing grossly normal bilaterally  Pulmonary/Chest: clear to auscultation bilaterally- no wheezes, rales or rhonchi, normal air movement, no respiratory distress  Cardiovascular: normal rate, regular rhythm and intact distal pulses  Abdomen: soft, non-tender and bowel sounds normal  Extremities: no cyanosis, no clubbing and 3 + edema-  left lower leg and foot with scarring noted; 2+ edema to the right lower leg and foot  Musculoskeletal: normal range of motion of extremities x 4, no joint swelling, deformity or tenderness  Neurologic: gait and coordination normal and speech normal  Left lateral lower leg: Incision noted with large broken blistered area towards the distal portion of the incision with necrotic red/brown base. Distal portion of the incision is unstable. Draining moderate amount of serosanguineous drainage. Periwound skin is intact.   No redness, warmth, or induration noted. Debridement done to remove necrotic tissue and broken blister exposing a beefy red wound bed. Left anterior lower leg: Ulcer bed is 100% yellow slough. Draining moderate amount of serosanguineous drainage. Periulcer skin is intact area no redness, warmth, or induration noted. Left medial lower leg: incision noted small superficial open areas scattered along the incision draining moderate amount of serosanguineous drainage. Periwound skin is intact. No redness, warmth, or induration noted. Left medial 1st metatarsal: Ulcer is 100% dry stable eschar. No drainage noted. Periulcer skin intact. No redness, warmth, or induration noted. Right lateral 3rd toe: Ulcer bed is 50% pink and 50% yellow slough. Draining moderate amount of serous drainage. Periulcer skin is macerated. No redness, warmth, or induration noted. Right medial 4th toe: Ulcer bed is 50% pink and 50% yellow slough. Draining moderate amount of serous drainage. Periulcer skin is macerated. No redness, warmth, or induration noted. Right posterior heel: Pressure ulcer, unstageable-intact, dry, stable eschar noted. No drainage. Periulcer skin is intact. No redness, warmth, or induration noted. Left lateral lower leg    Left medial lower leg    Left medial 1st metatarsal     Right 3rd and 4th toes    Right posterior heel    Left lateral lower leg post debridement    Wound 08/09/18 Leg Left; Lower; Lateral #1 (Active)   Wound Image   8/9/2018 12:47 PM   Wound Type Wound 8/9/2018 12:10 PM   Wound Cleansed Rinsed/Irrigated with saline 8/9/2018 12:10 PM   Wound Length (cm) 5.2 cm 8/9/2018 12:47 PM   Wound Width (cm) 5.5 cm 8/9/2018 12:47 PM   Wound Depth (cm)  .1 8/9/2018 12:47 PM   Calculated Wound Size (cm^2) (l*w) 28.6 cm^2 8/9/2018 12:47 PM   Change in Wound Size % (l*w) -83.33 8/9/2018 12:47 PM   Drainage Amount Large 8/9/2018 12:10 PM   Drainage Description Serosanguinous 8/9/2018 12:10 PM   Odor None 8/9/2018 12:10 Devitalized Tissue Debrided:  biofilm and slough    Pre Debridement Measurements:  Are located in the Wound/Ulcer Documentation Flow Sheet    Wound/Ulcer #: 1    Post Debridement Measurements:    Wound/Ulcer Descriptions are listed under Physical Exam above. Wound/Ulcer Descriptions are Pre Debridement except measurements    Percent of Wound/Ulcer Debrided: 100%    Total Surface Area Debrided:  28 sq cm     Bleeding:  Minimal    Hemostasis Achieved:  not needed    Procedural Pain:  0  / 10     Post Procedural Pain:  0 / 10     Response to treatment:  Well tolerated by patient. Plan:     Patient examined and evaluated. Patient has significant edema to bilateral lower leg with long history of venous insufficiency. He has not been using any compression to lower leg since his surgery. He will need some light compression in order to manage his edema and help with his wound healing. He has multiple ulcers to the right foot and 1 on the left that he has been following with Dr. Michelle Tanner for. We did assess those ulcers today and assisted with applying dressings. His wife is concerned about trying to get him to multiple appointments did offer that we can continue to assess his bilateral foot ulcers until we get the leg healed and then we determine a treatment back over to Dr. Michelle Tanner or else they could continue to follow with Dr. Michelle Tanner for management of these ulcers. Selective debridement of left lateral lower leg wound done to remove slough    Culture of left lateral leg wound obtained    Take Cipro as ordered by Dr. Mary Luu    Left lower leg: Aquacel Ag to open left lower lateral leg and left lower anterior leg wounds. Apply Betadine to the intact incision on the lateral and medial sides. Cover with ABDs. Wrap with Kerlix and Ace wrap from the base of the toe to just below the knee including the heel. Change every other day.     Left first medial metatarsal, right posterior heel, right third and fourth toes: Paint with betadine daily. Gauze between the right third and fourth toes to absorb drainage and keep them . Right lower leg: apply Tubigrip (Apply In AM and Remove in PM)    Treatment:   Orders Placed This Encounter   Procedures    Aerobic culture     Left Lateral Leg     Standing Status:   Standing     Number of Occurrences:   1         Antibiotics: Yes    Follow up: 1 weeks    Please see attached Discharge Instructions    Written patient dismissal instructions given to patient and signed by patient or POA. Discharge Instructions       Visit Discharge/Physician Orders:  -culture taken  -start antibiotic as ordered previously (Cipro)  -If Tubigrip to Right leg does not work continue wrapping as before  -Wound debrided today  -Clinic order Medical supplies    Wound Location: Left lower leg   Dressing orders:     1) Gather wound care supplies and arrange on clean table. 2) Wash your hands with soap and water or use alcohol based hand  for 20 seconds (sing \"Happy Birthday\" twice). 3) Cleanse wounds with normal saline or wound cleanser and gauze. Pat dry with clean gauze. 4) Aquacel Ag to open left lower lateral leg and left lower medial distal leg      5) Betadine all other areas including scabbed areas (gauze in between 3rd and 4th toes on right foot)    6) Left Lower Legs Cover with ABD, wrap in Kerlix, wrap with ACE wrap Change every other day    7) Right Lower Leg apply Tubigrip (Apply In AM and Remove in PM)        Keep all dressings clean & dry. Do not shower, take baths or get wound wet, unless otherwise instructed by your Wound Care doctor. Follow up visit:  Friday 1030 am 8/17/2018 with Ollie West CNP  Keep next scheduled appointment. Please give 24 hour notice if unable to keep appointment.  [phone]    If you experience any of the following, please call the Wound Care Service during business hours: Monday through Friday 8:00 am - 4:30 pm

## 2018-08-12 LAB
AEROBIC CULTURE: ABNORMAL
AEROBIC CULTURE: ABNORMAL
GRAM STAIN RESULT: ABNORMAL
ORGANISM: ABNORMAL
ORGANISM: ABNORMAL

## 2018-08-13 ENCOUNTER — TELEPHONE (OUTPATIENT)
Dept: WOUND CARE | Age: 83
End: 2018-08-13

## 2018-08-13 NOTE — TELEPHONE ENCOUNTER
Per Estelita CNP called into [facility] Cipro 250 mg PO twice daily X4 more days for a total of 14 days. Pt wife advised voiced understanding.

## 2018-08-15 ENCOUNTER — OFFICE VISIT (OUTPATIENT)
Dept: CARDIOTHORACIC SURGERY | Age: 83
End: 2018-08-15

## 2018-08-15 VITALS
SYSTOLIC BLOOD PRESSURE: 116 MMHG | HEIGHT: 70 IN | BODY MASS INDEX: 34.25 KG/M2 | DIASTOLIC BLOOD PRESSURE: 71 MMHG | HEART RATE: 75 BPM

## 2018-08-15 DIAGNOSIS — Z48.89 ENCOUNTER FOR POST SURGICAL WOUND CHECK: ICD-10-CM

## 2018-08-15 DIAGNOSIS — I87.2 VENOUS INSUFFICIENCY OF BOTH LOWER EXTREMITIES: Primary | ICD-10-CM

## 2018-08-15 PROCEDURE — 99024 POSTOP FOLLOW-UP VISIT: CPT | Performed by: PHYSICIAN ASSISTANT

## 2018-08-15 ASSESSMENT — ENCOUNTER SYMPTOMS
BLURRED VISION: 0
BLOOD IN STOOL: 0
NAUSEA: 0
EYE PAIN: 0
PHOTOPHOBIA: 0
WHEEZING: 0
EYE DISCHARGE: 0
HEARTBURN: 0
HEMOPTYSIS: 0
CONSTIPATION: 0
COUGH: 0
ABDOMINAL PAIN: 0
EYE REDNESS: 0
DIARRHEA: 0
BACK PAIN: 0
SPUTUM PRODUCTION: 0
SHORTNESS OF BREATH: 0
VOMITING: 0
DOUBLE VISION: 0
ORTHOPNEA: 0

## 2018-08-15 NOTE — PROGRESS NOTES
Cardiothoracic Follow Up Note      Patient's Name/Date of Birth: Jan D Cookie Mcburney / 1935 (82 y.o.)    Date: August 15, 2018     Wound improved. Following with wound clinic. No signs of infection noted. Continue current plan. 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 Past Surgical History:  The patient  has a past surgical history that includes Colon surgery; colostomy; colostomy; Cholecystectomy; Neck surgery (06/29/2016); Eye surgery; hernia repair; other surgical history (06/29/2016); Cardiac catheterization; Cardiac catheterization; Neck surgery; back surgery; pr colsc flx w/removal lesion by hot bx forceps (Left, 7/17/2017); Upper gastrointestinal endoscopy (N/A, 7/17/2017); Cataract removal (Bilateral, 2010); other surgical history (Bilateral, 04/09/2018); pr inj dx/ther agnt paravert facet joint, lumbar/sac, 2nd level (Bilateral, 4/9/2018); Nerve Surgery (Bilateral, 05/15/2018); pr inj dx/ther agnt paravert facet joint, lumbar/sac, 2nd level (Bilateral, 5/15/2018); pr radiofreq neurotomy lumbar or sacral, w image guide,ea addl level (Left, 6/12/2018); eye surgery (2010); pr radiofreq neurotomy lumbar or sacral, w image guide,ea addl level (Right, 7/19/2018); and pr office/outpt visit,procedure only (Left, 7/30/2018). Allergies: The patient is allergic to pcn [penicillins].     Medications:    Current Outpatient Prescriptions:     ciprofloxacin (CIPRO) 250 MG tablet, Take 1 tablet by mouth 2 times daily for 10 days, Disp: 20 tablet, Rfl: 0    rivaroxaban (XARELTO) 20 MG TABS tablet, TAKE 1 TABLET DAILY, Disp: 90 tablet, Rfl: 3    Calcium Carbonate Antacid (TUMS PO), Take by mouth as needed, Disp: , Rfl:     levothyroxine (SYNTHROID) 100 MCG tablet, Take 100 mcg by mouth Daily, Disp: , Rfl:     magnesium hydroxide (MILK OF MAGNESIA) 400 MG/5ML suspension, Take 30 mLs by mouth daily as needed for Constipation, Disp: , Rfl:     ranitidine (ZANTAC) 150 MG tablet, Take 150 mg by mouth daily as needed for Heartburn, Disp: , Rfl:     Pseudoephedrine HCl (SUDAFED PO), Take by mouth as needed, Disp: , Rfl:     Cranberry 1000 MG CAPS, Take 500 mg by mouth 2 times daily , Disp: , Rfl:     NONFORMULARY, Take 1 capsule by mouth daily , Disp: , Rfl:     tamsulosin (FLOMAX) 0.4 MG capsule, Take 1 capsule by mouth daily, Disp: 90 capsule, Rfl: 3    aspirin 81 MG EC tablet, Take 1 tablet by mouth daily, Disp: 1 tablet, Rfl: 0    Calcium Carbonate-Vitamin D (CALCIUM 600/VITAMIN D PO), Take 1 tablet by mouth 2 times daily, Disp: , Rfl:     metoprolol tartrate (LOPRESSOR) 50 MG tablet, TAKE 1 TABLET TWICE A DAY, Disp: 180 tablet, Rfl: 0    furosemide (LASIX) 40 MG tablet, Take 1 tablet by mouth daily, Disp: 90 tablet, Rfl: 2    cloNIDine (CATAPRES) 0.1 MG tablet, Take 0.1 mg by mouth as needed for High Blood Pressure Takes if BP is greater than 175, Disp: , Rfl:     hydrOXYzine (ATARAX) 25 MG tablet, Take 25 mg by mouth 4 times daily as needed for Itching, Disp: , Rfl:     loperamide (IMODIUM) 2 MG capsule, Take 2 mg by mouth as needed for Diarrhea, Disp: , Rfl:     potassium chloride (K-TAB) 10 MEQ extended release tablet, Take 4 tablets by mouth daily (Patient taking differently: Take 10 mEq by mouth daily ), Disp: 360 tablet, Rfl: 3    docusate sodium (COLACE) 100 MG capsule, Take 300 mg by mouth 2 times daily , Disp: , Rfl:     magnesium oxide (MAG-OX) 400 weakness, seizures, loss of consciousness, weakness and headaches. Endo/Heme/Allergies: Negative for environmental allergies and polydipsia. Does not bruise/bleed easily. Psychiatric/Behavioral: Negative for depression, hallucinations, memory loss, substance abuse and suicidal ideas. The patient is not nervous/anxious and does not have insomnia. Physical Exam:  /71 (Site: Left Arm, Position: Sitting, Cuff Size: Medium Adult)   Pulse 75   Ht 5' 10\" (1.778 m)   BMI 34.25 kg/m²     Labs:      CBC:   Lab Results   Component Value Date    WBC 14.1 07/31/2018    HGB 11.0 07/31/2018    HCT 33.2 07/31/2018    MCV 94.3 07/31/2018     07/31/2018     BMP:   Lab Results   Component Value Date     07/31/2018    K 3.8 07/31/2018     07/31/2018    CO2 21 07/31/2018    PHOS 3.5 09/08/2016    BUN 15 07/31/2018    CREATININE 0.8 07/31/2018    MG 1.7 07/30/2018     PT/INR:   Lab Results   Component Value Date    INR 1.23 07/30/2018         CXR PA & LATERAL   Results for orders placed during the hospital encounter of 03/01/17   XR Chest Standard TWO VW    Narrative PROCEDURE: XR CHEST STANDARD TWO VW    CLINICAL INFORMATION: SOB, . COMPARISON: PA and lateral chest x-ray September 22, 2016. TECHNIQUE: PA and lateral views the chest.    FINDINGS:       Stable moderate cardiomegaly. The mediastinum is not widened. There are no pulmonary infiltrates. The posterior costophrenic angles remain blunted. The lateral costophrenic angle on the right is mildly obscured but to lesser degree than the last study. The left costophrenic angle is clear. The pulmonary vascularity is normal. Redemonstrated is the significant anterior osteophytosis of the lower thoracic spine. No suspicious osseous lesions are present. Impression    1. Decrease in the small right pleural effusion. Stable small left pleural effusion. 2. No other acute interval change.           **This report has been created using voice recognition software. It may contain minor errors which are inherent in voice recognition technology. **    Final report electronically signed by Dr. Navdeep Amaya on 3/1/2017 12:35 PM       CXR portable   Results for orders placed during the hospital encounter of 06/27/14   XR Chest Portable    Narrative       [id number] [id number]          Patient Name: Ran Steward        Patient Type: Emergency           MRN:  [id number]        Gender:  Male                     Account Number:  [de-identified]        [address]:  Laureen Molina    YOB: 1935        M. CODY Pt Loc:  ED                             R a d i o l o g y   R e p o r t         Accession Number:  Procedure Name:             Exam Date/Time:       [id number]      Chest Mobile Single         6/27/2014 6:30:00 AM         CPT4 code       03543           Reason For Exam       CHEST PAIN;CHEST PAIN           REPORT       CHEST FILM         CLINICAL INFORMATION:  70year old with left arm numbness and left leg       numbness, chest pain. COMPARISON:  February 13, 2012. TECHNIQUE:  A portable AP upright view of the chest was obtained. FINDINGS:  The heart size is at the upper limits of normal.  The       mediastinum is not widened. There are no pulmonary infiltrates or       effusions. The pulmonary vascularity is normal.  There is no pneumothorax. There are arthritic changes in both shoulders. There are degenerative       changes in the thoracic spine. There is no significant change in the       appearance of the chest compared to the prior study. IMPRESSION:  No acute cardiopulmonary process. Dictated By :  Addison Eid M.D.        Certified Electronic Signature       [facility].       Dictated Date and Time:  27-JUN-2014 07:13       Transcribed By:  Adrienne Esteban       Transcribed Date and Time:  27-JUN-2014 07:29       Approved By:  Fernandez Edwards JOHNNY ALONZO M.D. Approve Date and Time:  27-JUN-2014 08:01         Technologist:  Anali Barahona  RT(R)           Page 1 of 1              Print date/time:6/27/2014 8:01 AM          Physical Exam   Constitutional: He is oriented to person, place, and time and well-developed, well-nourished, and in no distress. No distress. HENT:   Head: Normocephalic and atraumatic. Right Ear: External ear normal.   Left Ear: External ear normal.   Nose: Nose normal.   Mouth/Throat: Oropharynx is clear and moist. No oropharyngeal exudate. Eyes: Pupils are equal, round, and reactive to light. Conjunctivae and EOM are normal. Right eye exhibits no discharge. Left eye exhibits no discharge. No scleral icterus. Neck: Normal range of motion. Neck supple. No JVD present. No tracheal deviation present. No thyromegaly present. Cardiovascular: Normal rate, regular rhythm, normal heart sounds and intact distal pulses. Exam reveals no gallop and no friction rub. No murmur heard. Pulmonary/Chest: Effort normal and breath sounds normal. No stridor. No respiratory distress. He has no wheezes. He has no rales. He exhibits no tenderness. Abdominal: Soft. Bowel sounds are normal. He exhibits no distension and no mass. There is no tenderness. There is no rebound and no guarding. Musculoskeletal: Normal range of motion. He exhibits no edema, tenderness or deformity. Lymphadenopathy:     He has no cervical adenopathy. Neurological: He is alert and oriented to person, place, and time. He has normal reflexes. No cranial nerve deficit. He exhibits normal muscle tone. Gait normal. Coordination normal. GCS score is 15. Skin: No rash noted. He is not diaphoretic. No pallor. Psychiatric: Mood, memory, affect and judgment normal.         Assessment:  1. Venous insufficiency of both lower extremities    2.  Encounter for post surgical wound check          Plan 8/15/18:  1) Wound improved-No signs of infection   2) following with

## 2018-08-17 ENCOUNTER — HOSPITAL ENCOUNTER (OUTPATIENT)
Dept: WOUND CARE | Age: 83
Discharge: HOME | End: 2018-08-17
Payer: MEDICARE

## 2018-08-17 VITALS
SYSTOLIC BLOOD PRESSURE: 111 MMHG | HEART RATE: 82 BPM | DIASTOLIC BLOOD PRESSURE: 57 MMHG | RESPIRATION RATE: 18 BRPM | TEMPERATURE: 97.7 F | OXYGEN SATURATION: 98 %

## 2018-08-17 PROCEDURE — 99214 OFFICE O/P EST MOD 30 MIN: CPT | Performed by: NURSE PRACTITIONER

## 2018-08-17 PROCEDURE — 2709999900 HC NON-CHARGEABLE SUPPLY

## 2018-08-17 PROCEDURE — 99213 OFFICE O/P EST LOW 20 MIN: CPT

## 2018-08-17 RX ORDER — OXYCODONE HYDROCHLORIDE AND ACETAMINOPHEN 5; 325 MG/1; MG/1
1 TABLET ORAL EVERY 6 HOURS PRN
COMMUNITY
End: 2018-10-04 | Stop reason: SDUPTHER

## 2018-08-17 ASSESSMENT — PAIN DESCRIPTION - PAIN TYPE: TYPE: CHRONIC PAIN

## 2018-08-17 ASSESSMENT — PAIN DESCRIPTION - LOCATION: LOCATION: BACK

## 2018-08-17 ASSESSMENT — PAIN SCALES - GENERAL: PAINLEVEL_OUTOF10: 5

## 2018-08-17 NOTE — PROGRESS NOTES
HERNIA REPAIR      NECK SURGERY  06/29/2016    three   [address] [place] Bilateral 05/15/2018    LUMBAR FACET MBB L3-4, L4-5, L5-S1    OTHER SURGICAL HISTORY  06/29/2016    ACDF C4-5    OTHER SURGICAL HISTORY Bilateral 04/09/2018    Lumbar facet medial branch block at L3-4, L45, L5-S1    MA COLSC FLX W/REMOVAL LESION BY HOT BX FORCEPS Left 7/17/2017    COLONOSCOPY POLYPECTOMY HOT BIOPSY performed by Kimberly Okeefe MD at [place] [address] DX/THER AGNT PARAVERT FACET JOINT, LUMBAR/SAC, 2ND LEVEL Bilateral 4/9/2018    LUMBAR FACET MBB @ L3-4,L4-5 and L5-S1, BILATERAL performed by Maggie Bass MD at [address] [place] [address] DX/THER AGNT PARAVERT FACET JOINT, LUMBAR/SAC, 2ND LEVEL Bilateral 5/15/2018    bilateral L-facet MBB # 2 @ L3-4, L4-5, L5-S1. performed by Maggie Bass MD at [address] [place] [address] OFFICE/OUTPT VISIT,PROCEDURE ONLY Left 7/30/2018    Left superficial femoral to anterior tibial cadaver saphenous vein bypass; left below knee embolectomy and exploration of left posterior tibial performed by Danish Ortiz MD at [address] [place] [address] Left 6/12/2018    LUMBAR RFA  L3-4,  L4-5,  L5-S1  LEFT SIDE performed by Maggie Bass MD at [address] [facility], W IMAGE [address] [facility] LEVEL Right 7/19/2018    LUMBAR RFA  L3-4,  L4-5,  L5-S1  RIGHT SIDE performed by Maggie Bass MD at [facility] N/A 7/17/2017    EGD CONTROL HEMORRHAGE performed by Kimberly Okeefe MD at [facility]       FAMILY HISTORY    Family History   Problem Relation Age of Onset    Diabetes Mother     Cancer Father     Heart Disease Father     COPD Sister     Cancer Sister     High Blood Pressure Sister        SOCIAL HISTORY    Social History   Substance Use Topics    Smoking status: Former Smoker     Quit date: 8/14/1991    Smokeless tobacco: Never Used    Alcohol use No       ALLERGIES    Allergies   Allergen Reactions    Pcn [Penicillins] Swelling       MEDICATIONS    Current Outpatient Prescriptions on File Prior to Encounter   Medication Sig Dispense Refill    ciprofloxacin (CIPRO) 250 MG tablet Take 1 tablet by mouth 2 times daily for 10 days 20 tablet 0    rivaroxaban (XARELTO) 20 MG TABS tablet TAKE 1 TABLET DAILY 90 tablet 3    Calcium Carbonate Antacid (TUMS PO) Take by mouth as needed      levothyroxine (SYNTHROID) 100 MCG tablet Take 100 mcg by mouth Daily      magnesium hydroxide (MILK OF MAGNESIA) 400 MG/5ML suspension Take 30 mLs by mouth daily as needed for Constipation      ranitidine (ZANTAC) 150 MG tablet Take 150 mg by mouth daily as needed for Heartburn      Pseudoephedrine HCl (SUDAFED PO) Take by mouth as needed      Cranberry 1000 MG CAPS Take 500 mg by mouth 2 times daily       NONFORMULARY Take 1 capsule by mouth daily       tamsulosin (FLOMAX) 0.4 MG capsule Take 1 capsule by mouth daily 90 capsule 3    aspirin 81 MG EC tablet Take 1 tablet by mouth daily 1 tablet 0    Calcium Carbonate-Vitamin D (CALCIUM 600/VITAMIN D PO) Take 1 tablet by mouth 2 times daily      metoprolol tartrate (LOPRESSOR) 50 MG tablet TAKE 1 TABLET TWICE A  tablet 0    furosemide (LASIX) 40 MG tablet Take 1 tablet by mouth daily 90 tablet 2    hydrOXYzine (ATARAX) 25 MG tablet Take 25 mg by mouth 4 times daily as needed for Itching      loperamide (IMODIUM) 2 MG capsule Take 2 mg by mouth as needed for Diarrhea      potassium chloride (K-TAB) 10 MEQ extended release tablet Take 4 tablets by mouth daily (Patient taking differently: Take 10 mEq by mouth daily ) 360 tablet 3    docusate sodium (COLACE) 100 MG capsule Take 300 mg by mouth 2 times daily       magnesium oxide (MAG-OX) 400 (241.3 MG) MG TABS tablet Take 1 tablet by mouth daily 30 tablet 0    allopurinol (ZYLOPRIM) 300 MG tablet Take 300 mg by mouth daily      quinapril (ACCUPRIL) 40 MG tablet Take 40 mg by mouth daily.  pravastatin (PRAVACHOL) 80 MG tablet Take 80 mg by mouth nightly.  indapamide (LOZOL) 1.25 MG tablet Take 1.25 mg by mouth every morning.  cyclobenzaprine (FLEXERIL) 10 MG tablet Take 10 mg by mouth every 8 hours as needed.  saline nasal gel (AYR) GEL 1 g by Nasal route as needed (dryness, nosebleed prevention) 1 Tube 0    cloNIDine (CATAPRES) 0.1 MG tablet Take 0.1 mg by mouth as needed for High Blood Pressure Takes if BP is greater than 175       No current facility-administered medications on file prior to encounter.         REVIEW OF SYSTEMS    Constitutional: negative for chills, fevers and sweats  Eyes: negative for irritation and redness  Ears, nose, mouth, throat, and face: negative for hearing loss and hoarseness  Respiratory: negative for cough and shortness of breath  Cardiovascular: positive for lower extremity edema, negative for chest pain  Gastrointestinal: negative for abdominal pain, nausea and vomiting  Integument/breast: positive for left lower leg ulcer, left lower leg wounds, left medial 1st metatarsal ulcer, right posterior heel ulcer, right lateral 3rd toe ulcer, and right medial 4th toe ulcer  Musculoskeletal:negative for muscle weakness  Neurological: negative for gait problems, memory problems and speech problems  Behavioral/Psych: positive for good mood    Objective:      BP (!) 111/57   Pulse 82   Temp 97.7 °F (36.5 °C) (Oral)   Resp 18   SpO2 98%     Wt Readings from Last 3 Encounters:   08/01/18 238 lb 11.2 oz (108.3 kg)   07/19/18 230 lb 12.8 oz (104.7 kg)   07/17/18 234 lb 12.8 oz (106.5 kg)       PHYSICAL EXAM    General Appearance: alert and oriented to person, place and time and pale  Skin: warm and dry  Head: normocephalic and atraumatic  Eyes: pupils equal, round, and reactive to light  ENT: hearing grossly normal bilaterally  Pulmonary/Chest: clear to warmth, or induration noted. Right posterior heel: Pressure ulcer, unstageable- Ulcer bed is 50% red and 50% yellow. Scant drainage noted. Periulcer skin is callused and intact. No redness, warmth, or induration noted. Left lateral lower leg    Left anterior lower leg    Left medial lower leg    Right 3rd lateral toe    Right 4th medial toe    Right posterior heel    Left medial 1st metatarsal    Wound 08/09/18 Leg Left; Lower; Lateral #1 (Active)   Wound Image   8/17/2018 10:51 AM   Wound Type Wound 8/17/2018 10:51 AM   Dressing Status Intact 8/17/2018 10:51 AM   Dressing Changed Changed/New 8/17/2018 10:51 AM   Wound Cleansed Rinsed/Irrigated with saline 8/17/2018 10:51 AM   Wound Length (cm) 8.2 cm 8/17/2018 10:51 AM   Wound Width (cm) 0.5 cm 8/17/2018 10:51 AM   Wound Depth (cm)  0.3 8/17/2018 10:51 AM   Calculated Wound Size (cm^2) (l*w) 4.1 cm^2 8/17/2018 10:51 AM   Change in Wound Size % (l*w) 60.58 8/17/2018 10:51 AM   Drainage Amount Small 8/17/2018 10:51 AM   Drainage Description Serosanguinous 8/17/2018 10:51 AM   Odor None 8/17/2018 10:51 AM   Margins Attached edges 8/17/2018 10:51 AM   Rhoda-wound Assessment Dry 8/17/2018 10:51 AM   Red%Wound Bed 25 8/17/2018 10:51 AM   Yellow%Wound Bed 25 8/17/2018 10:51 AM   Black%Wound Bed 40 8/17/2018 10:51 AM   Other%Wound Bed 10  Epi 8/17/2018 10:51 AM   Number of days: 8       Wound 08/09/18 Leg Left; Lower;Medial #2 (Active)   Wound Image   8/17/2018 10:51 AM   Wound Type Wound 8/17/2018 10:51 AM   Dressing Status Intact 8/17/2018 10:51 AM   Dressing Changed Changed/New 8/17/2018 10:51 AM   Wound Cleansed Rinsed/Irrigated with saline 8/17/2018 10:51 AM   Wound Length (cm) 17.5 cm 8/17/2018 10:51 AM   Wound Width (cm) 0.6 cm 8/17/2018 10:51 AM   Wound Depth (cm)  0.1 8/17/2018 10:51 AM   Calculated Wound Size (cm^2) (l*w) 10.5 cm^2 8/17/2018 10:51 AM   Change in Wound Size % (l*w) -1212.5 8/17/2018 10:51 AM   Drainage Amount Small 8/17/2018 10:51 AM Drainage Description Serosanguinous 8/17/2018 10:51 AM   Odor None 8/17/2018 10:51 AM   Margins Attached edges 8/17/2018 10:51 AM   Rhoda-wound Assessment Dry;Red 8/17/2018 10:51 AM   Red%Wound Bed 10 8/17/2018 10:51 AM   Yellow%Wound Bed 10 8/17/2018 10:51 AM   Black%Wound Bed 80 8/17/2018 10:51 AM   Op First Treatment Date 08/09/18 8/9/2018 12:10 PM   Number of days: 8       Wound 08/09/18 Leg Left; Lower;Medial;Distal #3 (Active)   Wound Image   8/17/2018 10:51 AM   Wound Type Wound 8/17/2018 10:51 AM   Dressing Status Intact 8/17/2018 10:51 AM   Dressing Changed Changed/New 8/17/2018 10:51 AM   Wound Cleansed Rinsed/Irrigated with saline 8/17/2018 10:51 AM   Wound Length (cm) 2.5 cm 8/17/2018 10:51 AM   Wound Width (cm) 2.5 cm 8/17/2018 10:51 AM   Wound Depth (cm)  0.1 8/17/2018 10:51 AM   Calculated Wound Size (cm^2) (l*w) 6.25 cm^2 8/17/2018 10:51 AM   Change in Wound Size % (l*w) -525 8/17/2018 10:51 AM   Drainage Amount Small 8/17/2018 10:51 AM   Drainage Description Serosanguinous 8/17/2018 10:51 AM   Odor None 8/17/2018 10:51 AM   Margins Attached edges 8/17/2018 10:51 AM   Rhoda-wound Assessment Dry;Red 8/9/2018 12:10 PM   Red%Wound Bed 50 8/17/2018 10:51 AM   Yellow%Wound Bed 20 8/17/2018 10:51 AM   Black%Wound Bed 30 8/17/2018 10:51 AM   Number of days: 8       Wound 08/09/18 Toe (Comment  which one) Right #4  Third (Active)   Wound Image   8/17/2018 10:51 AM   Wound Type Wound 8/17/2018 10:51 AM   Dressing Status Intact 8/17/2018 10:51 AM   Dressing Changed Changed/New 8/17/2018 10:51 AM   Wound Cleansed Rinsed/Irrigated with saline 8/17/2018 10:51 AM   Wound Length (cm) 0.7 cm 8/17/2018 10:51 AM   Wound Width (cm) 0.5 cm 8/17/2018 10:51 AM   Wound Depth (cm)  0.1 8/17/2018 10:51 AM   Calculated Wound Size (cm^2) (l*w) 0.35 cm^2 8/17/2018 10:51 AM   Change in Wound Size % (l*w) 28.57 8/17/2018 10:51 AM   Wound Assessment Yellow;Red 8/17/2018 10:51 AM   Drainage Amount Small 8/17/2018 10:51 AM   Drainage 8/17/2018 10:51 AM   Wound Assessment Yellow 8/17/2018 10:51 AM   Drainage Amount None 8/17/2018 10:51 AM   Odor None 8/17/2018 10:51 AM   Margins Unattached edges 8/17/2018 10:51 AM   Rhoda-wound Assessment Dry 8/17/2018 10:51 AM   Yellow%Wound Bed 100 8/17/2018 10:51 AM   Number of days: 7       Wound 08/09/18 Foot Left;Medial #7  (Active)   Wound Image   8/17/2018 10:51 AM   Wound Type Wound 8/17/2018 10:51 AM   Dressing Status Intact 8/17/2018 10:51 AM   Dressing Changed Changed/New 8/17/2018 10:51 AM   Wound Cleansed Rinsed/Irrigated with saline 8/17/2018 10:51 AM   Wound Length (cm) 0.5 cm 8/17/2018 10:51 AM   Wound Width (cm) 0.3 cm 8/17/2018 10:51 AM   Wound Depth (cm)  0.1 8/17/2018 10:51 AM   Calculated Wound Size (cm^2) (l*w) 0.15 cm^2 8/17/2018 10:51 AM   Change in Wound Size % (l*w) 0 8/17/2018 10:51 AM   Wound Assessment Yellow 8/17/2018 10:51 AM   Drainage Amount None 8/17/2018 10:51 AM   Odor None 8/17/2018 10:51 AM   Margins Attached edges 8/17/2018 10:51 AM   Rhoda-wound Assessment Dry 8/17/2018 10:51 AM   Number of days: 7       LABS      CBC:   Lab Results   Component Value Date    WBC 14.1 07/31/2018    HGB 11.0 07/31/2018    HCT 33.2 07/31/2018    MCV 94.3 07/31/2018     07/31/2018     BMP:   Lab Results   Component Value Date     07/31/2018    K 3.8 07/31/2018     07/31/2018    CO2 21 07/31/2018    PHOS 3.5 09/08/2016    BUN 15 07/31/2018    CREATININE 0.8 07/31/2018     PT/INR:   Lab Results   Component Value Date    INR 1.23 07/30/2018     Prealbumin:   Lab Results   Component Value Date    PREALBUMIN 18.3 08/11/2016     Albumin:  Lab Results   Component Value Date    LABALBU 3.2 07/31/2018     Sed Rate:  Lab Results   Component Value Date    SEDRATE 31 07/20/2013     CRP:   Lab Results   Component Value Date    CRP 6.36 06/28/2016     Micro:   Lab Results   Component Value Date    BC No growth-preliminary  No growth   09/06/2016    BC No growth-preliminary  No growth improved. The left lower leg wounds are improved. He continues to have dehisced areas noted along his incision. The venous ulcer to the left anterior lower leg is slightly larger with pressure area noted in the base due to his wrap sliding down. Continue Cipro until gone-total of 14 days    Left lower leg wounds- Apply Aquacel Ag to open wounds and betadine to scabs. Cover with gauze and ABD pads. Wrap with kerlix and ace bandage snugly from base of toes to 1-2 inches below bend of knee. Change every other day.     Right heel and Right 3rd and 4th toe wounds- Apply betadine and cover with gauze. Change daily. Right lower leg swelling- Apply Tubigrip (Apply in AM and remove at night). Treatment:   Orders Placed This Encounter   Procedures    Apply dressing     Right 3rd and 4th toes, Right heel, Left medial foot- Apply betadine, gauze. Right lower leg- Apply tubigrip. Left lower leg wounds- Betadine to scabs, Aquacel to open wounds. Cover with gauze, ABD pads, kerlix, ace. Standing Status:   Standing     Number of Occurrences:   1         Antibiotics: Yes    Follow up: 2 weeks    Please see attached Discharge Instructions    Written patient dismissal instructions given to patient and signed by patient or POA. Discharge Instructions       Visit Discharge/Physician Orders:  -Offloading boot given today: Wear when sitting in bed or chair to keep pressure off of heel.  -Make sure to dry in between toes well. Wound Location: Left lower leg wounds, Left foot, Right lower leg swelling, Right 3rd and 4th toes. Dressing orders:      1) Gather wound care supplies and arrange on clean table.      2) Wash your hands with soap and water or use alcohol based hand  for 20 seconds (sing \"Happy Birthday\" twice).    3) Cleanse wounds with normal saline or wound cleanser and gauze. Pat dry with clean gauze.    4) Left lower leg wounds- Apply Aquacel Ag to open wounds and betadine to scabs.

## 2018-08-21 ENCOUNTER — OFFICE VISIT (OUTPATIENT)
Dept: PHYSICAL MEDICINE AND REHAB | Age: 83
End: 2018-08-21
Payer: MEDICARE

## 2018-08-21 VITALS
HEIGHT: 70 IN | SYSTOLIC BLOOD PRESSURE: 134 MMHG | HEART RATE: 71 BPM | BODY MASS INDEX: 32.93 KG/M2 | WEIGHT: 230 LBS | DIASTOLIC BLOOD PRESSURE: 67 MMHG

## 2018-08-21 DIAGNOSIS — G89.4 CHRONIC PAIN SYNDROME: ICD-10-CM

## 2018-08-21 DIAGNOSIS — I73.9 PVD (PERIPHERAL VASCULAR DISEASE) (HCC): ICD-10-CM

## 2018-08-21 DIAGNOSIS — M47.816 SPONDYLOSIS OF LUMBAR REGION WITHOUT MYELOPATHY OR RADICULOPATHY: Primary | ICD-10-CM

## 2018-08-21 DIAGNOSIS — M47.816 LUMBAR SPONDYLOSIS: ICD-10-CM

## 2018-08-21 PROCEDURE — 1123F ACP DISCUSS/DSCN MKR DOCD: CPT | Performed by: NURSE PRACTITIONER

## 2018-08-21 PROCEDURE — 4040F PNEUMOC VAC/ADMIN/RCVD: CPT | Performed by: NURSE PRACTITIONER

## 2018-08-21 PROCEDURE — G8417 CALC BMI ABV UP PARAM F/U: HCPCS | Performed by: NURSE PRACTITIONER

## 2018-08-21 PROCEDURE — G8427 DOCREV CUR MEDS BY ELIG CLIN: HCPCS | Performed by: NURSE PRACTITIONER

## 2018-08-21 PROCEDURE — G8598 ASA/ANTIPLAT THER USED: HCPCS | Performed by: NURSE PRACTITIONER

## 2018-08-21 PROCEDURE — 1111F DSCHRG MED/CURRENT MED MERGE: CPT | Performed by: NURSE PRACTITIONER

## 2018-08-21 PROCEDURE — 1036F TOBACCO NON-USER: CPT | Performed by: NURSE PRACTITIONER

## 2018-08-21 PROCEDURE — 99213 OFFICE O/P EST LOW 20 MIN: CPT | Performed by: NURSE PRACTITIONER

## 2018-08-21 PROCEDURE — 1101F PT FALLS ASSESS-DOCD LE1/YR: CPT | Performed by: NURSE PRACTITIONER

## 2018-08-21 RX ORDER — OXYCODONE HYDROCHLORIDE AND ACETAMINOPHEN 5; 325 MG/1; MG/1
1 TABLET ORAL EVERY 6 HOURS PRN
Qty: 120 TABLET | Refills: 0 | Status: SHIPPED | OUTPATIENT
Start: 2018-08-21 | End: 2018-09-20

## 2018-08-21 ASSESSMENT — ENCOUNTER SYMPTOMS: BACK PAIN: 1

## 2018-08-21 NOTE — PROGRESS NOTES
[facility] [place]  [address] [facility] [address]  Dept: [phone]  Dept Fax: [phone]  Loc: [phone]    Visit Date: 8/21/2018    Functionality Assessment/Goals Worksheet     On a scale of 0 (Does not Interfere) to 10 (Completely Interferes)     1. Which number describes how during the past week pain has interfered with       the following:  A. General Activity:  8  B. Mood: 7  C. Walking Ability:  9  D. Normal Work (Includes both work outside the home and housework):  10  E. Relations with Other People:   8  F. Sleep:   8  G. Enjoyment of Life:   9    2. Patient Prefers to Take their Pain Medications:     []  On a regular basis   [x]  Only when necessary    []  Does not take pain medications    3. What are the Patient's Goals/Expectations for Visiting Pain Management? []  Learn about my pain    []  Receive Medication   []  Physical Therapy     []  Treat Depression   []  Receive Injections    []  Treat Sleep   []  Deal with Anxiety and Stress   []  Treat Opoid Dependence/Addiction   [x]  Other:      HPI:   Tomasa Lobo is a 82 y.o. male is here today for    Chief Complaint: Lower back pain, leg pain     Wife present     HPI   FU Bilateral L-RFA, left side from 6/12/2018, and right side from 7/19/2018. Receiving good relief from bilateral L-RFA of over 50% relief of lower back pain. Continues to have pain across lower back,     Had left leg vein bypass surgery on 7/31/2018, has wounds covered on left lower extremity with dressing- having acute pain- Following with wound clinic-- on antibiotics   Planning right leg vein bypass in the future     Percocet remains effective but not lasting 8 hours. Continues to ambulate with walker. Medications reviewed. Patient denies  side effects with medications. Patient states he is  taking medications as prescribed.  He denies receiving pain medications from other sources. He denies any ER visits since last visit. Pain scale with out pain medications is 8-10/10. Pain scale with pain medications is  3-4/10. Last dose of Perocet was today  Drug screen reviewed from 6/5/2018 and was appropriate  Pill count was completed today and was appropriate- out     The patient is allergic to pcn [penicillins]. 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 Past Surgical History  The patient  has a past surgical history that includes Colon surgery; colostomy; colostomy; Cholecystectomy; Neck surgery (06/29/2016); Eye surgery; hernia repair; other surgical history (06/29/2016); Cardiac catheterization; Cardiac catheterization; Neck surgery; back surgery; pr colsc flx w/removal lesion by hot bx forceps (Left, 7/17/2017); Upper gastrointestinal endoscopy (N/A, 7/17/2017); Cataract removal (Bilateral, 2010); other surgical history (Bilateral, 04/09/2018); pr inj dx/ther agnt paravert facet joint, lumbar/sac, 2nd level (Bilateral, 4/9/2018); Nerve Surgery (Bilateral, 05/15/2018); pr inj dx/ther agnt paravert facet joint, lumbar/sac, 2nd level (Bilateral, 5/15/2018); pr radiofreq neurotomy lumbar or sacral, w image guide,ea addl level (Left, 6/12/2018); eye surgery (2010); pr radiofreq neurotomy lumbar or sacral, w image guide,ea addl level (Right, 7/19/2018); and pr office/outpt visit,procedure only (Left, 7/30/2018).     Family History  This patient's family history includes COPD in his sister; Cancer in his father and bilateral L-RFA. · Medications are effective, patient is compliant. Continue Percocet 5/325 QID prn at this time. Ordered refill   · On antibiotics, following with wound care. Not a candidate for procedures at this time. Meds. Prescribed:   Orders Placed This Encounter   Medications    oxyCODONE-acetaminophen (PERCOCET) 5-325 MG per tablet     Sig: Take 1 tablet by mouth every 6 hours as needed for Pain for up to 30 days. .     Dispense:  120 tablet     Refill:  0       Return in about 3 months (around 11/21/2018), or if symptoms worsen or fail to improve, for follow up  for medications.          Electronically signed by BRENNA Villarreal CNP on 8/21/2018 at 11:47 AM

## 2018-08-31 ENCOUNTER — HOSPITAL ENCOUNTER (OUTPATIENT)
Dept: WOUND CARE | Age: 83
Discharge: HOME | End: 2018-08-31
Payer: MEDICARE

## 2018-08-31 VITALS
DIASTOLIC BLOOD PRESSURE: 58 MMHG | HEART RATE: 71 BPM | BODY MASS INDEX: 33.36 KG/M2 | RESPIRATION RATE: 18 BRPM | SYSTOLIC BLOOD PRESSURE: 118 MMHG | WEIGHT: 232.5 LBS | TEMPERATURE: 97.8 F | OXYGEN SATURATION: 97 %

## 2018-08-31 PROCEDURE — 2709999900 HC NON-CHARGEABLE SUPPLY

## 2018-08-31 PROCEDURE — 99214 OFFICE O/P EST MOD 30 MIN: CPT

## 2018-08-31 PROCEDURE — 99214 OFFICE O/P EST MOD 30 MIN: CPT | Performed by: NURSE PRACTITIONER

## 2018-08-31 NOTE — PROGRESS NOTES
NERVE SURGERY Bilateral 05/15/2018    LUMBAR FACET MBB L3-4, L4-5, L5-S1    OTHER SURGICAL HISTORY  06/29/2016    ACDF C4-5    OTHER SURGICAL HISTORY Bilateral 04/09/2018    Lumbar facet medial branch block at L3-4, L45, L5-S1    AK COLSC FLX W/REMOVAL LESION BY HOT BX FORCEPS Left 7/17/2017    COLONOSCOPY POLYPECTOMY HOT BIOPSY performed by Jarad Colbert MD at [place] [address] DX/THER AGNT PARAVERT FACET JOINT, LUMBAR/SAC, 2ND LEVEL Bilateral 4/9/2018    LUMBAR FACET MBB @ L3-4,L4-5 and L5-S1, BILATERAL performed by Rubi Alvarado MD at [facility] DX/THER AGNT PARAVERT FACET JOINT, LUMBAR/SAC, 2ND LEVEL Bilateral 5/15/2018    bilateral L-facet MBB # 2 @ L3-4, L4-5, L5-S1. performed by Rubi Alvarado MD at [address] [facility] OFFICE/OUTPT VISIT,PROCEDURE ONLY Left 7/30/2018    Left superficial femoral to anterior tibial cadaver saphenous vein bypass; left below knee embolectomy and exploration of left posterior tibial performed by Sonido Dewey MD at [address] [place] [address] Left 6/12/2018    LUMBAR RFA  L3-4,  L4-5,  L5-S1  LEFT SIDE performed by Rubi Alvarado MD at [address], W IMAGE [address] [facility] LEVEL Right 7/19/2018    LUMBAR RFA  L3-4,  L4-5,  L5-S1  RIGHT SIDE performed by Rubi Alvarado MD at [facility] N/A 7/17/2017    EGD CONTROL HEMORRHAGE performed by Jarad Colbert MD at [facility]       FAMILY HISTORY    Family History   Problem Relation Age of Onset    Diabetes Mother     Cancer Father     Heart Disease Father     COPD Sister     Cancer Sister     High Blood Pressure Sister        SOCIAL HISTORY    Social History   Substance Use Topics    Smoking status: Former Smoker     Quit date: 8/14/1991    Smokeless tobacco: Never Used    Alcohol use No ALLERGIES    Allergies   Allergen Reactions    Pcn [Penicillins] Swelling       MEDICATIONS    Current Outpatient Prescriptions on File Prior to Encounter   Medication Sig Dispense Refill    oxyCODONE-acetaminophen (PERCOCET) 5-325 MG per tablet Take 1 tablet by mouth every 6 hours as needed for Pain for up to 30 days. . 120 tablet 0    oxyCODONE-acetaminophen (PERCOCET) 5-325 MG per tablet Take 1 tablet by mouth every 6 hours as needed for Pain. Daivd Geralds rivaroxaban (XARELTO) 20 MG TABS tablet TAKE 1 TABLET DAILY 90 tablet 3    Calcium Carbonate Antacid (TUMS PO) Take by mouth as needed      levothyroxine (SYNTHROID) 100 MCG tablet Take 100 mcg by mouth Daily      magnesium hydroxide (MILK OF MAGNESIA) 400 MG/5ML suspension Take 30 mLs by mouth daily as needed for Constipation      ranitidine (ZANTAC) 150 MG tablet Take 150 mg by mouth daily as needed for Heartburn      Pseudoephedrine HCl (SUDAFED PO) Take by mouth as needed      Cranberry 1000 MG CAPS Take 500 mg by mouth 2 times daily       NONFORMULARY Take 1 capsule by mouth daily       tamsulosin (FLOMAX) 0.4 MG capsule Take 1 capsule by mouth daily 90 capsule 3    aspirin 81 MG EC tablet Take 1 tablet by mouth daily 1 tablet 0    Calcium Carbonate-Vitamin D (CALCIUM 600/VITAMIN D PO) Take 1 tablet by mouth 2 times daily      metoprolol tartrate (LOPRESSOR) 50 MG tablet TAKE 1 TABLET TWICE A  tablet 0    furosemide (LASIX) 40 MG tablet Take 1 tablet by mouth daily 90 tablet 2    cloNIDine (CATAPRES) 0.1 MG tablet Take 0.1 mg by mouth as needed for High Blood Pressure Takes if BP is greater than 175      hydrOXYzine (ATARAX) 25 MG tablet Take 25 mg by mouth 4 times daily as needed for Itching      loperamide (IMODIUM) 2 MG capsule Take 2 mg by mouth as needed for Diarrhea      potassium chloride (K-TAB) 10 MEQ extended release tablet Take 4 tablets by mouth daily (Patient taking differently: Take 10 mEq by mouth daily ) 360 tablet Wound Assessment Yellow 8/31/2018 12:04 PM   Drainage Amount Small 8/31/2018 12:04 PM   Drainage Description Serous 8/31/2018 12:04 PM   Odor None 8/31/2018 12:04 PM   Margins Attached edges 8/31/2018 12:04 PM   Rhoda-wound Assessment White; Maceration 8/31/2018 12:04 PM   [place]%Wound Bed 60 8/9/2018 12:43 PM   Yellow%Wound Bed 100 8/31/2018 12:04 PM   Number of days: 22       Wound 08/09/18 Heel Right;Lateral #6 (Active)   Wound Image   8/31/2018 12:05 PM   Wound Type Wound 8/31/2018 12:05 PM   Dressing Status Intact 8/31/2018 12:05 PM   Dressing Changed Changed/New 8/31/2018 12:05 PM   Wound Cleansed Rinsed/Irrigated with saline 8/17/2018 10:51 AM   Wound Length (cm) 0.5 cm 8/31/2018 12:05 PM   Wound Width (cm) 0.2 cm 8/31/2018 12:05 PM   Wound Depth (cm)  scab 8/31/2018 12:05 PM   Calculated Wound Size (cm^2) (l*w) 0.1 cm^2 8/31/2018 12:05 PM   Change in Wound Size % (l*w) 66.67 8/31/2018 12:05 PM   Undermining Starts ___ O'Clock 12 8/17/2018 10:51 AM   Undermining Ends___ O'Clock 12 8/17/2018 10:51 AM   Undermining Maxium Distance (cm) 0.3 8/17/2018 10:51 AM   Wound Assessment Yellow 8/17/2018 10:51 AM   Drainage Amount None 8/31/2018 12:05 PM   Odor None 8/31/2018 12:05 PM   Margins Attached edges 8/31/2018 12:05 PM   Rhoda-wound Assessment Dry 8/31/2018 12:05 PM   Yellow%Wound Bed 100 8/17/2018 10:51 AM   Black%Wound Bed 100 8/31/2018 12:05 PM   Number of days: 22       Wound 08/09/18 Foot Left;Medial #7  (Active)   Wound Image   8/31/2018 12:05 PM   Wound Type Wound 8/31/2018 12:05 PM   Dressing Status Intact 8/31/2018 12:05 PM   Dressing Changed Changed/New 8/31/2018 12:05 PM   Wound Cleansed Rinsed/Irrigated with saline 8/31/2018 12:05 PM   Wound Length (cm) 0.5 cm 8/31/2018 12:05 PM   Wound Width (cm) 0.3 cm 8/31/2018 12:05 PM   Wound Depth (cm)  0.1 8/31/2018 12:05 PM   Calculated Wound Size (cm^2) (l*w) 0.15 cm^2 8/31/2018 12:05 PM   Change in Wound Size % (l*w) 0 8/31/2018 12:05 PM   Wound Assessment Red Idiopathic hypotension I95.0    Chronic atrial fibrillation ([facility]) I48.2    Hypokalemia E87.6    Prostatism N40.0    Elevated TSH R94.6    PAD (peripheral artery disease) ([facility]) I73.9    Bilateral leg edema R60.0    Open wound of left lower leg S81.802A    Lumbar spondylosis M47.816    Atherosclerosis of native artery of both lower extremities ([facility]) I70.203    Femoral-tibial bypass graft occlusion, left, initial encounter [facility]) T82.898A    Leg wound, left S81.802A    Venous insufficiency of both lower extremities I87.2    Venous ulcer of left leg ([facility]) I83.029, L97.929    Decubitus ulcer of right heel, unstageable ([facility]) L89.610    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 Wound/Ulcer Descriptions are listed under Physical Exam above. Plan:     Patient examined and evaluated. Patient's left leg wounds continued to improve. Continues to struggle with nonhealing ulcers to the right foot. No signs of infection in any of his wounds or ulcers. He has been using his offloading boot to the right foot in order to prevent pressure in the ulcer has stabilized. He has an appointment to see Dr. Angelia Anderson at the vein care center next week. Exposed suture to the left medial thigh trimmed. Betadine and gauze applied. Left lower leg wounds- Apply Aquacel Ag to open wounds and betadine to scabs. Cover with gauze and ABD pads. Wrap with kerlix and ace bandage from base of toes to 1-2 inches below bend of knee. Change every other day.     Right heel- Apply betadine and cover with gauze. Change daily. Right toes and left foot: Apply iodoflex to open areas.  Cover

## 2018-09-14 ENCOUNTER — HOSPITAL ENCOUNTER (OUTPATIENT)
Dept: WOUND CARE | Age: 83
Discharge: HOME | End: 2018-09-14
Payer: MEDICARE

## 2018-09-14 VITALS
BODY MASS INDEX: 33.72 KG/M2 | OXYGEN SATURATION: 98 % | DIASTOLIC BLOOD PRESSURE: 68 MMHG | WEIGHT: 235 LBS | SYSTOLIC BLOOD PRESSURE: 125 MMHG | TEMPERATURE: 97.6 F | RESPIRATION RATE: 18 BRPM | HEART RATE: 66 BPM

## 2018-09-14 PROCEDURE — 2709999900 HC NON-CHARGEABLE SUPPLY

## 2018-09-14 PROCEDURE — 99213 OFFICE O/P EST LOW 20 MIN: CPT

## 2018-09-14 PROCEDURE — 99214 OFFICE O/P EST MOD 30 MIN: CPT | Performed by: NURSE PRACTITIONER

## 2018-09-14 NOTE — PLAN OF CARE
Problem: Wound:  Intervention: Assess wound size, appearance and drainage  Care plan reviewed with patient and wife. Patient and wife verbalize understanding of the plan of care and contribute to goal setting. Goal: Will show signs of wound healing; wound closure and no evidence of infection  Will show signs of wound healing; wound closure and no evidence of infection   Outcome: Ongoing  Pt.'s wounds are measuring smaller. Dressings changed to iodoflex, gauze, kerlix, and coban to bilateral lower legs and feet. Change every 3 days. Follow up in 2 weeks.

## 2018-09-14 NOTE — PROGRESS NOTES
[facility]          Progress Note       Osmar Lewis RECORD NUMBER:  [id number]  AGE: 82 y.o. GENDER: male  : 1935  EPISODE DATE:  2018    Subjective:     Chief Complaint   Patient presents with    Wound Check     bilateral lower legs         HISTORY of PRESENT ILLNESS HPI     Joann Cordova is a 82 y.o. male Established patient referred by CATRACHO Barrera and Dr. Charis Florence, who presents today for wound/ulcer evaluation. History of Wound Context: Patient has left lower leg wounds following left SFA to anterior tibial artery bypass for limb salvage, exploration of left popliteal artery and Benjamin thrombectomy, exploration of left posterior tibial artery by Dr. Micheal Lopez on 18. Patient has had significant swelling since surgery and also has a history of venous insufficiency with venous ulcer which he was previously treated for by Dr. Regina Cast and was discharged from the wound clinic in 2017. He has been been wearing any compression to the left leg since his surgery and states that the left lateral incision blister started a couple of days ago and he has been having moderate to large amount of drainage from the left lateral and medial lower leg incisions. His wife has been applying betadine to the areas and leaving it open to air. He also has ulcers to the left medial 1st metatarsal, right posterior heel, right lateral 3rd toe, and right medial 4th toe which he has been following with Dr. Naina Cook for their treatment. He has severe PVD and a history of diabetes. Dr. Charis Florence provided patient with a prescription for Cipro for the wounds this AM and referred patient here for evaluation. Culture of left lateral lower leg from 18 positive for Pseudomonas putida and Serratia marcescens continued Cipro for a total of 14 days. 18: The wounds are improved. Edema is improved. He is tolerating the Cipro well. 18:  The wounds and ulcers continue to improve. There is no sign of infection. He has an exposed suture noted to the left medial thigh. 9/14/18: The wounds and ulcer continue to slowly improve. He was evaluated by Dr. Corrine Rubinstein at the [facility] and she referred him to Lymphedema Clinic. Wound/Ulcer Pain Timing/Severity: none  Quality of pain: N/A  Severity:  0 / 10   Modifying Factors: None  Associated Signs/Symptoms: edema and drainage    Interval History:   Patient presents today for follow up on wound/ulcer's progression. The patient is currently not on antibiotics. Current dressing care includes:    Left lower leg wounds- Apply Aquacel Ag to open wounds and betadine to scabs. Cover with gauze and ABD pads. Wrap with kerlix and ace bandage from base of toes to 1-2 inches below bend of knee. Change every other day.     Right heel- Apply betadine and cover with gauze. Change daily.     Right toes and left foot: Apply iodoflex to open areas. Cover with gauze. Change every 2 days.     Right lower leg swelling- Apply Tubigrip (Apply in AM and remove at night).         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 ([address] 8/9/2018    DVT (deep venous thrombosis) (HCC)     GERD (gastroesophageal reflux disease)     Gout     Hernia     Hx of blood clots     left leg DVT    Hyperlipidemia     Hypertension     Irregular cardiac rhythm 07/2016    Movement disorder     back pain    Myocardial infarct ([address]     Thyroid disease     UTI (urinary tract infection)        PAST SURGICAL HISTORY    Past Surgical History:   Procedure Laterality Date    BACK SURGERY      lower    CARDIAC CATHETERIZATION      ok    CARDIAC CATHETERIZATION      ok    CATARACT REMOVAL Bilateral 2010    CHOLECYSTECTOMY      COLON SURGERY (IMODIUM) 2 MG capsule Take 2 mg by mouth as needed for Diarrhea      potassium chloride (K-TAB) 10 MEQ extended release tablet Take 4 tablets by mouth daily (Patient taking differently: Take 10 mEq by mouth daily ) 360 tablet 3    docusate sodium (COLACE) 100 MG capsule Take 300 mg by mouth 2 times daily       magnesium oxide (MAG-OX) 400 (241.3 MG) MG TABS tablet Take 1 tablet by mouth daily 30 tablet 0    allopurinol (ZYLOPRIM) 300 MG tablet Take 300 mg by mouth daily      quinapril (ACCUPRIL) 40 MG tablet Take 40 mg by mouth daily.  pravastatin (PRAVACHOL) 80 MG tablet Take 80 mg by mouth nightly.  indapamide (LOZOL) 1.25 MG tablet Take 1.25 mg by mouth every morning.  cyclobenzaprine (FLEXERIL) 10 MG tablet Take 10 mg by mouth every 8 hours as needed.  saline nasal gel (AYR) GEL 1 g by Nasal route as needed (dryness, nosebleed prevention) 1 Tube 0     No current facility-administered medications on file prior to encounter.         REVIEW OF SYSTEMS    Constitutional: negative for chills, fevers and sweats  Eyes: negative for irritation and redness  Ears, nose, mouth, throat, and face: negative for hearing loss and hoarseness  Respiratory: negative for cough and shortness of breath  Cardiovascular: positive for lower extremity edema, negative for chest pain  Gastrointestinal: negative for abdominal pain, nausea and vomiting  Integument/breast: positive for left lower leg ulcer, left lower leg wounds, left medial 1st metatarsal ulcer, right posterior heel ulcer, right lateral 3rd toe ulcer, and right medial 4th toe ulcer  Musculoskeletal:negative for muscle weakness  Neurological: negative for gait problems, memory problems and speech problems  Behavioral/Psych: positive for good mood    Objective:      /68   Pulse 66   Temp 97.6 °F (36.4 °C) (Oral)   Resp 18   Wt 235 lb (106.6 kg)   SpO2 98%   BMI 33.72 kg/m²     Wt Readings from Last 3 Encounters:   09/14/18 235 lb (106.6 kg)   08/31/18 232 lb 8 oz (105.5 kg)   08/21/18 230 lb (104.3 kg)       PHYSICAL EXAM    General Appearance: alert and oriented to person, place and time and pale  Skin: warm and dry  Head: normocephalic and atraumatic  Eyes: pupils equal, round, and reactive to light  ENT: hearing grossly normal bilaterally  Pulmonary/Chest: clear to auscultation bilaterally- no wheezes, rales or rhonchi, normal air movement, no respiratory distress  Cardiovascular: normal rate, regular rhythm and intact distal pulses  Abdomen: soft, non-tender and bowel sounds normal  Extremities: no cyanosis, no clubbing and 1 + edema-  left lower leg and foot with scarring noted; 2+ edema to the right lower leg and foot  Musculoskeletal: normal range of motion of extremities x 4, no joint swelling, deformity or tenderness  Neurologic: gait and coordination normal and speech normal  Left lateral lower leg: Continues to improve, measurements are smaller. Wound bed is 100% red.  Draining moderate amount of serosanguineous drainage.  Periwound skin is intact.  No redness, warmth, or induration noted. Left anterior lower leg: Improved, measurements are smaller. Ulcer bed is 100% red.  Draining moderate amount of serosanguineous drainage.  Periulcer skin is scarred and intact. No warmth, redness, or induration noted. Left medial lower leg: Improved. Peeling eschars noted which were trimmed. Open wounds underneath are 50% red and 50% yellow slough. Draining moderate amount of serosanguineous drainage. Periwound skin is intact.  No redness, warmth, or induration noted. Left medial 1st metatarsal: Improved. Ulcer is dry stable eschar. No drainage noted.  Periulcer skin scarred and intact.  No redness, warmth, or induration noted. Right lateral 3rd toe: Measurements are slightly larger. Ulcer bed is 60% pink and 40% yellow slough.  Draining moderate amount of serosanguineous drainage.  Periulcer skin is macerated.  No redness, warmth, or induration noted. Right medial 4th toe: Measurements are unchanged. Ulcer bed is 70% pink and 30% yellow slough.  Draining moderate amount of serosanguineous drainage.  Periulcer skin is macerated.  No redness, warmth, or induration noted.    Right posterior heel: Pressure ulcer, unstageable- Ulcer bed is 100% brown, dry, lifting eschar which easily lifted off. Ulcer bed underneath is 100% red. Draining moderate amount of serosanguinous drainage.  Periulcer skin is callused and intact.  No redness, warmth, or induration noted. Right posterior heel    Right 4th medial toe    Right 3rd lateral toe     Left lower leg    Left medial 1st metatarsal    Wound 08/09/18 Leg Left; Lower; Lateral #1 (Active)   Wound Image   9/14/2018 12:23 PM   Wound Type Wound 9/14/2018 12:23 PM   Dressing Status Intact; Old drainage 9/14/2018 12:23 PM   Dressing Changed Changed/New 9/14/2018 12:23 PM   Wound Cleansed Rinsed/Irrigated with saline 9/14/2018 12:23 PM   Wound Length (cm) 1 cm 9/14/2018 12:23 PM   Wound Width (cm) 0.5 cm 9/14/2018 12:23 PM   Wound Depth (cm)  .1 9/14/2018 12:23 PM   Calculated Wound Size (cm^2) (l*w) 0.5 cm^2 9/14/2018 12:23 PM   Change in Wound Size % (l*w) 95.19 9/14/2018 12:23 PM   Drainage Amount Scant 9/14/2018 12:23 PM   Drainage Description Serosanguinous 9/14/2018 12:23 PM   Odor None 9/14/2018 12:23 PM   Margins Attached edges 9/14/2018 12:23 PM   Rhoda-wound Assessment Dry;Pale 9/14/2018 12:23 PM   Red%Wound Bed 100 9/14/2018 12:23 PM   Yellow%Wound Bed 50 8/31/2018 12:04 PM   Black%Wound Bed 40 8/17/2018 10:51 AM   Other%Wound Bed 10  Epi 8/17/2018 10:51 AM   Number of days: 36       Wound 08/09/18 Leg Left; Lower;Medial #2 (Active)   Wound Image   8/17/2018 10:51 AM   Wound Type Wound 9/14/2018 12:23 PM   Dressing Status Intact 9/14/2018 12:23 PM   Dressing Changed Changed/New 9/14/2018 12:23 PM   Wound Cleansed Rinsed/Irrigated with saline 9/14/2018 12:23 PM   Wound Length (cm) 16 cm 9/14/2018 12:23 PM   Wound Width (cm) 0.1 cm 9/14/2018 12:23 PM   Wound Depth (cm)  scab 9/14/2018 12:23 PM   Calculated Wound Size (cm^2) (l*w) 1.6 cm^2 9/14/2018 12:23 PM   Change in Wound Size % (l*w) -100 9/14/2018 12:23 PM   Drainage Amount None 9/14/2018 12:23 PM   Drainage Description Serosanguinous 8/31/2018 12:04 PM   Odor None 9/14/2018 12:23 PM   Margins Attached edges 9/14/2018 12:23 PM   Rhoda-wound Assessment Dry 9/14/2018 12:23 PM   Red%Wound Bed 10 8/17/2018 10:51 AM   Yellow%Wound Bed 10 8/17/2018 10:51 AM   Black%Wound Bed 100 9/14/2018 12:23 PM   Op First Treatment Date 08/09/18 8/9/2018 12:10 PM   Number of days: 36       Wound 08/09/18 Toe (Comment  which one) Right #4  Third (Active)   Wound Image   9/14/2018 12:23 PM   Wound Type Wound 9/14/2018 12:23 PM   Dressing Status Intact; Old drainage 9/14/2018 12:23 PM   Dressing Changed Changed/New 9/14/2018 12:23 PM   Wound Cleansed Rinsed/Irrigated with saline 9/14/2018 12:23 PM   Wound Length (cm) 0.8 cm 9/14/2018 12:23 PM   Wound Width (cm) 0.8 cm 9/14/2018 12:23 PM   Wound Depth (cm)  .1 9/14/2018 12:23 PM   Calculated Wound Size (cm^2) (l*w) 0.64 cm^2 9/14/2018 12:23 PM   Change in Wound Size % (l*w) -30.61 9/14/2018 12:23 PM   Wound Assessment Yellow 9/14/2018 12:23 PM   Drainage Amount Small 9/14/2018 12:23 PM   Drainage Description Yellow 9/14/2018 12:23 PM   Odor None 9/14/2018 12:23 PM   Margins Attached edges 9/14/2018 12:23 PM   Rhoda-wound Assessment Maceration 9/14/2018 12:23 PM   [place]%Wound Bed 20 8/31/2018 12:04 PM   Red%Wound Bed 20 8/17/2018 10:51 AM   Yellow%Wound Bed 100 9/14/2018 12:23 PM   Number of days: 36       Wound 08/09/18 Toe (Comment  which one) Right # 5 Fourth  (Active)   Wound Image   9/14/2018 12:23 PM   Wound Type Wound 9/14/2018 12:23 PM   Dressing Status Intact; Old drainage 9/14/2018 12:23 PM   Dressing Changed Changed/New 9/14/2018 12:23 PM   Wound Cleansed Rinsed/Irrigated with saline 9/14/2018 12:23 PM   Wound Length (cm) 0.5 cm Width (cm) 0.5 cm 9/14/2018 12:23 PM   Wound Depth (cm)  scab 9/14/2018 12:23 PM   Calculated Wound Size (cm^2) (l*w) 0.35 cm^2 9/14/2018 12:23 PM   Change in Wound Size % (l*w) -133.33 9/14/2018 12:23 PM   Wound Assessment Yellow 9/14/2018 12:23 PM   Drainage Amount None 9/14/2018 12:23 PM   Drainage Description Serosanguinous 8/31/2018 12:05 PM   Odor None 9/14/2018 12:23 PM   Margins Attached edges 8/31/2018 12:05 PM   Rhoda-wound Assessment Dry 9/14/2018 12:23 PM   Red%Wound Bed 100 8/31/2018 12:05 PM   Yellow%Wound Bed 100 9/14/2018 12:23 PM   Number of days: 36       LABS      CBC:   Lab Results   Component Value Date    WBC 14.1 07/31/2018    HGB 11.0 07/31/2018    HCT 33.2 07/31/2018    MCV 94.3 07/31/2018     07/31/2018     BMP:   Lab Results   Component Value Date     07/31/2018    K 3.8 07/31/2018     07/31/2018    CO2 21 07/31/2018    PHOS 3.5 09/08/2016    BUN 15 07/31/2018    CREATININE 0.8 07/31/2018     PT/INR:   Lab Results   Component Value Date    INR 1.23 07/30/2018     Prealbumin:   Lab Results   Component Value Date    PREALBUMIN 18.3 08/11/2016     Albumin:  Lab Results   Component Value Date    LABALBU 3.2 07/31/2018     Sed Rate:  Lab Results   Component Value Date    SEDRATE 31 07/20/2013     CRP:   Lab Results   Component Value Date    CRP 6.36 06/28/2016     Micro:   Lab Results   Component Value Date    BC No growth-preliminary  No growth   09/06/2016    BC No growth-preliminary  No growth   09/06/2016      Hemoglobin A1C: No results found for: LABA1C    Assessment:     Leg wound, left, full thickness  Venous ulcer of left leg, limited to breakdown of skin  Pressure ulcer of right heel, unstageable  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      Electronically signed by BRENNA Fierro CNP on 9/14/18 at 1:12 PM            Electronically signed by BRENNA Fierro CNP on 9/14/2018 at 1:13 PM

## 2018-09-18 ENCOUNTER — TELEPHONE (OUTPATIENT)
Dept: WOUND CARE | Age: 83
End: 2018-09-18

## 2018-09-18 NOTE — PROGRESS NOTES
Called pt to follow up on compression stockings. Received compression stockings on 8/31/18 through [facility]. Pt wife states that she thinks compression hose might be too small because they are size reuben. Instructed pts wife to bring compression stockings to next appt and we will make sure sizing is okay otherwise we will call [facility] and see if they can exchange sizes. Pt mentions that they saw Dr. Marco Viera at vein care and she referred to lymphedema clinic however states that they havent heard from lymphedema clinic yet. Instructed pt to call Vein care center to follow up on status of referral. Pt has appt at wound care next week.

## 2018-09-25 ENCOUNTER — TELEPHONE (OUTPATIENT)
Dept: CARDIOLOGY CLINIC | Age: 83
End: 2018-09-25

## 2018-09-28 ENCOUNTER — HOSPITAL ENCOUNTER (OUTPATIENT)
Dept: WOUND CARE | Age: 83
Discharge: HOME | End: 2018-09-28
Payer: MEDICARE

## 2018-09-28 ENCOUNTER — HOSPITAL ENCOUNTER (OUTPATIENT)
Dept: OCCUPATIONAL THERAPY | Age: 83
Setting detail: THERAPIES SERIES
Discharge: HOME | End: 2018-09-28
Payer: MEDICARE

## 2018-09-28 VITALS
BODY MASS INDEX: 35.61 KG/M2 | HEART RATE: 66 BPM | TEMPERATURE: 95.2 F | OXYGEN SATURATION: 97 % | DIASTOLIC BLOOD PRESSURE: 56 MMHG | WEIGHT: 235 LBS | HEIGHT: 68 IN | RESPIRATION RATE: 18 BRPM | SYSTOLIC BLOOD PRESSURE: 104 MMHG

## 2018-09-28 PROCEDURE — 2709999900 HC NON-CHARGEABLE SUPPLY

## 2018-09-28 PROCEDURE — 6370000000 HC RX 637 (ALT 250 FOR IP): Performed by: NURSE PRACTITIONER

## 2018-09-28 PROCEDURE — G8979 MOBILITY GOAL STATUS: HCPCS

## 2018-09-28 PROCEDURE — 97597 DBRDMT OPN WND 1ST 20 CM/<: CPT | Performed by: NURSE PRACTITIONER

## 2018-09-28 PROCEDURE — 17250 CHEM CAUT OF GRANLTJ TISSUE: CPT | Performed by: NURSE PRACTITIONER

## 2018-09-28 PROCEDURE — G8978 MOBILITY CURRENT STATUS: HCPCS

## 2018-09-28 PROCEDURE — 17250 CHEM CAUT OF GRANLTJ TISSUE: CPT

## 2018-09-28 PROCEDURE — 97166 OT EVAL MOD COMPLEX 45 MIN: CPT

## 2018-09-28 RX ADMIN — SILVER NITRATE APPLICATORS 2 EACH: 25; 75 STICK TOPICAL at 12:21

## 2018-09-28 ASSESSMENT — PAIN SCALES - GENERAL: PAINLEVEL_OUTOF10: 5

## 2018-09-28 ASSESSMENT — PAIN DESCRIPTION - PAIN TYPE: TYPE: CHRONIC PAIN

## 2018-09-28 ASSESSMENT — PAIN DESCRIPTION - LOCATION: LOCATION: BACK

## 2018-09-28 NOTE — PROGRESS NOTES
7/17/2017    EGD CONTROL HEMORRHAGE performed by Yared Hong MD at [address] [facility] HISTORY    Family History   Problem Relation Age of Onset    Diabetes Mother     Cancer Father     Heart Disease Father     COPD Sister     Cancer Sister     High Blood Pressure Sister        SOCIAL HISTORY    Social History   Substance Use Topics    Smoking status: Former Smoker     Quit date: 8/14/1991    Smokeless tobacco: Never Used    Alcohol use No       ALLERGIES    Allergies   Allergen Reactions    Pcn [Penicillins] Swelling       MEDICATIONS    Current Outpatient Prescriptions on File Prior to Encounter   Medication Sig Dispense Refill    oxyCODONE-acetaminophen (PERCOCET) 5-325 MG per tablet Take 1 tablet by mouth every 6 hours as needed for Pain. Daivd Geralds rivaroxaban (XARELTO) 20 MG TABS tablet TAKE 1 TABLET DAILY 90 tablet 3    Calcium Carbonate Antacid (TUMS PO) Take by mouth as needed      levothyroxine (SYNTHROID) 100 MCG tablet Take 100 mcg by mouth Daily      magnesium hydroxide (MILK OF MAGNESIA) 400 MG/5ML suspension Take 30 mLs by mouth daily as needed for Constipation      ranitidine (ZANTAC) 150 MG tablet Take 150 mg by mouth daily as needed for Heartburn      Pseudoephedrine HCl (SUDAFED PO) Take by mouth as needed      Cranberry 1000 MG CAPS Take 500 mg by mouth 2 times daily       NONFORMULARY Take 1 capsule by mouth daily       tamsulosin (FLOMAX) 0.4 MG capsule Take 1 capsule by mouth daily 90 capsule 3    aspirin 81 MG EC tablet Take 1 tablet by mouth daily 1 tablet 0    Calcium Carbonate-Vitamin D (CALCIUM 600/VITAMIN D PO) Take 1 tablet by mouth 2 times daily      metoprolol tartrate (LOPRESSOR) 50 MG tablet TAKE 1 TABLET TWICE A  tablet 0    furosemide (LASIX) 40 MG tablet Take 1 tablet by mouth daily 90 tablet 2    cloNIDine (CATAPRES) 0.1 MG tablet Take 0.1 mg by mouth as needed for High Blood Pressure Takes if BP is greater than 175      hydrOXYzine (ATARAX) 25 MG tablet Take 25 mg by mouth 4 times daily as needed for Itching      loperamide (IMODIUM) 2 MG capsule Take 2 mg by mouth as needed for Diarrhea      potassium chloride (K-TAB) 10 MEQ extended release tablet Take 4 tablets by mouth daily (Patient taking differently: Take 10 mEq by mouth daily ) 360 tablet 3    docusate sodium (COLACE) 100 MG capsule Take 300 mg by mouth 2 times daily       magnesium oxide (MAG-OX) 400 (241.3 MG) MG TABS tablet Take 1 tablet by mouth daily 30 tablet 0    allopurinol (ZYLOPRIM) 300 MG tablet Take 300 mg by mouth daily      quinapril (ACCUPRIL) 40 MG tablet Take 40 mg by mouth daily.  pravastatin (PRAVACHOL) 80 MG tablet Take 80 mg by mouth nightly.  indapamide (LOZOL) 1.25 MG tablet Take 1.25 mg by mouth every morning.  cyclobenzaprine (FLEXERIL) 10 MG tablet Take 10 mg by mouth every 8 hours as needed.  saline nasal gel (AYR) GEL 1 g by Nasal route as needed (dryness, nosebleed prevention) 1 Tube 0     No current facility-administered medications on file prior to encounter.         REVIEW OF SYSTEMS    Constitutional: negative for chills, fevers and sweats  Eyes: negative for irritation and redness  Ears, nose, mouth, throat, and face: negative for hearing loss and hoarseness  Respiratory: negative for cough and shortness of breath  Cardiovascular: positive for lower extremity edema, negative for chest pain  Gastrointestinal: negative for abdominal pain, nausea and vomiting  Integument/breast: positive for left lower leg wounds, left medial 1st metatarsal ulcer, right posterior heel ulcer, right posterior lower leg ulcer, right lateral 3rd toe ulcer, and right medial 4th toe ulcer  Musculoskeletal:negative for muscle weakness  Neurological: negative for gait problems, memory problems and speech problems  Behavioral/Psych: positive for good mood    Objective:      BP (!) 104/56   Pulse 66   Temp 95.2 °F (35.1 °C) (Tympanic)   Resp 18 Ht 5' 8\" (1.727 m)   Wt 235 lb (106.6 kg)   SpO2 97%   BMI 35.73 kg/m²     Wt Readings from Last 3 Encounters:   09/28/18 235 lb (106.6 kg)   09/14/18 235 lb (106.6 kg)   08/31/18 232 lb 8 oz (105.5 kg)       PHYSICAL EXAM    General Appearance: alert and oriented to person, place and time and pale  Skin: warm and dry  Head: normocephalic and atraumatic  Eyes: pupils equal, round, and reactive to light  ENT: hearing grossly normal bilaterally  Pulmonary/Chest: clear to auscultation bilaterally- no wheezes, rales or rhonchi, normal air movement, no respiratory distress  Cardiovascular: normal rate, regular rhythm and intact distal pulses  Abdomen: soft, non-tender and bowel sounds normal  Extremities: no cyanosis, no clubbing and 1 + edema-  left lower leg and foot with scarring noted; 1-2+ edema to the right lower leg and foot  Musculoskeletal: normal range of motion of extremities x 4, no joint swelling, deformity or tenderness  Neurologic: gait and coordination normal and speech normal  Left lateral lower leg: Continues to improve, measurements are smaller. Wound bed is 100% red and friable.  Draining moderate amount of serosanguineous drainage.  Periwound skin is scarred and intact.  No redness, warmth, or induration noted. Left anterior lower leg: Healed with scarring noted. No warmth, redness, or induration noted. Left medial lower leg: Improved, measurements are smaller. Unstable necrotic eschar noted to 50% of the wound bed and remaining wound bed is 50% red. Draining moderate amount of serosanguineous drainage. Periwound skin is scarred and intact.  No redness, warmth, or induration noted. Selective debridement done to remove necrotic tissue. Left medial 1st metatarsal: Improved, measurements are smaller .  Ulcer is dry stable eschar. No drainage noted.  Periulcer skin scarred and intact.  No redness, warmth, or induration noted. Right lateral 3rd toe: Measurements are slightly smaller.  Ulcer 11:41 AM   Rhoda-wound Assessment Dry 9/28/2018 11:41 AM   Yellow%Wound Bed 100 9/28/2018 11:41 AM   Black%Wound Bed 100 9/14/2018 12:23 PM   Number of days: 50       Wound 08/09/18 Foot Left;Medial #7  (Active)   Wound Image   9/28/2018 11:41 AM   Wound Type Wound 9/28/2018 11:41 AM   Dressing Status Intact 9/28/2018 11:41 AM   Dressing Changed Changed/New 9/28/2018 11:41 AM   Wound Cleansed Rinsed/Irrigated with saline 9/28/2018 11:41 AM   Wound Length (cm) 0.5 cm 9/28/2018 11:41 AM   Wound Width (cm) 0.3 cm 9/28/2018 11:41 AM   Wound Depth (cm)  scab 9/28/2018 11:41 AM   Calculated Wound Size (cm^2) (l*w) 0.15 cm^2 9/28/2018 11:41 AM   Change in Wound Size % (l*w) 0 9/28/2018 11:41 AM   Wound Assessment Black 9/28/2018 11:41 AM   Drainage Amount None 9/28/2018 11:41 AM   Drainage Description Serosanguinous 8/31/2018 12:05 PM   Odor None 9/28/2018 11:41 AM   Margins Attached edges 8/31/2018 12:05 PM   Rhoda-wound Assessment Dry 9/28/2018 11:41 AM   Red%Wound Bed 100 8/31/2018 12:05 PM   Yellow%Wound Bed 100 9/14/2018 12:23 PM   Black%Wound Bed 100 9/28/2018 11:41 AM   Number of days: 50       Wound 09/28/18 Leg Right; Lower; Posterior #8 (Active)   Wound Type Wound 9/28/2018 11:41 AM   Dressing Status Intact 9/28/2018 11:41 AM   Dressing Changed Changed/New 9/28/2018 11:41 AM   Wound Cleansed Rinsed/Irrigated with saline 9/28/2018 11:41 AM   Wound Length (cm) 0.5 cm 9/28/2018 11:41 AM   Wound Width (cm) 1 cm 9/28/2018 11:41 AM   Wound Depth (cm)  0.05 9/28/2018 11:41 AM   Calculated Wound Size (cm^2) (l*w) 0.05 cm^2 9/28/2018 11:41 AM   Wound Assessment Red 9/28/2018 11:41 AM   Drainage Amount Moderate 9/28/2018 11:41 AM   Drainage Description Serosanguinous 9/28/2018 11:41 AM   Odor None 9/28/2018 11:41 AM   Margins Attached edges 9/28/2018 11:41 AM   Rhoda-wound Assessment Dry 9/28/2018 11:41 AM   Red%Wound Bed 100 9/28/2018 11:41 AM   Op First Treatment Date 09/28/18 9/28/2018 11:41 AM   Number of days: 0       LABS CBC:   Lab Results   Component Value Date    WBC 14.1 07/31/2018    HGB 11.0 07/31/2018    HCT 33.2 07/31/2018    MCV 94.3 07/31/2018     07/31/2018     BMP:   Lab Results   Component Value Date     07/31/2018    K 3.8 07/31/2018     07/31/2018    CO2 21 07/31/2018    PHOS 3.5 09/08/2016    BUN 15 07/31/2018    CREATININE 0.8 07/31/2018     PT/INR:   Lab Results   Component Value Date    INR 1.23 07/30/2018     Prealbumin:   Lab Results   Component Value Date    PREALBUMIN 18.3 08/11/2016     Albumin:  Lab Results   Component Value Date    LABALBU 3.2 07/31/2018     Sed Rate:  Lab Results   Component Value Date    SEDRATE 31 07/20/2013     CRP:   Lab Results   Component Value Date    CRP 6.36 06/28/2016     Micro:   Lab Results   Component Value Date    BC No growth-preliminary  No growth   09/06/2016    BC No growth-preliminary  No growth   09/06/2016      Hemoglobin A1C: No results found for: LABA1C    Assessment:     Leg wound, left, full thickness  Venous ulcer of left leg, limited to breakdown of skin  Pressure ulcer of right heel, unstageable  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  Diabetic ulcer of toe of right foot associated with type 2 diabetes mellitus, with fat layer exposed  Venous insufficiency of both lower extremities     Contributing Factors: edema, venous stasis, diabetes and arterial insufficiency    Patient Active Problem List   Diagnosis Code    Cervical spinal cord compression ([facility]) G95.20    Essential hypertension I10    Leukocytosis D72.829    Hypokalemia E87.6    Spinal stenosis, lumbar region, 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      Follow up visit:   2 weeks Friday October 12th @ 11am      Keep next scheduled appointment. Please give 24 hour notice if unable to keep appointment. [phone]     If you experience any of the following, please call the Wound Care Service during business hours: Monday through Friday 8:00 am - 4:30 pm  ([phone]).             *Increase in pain              *Temperature over 101              *Increase in drainage from your wound or a foul odor              *Uncontrolled swelling              *Need for compression bandage changes due to slippage, breakthrough drainage     If you need medical attention outside of business hours, please contact your Primary Care Doctor or go to the nearest emergency room.      Electronically signed by BRENNA Lemus CNP on 9/28/18 at 12:46 PM            Electronically signed by BRENNA Lemus CNP on 9/28/2018 at 12:46 PM

## 2018-09-28 NOTE — PLAN OF CARE
Problem: Wound:  Goal: Will show signs of wound healing; wound closure and no evidence of infection  Will show signs of wound healing; wound closure and no evidence of infection  Outcome: Ongoing  Pt presents to clinic with ongoing care of bilateral lower leg and feet wounds. No s/s of infection. Wounds debrided and silver nitrate applied to wound bed. Pt afebrile. Iodoflex applied to left medial foot and right 3rd and 4th toe wounds followed by gauze. Betadine applied to right heel and scabbed areas to left lower leg. Aquacel ag applied to open areas to left lower leg. Covered with gauze. Left lower leg wrapped with kerlix then ace wrap. Right leg posterior wound Iodolfex to wound bed followed by Kerlix and coban. Patient to change every three days. Pt to follow up in wound clinic in 2 weeks. Comments: Care plan reviewed with patient. Patient verbalize understanding of the plan of care and contribute to goal setting.

## 2018-09-28 NOTE — FLOWSHEET NOTE
I certify that I have examined the patient below and determined that Physical Medicine and Rehabilitation service is necessary; that the secondary diagnosis for the provision of rehabilitation services is consistent with identified needs; that service will be furnished on an outpatient basis while the patient is in my care; that I approve the above plan of care for up to 90 days or as specifically noted above and will review it within that time frame or more often if the patients condition requires. Attestation, signature or co-signature of physician indicates approval of certification requirements.    ________________________ ____________ __________  Physician Signature   Date   Time    [facility] EVALUATION               Date: 2018  Patient Name: Lee Barger        CSN: [id number]   : 1935  (82 y.o.)  Gender: male   Referring Practitioner: Dr. Julissa Navarro  Diagnosis: BLE lymphedema, Q82.0     Additional Pertinent Hx: Pt. presents today for evaluation due to swelling and wounds in his legs. Pt. states about a month and half ago, pt. had vascular surgery on bilateral legs to improve circulation. Unfortunately, the lower leg incisions did not heal and pt. not only had wounds but continued swelling. Pt. has been going to wound clinic to get legs healed and family is wrapping legs with gauze and coban every 3 days. Pt. is done with antibiotics. Pt. has a PMH high blood pressure, colon resection, colostomy reversal, hernia repair with mesh, neck and back surgery    General:  OT Visit Information  Onset Date: 18  OT Insurance Information: Medicare  Total # of Visits to Date: 1  Progress Note Counter: 1/10  Comments:  Follows up with Dr. Elan Degroot 2018  Chart Reviewed: Yes  Family / Caregiver Present: Yes (wife present)    Restrictions/Precautions:  Restrictions/Precautions: General Precautions, Fall Risk (high risk for infection) goals by utilizing the following treatment interventions:    X Manual Lymph Drainage: To promote and re direct drainage of excess lymphatic fluid back into the central circulation and lead to a decrease in swelling. X Skin Care/Education: To improve the elasticity of the skin and decrease the risk of recurrent infection. X Compression Bandaging: To decrease the excess lymphatic fluid from re accumulating in the involved area, break down fibrotic tissue and act as a counter force against the muscle pumping action during functional mobility and exercising. X Therapeutic Exercising: To stimulate the flow of excess lymphatic fluid while the muscles contract against the compression bandages/garments during functional mobility/exercises that will lead to a decrease in swelling. X Patient Education: To train and educate the patient and/or caregiver on becoming independent with the home management skills for this chronic condition of Lymphedema. X Garment Fitting/Assessment:  Assist with recommending and obtaining appropriate compression garments to be worn daily to keep swelling down in the involved areas.          EDUCATION   New Education Provided: Educated on complete decongestive therapy program. Explained that wife has done a good job caring for pt's leg wounds and wrapping his legs with coban, and so there is currently minimal edema, so we will focus on how to bandage with low stretch bandages, skin care, exercise, and try to obtain a compression pump   Learner:patient and wife  Method: explanation       Outcome: acknowledged understanding     GOALS:   PATIENT GOAL: To heal the wounds completely on legs, be able to transfer independently and walk functional distances again   SHORT-TERM GOALS:  to be met in 4 weeks   X  Patient will demonstrate a decrease in circumferential measurements of the affected extremity by up to 1 cm working towards the lymphedema swelling stabilizing   X  Patient will tolerate 1  Functional Limitation: Mobility: Walking and moving around  Mobility: Walking and Moving Around Current Status (): At least 80 percent but less than 100 percent impaired, limited or restricted  Mobility: Walking and Moving Around Goal Status ():  At least 40 percent but less than 60 percent impaired, limited or restricted    Oral Lightning, OTR/L [id number]

## 2018-10-02 ENCOUNTER — HOSPITAL ENCOUNTER (OUTPATIENT)
Dept: OCCUPATIONAL THERAPY | Age: 83
Setting detail: THERAPIES SERIES
Discharge: HOME | End: 2018-10-02
Payer: MEDICARE

## 2018-10-02 PROCEDURE — 97110 THERAPEUTIC EXERCISES: CPT

## 2018-10-02 PROCEDURE — 97535 SELF CARE MNGMENT TRAINING: CPT

## 2018-10-02 NOTE — PROGRESS NOTES
[facility]  LYMPHEDEMA SERVICES  OCCUPATIONAL THERAPY  DAILY NOTE             Date: 10/2/2018  Patient Name: Gavi Vance        CSN: [id number]   : 1935  (82 y.o.)  Gender: male   Referring Practitioner: Dr. Paola Zimmer  Diagnosis: BLE lymphedema (Q82.0)     Additional Pertinent Hx: Pt. initially presented for evaluation due to swelling and wounds in his legs. Pt. states about a month and half ago, pt. had vascular surgery on bilateral legs to improve circulation. Unfortunately, the lower leg incisions did not heal and pt. not only had wounds but continued swelling. Pt. has been going to wound clinic to get legs healed and family is wrapping legs with gauze and coban every 3 days. Pt. is done with antibiotics. Pt. has a PMH high blood pressure, colon resection, colostomy reversal, hernia repair with mesh, neck and back surgery    General:  OT Visit Information  OT Insurance Information: Medicare  Total # of Visits to Date: 2  Certification Period Expiration Date: 10/26/18  Progress Note Due Date: 10/26/18  Progress Note Counter: 2/10  Comments:  Follows up with Dr. Raine Carrillo 2018       Restrictions/Precautions:  Restrictions/Precautions: General Precautions, Fall Risk (high risk for infection)              Subjective:  Subjective: Pt. continue to report mostly back pain but legs will hurt with prolonged standing          Pain:  Pain Assessment  Patient Currently in Pain:  (back pain)    Restrictions/Precautions    OBJECTIVE  Skin Appearance/Texture: left leg appears normal - wife states she put Aquaphor on left leg, but not on right  Skin Color: Normal  Hyperkeratosis: - None  Papillomas: None  Lymphorrhea (Weeping): None    Circumferential Measurements:   Left Right   MTP's 24 cm 23.8 cm   Dorsum of foot 23.2 cm 24.7 cm   ankle 24.2 cm 25.9 cm   10 cm up from lateral malleolus 24 cm 25.8 cm   20 cm up from lateral malleolus 32.9 cm 35.8 cm   30 cm up 35.6 cm 38.2 cm   Knee

## 2018-10-04 ENCOUNTER — HOSPITAL ENCOUNTER (OUTPATIENT)
Dept: OCCUPATIONAL THERAPY | Age: 83
Setting detail: THERAPIES SERIES
Discharge: HOME | End: 2018-10-04
Payer: MEDICARE

## 2018-10-04 DIAGNOSIS — G89.4 CHRONIC PAIN SYNDROME: Primary | ICD-10-CM

## 2018-10-04 PROCEDURE — 97535 SELF CARE MNGMENT TRAINING: CPT

## 2018-10-04 RX ORDER — OXYCODONE HYDROCHLORIDE AND ACETAMINOPHEN 5; 325 MG/1; MG/1
1 TABLET ORAL EVERY 6 HOURS PRN
Qty: 120 TABLET | Refills: 0 | Status: SHIPPED | OUTPATIENT
Start: 2018-10-04 | End: 2018-11-03

## 2018-10-04 NOTE — PROGRESS NOTES
[facility]  LYMPHEDEMA SERVICES  OCCUPATIONAL THERAPY  DAILY NOTE             Date: 10/4/2018  Patient Name: Romel Fonseca        CSN: [id number]   : 1935  (82 y.o.)  Gender: male   Referring Practitioner: Dr. Alma Rosa Arciniega  Diagnosis: BLE lymphedema     Additional Pertinent Hx: Pt. initially presented for evaluation due to swelling and wounds in his legs. Pt. states about a month and half ago, pt. had vascular surgery on bilateral legs to improve circulation. Unfortunately, the lower leg incisions did not heal and pt. not only had wounds but continued swelling. Pt. has been going to wound clinic to get legs healed and family is wrapping legs with gauze and coban every 3 days. Pt. is done with antibiotics. Pt. has a PMH high blood pressure, colon resection, colostomy reversal, hernia repair with mesh, neck and back surgery    General:  OT Visit Information  OT Insurance Information: Medicare  Total # of Visits to Date: 3  Certification Period Expiration Date: 10/26/18  Progress Note Counter: 3/10  Comments: Follows up with Dr. Heather Nelson 2018       Restrictions/Precautions:  Restrictions/Precautions: General Precautions, Fall Risk (high risk for infection)              Subjective:  Subjective: Patient reports he can tell the swelling in his legs are down. Wife was going to bring in \"mud\" to pack wound but forgot. Pain:  Pain Assessment  Patient Currently in Pain:  (back pain)    Restrictions/Precautions  Fall risk, Universal precautions,     SUBJECTIVE     Patient pleasant and cooperative. States he feels like the swelling is better.     OBJECTIVE  Skin Appearance/Texture: Normal  Skin Color: Normal  Healing wounds L medial LE  Hyperkeratosis: - None  Hyperplasia:  None   Hyperpigmentation:None  Papillomas: None  Lymphorrhea (Weeping): None    TREATMENT SESSION    Skin Care Measures  Applied moisturizer - Type: Eucerin around healed skin areas, avoided toes and wounds on L

## 2018-10-09 ENCOUNTER — HOSPITAL ENCOUNTER (OUTPATIENT)
Dept: OCCUPATIONAL THERAPY | Age: 83
Setting detail: THERAPIES SERIES
Discharge: HOME | End: 2018-10-09
Payer: MEDICARE

## 2018-10-09 PROCEDURE — 97535 SELF CARE MNGMENT TRAINING: CPT

## 2018-10-09 PROCEDURE — 97140 MANUAL THERAPY 1/> REGIONS: CPT

## 2018-10-09 ASSESSMENT — ENCOUNTER SYMPTOMS
ABDOMINAL PAIN: 0
VOMITING: 0
NAUSEA: 0

## 2018-10-09 NOTE — PROGRESS NOTES
[facility]  LYMPHEDEMA SERVICES  OCCUPATIONAL THERAPY  DAILY NOTE             Date: 10/9/2018  Patient Name: Ban Fields        CSN: [id number]   : 1935  (82 y.o.)  Gender: male   Referring Practitioner: Dr. Sabrina Durbin  Diagnosis: BLE lymphedema          General:  OT Visit Information  OT Insurance Information: Medicare  Total # of Visits to Date: 4  Certification Period Expiration Date: 10/26/18  Progress Note Counter: 4/10  Comments: Follows up with Dr. Harshil Whatley 2018       Restrictions/Precautions:  Restrictions/Precautions: General Precautions, Fall Risk (high risk for infection)              Subjective:  Subjective: Pt. states he has been having increased right hip and back  pain over the weekend; states legs do not hurt        OBJECTIVE  Skin Appearance/Texture: slightly dry; continues to have healing wounds on left lower leg, between toes on right foot, and on heel of right foot  Skin Color: Mottled  Hyperkeratosis: - None  Papillomas: None  Lymphorrhea (Weeping): None    TREATMENT SESSION      Skin Care Measures  Washed involved extremity/body part and applied Eucerin to moisturize skin; wife re-dressed wounds with iodine base dressings and applied gauze. Also educated on skin care precautions today    Compression Bandaging  Location: Bilateral lower legs foot to knees  Compression Gradient: Moderate to Minimal  Supplies (per involved extremity):   Stockinette: Size: 4\", Thickness: 1 layer   Transelast none   10 cm Artiflex Cotton 1 roll   15 cm Artiflex Cotton none   4 cm Rosidal K none   6 cm Rosidal K 1 roll   8 cm Rosidal K 1 roll   10 cm Rosidal K 2 rolls   12 cm Rosidal K none   Rosidal Soft none             Decongestive/Therapeutic Exercise  Ankle pumps    Patient Education  New Education Provided:   Therapist wrapped pt's right leg and then had wife wrap pt.s' left leg with min vc's for technique. Educated on skin care precautions and have handout.

## 2018-10-10 ENCOUNTER — OFFICE VISIT (OUTPATIENT)
Dept: UROLOGY | Age: 83
End: 2018-10-10
Payer: MEDICARE

## 2018-10-10 VITALS
WEIGHT: 228 LBS | SYSTOLIC BLOOD PRESSURE: 120 MMHG | BODY MASS INDEX: 34.56 KG/M2 | HEIGHT: 68 IN | DIASTOLIC BLOOD PRESSURE: 70 MMHG

## 2018-10-10 DIAGNOSIS — N40.1 BENIGN PROSTATIC HYPERPLASIA WITH URINARY RETENTION: Primary | ICD-10-CM

## 2018-10-10 DIAGNOSIS — R33.8 BENIGN PROSTATIC HYPERPLASIA WITH URINARY RETENTION: Primary | ICD-10-CM

## 2018-10-10 LAB — POST VOID RESIDUAL (PVR): 104 ML

## 2018-10-10 PROCEDURE — G8484 FLU IMMUNIZE NO ADMIN: HCPCS | Performed by: NURSE PRACTITIONER

## 2018-10-10 PROCEDURE — G8427 DOCREV CUR MEDS BY ELIG CLIN: HCPCS | Performed by: NURSE PRACTITIONER

## 2018-10-10 PROCEDURE — 99213 OFFICE O/P EST LOW 20 MIN: CPT | Performed by: NURSE PRACTITIONER

## 2018-10-10 PROCEDURE — 4040F PNEUMOC VAC/ADMIN/RCVD: CPT | Performed by: NURSE PRACTITIONER

## 2018-10-10 PROCEDURE — 1036F TOBACCO NON-USER: CPT | Performed by: NURSE PRACTITIONER

## 2018-10-10 PROCEDURE — G8598 ASA/ANTIPLAT THER USED: HCPCS | Performed by: NURSE PRACTITIONER

## 2018-10-10 PROCEDURE — 51798 US URINE CAPACITY MEASURE: CPT | Performed by: NURSE PRACTITIONER

## 2018-10-10 PROCEDURE — G8417 CALC BMI ABV UP PARAM F/U: HCPCS | Performed by: NURSE PRACTITIONER

## 2018-10-10 PROCEDURE — 1123F ACP DISCUSS/DSCN MKR DOCD: CPT | Performed by: NURSE PRACTITIONER

## 2018-10-10 PROCEDURE — 1101F PT FALLS ASSESS-DOCD LE1/YR: CPT | Performed by: NURSE PRACTITIONER

## 2018-10-12 ENCOUNTER — HOSPITAL ENCOUNTER (OUTPATIENT)
Dept: WOUND CARE | Age: 83
Discharge: HOME | End: 2018-10-12
Payer: MEDICARE

## 2018-10-12 ENCOUNTER — HOSPITAL ENCOUNTER (OUTPATIENT)
Dept: OCCUPATIONAL THERAPY | Age: 83
Setting detail: THERAPIES SERIES
Discharge: HOME | End: 2018-10-12
Payer: MEDICARE

## 2018-10-12 VITALS
DIASTOLIC BLOOD PRESSURE: 56 MMHG | HEART RATE: 68 BPM | SYSTOLIC BLOOD PRESSURE: 119 MMHG | RESPIRATION RATE: 18 BRPM | TEMPERATURE: 97.8 F | OXYGEN SATURATION: 97 %

## 2018-10-12 PROCEDURE — 99214 OFFICE O/P EST MOD 30 MIN: CPT | Performed by: NURSE PRACTITIONER

## 2018-10-12 PROCEDURE — G8980 MOBILITY D/C STATUS: HCPCS

## 2018-10-12 PROCEDURE — 99213 OFFICE O/P EST LOW 20 MIN: CPT

## 2018-10-12 PROCEDURE — G8979 MOBILITY GOAL STATUS: HCPCS

## 2018-10-12 PROCEDURE — 97535 SELF CARE MNGMENT TRAINING: CPT

## 2018-10-12 ASSESSMENT — PAIN DESCRIPTION - PAIN TYPE: TYPE: CHRONIC PAIN

## 2018-10-12 ASSESSMENT — PAIN SCALES - GENERAL: PAINLEVEL_OUTOF10: 8

## 2018-10-12 NOTE — PROGRESS NOTES
([facility] 8/9/2018    DVT (deep venous thrombosis) ([facility])     GERD (gastroesophageal reflux disease)     Gout     Hernia     Hx of blood clots     left leg DVT    Hyperlipidemia     Hypertension     Irregular cardiac rhythm 07/2016    Movement disorder     back pain    Myocardial infarct ([facility]     Thyroid disease     UTI (urinary tract infection)        PAST SURGICAL HISTORY    Past Surgical History:   Procedure Laterality Date    BACK SURGERY      lower    CARDIAC CATHETERIZATION      ok    CARDIAC CATHETERIZATION      ok    CATARACT REMOVAL Bilateral 2010    CHOLECYSTECTOMY      COLON SURGERY      6-8 in of descending colon removed    COLOSTOMY      COLOSTOMY      reversed    EYE SURGERY      band    EYE SURGERY  2010    bilatera cataracts    HERNIA REPAIR      NECK SURGERY  06/29/2016    three   [address] Bilateral 05/15/2018    LUMBAR FACET MBB L3-4, L4-5, L5-S1    OTHER SURGICAL HISTORY  06/29/2016    ACDF C4-5    OTHER SURGICAL HISTORY Bilateral 04/09/2018    Lumbar facet medial branch block at L3-4, L45, L5-S1    [facility] COLSC FLX W/REMOVAL LESION BY HOT BX FORCEPS Left 7/17/2017    COLONOSCOPY POLYPECTOMY HOT BIOPSY performed by Yanelis Farrar MD at [place] [address] DX/THER AGNT PARAVERT FACET JOINT, LUMBAR/SAC, 2ND LEVEL Bilateral 4/9/2018    LUMBAR FACET MBB @ L3-4,L4-5 and L5-S1, BILATERAL performed by Malinda Stahl MD at [address] [place] [address] DX/THER AGNT PARAVERT FACET JOINT, LUMBAR/SAC, 2ND LEVEL Bilateral 5/15/2018    bilateral L-facet MBB # 2 @ L3-4, L4-5, L5-S1. performed by Malinda Stahl MD at [address] OFFICE/OUTPT VISIT,PROCEDURE ONLY Left 7/30/2018    Left superficial femoral to anterior tibial cadaver saphenous vein bypass; left below knee embolectomy and exploration of left posterior tibial performed by Violet Harrison MD at [facility] [address] ADDL LEVEL Left 6/12/2018    LUMBAR RFA  L3-4,  L4-5,  L5-S1  LEFT SIDE performed by Monica Saunders MD at [address] [facility], [address] IM[address] LEVEL Right 7/19/2018    LUMBAR RFA  L3-4,  L4-5,  L5-S1  RIGHT SIDE performed by Monica Saunders MD at [facility] ENDOSCOPY N/A 7/17/2017    EGD CONTROL HEMORRHAGE performed by Argentina Coulter MD at [facility]       FAMILY HISTORY    Family History   Problem Relation Age of Onset    Diabetes Mother     Cancer Father     Heart Disease Father     COPD Sister     Cancer Sister     High Blood Pressure Sister        SOCIAL HISTORY    Social History   Substance Use Topics    Smoking status: Former Smoker     Quit date: 8/14/1991    Smokeless tobacco: Never Used    Alcohol use No       ALLERGIES    Allergies   Allergen Reactions    Pcn [Penicillins] Swelling       MEDICATIONS    Current Outpatient Prescriptions on File Prior to Encounter   Medication Sig Dispense Refill    oxyCODONE-acetaminophen (PERCOCET) 5-325 MG per tablet Take 1 tablet by mouth every 6 hours as needed for Pain for up to 30 days. . 120 tablet 0    rivaroxaban (XARELTO) 20 MG TABS tablet TAKE 1 TABLET DAILY 90 tablet 3    Calcium Carbonate Antacid (TUMS PO) Take by mouth as needed      levothyroxine (SYNTHROID) 100 MCG tablet Take 100 mcg by mouth Daily      magnesium hydroxide (MILK OF MAGNESIA) 400 MG/5ML suspension Take 30 mLs by mouth daily as needed for Constipation      Pseudoephedrine HCl (SUDAFED PO) Take by mouth as needed      Cranberry 1000 MG CAPS Take 500 mg by mouth 2 times daily       NONFORMULARY Take 1 capsule by mouth daily       tamsulosin (FLOMAX) 0.4 MG capsule Take 1 capsule by mouth daily 90 capsule 3    aspirin 81 MG EC tablet Take 1 tablet by mouth daily 1 tablet 0    Calcium Carbonate-Vitamin D (CALCIUM 600/VITAMIN D PO) Take 1 tablet by mouth 2 times daily intact.  No redness, warmth, or induration noted. Suture sticking out of the proximal portion which was trimmed. Left medial 1st metatarsal: Dry, stable eschar noted which was easily removed. No drainage noted.  Periulcer skin scarred and intact.  No redness, warmth, or induration noted. Right lateral 3rd toe: Measurements are slightly larger. Ulcer bed is 40% pink and 60% yellow slough.  Draining moderate amount of serosanguineous drainage. Periulcer skin is scarred and intact.  No redness, warmth, or induration noted. Right medial 4th toe: Measurements are slightly smaller. Ulcer bed is 30% pink and 70% yellow slough.  Draining moderate amount of serosanguineous drainage.  Periulcer skin is scarred and intact.  No redness, warmth, or induration noted.    Right posterior heel: Pressure ulcer, unstageable- Measurements are unchanged. Ulcer bed is 100% red. Draining moderate amount of serosanguinous drainage.  Periulcer skin is scarred and intact.  No redness, warmth, or induration noted. Right posterior lower leg: healed with scarring noted. No redness, warmth, or induration. Left medial 1st metatarsal    Left lateral lower leg    Left medial lower leg    Right lateral 3rd toe    Right 4th medial toe    Right lateral heel    Right posterior lower leg    Wound 08/09/18 Leg Left; Lower; Lateral;Proximal #1 (Active)   Wound Image   10/12/2018 11:30 AM   Wound Type Wound 10/12/2018 11:30 AM   Dressing Status Intact 10/12/2018 11:30 AM   Dressing Changed Changed/New 10/12/2018 11:30 AM   Wound Cleansed Rinsed/Irrigated with saline 10/12/2018 11:30 AM   Wound Length (cm) 0.8 cm 10/12/2018 11:30 AM   Wound Width (cm) 0.5 cm 10/12/2018 11:30 AM   Wound Depth (cm)  0.1 10/12/2018 11:30 AM   Calculated Wound Size (cm^2) (l*w) 0.4 cm^2 10/12/2018 11:30 AM   Change in Wound Size % (l*w) 96.15 10/12/2018 11:30 AM   Wound Assessment Red 10/12/2018 11:30 AM   Drainage Amount Small 10/12/2018 11:30 AM   Drainage Description Serosanguinous 10/12/2018 11:30 AM   Odor None 10/12/2018 11:30 AM   Margins Attached edges 10/12/2018 11:30 AM   Rhoda-wound Assessment Dry 10/12/2018 11:30 AM   Red%Wound Bed 100 10/12/2018 11:30 AM   Yellow%Wound Bed 50 8/31/2018 12:04 PM   Black%Wound Bed 40 8/17/2018 10:51 AM   Other%Wound Bed 10  Epi 8/17/2018 10:51 AM   Number of days: 64       Wound 08/09/18 Leg Left; Lower;Medial #2 (Active)   Wound Image   10/12/2018 11:30 AM   Wound Type Wound 10/12/2018 11:30 AM   Dressing Status Intact 10/12/2018 11:30 AM   Dressing Changed Changed/New 10/12/2018 11:30 AM   Wound Cleansed Rinsed/Irrigated with saline 10/12/2018 11:30 AM   Wound Length (cm) 11.3 cm 10/12/2018 11:30 AM   Wound Width (cm) 0.5 cm 10/12/2018 11:30 AM   Wound Depth (cm)  0.2 10/12/2018 11:30 AM   Calculated Wound Size (cm^2) (l*w) 5.65 cm^2 10/12/2018 11:30 AM   Change in Wound Size % (l*w) -606.25 10/12/2018 11:30 AM   Wound Assessment Yellow;Red;Black; Epithelialization 10/12/2018 11:30 AM   Drainage Amount Small 10/12/2018 11:30 AM   Drainage Description Serosanguinous 10/12/2018 11:30 AM   Odor None 10/12/2018 11:30 AM   Margins Attached edges 10/12/2018 11:30 AM   Rhoda-wound Assessment Dry 10/12/2018 11:30 AM   Red%Wound Bed 25 10/12/2018 11:30 AM   Yellow%Wound Bed 25 10/12/2018 11:30 AM   Black%Wound Bed 20 10/12/2018 11:30 AM   Other%Wound Bed 30   epi 10/12/2018 11:30 AM   Op First Treatment Date 08/09/18 8/9/2018 12:10 PM   Number of days: 64       Wound 08/09/18 Toe (Comment  which one) Right #4  Third (Active)   Wound Image   10/12/2018 11:30 AM   Wound Type Wound 10/12/2018 11:30 AM   Dressing Status Intact 10/12/2018 11:30 AM   Dressing Changed Changed/New 10/12/2018 11:30 AM   Wound Cleansed Rinsed/Irrigated with saline 10/12/2018 11:30 AM   Wound Length (cm) 0.8 cm 10/12/2018 11:30 AM   Wound Width (cm) 0.8 cm 10/12/2018 11:30 AM   Wound Depth (cm)  0.1 10/12/2018 11:30 AM   Calculated Wound Size (cm^2) (l*w) 0.64 cm^2 10/12/2018 11:30 AM   Change in Wound Size % (l*w) -30.61 10/12/2018 11:30 AM   Wound Assessment Pink;Yellow 10/12/2018 11:30 AM   Drainage Amount Small 10/12/2018 11:30 AM   Drainage Description Yellow 10/12/2018 11:30 AM   Odor None 10/12/2018 11:30 AM   Margins Attached edges 10/12/2018 11:30 AM   Rhoda-wound Assessment White; Maceration 10/12/2018 11:30 AM   McCleary%Wound Bed 10 10/12/2018 11:30 AM   Red%Wound Bed 20 8/17/2018 10:51 AM   Yellow%Wound Bed 90 10/12/2018 11:30 AM   Number of days: 63       Wound 08/09/18 Toe (Comment  which one) Right # 5 Fourth  (Active)   Wound Image   10/12/2018 11:30 AM   Wound Type Wound 10/12/2018 11:30 AM   Dressing Status Intact 10/12/2018 11:30 AM   Dressing Changed Changed/New 10/12/2018 11:30 AM   Wound Cleansed Rinsed/Irrigated with saline 10/12/2018 11:30 AM   Wound Length (cm) 0.3 cm 10/12/2018 11:30 AM   Wound Width (cm) 0.3 cm 10/12/2018 11:30 AM   Wound Depth (cm)  0.1 10/12/2018 11:30 AM   Calculated Wound Size (cm^2) (l*w) 0.09 cm^2 10/12/2018 11:30 AM   Change in Wound Size % (l*w) 67.86 10/12/2018 11:30 AM   Wound Assessment Yellow 10/12/2018 11:30 AM   Drainage Amount Small 10/12/2018 11:30 AM   Drainage Description Yellow 10/12/2018 11:30 AM   Odor None 10/12/2018 11:30 AM   Margins Attached edges 10/12/2018 11:30 AM   Rhoda-wound Assessment White; Maceration 10/12/2018 11:30 AM   McCleary%Wound Bed 60 8/9/2018 12:43 PM   Yellow%Wound Bed 100 10/12/2018 11:30 AM   Number of days: 63       Wound 08/09/18 Heel Right;Lateral #6 (Active)   Wound Image   10/12/2018 11:30 AM   Wound Type Wound 10/12/2018 11:30 AM   Dressing Status Intact 10/12/2018 11:30 AM   Dressing Changed Changed/New 10/12/2018 11:30 AM   Wound Cleansed Rinsed/Irrigated with saline 10/12/2018 11:30 AM   Wound Length (cm) 0.8 cm 10/12/2018 11:30 AM   Wound Width (cm) 0.5 cm 10/12/2018 11:30 AM   Wound Depth (cm)  scab 10/12/2018 11:30 AM   Calculated Wound Size (cm^2) (l*w) 0.4 cm^2 10/12/2018 11:30 08/11/2016     Albumin:  Lab Results   Component Value Date    LABALBU 3.2 07/31/2018     Sed Rate:  Lab Results   Component Value Date    SEDRATE 31 07/20/2013     CRP:   Lab Results   Component Value Date    CRP 6.36 06/28/2016     Micro:   Lab Results   Component Value Date    BC No growth-preliminary  No growth   09/06/2016    BC No growth-preliminary  No growth   09/06/2016      Hemoglobin A1C: No results found for: LABA1C    Assessment:     Leg wound, left, full thickness  Venous ulcer of left leg, limited to breakdown of skin  Pressure ulcer of right heel, unstageable  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  Diabetic ulcer of toe of right foot associated with type 2 diabetes mellitus, with fat layer exposed  Venous insufficiency of both lower extremities     Contributing Factors: edema, venous stasis, diabetes and arterial insufficiency    Patient Active Problem List   Diagnosis Code    Cervical spinal cord compression ([facility]) G95.20    Essential hypertension I10    Leukocytosis D72.829    Hypokalemia E87.6    Spinal stenosis, lumbar region, with neurogenic claudication M48.062    Idiopathic hypotension I95.0    Chronic atrial fibrillation ([facility]) I48.2    Hypokalemia E87.6    Prostatism N40.0    Elevated TSH R79.89    PAD (peripheral artery disease) ([facility]) I73.9    Bilateral leg edema R60.0    Open wound of left lower leg S81.802A    Lumbar spondylosis M47.816    Atherosclerosis of native artery of both lower extremities ([facility]) I70.203    Femoral-tibial bypass graft occlusion, left, initial encounter [facility]) T82.898A    Leg wound, left S81.802A    Venous insufficiency of both lower extremities I87.2    Venous ulcer of left leg ([facility]) I83.029, L97.929    Decubitus ulcer of right heel, unstageable ([facility]) L89.610    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

## 2018-10-12 NOTE — DISCHARGE SUMMARY
wound clinic can order the bandages and get them covered by medicare. Gave wife the website she can order the bandages if wound clinic cannot order them and get them covered by medicare. Pt. And wife know that they will need to continue bandaging the legs until wounds are healed and then he can wear the compression stockings they purchased. Also explained that they should order new bandages and stockings every 6 months to keep proper compression. Learner:patient and wife  Method: demonstration and explanation       Outcome: acknowledged understanding           ASSESSMENT:  Assessment:  {ambiguous abbreviation:2480468::\"Patient not progressing toward goal achievement\",\"Patient progressing toward goal achievement\"}. Activity Tolerance:  Patient tolerance of  treatment: {ambiguous abbreviation:2534379::\"Poor\",\"Fair\",\"Good\"}. Plan: {ambiguous abbreviation:8570752::\"Discharge patient secondary to ***\",\"Modify plan of care as follows: ***\",\"Continue treatment *** times per week for *** weeks according to established treatment plan\",\"Continue treatment according to established plan of care\"}    OT G-codes  Functional Assessment Tool Used: Patient specific functional scale  Score: functional transfers - 3, ability to stand and use the toilet - 2, walking - 2  Functional Limitation: Mobility: Walking and moving around  Mobility: Walking and Moving Around Goal Status (): At least 40 percent but less than 60 percent impaired, limited or restricted  Mobility: Walking and Moving Around Discharge Status ():  At least 80 percent but less than 100 percent impaired, limited or restricted    Time In: ***   Time Out: ***   Timed Code Minutes: ***   Untimed Code Minutes: ***   Total Treatment Time: ***       ***

## 2018-10-24 ENCOUNTER — HOSPITAL ENCOUNTER (EMERGENCY)
Age: 83
Discharge: HOME | End: 2018-10-24
Payer: MEDICARE

## 2018-10-24 VITALS
HEIGHT: 68 IN | OXYGEN SATURATION: 95 % | RESPIRATION RATE: 16 BRPM | SYSTOLIC BLOOD PRESSURE: 135 MMHG | WEIGHT: 220 LBS | HEART RATE: 80 BPM | TEMPERATURE: 97.2 F | DIASTOLIC BLOOD PRESSURE: 69 MMHG | BODY MASS INDEX: 33.34 KG/M2

## 2018-10-24 DIAGNOSIS — M43.6 TORTICOLLIS: Primary | ICD-10-CM

## 2018-10-24 PROCEDURE — 6360000002 HC RX W HCPCS: Performed by: NURSE PRACTITIONER

## 2018-10-24 PROCEDURE — 99282 EMERGENCY DEPT VISIT SF MDM: CPT

## 2018-10-24 PROCEDURE — 6370000000 HC RX 637 (ALT 250 FOR IP): Performed by: NURSE PRACTITIONER

## 2018-10-24 PROCEDURE — 96372 THER/PROPH/DIAG INJ SC/IM: CPT

## 2018-10-24 RX ORDER — LIDOCAINE 4 G/G
2 PATCH TOPICAL ONCE
Status: DISCONTINUED | OUTPATIENT
Start: 2018-10-24 | End: 2018-10-24 | Stop reason: HOSPADM

## 2018-10-24 RX ORDER — PREDNISONE 20 MG/1
TABLET ORAL
Qty: 18 TABLET | Refills: 0 | Status: SHIPPED | OUTPATIENT
Start: 2018-10-24 | End: 2018-11-03

## 2018-10-24 RX ORDER — TIZANIDINE 4 MG/1
4 TABLET ORAL ONCE
Status: COMPLETED | OUTPATIENT
Start: 2018-10-24 | End: 2018-10-24

## 2018-10-24 RX ORDER — LIDOCAINE 50 MG/G
OINTMENT TOPICAL
Qty: 30 G | Refills: 0 | Status: SHIPPED | OUTPATIENT
Start: 2018-10-24

## 2018-10-24 RX ADMIN — TIZANIDINE 4 MG: 4 TABLET ORAL at 08:05

## 2018-10-24 RX ADMIN — HYDROMORPHONE HYDROCHLORIDE 0.5 MG: 1 INJECTION, SOLUTION INTRAMUSCULAR; INTRAVENOUS; SUBCUTANEOUS at 08:05

## 2018-10-24 ASSESSMENT — ENCOUNTER SYMPTOMS
VOICE CHANGE: 0
SORE THROAT: 0
COLOR CHANGE: 0
SHORTNESS OF BREATH: 0
ABDOMINAL PAIN: 0
COUGH: 0
EYE REDNESS: 0
NAUSEA: 0
CONSTIPATION: 0
RHINORRHEA: 0
BACK PAIN: 0
WHEEZING: 0
DIARRHEA: 0
BLOOD IN STOOL: 0
VOMITING: 0
CHEST TIGHTNESS: 0
ABDOMINAL DISTENTION: 0
PHOTOPHOBIA: 0
SINUS PRESSURE: 0

## 2018-10-24 ASSESSMENT — PAIN DESCRIPTION - LOCATION: LOCATION: NECK

## 2018-10-24 ASSESSMENT — PAIN DESCRIPTION - DESCRIPTORS: DESCRIPTORS: SQUEEZING

## 2018-10-24 ASSESSMENT — PAIN SCALES - GENERAL
PAINLEVEL_OUTOF10: 10
PAINLEVEL_OUTOF10: 10

## 2018-10-24 ASSESSMENT — PAIN DESCRIPTION - ORIENTATION: ORIENTATION: LOWER;MID

## 2018-10-24 ASSESSMENT — PAIN DESCRIPTION - PAIN TYPE: TYPE: ACUTE PAIN

## 2018-10-24 ASSESSMENT — PAIN DESCRIPTION - FREQUENCY: FREQUENCY: CONTINUOUS

## 2018-10-24 NOTE — ED PROVIDER NOTES
[facility] Emergency Department    CHIEF COMPLAINT       Chief Complaint   Patient presents with    Neck Pain       Nurses Notes reviewed and I agree except as noted in the HPI. HISTORY OF PRESENT ILLNESS    Osmar Scott jay 82 y.o. male with a history of spinal stenosis, degenerative disc disease and cervical spinal cord compression who presents to the ED for evaluation of neck pain. Patient states he awoke this morning with worsening neck pain. He took Percocet with no relief. Patient also took Flexeril prior to coming in. He denies radiation of pain down his arm or anywhere else. He denies recently falling. Patient is on Xarelto for atrial fibrillation. Patient denies any cough, fever, chills, headache, dizziness, or congestion. The patient has had 3 neck surgeries with Dr. Dario Palm, the last one being 2 years ago. Patient denies any further complaints at initial encounter. Pain description:  Onset: this morning  Location: neck  Duration: constant  Character: aching  Aggravating factors: worse with movement  Radiation: None  Severity: 10/10    Experienced previously: Yes    HPI was provided by the patient. REVIEW OF SYSTEMS     Review of Systems   Constitutional: Negative for appetite change, chills, diaphoresis, fatigue, fever and unexpected weight change. HENT: Negative for congestion, hearing loss, postnasal drip, rhinorrhea, sinus pressure, sore throat and voice change. Eyes: Negative for photophobia, redness and visual disturbance. Respiratory: Negative for cough, chest tightness, shortness of breath and wheezing. Cardiovascular: Negative for chest pain and palpitations. Gastrointestinal: Negative for abdominal distention, abdominal pain, blood in stool, constipation, diarrhea, nausea and vomiting. Endocrine: Negative for cold intolerance, heat intolerance, polydipsia, polyphagia and polyuria.    Genitourinary: Negative for decreased urine volume, difficulty urinating, dysuria, image guide,ea addl level (Right, 7/19/2018); and pr office/outpt visit,procedure only (Left, 7/30/2018). CURRENT MEDICATIONS       Discharge Medication List as of 10/24/2018  8:33 AM      CONTINUE these medications which have NOT CHANGED    Details   oxyCODONE-acetaminophen (PERCOCET) 5-325 MG per tablet Take 1 tablet by mouth every 6 hours as needed for Pain for up to 30 days. ., Disp-120 tablet, R-0Normal      rivaroxaban (XARELTO) 20 MG TABS tablet TAKE 1 TABLET DAILY, Disp-90 tablet, R-3Normal      Calcium Carbonate Antacid (TUMS PO) Take by mouth as neededHistorical Med      levothyroxine (SYNTHROID) 100 MCG tablet Take 100 mcg by mouth DailyHistorical Med      magnesium hydroxide (MILK OF MAGNESIA) 400 MG/5ML suspension Take 30 mLs by mouth daily as needed for ConstipationHistorical Med      ranitidine (ZANTAC) 150 MG tablet Take 150 mg by mouth daily as needed for HeartburnHistorical Med      Pseudoephedrine HCl (SUDAFED PO) Take by mouth as neededHistorical Med      Cranberry 1000 MG CAPS Take 500 mg by mouth 2 times daily Historical Med      saline nasal gel (AYR) GEL 1 g by Nasal route as needed (dryness, nosebleed prevention), Nasal, PRN Starting Fri 11/10/2017, Until u 7/19/2018, Disp-1 Tube, R-0, Print      NONFORMULARY Take 1 capsule by mouth daily Historical Med      tamsulosin (FLOMAX) 0.4 MG capsule Take 1 capsule by mouth daily, Disp-90 capsule, R-3Normal      aspirin 81 MG EC tablet Take 1 tablet by mouth daily, Disp-1 tablet, R-0Normal      Calcium Carbonate-Vitamin D (CALCIUM 600/VITAMIN D PO) Take 1 tablet by mouth 2 times dailyHistorical Med      metoprolol tartrate (LOPRESSOR) 50 MG tablet TAKE 1 TABLET TWICE A DAY, Disp-180 tablet, R-0Normal      furosemide (LASIX) 40 MG tablet Take 1 tablet by mouth daily, Disp-90 tablet, R-2Normal      cloNIDine (CATAPRES) 0.1 MG tablet Take 0.1 mg by mouth as needed for High Blood Pressure Takes if BP is greater than 175Historical Med discharge. No scleral icterus. Neck: Neck supple. No JVD present. Muscular tenderness present. No tracheal tenderness and no spinous process tenderness present. No neck rigidity. Decreased range of motion present. No tracheal deviation, no edema and no erythema present. Soft tissue tenderness. Pulmonary/Chest: Effort normal. No stridor. No respiratory distress. Abdominal: Soft. He exhibits no distension. Musculoskeletal: He exhibits no edema. Neurological: He is alert and oriented to person, place, and time. He exhibits normal muscle tone. GCS eye subscore is 4. GCS verbal subscore is 5. GCS motor subscore is 6. Skin: Skin is warm and dry. He is not diaphoretic. No erythema. Psychiatric: He has a normal mood and affect. His behavior is normal.   Nursing note and vitals reviewed. DIFFERENTIAL DIAGNOSIS:   Torticollis, Degenerative disc disease, Spinal Stenosis, Muscle strain    DIAGNOSTIC RESULTS     EKG: All EKG's are interpreted by the Emergency Department Physician who eithersigns or Co-signs this chart in the absence of a cardiologist.    None     RADIOLOGY: non-plainfilm images(s) such as CT, Ultrasound and MRI are read by the radiologist.  Plain radiographic images are visualized and preliminarily interpreted by the emergency physician unless otherwise stated below. No orders to display         LABS:   Labs Reviewed - No data to display    EMERGENCY DEPARTMENT COURSE:   Vitals:    Vitals:    10/24/18 0712   BP: 135/69   Pulse: 80   Resp: 16   Temp: 97.2 °F (36.2 °C)   TempSrc: Oral   SpO2: 95%   Weight: 220 lb (99.8 kg)   Height: 5' 8\" (1.727 m)       MDM  The patient was seen within the ED today for the evaluation of neck pain. The patient arrived in no acute distress and in stable condition. Within the department, I observed the patient's vital signs to be within acceptable range. On exam, I appreciated soft tissue tenderness, decreased ROM.  Within the department, the patient was treated with Lidocaine, Dilaudid, and Xanaflex for pain. I observed the patient's condition to improve during the duration of his stay. I explained my proposed course of treatment to the patient, who was amenable to my decision, and I answered all questions that were asked. He was discharged home in stable condition with prescriptions for Lidocaine and Prendisone, and the patient will return to the ED if his symptoms become more severe in nature or otherwise change. I advised the patient to follow-up with Dr. Darby Cook in 1 day. I also discussed return to ED precautions with the patient who verbalized understanding. Medications   HYDROmorphone (DILAUDID) injection 0.5 mg (0.5 mg Intramuscular Given 10/24/18 0805)   tiZANidine (ZANAFLEX) tablet 4 mg (4 mg Oral Given 10/24/18 0805)       Patient was seenindependently by myself. The patient's final impression and disposition and plan was determined by myself. CRITICAL CARE:   None    CONSULTS:  None    PROCEDURES:  None    FINAL IMPRESSION     1. Torticollis          DISPOSITION/PLAN   Discharged    PATIENT REFERREDTO:  Danii Bernardo MD  [address] [place] [address]  [phone]    Call in 1 day  For follow up and evaluation      DISCHARGE MEDICATIONS:  Discharge Medication List as of 10/24/2018  8:33 AM      START taking these medications    Details   lidocaine (XYLOCAINE) 5 % ointment Apply topically as needed. , Disp-30 g, R-0, Print      predniSONE (DELTASONE) 20 MG tablet Take 60 mg (3 tabs) once daily for 3 days then 40 mg (2 tabs) once daily for 3 days then 20 mg (1 tab) once daily for 3 days, Disp-18 tablet, R-0Print             (Please note that portions of this note were completed with a voice recognition program.  Efforts were made to edit the dictations but occasionally words are mis-transcribed.)    Scribe:  Og Philip and Lucia Singleton 10/24/18 8:55 AM Scribing for and in the presence of Rhett Mcleod CNP.     Signed by: Emily Alas

## 2018-10-26 ENCOUNTER — HOSPITAL ENCOUNTER (OUTPATIENT)
Dept: WOUND CARE | Age: 83
Discharge: HOME | End: 2018-10-26
Payer: MEDICARE

## 2018-10-26 VITALS
HEART RATE: 72 BPM | RESPIRATION RATE: 18 BRPM | SYSTOLIC BLOOD PRESSURE: 138 MMHG | WEIGHT: 230.7 LBS | OXYGEN SATURATION: 97 % | TEMPERATURE: 97.9 F | DIASTOLIC BLOOD PRESSURE: 82 MMHG | BODY MASS INDEX: 35.08 KG/M2

## 2018-10-26 PROCEDURE — 2709999900 HC NON-CHARGEABLE SUPPLY

## 2018-10-26 PROCEDURE — 29581 APPL MULTLAYER CMPRN SYS LEG: CPT

## 2018-10-26 PROCEDURE — 99213 OFFICE O/P EST LOW 20 MIN: CPT | Performed by: NURSE PRACTITIONER

## 2018-10-26 NOTE — PROGRESS NOTES
to improve. There is no sign of infection. He has an exposed suture noted to the left medial thigh. 9/14/18: The wounds and ulcer continue to slowly improve. He was evaluated by Dr. Mark Pulido at the [facility] and she referred him to Lymphedema Clinic.   9/28/18: Left lower leg wounds continue to improve, measurements are smaller.  The left medial first metatarsal ulcer is stable.  The right fourth toe ulcer and right third toe ulcer are unchanged.  He is a new open area to the right posterior lower leg and the right posterior heel ulcer is stable. 10/12/18: The left medial 1st metatarsal ulcer is healed. The left medial lower leg wounds are improving. The right 3rd and 4th toe ulcers are unchanged. The right posterior lower leg ulcer is healed. The right lateral heel ulcer is stable. There are 2 small new open areas to the left anterior and lateral leg. 10/26/18: The wounds/ulcer continue to improve significantly, measurements are smaller. His edema is reduced. He continues to do lymphedema therapy. The left medial 1st metatarsal has reopened/eschar came off. Wound/Ulcer Pain Timing/Severity: mild  Quality of pain: aching, tender  Severity:  3 / 10   Modifying Factors: Pain is relieved/improved with rest  Associated Signs/Symptoms: drainage    Interval History:   Patient presents today for follow up on wound/ulcer's progression. The patient is currently not on antibiotics. Current dressing care includes:    Left lower leg wounds- Apply iodoflex to open wounds and betadine to scabs. Cover with gauze and ABD pads. Wrap with kerlix and coban from base of toes to 1-2 inches below bend of knee. Change every 3 days.     Right heel and Right Posterior Leg- Apply iodoflex and cover with gauze. Wrap the leg with kerlix and coban change every 3 days.     Right toes: Apply iodoflex to open areas. Cover with gauze. Change every 3 days.         PAST MEDICAL HISTORY        Diagnosis Date    Atrial fibrillation (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 ([address] 8/9/2018    DVT (deep venous thrombosis) (HCC)     GERD (gastroesophageal reflux disease)     Gout     Hernia     Hx of blood clots     left leg DVT    Hyperlipidemia     Hypertension     Irregular cardiac rhythm 07/2016    Movement disorder     back pain    Myocardial infarct ([address]     Thyroid disease     UTI (urinary tract infection)        PAST SURGICAL HISTORY    Past Surgical History:   Procedure Laterality Date    BACK SURGERY      lower    CARDIAC CATHETERIZATION      ok    CARDIAC CATHETERIZATION      ok    CATARACT REMOVAL Bilateral 2010    CHOLECYSTECTOMY      COLON SURGERY      6-8 in of descending colon removed    COLOSTOMY      COLOSTOMY      reversed    EYE SURGERY      band    EYE SURGERY  2010    bilatera cataracts    HERNIA REPAIR      NECK SURGERY  06/29/2016    three    NECK SURGERY      NERVE SURGERY Bilateral 05/15/2018    LUMBAR FACET MBB L3-4, L4-5, L5-S1    OTHER SURGICAL HISTORY  06/29/2016    ACDF C4-5    OTHER SURGICAL HISTORY Bilateral 04/09/2018    Lumbar facet medial branch block at L3-4, L45, L5-S1    MS COLSC FLX W/REMOVAL LESION BY HOT BX FORCEPS Left 7/17/2017    COLONOSCOPY POLYPECTOMY HOT BIOPSY performed by Akiko Jaimes MD at [facility]    MS INJ DX/THER AGNT PARAVERT FACET JOINT, LUMBAR/SAC, 2ND LEVEL Bilateral 4/9/2018    LUMBAR FACET MBB @ L3-4,L4-5 and L5-S1, BILATERAL performed by Latonya Carter MD at [address] [place] [address] DX/THER AGNT PARAVERT FACET JOINT, LUMBAR/SAC, 2ND LEVEL Bilateral 5/15/2018    bilateral L-facet MBB # 2 @ L3-4, L4-5, L5-S1. performed by Latonya Carter MD at [facility] OFFICE/OUTPT VISIT,PROCEDURE ONLY Left 7/30/2018    Left superficial femoral to anterior tibial cadaver needed for Constipation      ranitidine (ZANTAC) 150 MG tablet Take 150 mg by mouth daily as needed for Heartburn      Pseudoephedrine HCl (SUDAFED PO) Take by mouth as needed      Cranberry 1000 MG CAPS Take 500 mg by mouth 2 times daily       NONFORMULARY Take 1 capsule by mouth daily       tamsulosin (FLOMAX) 0.4 MG capsule Take 1 capsule by mouth daily 90 capsule 3    aspirin 81 MG EC tablet Take 1 tablet by mouth daily 1 tablet 0    Calcium Carbonate-Vitamin D (CALCIUM 600/VITAMIN D PO) Take 1 tablet by mouth 2 times daily      metoprolol tartrate (LOPRESSOR) 50 MG tablet TAKE 1 TABLET TWICE A  tablet 0    furosemide (LASIX) 40 MG tablet Take 1 tablet by mouth daily 90 tablet 2    cloNIDine (CATAPRES) 0.1 MG tablet Take 0.1 mg by mouth as needed for High Blood Pressure Takes if BP is greater than 175      hydrOXYzine (ATARAX) 25 MG tablet Take 25 mg by mouth 4 times daily as needed for Itching      loperamide (IMODIUM) 2 MG capsule Take 2 mg by mouth as needed for Diarrhea      potassium chloride (K-TAB) 10 MEQ extended release tablet Take 4 tablets by mouth daily (Patient taking differently: Take 10 mEq by mouth daily ) 360 tablet 3    docusate sodium (COLACE) 100 MG capsule Take 300 mg by mouth 2 times daily       magnesium oxide (MAG-OX) 400 (241.3 MG) MG TABS tablet Take 1 tablet by mouth daily 30 tablet 0    allopurinol (ZYLOPRIM) 300 MG tablet Take 300 mg by mouth daily      quinapril (ACCUPRIL) 40 MG tablet Take 40 mg by mouth daily.  pravastatin (PRAVACHOL) 80 MG tablet Take 80 mg by mouth nightly.  indapamide (LOZOL) 1.25 MG tablet Take 1.25 mg by mouth every morning.  cyclobenzaprine (FLEXERIL) 10 MG tablet Take 10 mg by mouth every 8 hours as needed.  saline nasal gel (AYR) GEL 1 g by Nasal route as needed (dryness, nosebleed prevention) 1 Tube 0     No current facility-administered medications on file prior to encounter.         REVIEW OF insufficiency of both lower extremities     Contributing Factors: edema, venous stasis, diabetes and arterial insufficiency    Patient Active Problem List   Diagnosis Code    Cervical spinal cord compression ([facility]) G95.20    Essential hypertension I10    Leukocytosis D72.829    Hypokalemia E87.6    Spinal stenosis, lumbar region, with neurogenic claudication M48.062    Idiopathic hypotension I95.0    Chronic atrial fibrillation ([facility]) I48.2    Hypokalemia E87.6    Prostatism N40.0    Elevated TSH R79.89    PAD (peripheral artery disease) ([facility]) I73.9    Bilateral leg edema R60.0    Open wound of left lower leg S81.802A    Lumbar spondylosis M47.816    Atherosclerosis of native artery of both lower extremities ([facility]) I70.203    Femoral-tibial bypass graft occlusion, left, initial encounter [facility]) T82.898A    Leg wound, left S81.802A    Venous insufficiency of both lower extremities I87.2    Venous ulcer of left leg ([facility]) I83.029, L97.929    Decubitus ulcer of right heel, unstageable ([facility]) L89.610    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 Wound/Ulcer Descriptions are listed under Physical Exam above. Plan:     Patient examined and evaluated. The bilateral lower legs and feet wounds/ulcers continue to improve slowly. His edema has reduced significantly in the bilateral lower legs. There is no signs of infection. Continue lymphedema wraps per lymphedema clinic instructions    Left lower leg (inner)/foot- Apply iodoflex to wounds. Cover with gauze. Wrap with lymphedema wraps. Change every 3 days.       Right toes- Apply iodoflex and cover with gauze.

## 2018-11-07 ENCOUNTER — TELEPHONE (OUTPATIENT)
Dept: CARDIOLOGY CLINIC | Age: 83
End: 2018-11-07

## 2018-11-09 ENCOUNTER — HOSPITAL ENCOUNTER (OUTPATIENT)
Age: 83
Discharge: HOME | End: 2018-11-09
Payer: MEDICARE

## 2018-11-09 ENCOUNTER — HOSPITAL ENCOUNTER (OUTPATIENT)
Dept: WOUND CARE | Age: 83
Discharge: HOME | End: 2018-11-09
Payer: MEDICARE

## 2018-11-09 ENCOUNTER — HOSPITAL ENCOUNTER (OUTPATIENT)
Dept: GENERAL RADIOLOGY | Age: 83
Discharge: HOME | End: 2018-11-09
Payer: MEDICARE

## 2018-11-09 VITALS
DIASTOLIC BLOOD PRESSURE: 66 MMHG | RESPIRATION RATE: 18 BRPM | SYSTOLIC BLOOD PRESSURE: 137 MMHG | WEIGHT: 231 LBS | OXYGEN SATURATION: 97 % | TEMPERATURE: 98 F | BODY MASS INDEX: 35.12 KG/M2 | HEART RATE: 82 BPM

## 2018-11-09 DIAGNOSIS — E11.621 DIABETIC ULCER OF TOE OF RIGHT FOOT ASSOCIATED WITH TYPE 2 DIABETES MELLITUS, WITH FAT LAYER EXPOSED (HCC): ICD-10-CM

## 2018-11-09 DIAGNOSIS — I89.0 LYMPHEDEMA OF BOTH LOWER EXTREMITIES: ICD-10-CM

## 2018-11-09 DIAGNOSIS — L97.512 DIABETIC ULCER OF TOE OF RIGHT FOOT ASSOCIATED WITH TYPE 2 DIABETES MELLITUS, WITH FAT LAYER EXPOSED (HCC): Primary | ICD-10-CM

## 2018-11-09 DIAGNOSIS — E11.621 DIABETIC ULCER OF TOE OF RIGHT FOOT ASSOCIATED WITH TYPE 2 DIABETES MELLITUS, WITH FAT LAYER EXPOSED (HCC): Primary | ICD-10-CM

## 2018-11-09 DIAGNOSIS — L97.512 DIABETIC ULCER OF TOE OF RIGHT FOOT ASSOCIATED WITH TYPE 2 DIABETES MELLITUS, WITH FAT LAYER EXPOSED (HCC): ICD-10-CM

## 2018-11-09 PROCEDURE — 99214 OFFICE O/P EST MOD 30 MIN: CPT

## 2018-11-09 PROCEDURE — 2709999900 HC NON-CHARGEABLE SUPPLY

## 2018-11-09 PROCEDURE — 99214 OFFICE O/P EST MOD 30 MIN: CPT | Performed by: NURSE PRACTITIONER

## 2018-11-09 PROCEDURE — 73630 X-RAY EXAM OF FOOT: CPT

## 2018-11-09 RX ORDER — OXYCODONE HYDROCHLORIDE AND ACETAMINOPHEN 5; 325 MG/1; MG/1
1 TABLET ORAL EVERY 6 HOURS PRN
COMMUNITY
End: 2018-11-13 | Stop reason: SDUPTHER

## 2018-11-09 ASSESSMENT — PAIN DESCRIPTION - LOCATION: LOCATION: BACK

## 2018-11-09 ASSESSMENT — PAIN SCALES - GENERAL: PAINLEVEL_OUTOF10: 7

## 2018-11-09 ASSESSMENT — PAIN DESCRIPTION - PAIN TYPE: TYPE: CHRONIC PAIN

## 2018-11-09 NOTE — PROGRESS NOTES
[facility]          Progress Note       Osmar Lewis RECORD NUMBER:  [id number]  AGE: 82 y.o. GENDER: male  : 1935  EPISODE DATE:  2018    Subjective:     Chief Complaint   Patient presents with    Wound Check     Bilateral lower legs and feet         HISTORY of PRESENT ILLNESS HPI     Johanna Myers is a 82 y.o. male Established patient referred by CATRACHO Garcia and Dr. Song Goncalves, who presents today for wound/ulcer evaluation. History of Wound Context: Patient has left lower leg wounds following left SFA to anterior tibial artery bypass for limb salvage, exploration of left popliteal artery and Benjamin thrombectomy, exploration of left posterior tibial artery by Dr. Pato Morel on 18. Patient has had significant swelling since surgery and also has a history of venous insufficiency with venous ulcer which he was previously treated for by Dr. Broderick Carpenter and was discharged from the wound clinic in 2017. He has been been wearing any compression to the left leg since his surgery and states that the left lateral incision blister started a couple of days ago and he has been having moderate to large amount of drainage from the left lateral and medial lower leg incisions. His wife has been applying betadine to the areas and leaving it open to air. He also has ulcers to the left medial 1st metatarsal, right posterior heel, right lateral 3rd toe, and right medial 4th toe which he has been following with Dr. Yoly West for their treatment. He has severe PVD and a history of diabetes. Dr. Song Goncalves provided patient with a prescription for Cipro for the wounds this AM and referred patient here for evaluation. Culture of left lateral lower leg from 18 positive for Pseudomonas putida and Serratia marcescens continued Cipro for a total of 14 days. 18: The wounds are improved. Edema is improved. He is tolerating the Cipro well. 18:  The wounds and ulcers and hoarseness  Respiratory: negative for cough and shortness of breath  Cardiovascular: positive for lower extremity edema, negative for chest pain  Gastrointestinal: negative for abdominal pain, nausea and vomiting  Integument/breast: positive for left lower leg wounds, left dorsal foot ulcer, right posterior heel ulcer, right lateral 3rd toe ulcer, and right medial 4th toe ulcer  Musculoskeletal:negative for muscle weakness  Neurological: negative for gait problems, memory problems and speech problems  Behavioral/Psych: positive for good mood    Objective:      /66   Pulse 82   Temp 98 °F (36.7 °C) (Oral)   Resp 18   Wt 231 lb (104.8 kg)   SpO2 97%   BMI 35.12 kg/m²     Wt Readings from Last 3 Encounters:   11/09/18 231 lb (104.8 kg)   10/26/18 230 lb 11.2 oz (104.6 kg)   10/24/18 220 lb (99.8 kg)       PHYSICAL EXAM    General Appearance: alert and oriented to person, place and time and pale  Skin: warm and dry  Head: normocephalic and atraumatic  Eyes: pupils equal, round, and reactive to light  ENT: hearing grossly normal bilaterally  Pulmonary/Chest: clear to auscultation bilaterally- no wheezes, rales or rhonchi, normal air movement, no respiratory distress  Cardiovascular: normal rate, regular rhythm and intact distal pulses  Abdomen: soft, non-tender and bowel sounds normal  Extremities: no cyanosis, no clubbing and no edema-  bilateral lower legs and feet managed with compression wraps  Musculoskeletal: normal range of motion of extremities x 4, no joint swelling, deformity or tenderness  Neurologic: gait and coordination normal and speech normal.  Left knee: Small abrasion noted with red base. Draining moderate amount of serosanguinous drainage. Periwound skin is intact. No redness, warmth, or induration noted. Left anterior lower leg: New open area noted with red base. Draining small amount of serosanguinous drainage. Periwound skin is intact. No warmth, redness, or induration noted.   Left 11/9/2018  2:29 PM   Wound Depth (cm)  0.3 11/9/2018  2:29 PM   Calculated Wound Size (cm^2) (l*w) 0.04 cm^2 11/9/2018  2:29 PM   Change in Wound Size % (l*w) 85.71 11/9/2018  2:29 PM   Wound Assessment Yellow 11/9/2018  2:29 PM   Drainage Amount Small 11/9/2018  2:29 PM   Drainage Description Serous 11/9/2018  2:29 PM   Odor None 11/9/2018  2:29 PM   Margins Attached edges 11/9/2018  2:29 PM   Rhoda-wound Assessment Calloused 11/9/2018  2:29 PM   Adrian%Wound Bed 100 10/26/2018  2:17 PM   Yellow%Wound Bed 100 11/9/2018  2:29 PM   Number of days: 92       Wound 08/09/18 Heel Right;Lateral #6 (Active)   Wound Image   11/9/2018  2:29 PM   Wound Type Wound 11/9/2018  2:29 PM   Dressing Status Intact 11/9/2018  2:29 PM   Dressing Changed Changed/New 11/9/2018  2:29 PM   Wound Cleansed Rinsed/Irrigated with saline 11/9/2018  2:29 PM   Wound Length (cm) 0.1 cm 11/9/2018  2:29 PM   Wound Width (cm) 0.1 cm 11/9/2018  2:29 PM   Wound Depth (cm)  0.1 11/9/2018  2:29 PM   Calculated Wound Size (cm^2) (l*w) 0.01 cm^2 11/9/2018  2:29 PM   Change in Wound Size % (l*w) 96.67 11/9/2018  2:29 PM   Undermining Starts ___ O'Clock 0 9/14/2018 12:23 PM   Undermining Ends___ O'Clock 0 9/14/2018 12:23 PM   Undermining Maxium Distance (cm) 0 9/14/2018 12:23 PM   Wound Assessment Red 11/9/2018  2:29 PM   Drainage Amount None 11/9/2018  2:29 PM   Drainage Description Yellow 9/28/2018 11:41 AM   Odor None 11/9/2018  2:29 PM   Margins Attached edges 11/9/2018  2:29 PM   Rhoda-wound Assessment Calloused 11/9/2018  2:29 PM   Red%Wound Bed 100 11/9/2018  2:29 PM   Yellow%Wound Bed 100 9/28/2018 11:41 AM   Black%Wound Bed 100 10/26/2018  2:17 PM   Number of days: 92       Wound 08/09/18 Foot Left;Medial #7  (Active)   Wound Image   11/9/2018  2:29 PM   Wound Type Wound 11/9/2018  2:29 PM   Dressing Status Intact 11/9/2018  2:29 PM   Dressing Changed Changed/New 11/9/2018  2:29 PM   Dressing/Treatment Open to air 10/12/2018  1:12 PM   Wound Cleansed (cm) 0.3 cm 11/9/2018  2:29 PM   Wound Depth (cm)  0.05 11/9/2018  2:29 PM   Calculated Wound Size (cm^2) (l*w) 0.09 cm^2 11/9/2018  2:29 PM   Wound Assessment Red 11/9/2018  2:29 PM   Drainage Amount Scant 11/9/2018  2:29 PM   Drainage Description Serosanguinous 11/9/2018  2:29 PM   Odor None 11/9/2018  2:29 PM   Margins Attached edges 11/9/2018  2:29 PM   Rhoda-wound Assessment Dry;Red 11/9/2018  2:29 PM   Red%Wound Bed 100 11/9/2018  2:29 PM   Op First Treatment Date 11/09/18 11/9/2018  2:29 PM   Number of days: 0       LABS      CBC:   Lab Results   Component Value Date    WBC 14.1 07/31/2018    HGB 11.0 07/31/2018    HCT 33.2 07/31/2018    MCV 94.3 07/31/2018     07/31/2018     BMP:   Lab Results   Component Value Date     07/31/2018    K 3.8 07/31/2018     07/31/2018    CO2 21 07/31/2018    PHOS 3.5 09/08/2016    BUN 15 07/31/2018    CREATININE 0.8 07/31/2018     PT/INR:   Lab Results   Component Value Date    INR 1.23 07/30/2018     Prealbumin:   Lab Results   Component Value Date    PREALBUMIN 18.3 08/11/2016     Albumin:  Lab Results   Component Value Date    LABALBU 3.2 07/31/2018     Sed Rate:  Lab Results   Component Value Date    SEDRATE 31 07/20/2013     CRP:   Lab Results   Component Value Date    CRP 6.36 06/28/2016     Micro:   Lab Results   Component Value Date    BC No growth-preliminary  No growth   09/06/2016    BC No growth-preliminary  No growth   09/06/2016      Hemoglobin A1C: No results found for: LABA1C    Assessment:     Leg wound, left, full thickness  Pressure ulcer of right heel, unstageable  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 Wrap with kerlix then coban from base of toes to 1-2 inches below bend of knee. Change twice weekly. Right 3rd and 4th toes- Apply iodoflex to wounds. Wrap with gauze. Change every other day. Left knee- Bordered foam applied today, leave intact for 1 week, if not healed in 1 week then apply triple antibiotic ointment with bandaid and change daily.     -Apply lotion to dry skin on legs with dressing changes    Treatment:   Orders Placed This Encounter   Procedures    XR FOOT RIGHT (MIN 3 VIEWS)     Standing Status:   Future     Standing Expiration Date:   11/9/2019     Order Specific Question:   Reason for exam:     Answer:   nonhealing ulcer right 3rd lateral toe and right 4th medial toe         Antibiotics: No    Follow up: 2 weeks    Please see attached Discharge Instructions    Written patient dismissal instructions given to patient and signed by patient or POA. Discharge Instructions       Visit Discharge/Physician Orders:  -Wear offloading boot when sitting in bed or chair to keep pressure off of right heel.  -Make sure to dry in between toes well. -Measurements taken and Juzo compression calf wrap and Juzo compression foot wrap ordered through [facility]   -Right foot xray ordered today.     Wound Location: Left lower leg (inner), Left lower leg (front), Right heel, Right 3rd and 4th toes, Left foot, Left knee,     Dressing orders:      1) Gather wound care supplies and arrange on clean table.      2) Wash your hands with soap and water or use alcohol based hand  for 20 seconds (sing \"Happy Birthday\" twice).    3) Cleanse wounds with normal saline or wound cleanser and gauze. Pat dry with clean gauze.    4) Left lower leg (inner/front), Right heel- Apply iodoflex to wounds. Cover with gauze. Pad top of left foot with ABD pad. Wrap with kerlix then coban from base of toes to 1-2 inches below bend of knee. Change twice weekly.     5) Right 3rd and 4th toes- Apply iodoflex to

## 2018-11-12 ENCOUNTER — OFFICE VISIT (OUTPATIENT)
Dept: CARDIOLOGY CLINIC | Age: 83
End: 2018-11-12
Payer: MEDICARE

## 2018-11-12 VITALS
DIASTOLIC BLOOD PRESSURE: 62 MMHG | HEIGHT: 67 IN | HEART RATE: 68 BPM | WEIGHT: 227 LBS | BODY MASS INDEX: 35.63 KG/M2 | SYSTOLIC BLOOD PRESSURE: 130 MMHG

## 2018-11-12 DIAGNOSIS — I50.22 CHRONIC SYSTOLIC (CONGESTIVE) HEART FAILURE (HCC): ICD-10-CM

## 2018-11-12 DIAGNOSIS — I73.9 PAD (PERIPHERAL ARTERY DISEASE) (HCC): Primary | ICD-10-CM

## 2018-11-12 DIAGNOSIS — I10 ESSENTIAL HYPERTENSION: ICD-10-CM

## 2018-11-12 DIAGNOSIS — I48.20 CHRONIC ATRIAL FIBRILLATION (HCC): ICD-10-CM

## 2018-11-12 PROCEDURE — 1101F PT FALLS ASSESS-DOCD LE1/YR: CPT | Performed by: INTERNAL MEDICINE

## 2018-11-12 PROCEDURE — 99204 OFFICE O/P NEW MOD 45 MIN: CPT | Performed by: INTERNAL MEDICINE

## 2018-11-12 PROCEDURE — G8484 FLU IMMUNIZE NO ADMIN: HCPCS | Performed by: INTERNAL MEDICINE

## 2018-11-12 PROCEDURE — 4040F PNEUMOC VAC/ADMIN/RCVD: CPT | Performed by: INTERNAL MEDICINE

## 2018-11-12 PROCEDURE — G8598 ASA/ANTIPLAT THER USED: HCPCS | Performed by: INTERNAL MEDICINE

## 2018-11-12 PROCEDURE — 1123F ACP DISCUSS/DSCN MKR DOCD: CPT | Performed by: INTERNAL MEDICINE

## 2018-11-12 PROCEDURE — G8417 CALC BMI ABV UP PARAM F/U: HCPCS | Performed by: INTERNAL MEDICINE

## 2018-11-12 PROCEDURE — G8427 DOCREV CUR MEDS BY ELIG CLIN: HCPCS | Performed by: INTERNAL MEDICINE

## 2018-11-12 PROCEDURE — 1036F TOBACCO NON-USER: CPT | Performed by: INTERNAL MEDICINE

## 2018-11-13 DIAGNOSIS — G89.4 CHRONIC PAIN SYNDROME: Primary | ICD-10-CM

## 2018-11-14 RX ORDER — OXYCODONE HYDROCHLORIDE AND ACETAMINOPHEN 5; 325 MG/1; MG/1
1 TABLET ORAL EVERY 6 HOURS PRN
Qty: 120 TABLET | Refills: 0 | Status: SHIPPED | OUTPATIENT
Start: 2018-11-14 | End: 2018-12-14

## 2018-11-19 NOTE — PROGRESS NOTES
New patient referral from Dr. Estephania Kelly.
reactive to light. Neck: Normal range of motion. Neck supple. No JVD present. Cardiovascular: Normal rate, regular rhythm, normal heart sounds and intact distal pulses. No murmur heard. Pulmonary/Chest: Effort normal and breath sounds normal. No respiratory distress. No wheezes. No rales. Abdominal: Soft. Bowel sounds are normal. No distension. There is no tenderness. Musculoskeletal: Normal range of motion. No edema. Neurological: Alert and oriented to person, place, and time. No cranial nerve deficit. Coordination normal.   Skin: Skin is warm and dry. Psychiatric: Normal mood and affect.        No results found for: CKTOTAL, CKMB, CKMBINDEX    Lab Results   Component Value Date    WBC 14.1 07/31/2018    RBC 3.52 07/31/2018    RBC 3.53 09/29/2016    HGB 11.0 07/31/2018    HCT 33.2 07/31/2018    MCV 94.3 07/31/2018    MCH 31.3 07/31/2018    MCHC 33.1 07/31/2018    RDW 18.4 06/08/2018     07/31/2018    MPV 9.5 07/31/2018       Lab Results   Component Value Date     07/31/2018    K 3.8 07/31/2018     07/31/2018    CO2 21 07/31/2018    BUN 15 07/31/2018    LABALBU 3.2 07/31/2018    CREATININE 0.8 07/31/2018    CALCIUM 8.6 07/31/2018    LABGLOM >90 07/31/2018    GLUCOSE 141 07/31/2018    GLUCOSE 103 09/29/2016       Lab Results   Component Value Date    ALKPHOS 106 07/31/2018    ALT 20 07/31/2018    AST 27 07/31/2018    PROT 5.9 07/31/2018    BILITOT 0.5 07/31/2018    BILIDIR <0.2 03/01/2017    LABALBU 3.2 07/31/2018       Lab Results   Component Value Date    MG 1.7 07/30/2018       Lab Results   Component Value Date    INR 1.23 (H) 07/30/2018    INR 2.55 (H) 03/01/2017    INR 3.74 (H) 09/06/2016         No results found for: LABA1C    No results found for: TRIG, HDL, LDLCALC, LDLDIRECT, LABVLDL    Lab Results   Component Value Date    TSH 6.070 03/01/2017         Testing Reviewed:      I have individually reviewed the cardiac test below:    ECHO:   Results for orders placed during the

## 2018-11-20 ENCOUNTER — OFFICE VISIT (OUTPATIENT)
Dept: PHYSICAL MEDICINE AND REHAB | Age: 83
End: 2018-11-20
Payer: MEDICARE

## 2018-11-20 VITALS
BODY MASS INDEX: 35.63 KG/M2 | DIASTOLIC BLOOD PRESSURE: 71 MMHG | SYSTOLIC BLOOD PRESSURE: 129 MMHG | HEART RATE: 68 BPM | WEIGHT: 227 LBS | HEIGHT: 67 IN

## 2018-11-20 DIAGNOSIS — M47.816 LUMBAR SPONDYLOSIS: ICD-10-CM

## 2018-11-20 DIAGNOSIS — I73.9 PVD (PERIPHERAL VASCULAR DISEASE) (HCC): ICD-10-CM

## 2018-11-20 DIAGNOSIS — M47.816 SPONDYLOSIS OF LUMBAR REGION WITHOUT MYELOPATHY OR RADICULOPATHY: Primary | ICD-10-CM

## 2018-11-20 DIAGNOSIS — G89.4 CHRONIC PAIN SYNDROME: ICD-10-CM

## 2018-11-20 PROCEDURE — G8598 ASA/ANTIPLAT THER USED: HCPCS | Performed by: NURSE PRACTITIONER

## 2018-11-20 PROCEDURE — 1123F ACP DISCUSS/DSCN MKR DOCD: CPT | Performed by: NURSE PRACTITIONER

## 2018-11-20 PROCEDURE — 4040F PNEUMOC VAC/ADMIN/RCVD: CPT | Performed by: NURSE PRACTITIONER

## 2018-11-20 PROCEDURE — G8417 CALC BMI ABV UP PARAM F/U: HCPCS | Performed by: NURSE PRACTITIONER

## 2018-11-20 PROCEDURE — 1036F TOBACCO NON-USER: CPT | Performed by: NURSE PRACTITIONER

## 2018-11-20 PROCEDURE — G8427 DOCREV CUR MEDS BY ELIG CLIN: HCPCS | Performed by: NURSE PRACTITIONER

## 2018-11-20 PROCEDURE — 1101F PT FALLS ASSESS-DOCD LE1/YR: CPT | Performed by: NURSE PRACTITIONER

## 2018-11-20 PROCEDURE — 99213 OFFICE O/P EST LOW 20 MIN: CPT | Performed by: NURSE PRACTITIONER

## 2018-11-20 PROCEDURE — G8484 FLU IMMUNIZE NO ADMIN: HCPCS | Performed by: NURSE PRACTITIONER

## 2018-11-20 ASSESSMENT — ENCOUNTER SYMPTOMS: BACK PAIN: 1

## 2018-11-20 NOTE — PROGRESS NOTES
[address] [facility] [place]  [address] [facility]  Dept: [phone]  Dept Fax: [phone]: [phone]    Visit Date: 11/20/2018    Functionality Assessment/Goals Worksheet     On a scale of 0 (Does not Interfere) to 10 (Completely Interferes)     1. Which number describes how during the past week pain has interfered with       the following:  A. General Activity:  5  B. Mood: 5  C. Walking Ability:  5  D. Normal Work (Includes both work outside the home and housework):  5  E. Relations with Other People:   5  F. Sleep:   5  G. Enjoyment of Life:   5    2. Patient Prefers to Take their Pain Medications:     [x]  On a regular basis   []  Only when necessary    []  Does not take pain medications    3. What are the Patient's Goals/Expectations for Visiting Pain Management? [x]  Learn about my pain    [x]  Receive Medication   []  Physical Therapy     []  Treat Depression   []  Receive Injections    []  Treat Sleep   []  Deal with Anxiety and Stress   []  Treat Opoid Dependence/Addiction   []  Other:      HPI:   Rohan Carter is a 82 y.o. male is here today for    Chief Complaint: Lower back and leg pain    Wife present     HPI   3 month FU. Continues to have pain across lower back. Effects from Bilateral L-RFA are starting to wear off. Pain has been up and down. Aching stabbing stiff pain     Leg wounds continue to heal. Still following with wound care     Percocet QID prn remains effective     Medications reviewed. Patient denies  side effects with medications. Patient states he is  taking medications as prescribed. Hedenies receiving pain medications from other sources. He had 1 ER visit since last visit. Pain scale with out pain medications is 9/10. Pain scale with pain medications is  4/10.   Last dose of percocet was today  Drug screen reviewed from 8/21/2018 and was appropriate  Pill pallor. Psychiatric: His mood appears not anxious. His affect is not angry, not blunt, not labile and not inappropriate. His speech is not rapid and/or pressured, not delayed, not tangential and not slurred. He is not agitated, not aggressive, not hyperactive, not slowed, not withdrawn, not actively hallucinating and not combative. Thought content is not paranoid and not delusional. Cognition and memory are not impaired. He does not express impulsivity or inappropriate judgment. He does not exhibit a depressed mood. He expresses no homicidal and no suicidal ideation. He expresses no suicidal plans and no homicidal plans. He is communicative. He exhibits normal recent memory and normal remote memory. He is attentive. Nursing note and vitals reviewed. CORAL test: negative   Yeomans test: negative   Gaenslen test: negative      Assessment:     1. Spondylosis of lumbar region without myelopathy or radiculopathy    2. PVD (peripheral vascular disease) ([facility] [address]    3. Lumbar spondylosis    4. Chronic pain syndrome            Plan:      · OARRSreviewed. · Discussed long term side effects of medications. · Discussed tolerance, dependency and addiction. · Previous UDS reviewed  · UDS preformed today for compliance. · Patient told can not receive any pain medications from any other source. · Discussed with patient that they may not be pain free. · No evidence of abuse, diversion or aberrant behavior. · Medications and/or procedures improve function and quality of life. · Procedure notes reviewed in detail. · Received over 60% relief from bilateral L-RFA, starting to wain. · Plan repeating Bilateral L-RFA in the future. · Medications are effective, patient is compliant. Continue Percocet 5/325 QID prn at this time- recently filled 11/14/2018  · Still following with wound care. Meds. Prescribed:   No orders of the defined types were placed in this encounter.       Return in about 1 month (around

## 2018-11-21 ENCOUNTER — HOSPITAL ENCOUNTER (OUTPATIENT)
Dept: WOUND CARE | Age: 83
Discharge: HOME | End: 2018-11-21
Payer: MEDICARE

## 2018-11-21 VITALS
WEIGHT: 227 LBS | DIASTOLIC BLOOD PRESSURE: 58 MMHG | BODY MASS INDEX: 35.63 KG/M2 | OXYGEN SATURATION: 97 % | SYSTOLIC BLOOD PRESSURE: 119 MMHG | HEART RATE: 62 BPM | RESPIRATION RATE: 18 BRPM | TEMPERATURE: 97.9 F | HEIGHT: 67 IN

## 2018-11-21 DIAGNOSIS — L89.610 DECUBITUS ULCER OF RIGHT HEEL, UNSTAGEABLE (HCC): ICD-10-CM

## 2018-11-21 DIAGNOSIS — I73.9 PAD (PERIPHERAL ARTERY DISEASE) (HCC): ICD-10-CM

## 2018-11-21 DIAGNOSIS — E11.621 DIABETIC ULCER OF TOE OF RIGHT FOOT ASSOCIATED WITH TYPE 2 DIABETES MELLITUS, WITH NECROSIS OF BONE (HCC): Primary | ICD-10-CM

## 2018-11-21 DIAGNOSIS — L97.514 DIABETIC ULCER OF TOE OF RIGHT FOOT ASSOCIATED WITH TYPE 2 DIABETES MELLITUS, WITH NECROSIS OF BONE (HCC): Primary | ICD-10-CM

## 2018-11-21 DIAGNOSIS — M86.671 CHRONIC OSTEOMYELITIS OF TOE, RIGHT (HCC): ICD-10-CM

## 2018-11-21 PROCEDURE — 6370000000 HC RX 637 (ALT 250 FOR IP): Performed by: NURSE PRACTITIONER

## 2018-11-21 PROCEDURE — 2709999900 HC NON-CHARGEABLE SUPPLY

## 2018-11-21 PROCEDURE — 87147 CULTURE TYPE IMMUNOLOGIC: CPT

## 2018-11-21 PROCEDURE — 17250 CHEM CAUT OF GRANLTJ TISSUE: CPT | Performed by: NURSE PRACTITIONER

## 2018-11-21 PROCEDURE — 87070 CULTURE OTHR SPECIMN AEROBIC: CPT

## 2018-11-21 PROCEDURE — 17250 CHEM CAUT OF GRANLTJ TISSUE: CPT

## 2018-11-21 PROCEDURE — 87205 SMEAR GRAM STAIN: CPT

## 2018-11-21 RX ORDER — DOXYCYCLINE HYCLATE 100 MG/1
100 CAPSULE ORAL 2 TIMES DAILY
Qty: 28 CAPSULE | Refills: 0 | Status: SHIPPED | OUTPATIENT
Start: 2018-11-21 | End: 2018-11-21 | Stop reason: CLARIF

## 2018-11-21 RX ORDER — DOXYCYCLINE HYCLATE 100 MG/1
100 CAPSULE ORAL 2 TIMES DAILY
Qty: 28 CAPSULE | Refills: 0 | Status: SHIPPED | OUTPATIENT
Start: 2018-11-21 | End: 2018-12-05

## 2018-11-21 RX ADMIN — SILVER NITRATE APPLICATORS 1 EACH: 25; 75 STICK TOPICAL at 15:51

## 2018-11-21 ASSESSMENT — PAIN DESCRIPTION - DESCRIPTORS: DESCRIPTORS: CONSTANT

## 2018-11-21 ASSESSMENT — PAIN DESCRIPTION - ONSET: ONSET: ON-GOING

## 2018-11-21 ASSESSMENT — PAIN DESCRIPTION - LOCATION: LOCATION: BACK

## 2018-11-21 ASSESSMENT — PAIN DESCRIPTION - ORIENTATION: ORIENTATION: LOWER

## 2018-11-21 ASSESSMENT — PAIN DESCRIPTION - FREQUENCY: FREQUENCY: CONTINUOUS

## 2018-11-21 ASSESSMENT — PAIN SCALES - GENERAL: PAINLEVEL_OUTOF10: 7

## 2018-11-21 ASSESSMENT — PAIN DESCRIPTION - PAIN TYPE: TYPE: CHRONIC PAIN

## 2018-11-21 ASSESSMENT — PAIN DESCRIPTION - PROGRESSION: CLINICAL_PROGRESSION: NOT CHANGED

## 2018-11-21 NOTE — PLAN OF CARE
gauze. Pt advised to Change every other day. Pt advised to Apply lotion to dry skin on legs with dressing changes. Next appt in 2 weeks.       Comments:                             Care plan reviewed with patient and wife. Patient and wife verbalize understanding of the plan of care and contribute to goal setting.

## 2018-11-21 NOTE — PROGRESS NOTES
[facility]          Progress Note      Osmar Lewis RECORD NUMBER:  [id number]  AGE: 82 y.o. GENDER: male  : 1935  EPISODE DATE:  2018    Subjective:     Chief Complaint   Patient presents with    Wound Check     bilateral legs and left knee         HISTORY of PRESENT ILLNESS HPI     Renetta Mai is a 82 y.o. male Established patient referred by CATRACHO Cain and Dr. Zachariah Laguna, who presents today for wound/ulcer evaluation. History of Wound Context: Patient has left lower leg wounds following left SFA to anterior tibial artery bypass for limb salvage, exploration of left popliteal artery and Benjamin thrombectomy, exploration of left posterior tibial artery by Dr. Frank Yuan on 18. Patient has had significant swelling since surgery and also has a history of venous insufficiency with venous ulcer which he was previously treated for by Dr. Ran Upton and was discharged from the wound clinic in 2017. He has been been wearing any compression to the left leg since his surgery and states that the left lateral incision blister started a couple of days ago and he has been having moderate to large amount of drainage from the left lateral and medial lower leg incisions. His wife has been applying betadine to the areas and leaving it open to air. He also has ulcers to the left medial 1st metatarsal, right posterior heel, right lateral 3rd toe, and right medial 4th toe which he has been following with Dr. Jah Clarke for their treatment. He has severe PVD and a history of diabetes. Dr. Zachariah Laguna provided patient with a prescription for Cipro for the wounds this AM and referred patient here for evaluation. Culture of left lateral lower leg from 18 positive for Pseudomonas putida and Serratia marcescens continued Cipro for a total of 14 days. 18: The wounds are improved. Edema is improved. He is tolerating the Cipro well. 18:  The wounds and ulcers (ACCUPRIL) 40 MG tablet Take 40 mg by mouth daily.  pravastatin (PRAVACHOL) 80 MG tablet Take 80 mg by mouth nightly.  indapamide (LOZOL) 1.25 MG tablet Take 1.25 mg by mouth every morning.  cyclobenzaprine (FLEXERIL) 10 MG tablet Take 10 mg by mouth every 8 hours as needed.  allopurinol (ZYLOPRIM) 300 MG tablet Take 300 mg by mouth daily       No current facility-administered medications on file prior to encounter.         REVIEW OF SYSTEMS    Constitutional: negative for chills, fevers and sweats  Eyes: negative for irritation and redness  Ears, nose, mouth, throat, and face: negative for hearing loss and hoarseness  Respiratory: negative for cough and shortness of breath  Cardiovascular: positive for lower extremity edema, negative for chest pain  Gastrointestinal: negative for abdominal pain, nausea and vomiting  Integument/breast: positive for left lower leg wounds, left dorsal foot ulcer, right lateral 3rd toe ulcer, and right medial 4th toe ulcer  Musculoskeletal:negative for muscle weakness  Neurological: negative for gait problems, memory problems and speech problems  Behavioral/Psych: positive for good mood    Objective:      BP (!) 119/58   Pulse 62   Temp 97.9 °F (36.6 °C) (Axillary)   Resp 18   Ht 5' 7\" (1.702 m)   Wt 227 lb (103 kg)   SpO2 97%   BMI 35.55 kg/m²     Wt Readings from Last 3 Encounters:   11/21/18 227 lb (103 kg)   11/20/18 227 lb (103 kg)   11/12/18 227 lb (103 kg)       PHYSICAL EXAM    General Appearance: alert and oriented to person, place and time and pale  Skin: warm and dry  Head: normocephalic and atraumatic  Eyes: pupils equal, round, and reactive to light  ENT: hearing grossly normal bilaterally  Pulmonary/Chest: clear to auscultation bilaterally- no wheezes, rales or rhonchi, normal air movement, no respiratory distress  Cardiovascular: normal rate, regular rhythm and intact distal pulses-diminished on the right  Abdomen: soft, non-tender and Changed/New 11/21/2018  3:09 PM   Wound Cleansed Rinsed/Irrigated with saline 11/21/2018  3:09 PM   Wound Length (cm) 0.2 cm 11/21/2018  3:09 PM   Wound Width (cm) 0.3 cm 11/21/2018  3:09 PM   Wound Depth (cm)  0.05 11/21/2018  3:09 PM   Calculated Wound Size (cm^2) (l*w) 0.06 cm^2 11/21/2018  3:09 PM   Change in Wound Size % (l*w) 92.5 11/21/2018  3:09 PM   Wound Assessment Yellow;Pink 11/21/2018  3:09 PM   Drainage Amount Scant 11/21/2018  3:09 PM   Drainage Description Serous 11/21/2018  3:09 PM   Odor None 11/21/2018  3:09 PM   Margins Attached edges 11/21/2018  3:09 PM   Rhoda-wound Assessment Dry;Pink 11/21/2018  3:09 PM   [place]%Wound Bed 30 11/21/2018  3:09 PM   Red%Wound Bed 25 10/12/2018 11:30 AM   Yellow%Wound Bed 70 11/21/2018  3:09 PM   Black%Wound Bed 100 10/26/2018  2:17 PM   Other%Wound Bed 30   epi 10/12/2018 11:30 AM   Op First Treatment Date 08/09/18 8/9/2018 12:10 PM   Number of days: 104       Wound 08/09/18 Toe (Comment  which one) Right #4  Third (Active)   Wound Image   11/21/2018  3:09 PM   Wound Type Wound 11/21/2018  3:09 PM   Dressing Status Intact; New drainage; Old drainage 11/21/2018  3:09 PM   Dressing Changed Changed/New 11/21/2018  3:09 PM   Wound Cleansed Rinsed/Irrigated with saline 11/21/2018  3:09 PM   Wound Length (cm) 0.5 cm 11/21/2018  3:09 PM   Wound Width (cm) 0.6 cm 11/21/2018  3:09 PM   Wound Depth (cm)  0.1 11/21/2018  3:09 PM   Calculated Wound Size (cm^2) (l*w) 0.3 cm^2 11/21/2018  3:09 PM   Change in Wound Size % (l*w) 38.78 11/21/2018  3:09 PM   Wound Assessment Yellow;Red 11/21/2018  3:09 PM   Drainage Amount Small 11/21/2018  3:09 PM   Drainage Description Serosanguinous 11/21/2018  3:09 PM   Odor None 11/21/2018  3:09 PM   Margins Attached edges 11/21/2018  3:09 PM   Rhoda-wound Assessment White;Red;Maceration 11/21/2018  3:09 PM   [place]%Wound Bed 75 10/26/2018  2:17 PM   Red%Wound Bed 30 11/21/2018  3:09 PM   Yellow%Wound Bed 70 11/21/2018  3:09 PM   Other%Wound Bed 25 white 10/26/2018  2:17 PM   Culture Taken Yes 11/21/2018  3:09 PM   Number of days: 104       Wound 08/09/18 Toe (Comment  which one) Right # 5 Fourth  (Active)   Wound Image   11/9/2018  2:29 PM   Wound Type Wound 11/21/2018  3:09 PM   Dressing Status Intact 11/21/2018  3:09 PM   Dressing Changed Changed/New 11/21/2018  3:09 PM   Wound Cleansed Rinsed/Irrigated with saline 11/21/2018  3:09 PM   Wound Length (cm) 0.2 cm 11/21/2018  3:09 PM   Wound Width (cm) 0.3 cm 11/21/2018  3:09 PM   Wound Depth (cm)  0.05 11/21/2018  3:09 PM   Calculated Wound Size (cm^2) (l*w) 0.06 cm^2 11/21/2018  3:09 PM   Change in Wound Size % (l*w) 78.57 11/21/2018  3:09 PM   Wound Assessment Yellow 11/21/2018  3:09 PM   Drainage Amount Scant 11/21/2018  3:09 PM   Drainage Description Serous 11/21/2018  3:09 PM   Odor None 11/21/2018  3:09 PM   Margins Attached edges 11/21/2018  3:09 PM   Rhoda-wound Assessment Pink 11/21/2018  3:09 PM   [place]%Wound Bed 100 10/26/2018  2:17 PM   Yellow%Wound Bed 100 11/21/2018  3:09 PM   Number of days: 104       Wound 08/09/18 Heel Right;Lateral #6 (Active)   Wound Image   11/9/2018  2:29 PM   Wound Type Wound 11/9/2018  2:29 PM   Dressing Status Intact 11/9/2018  2:29 PM   Dressing Changed Changed/New 11/9/2018  2:29 PM   Wound Cleansed Rinsed/Irrigated with saline 11/9/2018  2:29 PM   Wound Length (cm) 0 cm 11/21/2018  3:09 PM   Wound Width (cm) 0 cm 11/21/2018  3:09 PM   Wound Depth (cm)  0 11/21/2018  3:09 PM   Calculated Wound Size (cm^2) (l*w) 0 cm^2 11/21/2018  3:09 PM   Change in Wound Size % (l*w) 100 11/21/2018  3:09 PM   Undermining Starts ___ O'Clock 0 9/14/2018 12:23 PM   Undermining Ends___ O'Clock 0 9/14/2018 12:23 PM   Undermining Maxium Distance (cm) 0 9/14/2018 12:23 PM   Wound Assessment Red 11/9/2018  2:29 PM   Drainage Amount None 11/9/2018  2:29 PM   Drainage Description Yellow 9/28/2018 11:41 AM   Odor None 11/9/2018  2:29 PM   Margins Attached edges 11/9/2018  2:29 PM   Rhoda-wound Number of days: 0       LABS      CBC:   Lab Results   Component Value Date    WBC 14.1 07/31/2018    HGB 11.0 07/31/2018    HCT 33.2 07/31/2018    MCV 94.3 07/31/2018     07/31/2018     BMP:   Lab Results   Component Value Date     07/31/2018    K 3.8 07/31/2018     07/31/2018    CO2 21 07/31/2018    PHOS 3.5 09/08/2016    BUN 15 07/31/2018    CREATININE 0.8 07/31/2018     PT/INR:   Lab Results   Component Value Date    INR 1.23 07/30/2018     Prealbumin:   Lab Results   Component Value Date    PREALBUMIN 18.3 08/11/2016     Albumin:  Lab Results   Component Value Date    LABALBU 3.2 07/31/2018     Sed Rate:  Lab Results   Component Value Date    SEDRATE 31 07/20/2013     CRP:   Lab Results   Component Value Date    CRP 6.36 06/28/2016     Micro:   Lab Results   Component Value Date    BC No growth-preliminary  No growth   09/06/2016    BC No growth-preliminary  No growth   09/06/2016      Hemoglobin A1C: No results found for: LABA1C    Assessment:     Leg wound, left, full thickness  Pressure ulcer of right heel, unstageable  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  Diabetic ulcer of toe of right foot associated with type 2 diabetes mellitus, with necrosis of bone  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  Venous insufficiency of both lower extremities  Lymphedema of both lower extremities     Contributing Factors: edema, venous stasis, diabetes and arterial insufficiency    Patient Active Problem List   Diagnosis Code    Cervical spinal cord compression ([facility]) G95.20    Essential hypertension I10    Leukocytosis D72.829    Hypokalemia E87.6    Spinal stenosis, lumbar region, 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 He will need to be treated with antibiotics until the toe can be removed. Discussed ordering possible MRI with the patient and with Dr. Jamila Patel. Explained to the patient and his wife that at this point due to being able to probe to bone and the fact that he has had this ulcer to the right third toe or 1-1/2 years which has been nonhealing that this ulcer will have to be treated as osteomyelitis and MRI will most likely not change any course of the treatment. They verbalized understanding and we will hold off on ordering any MRI at this time. Referral to Dr. Ana Rothman at [place] for nonhealing right third and fourth toes with suspected osteomyelitis of the right third toe    Culture of right third toe obtained    Silver nitrate applied to the right third toe to maintain hemostasis    Doxycycline 100 mg by mouth twice daily ×14 days. Prescription sent to pharmacy    Patient to follow-up with Dr. Jamila Patel next week at her office    Wound care as follows:    Left medial leg and Left dorsal foot - Apply iodoflex to wounds. Cover with gauze. Pad top of left foot with ABD pad. Wrap with kerlix then coban from base of toes to 1-2 inches below bend of knee. Change twice weekly. Right lower leg- Wrap with kerlix and coban. Start at base of toes to 2 inches below the bend of the knee. Change twice weekly.      Right 3rd and 4th toes- Apply iodoflex to wounds. Wrap with gauze.  Change every other day.      -Apply lotion to dry skin on legs with dressing changes    Treatment:   Orders Placed This Encounter   Procedures    Aerobic culture     Right 3rd toe wound     Standing Status:   Standing     Number of Occurrences:   1    External Referral To Vascular Surgery     Referral Priority:   Routine     Referral Type:   Eval and Treat     Referral Reason:   Specialty Services Required     Requested Specialty:   Vascular Surgery     Number of Visits Requested:   1    Apply dressing    1) Gather wound care supplies and arrange on clean table.      2) Wash your hands with soap and water or use alcohol based hand  for 20 seconds (sing \"Happy Birthday\" twice).    3) Cleanse wounds with normal saline or wound cleanser and gauze. Pat dry with clean gauze.    4) Left medial leg and Left dorsal foot - Apply iodoflex to wounds. Cover with gauze. Pad top of left foot with ABD pad. Wrap with kerlix then coban from base of toes to 1-2 inches below bend of knee. 5) Right lower leg- Wrap with kerlix and coban. Start at base of toes to 2 inches below the bend of the knee.    6) Right 3rd and 4th toes- Apply iodoflex to wounds. Wrap with gauze.              Standing Status:   Standing     Number of Occurrences:   1         Antibiotics: Yes    Follow up: 2 weeks    Please see attached Discharge Instructions    Written patient dismissal instructions given to patient and signed by patient or POA. Discharge Instructions       Visit Discharge/Physician Orders:  -Wear offloading boot when sitting in bed or chair to keep pressure off of right heel.  -Make sure to dry in between toes well. - Bring compression wraps to next appointment  - Culture taken of right 3rd toe. Will call with results  - Silver nitrate applied to right 3rd toe. Wound may appear grayish. - Call Dr. Ameya Thomas office next week to schedule appointment. Will call Dr. Ameya Thomas office and leave voicemail   - Referral made for Dr. Grayson Frazier. Will schedule with Dr. Grayson Frazier at [facility] for vein issues . Will call next week with appointment   - Script for doxycyline antibiotic sent to pharmacy. Take as directed.   - Take daily over the counter probiotic to decrease stomach issues with antibiotic        Wound Location: Left lower leg (inner),   Right 3rd and 4th toes, Left foot,      Dressing orders:      1) Gather wound care supplies and arrange on clean table.      2) Wash your hands with soap and water or use alcohol based

## 2018-11-22 LAB
AEROBIC CULTURE: NORMAL
GRAM STAIN RESULT: NORMAL

## 2018-11-27 ENCOUNTER — TELEPHONE (OUTPATIENT)
Dept: WOUND CARE | Age: 83
End: 2018-11-27

## 2018-11-27 NOTE — TELEPHONE ENCOUNTER
Appt made with Dr. Francisco Rehman vascular surgery December 4th @ 1230 pm. At [facility] [address] [facility], [address] [facility]. Faxed all clinic information to [phone].

## 2018-12-05 ENCOUNTER — HOSPITAL ENCOUNTER (OUTPATIENT)
Dept: WOUND CARE | Age: 83
Discharge: HOME | End: 2018-12-05
Payer: MEDICARE

## 2018-12-05 VITALS
HEART RATE: 61 BPM | OXYGEN SATURATION: 98 % | RESPIRATION RATE: 16 BRPM | DIASTOLIC BLOOD PRESSURE: 57 MMHG | SYSTOLIC BLOOD PRESSURE: 115 MMHG | TEMPERATURE: 97.8 F

## 2018-12-05 DIAGNOSIS — I87.2 VENOUS INSUFFICIENCY OF BOTH LOWER EXTREMITIES: ICD-10-CM

## 2018-12-05 DIAGNOSIS — I89.0 LYMPHEDEMA OF BOTH LOWER EXTREMITIES: ICD-10-CM

## 2018-12-05 DIAGNOSIS — E11.621 DIABETIC ULCER OF LEFT MIDFOOT ASSOCIATED WITH TYPE 2 DIABETES MELLITUS, WITH FAT LAYER EXPOSED (HCC): ICD-10-CM

## 2018-12-05 DIAGNOSIS — M86.671 CHRONIC OSTEOMYELITIS OF TOE, RIGHT (HCC): Primary | ICD-10-CM

## 2018-12-05 DIAGNOSIS — E11.621 DIABETIC ULCER OF TOE OF RIGHT FOOT ASSOCIATED WITH TYPE 2 DIABETES MELLITUS, WITH NECROSIS OF BONE (HCC): ICD-10-CM

## 2018-12-05 DIAGNOSIS — L97.514 DIABETIC ULCER OF TOE OF RIGHT FOOT ASSOCIATED WITH TYPE 2 DIABETES MELLITUS, WITH NECROSIS OF BONE (HCC): ICD-10-CM

## 2018-12-05 DIAGNOSIS — L97.422 DIABETIC ULCER OF LEFT MIDFOOT ASSOCIATED WITH TYPE 2 DIABETES MELLITUS, WITH FAT LAYER EXPOSED (HCC): ICD-10-CM

## 2018-12-05 PROCEDURE — 87205 SMEAR GRAM STAIN: CPT

## 2018-12-05 PROCEDURE — 99214 OFFICE O/P EST MOD 30 MIN: CPT | Performed by: NURSE PRACTITIONER

## 2018-12-05 PROCEDURE — 87070 CULTURE OTHR SPECIMN AEROBIC: CPT

## 2018-12-05 PROCEDURE — 99213 OFFICE O/P EST LOW 20 MIN: CPT

## 2018-12-05 PROCEDURE — 87075 CULTR BACTERIA EXCEPT BLOOD: CPT

## 2018-12-05 RX ORDER — DOXYCYCLINE HYCLATE 100 MG
100 TABLET ORAL 2 TIMES DAILY
Qty: 8 TABLET | Refills: 0 | Status: SHIPPED | OUTPATIENT
Start: 2018-12-05 | End: 2018-12-09

## 2018-12-05 ASSESSMENT — PAIN DESCRIPTION - LOCATION: LOCATION: BACK

## 2018-12-05 ASSESSMENT — PAIN DESCRIPTION - DESCRIPTORS: DESCRIPTORS: CONSTANT

## 2018-12-05 ASSESSMENT — PAIN DESCRIPTION - PAIN TYPE: TYPE: CHRONIC PAIN

## 2018-12-05 ASSESSMENT — PAIN DESCRIPTION - ORIENTATION: ORIENTATION: LOWER

## 2018-12-05 ASSESSMENT — PAIN SCALES - GENERAL: PAINLEVEL_OUTOF10: 7

## 2018-12-05 NOTE — PROGRESS NOTES
SURGERY      band    EYE SURGERY  2010    bilatera cataracts    HERNIA REPAIR      NECK SURGERY  06/29/2016    three    NECK SURGERY      NERVE SURGERY Bilateral 05/15/2018    LUMBAR FACET MBB L3-4, L4-5, L5-S1    OTHER SURGICAL HISTORY  06/29/2016    ACDF C4-5    OTHER SURGICAL HISTORY Bilateral 04/09/2018    Lumbar facet medial branch block at L3-4, L45, L5-S1    [place] COLSC FLX W/REMOVAL LESION BY HOT BX FORCEPS Left 7/17/2017    COLONOSCOPY POLYPECTOMY HOT BIOPSY performed by Argentina Coulter MD at [place] [address] DX/THER AGNT PARAVERT FACET JOINT, LUMBAR/SAC, 2ND LEVEL Bilateral 4/9/2018    LUMBAR FACET MBB @ L3-4,L4-5 and L5-S1, BILATERAL performed by Monica Saunders MD at [facility] DX/THER AGNT PARAVERT FACET JOINT, LUMBAR/SAC, 2ND LEVEL Bilateral 5/15/2018    bilateral L-facet MBB # 2 @ L3-4, L4-5, L5-S1. performed by Monica Saunders MD at [address] [facility] OFFICE/OUTPT VISIT,PROCEDURE ONLY Left 7/30/2018    Left superficial femoral to anterior tibial cadaver saphenous vein bypass; left below knee embolectomy and exploration of left posterior tibial performed by Mallika Morillo MD at [address] [place] [address] Left 6/12/2018    LUMBAR RFA  L3-4,  L4-5,  L5-S1  LEFT SIDE performed by Monica Saunders MD at [address] [facility],  IMAGE [address] [facility] Right 7/19/2018    LUMBAR RFA  L3-4,  L4-5,  L5-S1  RIGHT SIDE performed by Monica Saunders MD at [address] [facility] N/A 7/17/2017    EGD CONTROL HEMORRHAGE performed by Argentina Coulter MD at [address] [facility]       FAMILY HISTORY    Family History   Problem Relation Age of Onset    Diabetes Mother     Cancer Father     Heart Disease Father     COPD Sister     Cancer Sister     High Blood Pressure Sister        SOCIAL HISTORY    Social History Substance Use Topics    Smoking status: Former Smoker     Quit date: 8/14/1991    Smokeless tobacco: Never Used    Alcohol use No       ALLERGIES    Allergies   Allergen Reactions    Pcn [Penicillins] Swelling       MEDICATIONS    Current Outpatient Prescriptions on File Prior to Encounter   Medication Sig Dispense Refill    doxycycline hyclate (VIBRAMYCIN) 100 MG capsule Take 1 capsule by mouth 2 times daily for 14 days 28 capsule 0    oxyCODONE-acetaminophen (PERCOCET) 5-325 MG per tablet Take 1 tablet by mouth every 6 hours as needed for Pain for up to 30 days. . 120 tablet 0    lidocaine (XYLOCAINE) 5 % ointment Apply topically as needed.  30 g 0    rivaroxaban (XARELTO) 20 MG TABS tablet TAKE 1 TABLET DAILY 90 tablet 3    Calcium Carbonate Antacid (TUMS PO) Take by mouth as needed      levothyroxine (SYNTHROID) 100 MCG tablet Take 100 mcg by mouth Daily      magnesium hydroxide (MILK OF MAGNESIA) 400 MG/5ML suspension Take 30 mLs by mouth daily as needed for Constipation      Pseudoephedrine HCl (SUDAFED PO) Take by mouth as needed      Cranberry 1000 MG CAPS Take 500 mg by mouth 2 times daily       saline nasal gel (AYR) GEL 1 g by Nasal route as needed (dryness, nosebleed prevention) 1 Tube 0    NONFORMULARY Take 1 capsule by mouth daily       tamsulosin (FLOMAX) 0.4 MG capsule Take 1 capsule by mouth daily 90 capsule 3    aspirin 81 MG EC tablet Take 1 tablet by mouth daily 1 tablet 0    Calcium Carbonate-Vitamin D (CALCIUM 600/VITAMIN D PO) Take 1 tablet by mouth 2 times daily      metoprolol tartrate (LOPRESSOR) 50 MG tablet TAKE 1 TABLET TWICE A  tablet 0    furosemide (LASIX) 40 MG tablet Take 1 tablet by mouth daily 90 tablet 2    cloNIDine (CATAPRES) 0.1 MG tablet Take 0.1 mg by mouth as needed for High Blood Pressure Takes if BP is greater than 175      hydrOXYzine (ATARAX) 25 MG tablet Take 25 mg by mouth 4 times daily as needed for Itching      loperamide (IMODIUM) 2 MG 3:08 PM   Yellow%Wound Bed 80 12/5/2018  3:08 PM   Number of days: 118       Wound 08/09/18 Foot Left;Medial #3 (Active)   Wound Image   12/5/2018  3:08 PM   Dressing Status Intact 12/5/2018  3:08 PM   Dressing Changed Changed/New 12/5/2018  3:08 PM   Wound Cleansed Rinsed/Irrigated with saline 12/5/2018  3:08 PM   Wound Length (cm) 4 cm 12/5/2018  3:08 PM   Wound Width (cm) 0.2 cm 12/5/2018  3:08 PM   Wound Depth (cm) 0.2 cm 12/5/2018  3:08 PM   Wound Surface Area (cm^2) 0.8 cm^2 12/5/2018  3:08 PM   Wound Volume (cm^3) 0.16 cm^3 12/5/2018  3:08 PM   Undermining Starts ___ O'Clock 12 12/5/2018  3:08 PM   Undermining Ends___ O'Clock 12 12/5/2018  3:08 PM   Undermining Maxium Distance (cm) 0.2 12/5/2018  3:08 PM   Wound Assessment Red 12/5/2018  3:08 PM   Drainage Amount Scant 12/5/2018  3:08 PM   Drainage Description Serosanguinous 12/5/2018  3:08 PM   Odor None 12/5/2018  3:08 PM   Margins Unattached edges 12/5/2018  3:08 PM   Rhoda-wound Assessment Dry;Calloused 12/5/2018  3:08 PM   Red%Wound Bed 100 12/5/2018  3:08 PM   Number of days: 118       Wound 11/21/18 Foot Left;Dorsal #4 (Active)   Wound Image   12/5/2018  3:08 PM   Dressing Status Intact 12/5/2018  3:08 PM   Dressing Changed Changed/New 12/5/2018  3:08 PM   Wound Cleansed Rinsed/Irrigated with saline 12/5/2018  3:08 PM   Wound Length (cm) 0.2 cm 12/5/2018  3:08 PM   Wound Width (cm) 0.5 cm 12/5/2018  3:08 PM   Wound Depth (cm) 0.1 cm 12/5/2018  3:08 PM   Wound Surface Area (cm^2) 0.1 cm^2 12/5/2018  3:08 PM   Wound Volume (cm^3) 0.01 cm^3 12/5/2018  3:08 PM   Wound Assessment Pink 12/5/2018  3:08 PM   Drainage Amount Scant 12/5/2018  3:08 PM   Drainage Description Serous 12/5/2018  3:08 PM   Odor None 12/5/2018  3:08 PM   Margins Attached edges 12/5/2018  3:08 PM   Rhoda-wound Assessment Dry;Pink 12/5/2018  3:08 PM   [place]%Wound Bed 100 12/5/2018  3:08 PM   Number of days: 14       LABS      CBC:   Lab Results   Component Value Date    WBC 14.1 07/31/2018 [facility]) I70.203    Femoral-tibial bypass graft occlusion, left, initial encounter [facility]) T82.898A    Leg wound, left S81.802A    Venous insufficiency of both lower extremities I87.2    Venous ulcer of left leg ([facility]) I83.029, L97.929    Decubitus ulcer of right heel, unstageable ([facility]) L89.610    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 ([facility] [address] E11.621, L97.422    Diabetic ulcer of toe of right foot associated with type 2 diabetes mellitus, with necrosis of bone ([facility]) E11.621, L97.514    Lymphedema of both lower extremities I89.0    Chronic osteomyelitis of 3rd toe, right ([address] M86.671       Procedure Note  Indications:  Based on my examination of this patient's wound(s)/ulcer(s) today, debridement is not required to promote healing and evaluate the extent healing. Wound/Ulcer Descriptions are listed under Physical Exam above. Plan:     Patient examined and evaluated. The right 3rd and 4th toe ulcers are larger in size and have declined since his previous visit. The toes are red and swollen. The culture from 11/21/18 was negative. Recommend bone culture of the right 3rd toe in order to determine proper treatment. Vascular intervention with Dr. Marianne Sandoval is scheduled for 1/9/19 at [facility]. He will need amputation of the right 3rd toe after vascular intervention is completed. Will need to be on antibiotics until amputation completed. The bilateral lower leg ulcers are healed. Discussed case with Dr. Todd Juarez will follow his ulcers in her office next week. Bone culture of right 3rd toe obtained    Continue Doxycyline 100mg twice daily. 4 more days worth sent to pharmacy for patient to continue until culture results are back    Left foot (top and inner)- Apply betadine to wounds. Cover with gauze. Change daily.     Right 3rd and 4th toes- Apply iodoflex to wounds. Wrap with gauze dressing. Change daily.     -Apply Juzo wraps to right and left lower legs daily in the morning and remove at night.

## 2018-12-05 NOTE — PLAN OF CARE
Problem: Wound:  Goal: Will show signs of wound healing; wound closure and no evidence of infection  Will show signs of wound healing; wound closure and no evidence of infection   Outcome: Ongoing  Patient presents to wound clinic for follow up of left foot wounds and right toe wounds. Increased redness, wound size, and swelling of right 3rd toe wounds. Bone/tissue culture taken of right 3rd toe wound per Estelita Alicia CNP. Instructed patient to wear offloading boot when sitting in bed or chair to keep pressure off of right heel. Instructed patient to make sure to dry in between toes well. Patient to continue to follow up with Dr. Zoe Lamas for right toe wounds. Appointment made for 12/11/18 at 1:15 pm. Call to reschedule a different time that works for your schedule. Procedure by Dr. Tonny Woodruff scheduled for 1/9/19 in [place]. Patient to complete Doxycyline antibiotic as prescribed. Patient to take daily over the counter probiotic to decrease stomach issues while on antibiotic. Left foot (top and inner)- Apply betadine to wounds. Cover with gauze. Change daily. Right 3rd and 4th toes- Apply iodoflex to wounds. Wrap with gauze dressing. Change daily. Apply Kaylynn Flies wraps to right and left lower legs daily in the morning and remove at night- instructed patient and wife how to apply. Apply lotion to dry skin on legs with dressing changes. Discharge from [address] [place] [address], follow up with Dr. Zoe Lamas as scheduled      Comments: Care plan reviewed with patient and wife. Patient and wife verbalize understanding of the plan of care and contribute to goal setting.

## 2018-12-10 ENCOUNTER — TELEPHONE (OUTPATIENT)
Dept: WOUND CARE | Age: 83
End: 2018-12-10

## 2018-12-10 LAB
AEROBIC CULTURE: NORMAL
ANAEROBIC CULTURE: NORMAL
GRAM STAIN RESULT: NORMAL

## 2018-12-10 RX ORDER — DOXYCYCLINE HYCLATE 100 MG/1
100 CAPSULE ORAL 2 TIMES DAILY
COMMUNITY
End: 2019-03-02

## 2018-12-18 ENCOUNTER — HOSPITAL ENCOUNTER (INPATIENT)
Age: 83
LOS: 3 days | Discharge: HOME | DRG: 617 | End: 2018-12-21
Attending: PODIATRIST | Admitting: PODIATRIST
Payer: MEDICARE

## 2018-12-18 ENCOUNTER — APPOINTMENT (OUTPATIENT)
Dept: MRI IMAGING | Age: 83
DRG: 617 | End: 2018-12-18
Attending: PODIATRIST
Payer: MEDICARE

## 2018-12-18 PROBLEM — M86.9 OSTEOMYELITIS (HCC): Status: ACTIVE | Noted: 2018-12-18

## 2018-12-18 LAB
GLUCOSE BLD-MCNC: 100 MG/DL (ref 70–108)
GLUCOSE BLD-MCNC: 102 MG/DL (ref 70–108)
GLUCOSE BLD-MCNC: 127 MG/DL (ref 70–108)

## 2018-12-18 PROCEDURE — 6370000000 HC RX 637 (ALT 250 FOR IP): Performed by: PODIATRIST

## 2018-12-18 PROCEDURE — 1200000000 HC SEMI PRIVATE

## 2018-12-18 PROCEDURE — 6360000002 HC RX W HCPCS: Performed by: INTERNAL MEDICINE

## 2018-12-18 PROCEDURE — 6370000000 HC RX 637 (ALT 250 FOR IP): Performed by: STUDENT IN AN ORGANIZED HEALTH CARE EDUCATION/TRAINING PROGRAM

## 2018-12-18 PROCEDURE — 2580000003 HC RX 258: Performed by: INTERNAL MEDICINE

## 2018-12-18 PROCEDURE — 73718 MRI LOWER EXTREMITY W/O DYE: CPT

## 2018-12-18 PROCEDURE — 2580000003 HC RX 258: Performed by: STUDENT IN AN ORGANIZED HEALTH CARE EDUCATION/TRAINING PROGRAM

## 2018-12-18 PROCEDURE — 82948 REAGENT STRIP/BLOOD GLUCOSE: CPT

## 2018-12-18 RX ORDER — CLONIDINE HYDROCHLORIDE 0.1 MG/1
0.1 TABLET ORAL PRN
Status: DISCONTINUED | OUTPATIENT
Start: 2018-12-18 | End: 2018-12-21 | Stop reason: HOSPADM

## 2018-12-18 RX ORDER — SODIUM CHLORIDE 9 MG/ML
INJECTION, SOLUTION INTRAVENOUS CONTINUOUS
Status: DISCONTINUED | OUTPATIENT
Start: 2018-12-18 | End: 2018-12-21 | Stop reason: HOSPADM

## 2018-12-18 RX ORDER — PRAVASTATIN SODIUM 80 MG/1
80 TABLET ORAL NIGHTLY
Status: DISCONTINUED | OUTPATIENT
Start: 2018-12-18 | End: 2018-12-21 | Stop reason: HOSPADM

## 2018-12-18 RX ORDER — LISINOPRIL 2.5 MG/1
2.5 TABLET ORAL DAILY
Status: DISCONTINUED | OUTPATIENT
Start: 2018-12-19 | End: 2018-12-21 | Stop reason: HOSPADM

## 2018-12-18 RX ORDER — CEFAZOLIN SODIUM 1 G/50ML
1 INJECTION, SOLUTION INTRAVENOUS EVERY 8 HOURS
Status: DISCONTINUED | OUTPATIENT
Start: 2018-12-18 | End: 2018-12-18

## 2018-12-18 RX ORDER — CYCLOBENZAPRINE HCL 10 MG
10 TABLET ORAL 3 TIMES DAILY PRN
Status: DISCONTINUED | OUTPATIENT
Start: 2018-12-18 | End: 2018-12-21 | Stop reason: HOSPADM

## 2018-12-18 RX ORDER — ASPIRIN 81 MG/1
81 TABLET ORAL DAILY
Status: DISCONTINUED | OUTPATIENT
Start: 2018-12-19 | End: 2018-12-21 | Stop reason: HOSPADM

## 2018-12-18 RX ORDER — DOCUSATE SODIUM 100 MG/1
300 CAPSULE, LIQUID FILLED ORAL 2 TIMES DAILY
Status: DISCONTINUED | OUTPATIENT
Start: 2018-12-18 | End: 2018-12-20

## 2018-12-18 RX ORDER — DOXYCYCLINE HYCLATE 100 MG
100 TABLET ORAL 2 TIMES DAILY
Status: DISCONTINUED | OUTPATIENT
Start: 2018-12-18 | End: 2018-12-21 | Stop reason: HOSPADM

## 2018-12-18 RX ORDER — ALLOPURINOL 300 MG/1
300 TABLET ORAL DAILY
Status: DISCONTINUED | OUTPATIENT
Start: 2018-12-19 | End: 2018-12-21 | Stop reason: HOSPADM

## 2018-12-18 RX ORDER — SODIUM CHLORIDE 0.9 % (FLUSH) 0.9 %
10 SYRINGE (ML) INJECTION EVERY 12 HOURS SCHEDULED
Status: DISCONTINUED | OUTPATIENT
Start: 2018-12-18 | End: 2018-12-20 | Stop reason: SDUPTHER

## 2018-12-18 RX ORDER — TAMSULOSIN HYDROCHLORIDE 0.4 MG/1
0.4 CAPSULE ORAL EVERY EVENING
Status: DISCONTINUED | OUTPATIENT
Start: 2018-12-18 | End: 2018-12-21 | Stop reason: HOSPADM

## 2018-12-18 RX ORDER — LEVOTHYROXINE SODIUM 0.1 MG/1
100 TABLET ORAL DAILY
Status: DISCONTINUED | OUTPATIENT
Start: 2018-12-19 | End: 2018-12-21 | Stop reason: HOSPADM

## 2018-12-18 RX ORDER — FUROSEMIDE 40 MG/1
40 TABLET ORAL DAILY
Status: DISCONTINUED | OUTPATIENT
Start: 2018-12-18 | End: 2018-12-21 | Stop reason: HOSPADM

## 2018-12-18 RX ORDER — OXYCODONE HYDROCHLORIDE AND ACETAMINOPHEN 5; 325 MG/1; MG/1
1 TABLET ORAL EVERY 6 HOURS PRN
Status: DISCONTINUED | OUTPATIENT
Start: 2018-12-18 | End: 2018-12-21 | Stop reason: HOSPADM

## 2018-12-18 RX ORDER — HYDROCHLOROTHIAZIDE 25 MG/1
25 TABLET ORAL DAILY
Status: DISCONTINUED | OUTPATIENT
Start: 2018-12-19 | End: 2018-12-21 | Stop reason: HOSPADM

## 2018-12-18 RX ORDER — METOPROLOL TARTRATE 50 MG/1
50 TABLET, FILM COATED ORAL 2 TIMES DAILY
Status: DISCONTINUED | OUTPATIENT
Start: 2018-12-18 | End: 2018-12-21 | Stop reason: HOSPADM

## 2018-12-18 RX ORDER — HYDROXYZINE HYDROCHLORIDE 25 MG/1
25 TABLET, FILM COATED ORAL 4 TIMES DAILY PRN
Status: DISCONTINUED | OUTPATIENT
Start: 2018-12-18 | End: 2018-12-21 | Stop reason: HOSPADM

## 2018-12-18 RX ORDER — LOPERAMIDE HYDROCHLORIDE 2 MG/1
2 CAPSULE ORAL PRN
Status: DISCONTINUED | OUTPATIENT
Start: 2018-12-18 | End: 2018-12-21 | Stop reason: HOSPADM

## 2018-12-18 RX ORDER — ONDANSETRON 2 MG/ML
4 INJECTION INTRAMUSCULAR; INTRAVENOUS EVERY 6 HOURS PRN
Status: DISCONTINUED | OUTPATIENT
Start: 2018-12-18 | End: 2018-12-21 | Stop reason: HOSPADM

## 2018-12-18 RX ORDER — OXYCODONE HYDROCHLORIDE AND ACETAMINOPHEN 5; 325 MG/1; MG/1
1 TABLET ORAL EVERY 6 HOURS PRN
COMMUNITY
End: 2018-12-24 | Stop reason: SDUPTHER

## 2018-12-18 RX ORDER — SODIUM CHLORIDE 0.9 % (FLUSH) 0.9 %
10 SYRINGE (ML) INJECTION PRN
Status: DISCONTINUED | OUTPATIENT
Start: 2018-12-18 | End: 2018-12-20 | Stop reason: SDUPTHER

## 2018-12-18 RX ADMIN — RIVAROXABAN 20 MG: 20 TABLET, FILM COATED ORAL at 21:57

## 2018-12-18 RX ADMIN — METOPROLOL TARTRATE 50 MG: 50 TABLET, FILM COATED ORAL at 21:57

## 2018-12-18 RX ADMIN — OXYCODONE AND ACETAMINOPHEN 1 TABLET: 5; 325 TABLET ORAL at 23:12

## 2018-12-18 RX ADMIN — Medication 10 ML: at 22:00

## 2018-12-18 RX ADMIN — HYDROXYZINE HYDROCHLORIDE 25 MG: 25 TABLET ORAL at 23:12

## 2018-12-18 RX ADMIN — DOCUSATE SODIUM 300 MG: 100 CAPSULE, LIQUID FILLED ORAL at 21:57

## 2018-12-18 RX ADMIN — CYCLOBENZAPRINE HYDROCHLORIDE 10 MG: 10 TABLET, FILM COATED ORAL at 21:57

## 2018-12-18 RX ADMIN — TAMSULOSIN HYDROCHLORIDE 0.4 MG: 0.4 CAPSULE ORAL at 20:00

## 2018-12-18 RX ADMIN — SODIUM CHLORIDE: 9 INJECTION, SOLUTION INTRAVENOUS at 15:50

## 2018-12-18 RX ADMIN — PRAVASTATIN SODIUM 80 MG: 80 TABLET ORAL at 21:57

## 2018-12-18 RX ADMIN — CEFEPIME HYDROCHLORIDE 1 G: 1 INJECTION, POWDER, FOR SOLUTION INTRAMUSCULAR; INTRAVENOUS at 15:50

## 2018-12-18 ASSESSMENT — PAIN DESCRIPTION - PROGRESSION
CLINICAL_PROGRESSION: NOT CHANGED

## 2018-12-18 ASSESSMENT — PAIN DESCRIPTION - FREQUENCY
FREQUENCY: INTERMITTENT
FREQUENCY: CONTINUOUS

## 2018-12-18 ASSESSMENT — PAIN DESCRIPTION - PAIN TYPE
TYPE: CHRONIC PAIN
TYPE: ACUTE PAIN
TYPE: CHRONIC PAIN

## 2018-12-18 ASSESSMENT — PAIN SCALES - GENERAL
PAINLEVEL_OUTOF10: 7
PAINLEVEL_OUTOF10: 6
PAINLEVEL_OUTOF10: 6
PAINLEVEL_OUTOF10: 5

## 2018-12-18 ASSESSMENT — PAIN DESCRIPTION - ORIENTATION
ORIENTATION: LOWER;MID;UPPER
ORIENTATION: RIGHT
ORIENTATION: RIGHT

## 2018-12-18 ASSESSMENT — PAIN DESCRIPTION - ONSET: ONSET: ON-GOING

## 2018-12-18 ASSESSMENT — PAIN DESCRIPTION - DESCRIPTORS
DESCRIPTORS: ACHING;DISCOMFORT
DESCRIPTORS: ACHING;CONSTANT
DESCRIPTORS: ACHING;DISCOMFORT

## 2018-12-18 ASSESSMENT — PAIN DESCRIPTION - LOCATION
LOCATION: BACK
LOCATION: FOOT;BACK
LOCATION: BACK;FOOT

## 2018-12-18 NOTE — CARE COORDINATION
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 Procedure: planning for resection of right 3rd toe. Pertinent abnormal Imaging:none, MRI of right foot ordered  Medications:  Scheduled Meds:   [START ON 12/19/2018] lisinopril  2.5 mg Oral Daily    pravastatin  80 mg Oral Nightly    [START ON 12/19/2018] hydrochlorothiazide  25 mg Oral Daily    [START ON 12/19/2018] allopurinol  300 mg Oral Daily    magnesium oxide  400 mg Oral Daily    docusate sodium  300 mg Oral BID    furosemide  40 mg Oral Daily    metoprolol tartrate  50 mg Oral BID    [START ON 12/19/2018] aspirin  81 mg Oral Daily    tamsulosin  0.4 mg Oral QPM    [START ON 12/19/2018] levothyroxine  100 mcg Oral Daily    rivaroxaban  20 mg Oral Dinner    doxycycline hyclate  100 mg Oral BID    sodium chloride flush  10 mL Intravenous 2 times per day    cefepime  1 g Intravenous Q12H     Continuous Infusions:   sodium chloride        Pertinent Info/Orders/Treatment Plan:  Podiatry and ID following. PT eval. N/V checks. IV cefepime. PO doxycycline. Xarelto. Home meds. Planning resection of right 3rd toe. Diet: DIET CARB CONTROL;   Smoking status:  reports that he quit smoking about 27 years ago.  He has never used smokeless tobacco.   PCP: Jovanny Stark MD  Readmission: no  Readmission Risk Score: 11%    Discharge Planning  Current Residence:  Private Residence  Living Arrangements:  Spouse/Significant Other   Support Systems:  Spouse/Significant Other, Children  Current Services PTA:     Potential Assistance Needed:  N/A  Potential Assistance Purchasing Medications:  No  Does patient want to participate in local refill/ meds to beds program?  No  Type of Home Care Services:  None  Patient expects to be discharged to:  Home  Expected Discharge date:  12/21/18  Follow Up Appointment: Best Day/ Time:  (any time)    Discharge Plan: Spoke with patient, plans to return home with wife at discharge. He declines services, wife manages dressing care at home and he follows at [facility] wound clinic. He does not anticipate discharge needs. Monitor atb plan.       Evaluation: no

## 2018-12-18 NOTE — CONSULTS
[address] [facility] 12  2010    bilatera cataracts    HERNIA REPAIR      NECK SURGERY  06/29/2016    three    NECK SURGERY      NERVE SURGERY Bilateral 05/15/2018    LUMBAR FACET MBB L3-4, L4-5, L5-S1    OTHER SURGICAL HISTORY  06/29/2016    ACDF C4-5    OTHER SURGICAL HISTORY Bilateral 04/09/2018    Lumbar facet medial branch block at L3-4, L45, L5-S1    NE COLSC FLX W/REMOVAL LESION BY HOT BX FORCEPS Left 7/17/2017    COLONOSCOPY POLYPECTOMY HOT BIOPSY performed by Sylvester Barahona MD at [place] [address] DX/THER AGNT PARAVERT FACET JOINT, LUMBAR/SAC, 2ND LEVEL Bilateral 4/9/2018    LUMBAR FACET MBB @ L3-4,L4-5 and L5-S1, BILATERAL performed by Salome Collado MD at [address] [place] [address] DX/THER AGNT PARAVERT FACET JOINT, LUMBAR/SAC, 2ND LEVEL Bilateral 5/15/2018    bilateral L-facet MBB # 2 @ L3-4, L4-5, L5-S1. performed by Salome Collado MD at [address] [place] [address] OFFICE/OUTPT VISIT,PROCEDURE ONLY Left 7/30/2018    Left superficial femoral to anterior tibial cadaver saphenous vein bypass; left below knee embolectomy and exploration of left posterior tibial performed by Chaim Decker MD at [address] [place] [address] Left 6/12/2018    LUMBAR RFA  L3-4,  L4-5,  L5-S1  LEFT SIDE performed by Slaome Collado MD at [address] [facility], [address] IM[address] [facility] LEVEL Right 7/19/2018    LUMBAR RFA  L3-4,  L4-5,  L5-S1  RIGHT SIDE performed by Salome Collado MD at [address] [facility] N/A 7/17/2017    EGD CONTROL HEMORRHAGE performed by Sylvester Barahona MD at [facility]:       Scheduled Meds:   [START ON 12/19/2018] lisinopril  2.5 mg Oral Daily    pravastatin  80 mg Oral Nightly    [START ON 12/19/2018] hydrochlorothiazide  25 mg Oral Daily    [START ON 12/19/2018] allopurinol  300

## 2018-12-18 NOTE — H&P
[address] [place] [id number]                              HISTORY AND PHYSICAL    PATIENT NAME: Rakesh Johnston                   :        1935  MED REC NO:   [id number]                           ROOM:       [id number]  ACCOUNT NO:   [de-identified]                           ADMIT DATE: 2018  PROVIDER:     Cecilia Robertson D.P.M. REASON FOR ADMISSION:  Osteomyelitis, right third toe; cellulitis, right  third toe. HISTORY OF PRESENT ILLNESS:  The patient is a pleasant 72-year-old male  whom I have been treating for a chronic non-healing wound on the lateral  aspect of the right third toe. Tthe wound does probe down to  bone and cellulitis is present, so surgical intervention is necessary. He was seen at the office today and then directly admitted to [address] [place] [address]. PAST MEDICAL HISTORY:  Significant for UTI, thyroid disease, MI,  irregular cardiac rhythm, hypertension, hyperlipidemia, history of blood  clots, hernia, gout, DVT, degenerative disk disease, cancer, CAD, and  AFib. PAST SURGICAL HISTORY:  Includes upper gastrointestinal endoscopy, back  surgery, neck surgery, eye surgery, colon surgery, cataract removal,  cardiac cath. FAMILY HISTORY:  His mother and father are . His mother had a  history of diabetes. His father had history of heart disease and  cancer. His sister had  and had history of COPD, cancer, and  high blood pressure. SOCIAL HISTORY:  He is a former smoker. He quit in . He denies any  alcohol use. He denies any illicit drug use. He lives at home with his  wife. ALLERGIES:  He is allergic to PENICILLIN, which causes swelling. MEDICATIONS:  Allopurinol, aspirin, clonidine, Flexeril, Colace, Lasix,  hydrochlorothiazide, Atarax, Synthroid, Zestril, Lopressor, Pravachol,  Xarelto, Flomax. REVIEW OF SYSTEMS:  He denies any nausea, vomiting, fever, or chills.    Denies any

## 2018-12-19 LAB
ANION GAP SERPL CALCULATED.3IONS-SCNC: 11 MEQ/L (ref 8–16)
BASOPHILS # BLD: 0.6 %
BASOPHILS ABSOLUTE: 0.1 THOU/MM3 (ref 0–0.1)
BUN BLDV-MCNC: 28 MG/DL (ref 7–22)
CALCIUM SERPL-MCNC: 8.3 MG/DL (ref 8.5–10.5)
CHLORIDE BLD-SCNC: 103 MEQ/L (ref 98–111)
CO2: 24 MEQ/L (ref 23–33)
CREAT SERPL-MCNC: 0.9 MG/DL (ref 0.4–1.2)
EOSINOPHIL # BLD: 7.6 %
EOSINOPHILS ABSOLUTE: 0.8 THOU/MM3 (ref 0–0.4)
ERYTHROCYTE [DISTWIDTH] IN BLOOD BY AUTOMATED COUNT: 17.2 % (ref 11.5–14.5)
ERYTHROCYTE [DISTWIDTH] IN BLOOD BY AUTOMATED COUNT: 58.9 FL (ref 35–45)
GFR SERPL CREATININE-BSD FRML MDRD: 81 ML/MIN/1.73M2
GLUCOSE BLD-MCNC: 102 MG/DL (ref 70–108)
GLUCOSE BLD-MCNC: 105 MG/DL (ref 70–108)
HCT VFR BLD CALC: 34.1 % (ref 42–52)
HEMOGLOBIN: 11.2 GM/DL (ref 14–18)
IMMATURE GRANS (ABS): 0.04 THOU/MM3 (ref 0–0.07)
IMMATURE GRANULOCYTES: 0.4 %
INR BLD: 1.78 (ref 0.85–1.13)
INR BLD: 3.75 (ref 0.85–1.13)
LYMPHOCYTES # BLD: 33.3 %
LYMPHOCYTES ABSOLUTE: 3.3 THOU/MM3 (ref 1–4.8)
MCH RBC QN AUTO: 31 PG (ref 26–33)
MCHC RBC AUTO-ENTMCNC: 32.8 GM/DL (ref 32.2–35.5)
MCV RBC AUTO: 94.5 FL (ref 80–94)
MONOCYTES # BLD: 8.1 %
MONOCYTES ABSOLUTE: 0.8 THOU/MM3 (ref 0.4–1.3)
NUCLEATED RED BLOOD CELLS: 0 /100 WBC
PLATELET # BLD: 204 THOU/MM3 (ref 130–400)
PMV BLD AUTO: 10 FL (ref 9.4–12.4)
POTASSIUM SERPL-SCNC: 3.7 MEQ/L (ref 3.5–5.2)
RBC # BLD: 3.61 MILL/MM3 (ref 4.7–6.1)
SEG NEUTROPHILS: 50 %
SEGMENTED NEUTROPHILS ABSOLUTE COUNT: 5 THOU/MM3 (ref 1.8–7.7)
SODIUM BLD-SCNC: 138 MEQ/L (ref 135–145)
WBC # BLD: 9.9 THOU/MM3 (ref 4.8–10.8)

## 2018-12-19 PROCEDURE — 6360000002 HC RX W HCPCS: Performed by: INTERNAL MEDICINE

## 2018-12-19 PROCEDURE — 2580000003 HC RX 258: Performed by: INTERNAL MEDICINE

## 2018-12-19 PROCEDURE — 80048 BASIC METABOLIC PNL TOTAL CA: CPT

## 2018-12-19 PROCEDURE — 1200000000 HC SEMI PRIVATE

## 2018-12-19 PROCEDURE — 36415 COLL VENOUS BLD VENIPUNCTURE: CPT

## 2018-12-19 PROCEDURE — 6370000000 HC RX 637 (ALT 250 FOR IP): Performed by: STUDENT IN AN ORGANIZED HEALTH CARE EDUCATION/TRAINING PROGRAM

## 2018-12-19 PROCEDURE — 6370000000 HC RX 637 (ALT 250 FOR IP): Performed by: PODIATRIST

## 2018-12-19 PROCEDURE — 88305 TISSUE EXAM BY PATHOLOGIST: CPT

## 2018-12-19 PROCEDURE — 85610 PROTHROMBIN TIME: CPT

## 2018-12-19 PROCEDURE — 82948 REAGENT STRIP/BLOOD GLUCOSE: CPT

## 2018-12-19 PROCEDURE — 85025 COMPLETE CBC W/AUTO DIFF WBC: CPT

## 2018-12-19 RX ORDER — PHYTONADIONE 5 MG/1
10 TABLET ORAL ONCE
Status: COMPLETED | OUTPATIENT
Start: 2018-12-19 | End: 2018-12-19

## 2018-12-19 RX ADMIN — CEFEPIME HYDROCHLORIDE 1 G: 1 INJECTION, POWDER, FOR SOLUTION INTRAMUSCULAR; INTRAVENOUS at 16:07

## 2018-12-19 RX ADMIN — DOXYCYCLINE HYCLATE 100 MG: 100 TABLET, COATED ORAL at 08:19

## 2018-12-19 RX ADMIN — HYDROCHLOROTHIAZIDE 25 MG: 25 TABLET ORAL at 08:19

## 2018-12-19 RX ADMIN — FUROSEMIDE 40 MG: 40 TABLET ORAL at 08:19

## 2018-12-19 RX ADMIN — METOPROLOL TARTRATE 50 MG: 50 TABLET, FILM COATED ORAL at 22:02

## 2018-12-19 RX ADMIN — ALLOPURINOL 300 MG: 300 TABLET ORAL at 08:19

## 2018-12-19 RX ADMIN — CYCLOBENZAPRINE HYDROCHLORIDE 10 MG: 10 TABLET, FILM COATED ORAL at 22:02

## 2018-12-19 RX ADMIN — METOPROLOL TARTRATE 50 MG: 50 TABLET, FILM COATED ORAL at 08:18

## 2018-12-19 RX ADMIN — OXYCODONE AND ACETAMINOPHEN 1 TABLET: 5; 325 TABLET ORAL at 20:14

## 2018-12-19 RX ADMIN — TAMSULOSIN HYDROCHLORIDE 0.4 MG: 0.4 CAPSULE ORAL at 20:14

## 2018-12-19 RX ADMIN — LEVOTHYROXINE SODIUM 100 MCG: 137 TABLET ORAL at 05:49

## 2018-12-19 RX ADMIN — PHYTONADIONE 10 MG: 5 TABLET ORAL at 08:06

## 2018-12-19 RX ADMIN — DOCUSATE SODIUM 300 MG: 100 CAPSULE, LIQUID FILLED ORAL at 08:17

## 2018-12-19 RX ADMIN — DOCUSATE SODIUM 300 MG: 100 CAPSULE, LIQUID FILLED ORAL at 22:02

## 2018-12-19 RX ADMIN — LISINOPRIL 2.5 MG: 2.5 TABLET ORAL at 08:18

## 2018-12-19 RX ADMIN — OXYCODONE AND ACETAMINOPHEN 1 TABLET: 5; 325 TABLET ORAL at 05:56

## 2018-12-19 RX ADMIN — PRAVASTATIN SODIUM 80 MG: 80 TABLET ORAL at 22:02

## 2018-12-19 RX ADMIN — CEFEPIME HYDROCHLORIDE 1 G: 1 INJECTION, POWDER, FOR SOLUTION INTRAMUSCULAR; INTRAVENOUS at 04:08

## 2018-12-19 RX ADMIN — HYDROXYZINE HYDROCHLORIDE 25 MG: 25 TABLET ORAL at 22:03

## 2018-12-19 ASSESSMENT — PAIN DESCRIPTION - PROGRESSION
CLINICAL_PROGRESSION: GRADUALLY WORSENING
CLINICAL_PROGRESSION: NOT CHANGED
CLINICAL_PROGRESSION: GRADUALLY WORSENING
CLINICAL_PROGRESSION: NOT CHANGED

## 2018-12-19 ASSESSMENT — PAIN DESCRIPTION - DESCRIPTORS
DESCRIPTORS: ACHING;DISCOMFORT

## 2018-12-19 ASSESSMENT — PAIN DESCRIPTION - FREQUENCY
FREQUENCY: CONTINUOUS

## 2018-12-19 ASSESSMENT — PAIN SCALES - GENERAL
PAINLEVEL_OUTOF10: 8
PAINLEVEL_OUTOF10: 4
PAINLEVEL_OUTOF10: 8
PAINLEVEL_OUTOF10: 7
PAINLEVEL_OUTOF10: 6
PAINLEVEL_OUTOF10: 8
PAINLEVEL_OUTOF10: 10
PAINLEVEL_OUTOF10: 7

## 2018-12-19 ASSESSMENT — PAIN DESCRIPTION - ORIENTATION
ORIENTATION: MID;LOWER
ORIENTATION: RIGHT;LOWER;MID
ORIENTATION: MID;LOWER;RIGHT
ORIENTATION: RIGHT
ORIENTATION: RIGHT
ORIENTATION: LOWER;MID

## 2018-12-19 ASSESSMENT — PAIN DESCRIPTION - ONSET
ONSET: ON-GOING

## 2018-12-19 ASSESSMENT — PAIN DESCRIPTION - PAIN TYPE
TYPE: ACUTE PAIN;CHRONIC PAIN
TYPE: CHRONIC PAIN
TYPE: ACUTE PAIN;CHRONIC PAIN
TYPE: CHRONIC PAIN
TYPE: ACUTE PAIN;CHRONIC PAIN
TYPE: CHRONIC PAIN

## 2018-12-19 ASSESSMENT — PAIN DESCRIPTION - LOCATION
LOCATION: BACK
LOCATION: BACK
LOCATION: BACK;FOOT
LOCATION: BACK
LOCATION: BACK;FOOT
LOCATION: BACK;FOOT

## 2018-12-19 NOTE — PROGRESS NOTES
Podiatric Progress Note  Osmar Johnson  Subjective :     12.19.18  Patient seen at bedside this am with Dr. German Cruz. Discussed MRI results of right foot with patient and schedule for surgery tomorrow. Patient is agreeable. Patient states his pain is improving from yesterday. Patient denies any N/V/F/C/SOB or CP. Patient has no further pedal concerns at this point in time.      Current Medications:    Current Facility-Administered Medications: phytonadione (VITAMIN K) tablet 10 mg, 10 mg, Oral, Once  lisinopril (PRINIVIL;ZESTRIL) tablet 2.5 mg, 2.5 mg, Oral, Daily  pravastatin (PRAVACHOL) tablet 80 mg, 80 mg, Oral, Nightly  hydrochlorothiazide (HYDRODIURIL) tablet 25 mg, 25 mg, Oral, Daily  cyclobenzaprine (FLEXERIL) tablet 10 mg, 10 mg, Oral, TID PRN  allopurinol (ZYLOPRIM) tablet 300 mg, 300 mg, Oral, Daily  magnesium oxide (MAG-OX) tablet 400 mg, 400 mg, Oral, Daily  docusate sodium (COLACE) capsule 300 mg, 300 mg, Oral, BID  cloNIDine (CATAPRES) tablet 0.1 mg, 0.1 mg, Oral, PRN  hydrOXYzine (ATARAX) tablet 25 mg, 25 mg, Oral, 4x Daily PRN  loperamide (IMODIUM) capsule 2 mg, 2 mg, Oral, PRN  furosemide (LASIX) tablet 40 mg, 40 mg, Oral, Daily  metoprolol tartrate (LOPRESSOR) tablet 50 mg, 50 mg, Oral, BID  aspirin EC tablet 81 mg, 81 mg, Oral, Daily  tamsulosin (FLOMAX) capsule 0.4 mg, 0.4 mg, Oral, QPM  sodium chloride (OCEAN, BABY AYR) 0.65 % nasal spray 1 spray, 1 spray, Nasal, PRN  levothyroxine (SYNTHROID) tablet 100 mcg, 100 mcg, Oral, Daily  magnesium hydroxide (MILK OF MAGNESIA) 400 MG/5ML suspension 30 mL, 30 mL, Oral, Daily PRN  rivaroxaban (XARELTO) tablet 20 mg, 20 mg, Oral, Dinner  doxycycline hyclate (VIBRA-TABS) tablet 100 mg, 100 mg, Oral, BID  0.9 % sodium chloride infusion, , Intravenous, Continuous  sodium chloride flush 0.9 % injection 10 mL, 10 mL, Intravenous, 2 times per day  sodium chloride flush 0.9 % injection 10 mL, 10 mL, Intravenous, PRN  ondansetron (ZOFRAN) injection 4 mg, 4 mg, is less than three seconds. Edema is +1  pitting for bilateral lower extremities. NEUROLOGICAL:  Sensation is intact with normal reflexes. MUSCULOSKELETAL:  He has normal range of motion and normal strength.     Impression   1. There is a soft tissue ulcer along the lateral margin of the right 3rd toe with associated skin thickening and subcutaneous edema which extends to the lateral margin of the 3rd proximal interphalangeal joint. There is marrow edema within the 3rd    proximal and middle phalanges consistent with osteomyelitis. There is no joint effusion. 2. The clinically described ulcer along the medial aspect of the right 4th toe is not definitively identified. There is skin and subcutaneous edema can be associated with a cellulitis. There is marrow edema within the 4th middle phalanx without osseous    destruction or periostitis. The differential includes reactive marrow edema and osteomyelitis. There is no joint effusion. 3. There is subcutaneous edema throughout the forefoot, most prominent dorsally which in the right clinical setting can be associated with a cellulitis. There is no abscess.               **This report has been created using voice recognition software.  It may contain minor errors which are inherent in voice recognition technology. **       Final report electronically signed by Dr. Ely Larry on 12/19/2018 6:02 AM         ASSESSMENT  Principle  1. 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 2.  Osteomyelitis, right third toe. 3.  Cellulitis, right third toe. 4.  Ulcer, right fourth toe down to subcutaneous.     Chronic  Patient Active Problem List   Diagnosis    Cervical spinal cord compression ([facility] [address]    Essential hypertension    Leukocytosis    Hypokalemia    Spinal stenosis, lumbar region, with neurogenic claudication    Idiopathic hypotension    Chronic atrial fibrillation (HCC)    Hypokalemia    Prostatism    Elevated TSH    PAD (peripheral artery disease)

## 2018-12-19 NOTE — PLAN OF CARE
Problem: Pain:  Goal: Pain level will decrease  Pain level will decrease    Outcome: Ongoing  Patient has pain medication available on MAR as needed to control pain. Patient states he has chronic pain    Problem: Falls - Risk of:  Goal: Will remain free from falls  Will remain free from falls   Outcome: Ongoing  Pt up with assistance, and walker, ambulates well, able to use call light when needing assistance    Problem: Cardiovascular  Goal: No DVT, peripheral vascular complications  Outcome: Ongoing  Patient taking xarelto. Xarelto is held for now because patient is going to surgery in the morning     Problem: GI  Goal: No bowel complications  Outcome: Ongoing  Active bowel sounds, had bowel movement this morning     Problem:   Goal: Adequate urinary output  Outcome: Ongoing  Urinating adequately     Problem: Nutrition  Goal: Optimal nutrition therapy  Outcome: Ongoing  Tolerating diet     Problem: Skin Integrity/Risk  Goal: No skin breakdown during hospitalization  Outcome: Ongoing  Non healing wound on 3rd right toe    Problem: Musculor/Skeletal Functional Status  Goal: Highest potential functional level  Outcome: Ongoing  Up with 1 assist and walker     Problem: Daily Care:  Goal: Daily care needs are met  Daily care needs are met   Outcome: Ongoing  Patient care needs are met as they arise     Problem: Discharge Planning:  Goal: Discharged to appropriate level of care  Discharged to appropriate level of care   Outcome: Ongoing  Patient plans to go home with wife at discharge     Care plan reviewed with patient. Patient verbalizes understanding of the plan of care and contribute to goal setting.

## 2018-12-20 PROCEDURE — 2500000003 HC RX 250 WO HCPCS: Performed by: PODIATRIST

## 2018-12-20 PROCEDURE — 87147 CULTURE TYPE IMMUNOLOGIC: CPT

## 2018-12-20 PROCEDURE — 2709999900 HC NON-CHARGEABLE SUPPLY: Performed by: PODIATRIST

## 2018-12-20 PROCEDURE — 87070 CULTURE OTHR SPECIMN AEROBIC: CPT

## 2018-12-20 PROCEDURE — 87075 CULTR BACTERIA EXCEPT BLOOD: CPT

## 2018-12-20 PROCEDURE — G8978 MOBILITY CURRENT STATUS: HCPCS

## 2018-12-20 PROCEDURE — 97161 PT EVAL LOW COMPLEX 20 MIN: CPT

## 2018-12-20 PROCEDURE — 6370000000 HC RX 637 (ALT 250 FOR IP): Performed by: PODIATRIST

## 2018-12-20 PROCEDURE — 97110 THERAPEUTIC EXERCISES: CPT

## 2018-12-20 PROCEDURE — 6360000002 HC RX W HCPCS: Performed by: INTERNAL MEDICINE

## 2018-12-20 PROCEDURE — 2580000003 HC RX 258: Performed by: INTERNAL MEDICINE

## 2018-12-20 PROCEDURE — G8979 MOBILITY GOAL STATUS: HCPCS

## 2018-12-20 PROCEDURE — 1200000000 HC SEMI PRIVATE

## 2018-12-20 PROCEDURE — 87205 SMEAR GRAM STAIN: CPT

## 2018-12-20 PROCEDURE — 3600000003 HC SURGERY LEVEL 3 BASE: Performed by: PODIATRIST

## 2018-12-20 PROCEDURE — 6370000000 HC RX 637 (ALT 250 FOR IP): Performed by: STUDENT IN AN ORGANIZED HEALTH CARE EDUCATION/TRAINING PROGRAM

## 2018-12-20 PROCEDURE — 0Y6T0Z0 DETACHMENT AT RIGHT 3RD TOE, COMPLETE, OPEN APPROACH: ICD-10-PCS | Performed by: PODIATRIST

## 2018-12-20 PROCEDURE — 3600000013 HC SURGERY LEVEL 3 ADDTL 15MIN: Performed by: PODIATRIST

## 2018-12-20 RX ORDER — DOCUSATE SODIUM 100 MG/1
100 CAPSULE, LIQUID FILLED ORAL 2 TIMES DAILY
Status: DISCONTINUED | OUTPATIENT
Start: 2018-12-20 | End: 2018-12-21 | Stop reason: HOSPADM

## 2018-12-20 RX ORDER — SODIUM CHLORIDE 0.9 % (FLUSH) 0.9 %
10 SYRINGE (ML) INJECTION EVERY 12 HOURS SCHEDULED
Status: DISCONTINUED | OUTPATIENT
Start: 2018-12-20 | End: 2018-12-21 | Stop reason: HOSPADM

## 2018-12-20 RX ORDER — SODIUM CHLORIDE 0.9 % (FLUSH) 0.9 %
10 SYRINGE (ML) INJECTION PRN
Status: DISCONTINUED | OUTPATIENT
Start: 2018-12-20 | End: 2018-12-21 | Stop reason: HOSPADM

## 2018-12-20 RX ORDER — BUPIVACAINE HYDROCHLORIDE 5 MG/ML
INJECTION, SOLUTION EPIDURAL; INTRACAUDAL PRN
Status: DISCONTINUED | OUTPATIENT
Start: 2018-12-20 | End: 2018-12-20 | Stop reason: HOSPADM

## 2018-12-20 RX ORDER — ZOLPIDEM TARTRATE 5 MG/1
5 TABLET ORAL NIGHTLY PRN
Status: DISCONTINUED | OUTPATIENT
Start: 2018-12-20 | End: 2018-12-21 | Stop reason: HOSPADM

## 2018-12-20 RX ORDER — ACETAMINOPHEN 325 MG/1
650 TABLET ORAL EVERY 4 HOURS PRN
Status: DISCONTINUED | OUTPATIENT
Start: 2018-12-20 | End: 2018-12-21 | Stop reason: HOSPADM

## 2018-12-20 RX ADMIN — ALLOPURINOL 300 MG: 300 TABLET ORAL at 09:29

## 2018-12-20 RX ADMIN — TAMSULOSIN HYDROCHLORIDE 0.4 MG: 0.4 CAPSULE ORAL at 17:27

## 2018-12-20 RX ADMIN — CEFEPIME HYDROCHLORIDE 1 G: 1 INJECTION, POWDER, FOR SOLUTION INTRAMUSCULAR; INTRAVENOUS at 04:13

## 2018-12-20 RX ADMIN — OXYCODONE AND ACETAMINOPHEN 1 TABLET: 5; 325 TABLET ORAL at 09:29

## 2018-12-20 RX ADMIN — HYDROXYZINE HYDROCHLORIDE 25 MG: 25 TABLET ORAL at 22:03

## 2018-12-20 RX ADMIN — FUROSEMIDE 40 MG: 40 TABLET ORAL at 09:29

## 2018-12-20 RX ADMIN — DOXYCYCLINE HYCLATE 100 MG: 100 TABLET, COATED ORAL at 22:03

## 2018-12-20 RX ADMIN — METOPROLOL TARTRATE 50 MG: 50 TABLET, FILM COATED ORAL at 22:02

## 2018-12-20 RX ADMIN — LISINOPRIL 2.5 MG: 2.5 TABLET ORAL at 09:29

## 2018-12-20 RX ADMIN — OXYCODONE AND ACETAMINOPHEN 1 TABLET: 5; 325 TABLET ORAL at 02:18

## 2018-12-20 RX ADMIN — CYCLOBENZAPRINE HYDROCHLORIDE 10 MG: 10 TABLET, FILM COATED ORAL at 22:03

## 2018-12-20 RX ADMIN — HYDROXYZINE HYDROCHLORIDE 25 MG: 25 TABLET ORAL at 15:34

## 2018-12-20 RX ADMIN — RIVAROXABAN 20 MG: 20 TABLET, FILM COATED ORAL at 17:27

## 2018-12-20 RX ADMIN — OXYCODONE AND ACETAMINOPHEN 1 TABLET: 5; 325 TABLET ORAL at 15:34

## 2018-12-20 RX ADMIN — CYCLOBENZAPRINE HYDROCHLORIDE 10 MG: 10 TABLET, FILM COATED ORAL at 09:29

## 2018-12-20 RX ADMIN — DOCUSATE SODIUM 100 MG: 100 CAPSULE, LIQUID FILLED ORAL at 09:29

## 2018-12-20 RX ADMIN — DOXYCYCLINE HYCLATE 100 MG: 100 TABLET, COATED ORAL at 09:29

## 2018-12-20 RX ADMIN — OXYCODONE AND ACETAMINOPHEN 1 TABLET: 5; 325 TABLET ORAL at 22:03

## 2018-12-20 RX ADMIN — PRAVASTATIN SODIUM 80 MG: 80 TABLET ORAL at 22:03

## 2018-12-20 RX ADMIN — DOCUSATE SODIUM 100 MG: 100 CAPSULE, LIQUID FILLED ORAL at 22:02

## 2018-12-20 RX ADMIN — CEFEPIME HYDROCHLORIDE 1 G: 1 INJECTION, POWDER, FOR SOLUTION INTRAMUSCULAR; INTRAVENOUS at 15:34

## 2018-12-20 RX ADMIN — HYDROCHLOROTHIAZIDE 25 MG: 25 TABLET ORAL at 09:29

## 2018-12-20 RX ADMIN — METOPROLOL TARTRATE 50 MG: 50 TABLET, FILM COATED ORAL at 09:29

## 2018-12-20 ASSESSMENT — PAIN DESCRIPTION - ORIENTATION
ORIENTATION: LOWER
ORIENTATION: LOWER;OUTER
ORIENTATION: LOWER

## 2018-12-20 ASSESSMENT — PAIN DESCRIPTION - FREQUENCY
FREQUENCY: CONTINUOUS

## 2018-12-20 ASSESSMENT — PAIN DESCRIPTION - ONSET
ONSET: ON-GOING

## 2018-12-20 ASSESSMENT — PAIN DESCRIPTION - DESCRIPTORS
DESCRIPTORS: ACHING;DISCOMFORT
DESCRIPTORS: ACHING;CONSTANT

## 2018-12-20 ASSESSMENT — PAIN SCALES - GENERAL
PAINLEVEL_OUTOF10: 10
PAINLEVEL_OUTOF10: 8
PAINLEVEL_OUTOF10: 10
PAINLEVEL_OUTOF10: 8
PAINLEVEL_OUTOF10: 5

## 2018-12-20 ASSESSMENT — PAIN DESCRIPTION - PROGRESSION
CLINICAL_PROGRESSION: NOT CHANGED
CLINICAL_PROGRESSION: RAPIDLY WORSENING
CLINICAL_PROGRESSION: NOT CHANGED
CLINICAL_PROGRESSION: GRADUALLY IMPROVING

## 2018-12-20 ASSESSMENT — PAIN DESCRIPTION - PAIN TYPE
TYPE: ACUTE PAIN;SURGICAL PAIN;CHRONIC PAIN
TYPE: ACUTE PAIN;SURGICAL PAIN
TYPE: ACUTE PAIN;SURGICAL PAIN
TYPE: CHRONIC PAIN
TYPE: ACUTE PAIN;SURGICAL PAIN;CHRONIC PAIN

## 2018-12-20 ASSESSMENT — PAIN DESCRIPTION - LOCATION
LOCATION: BACK

## 2018-12-20 NOTE — CARE COORDINATION
12/20/18, 10:00 AM      Ovi Brown day: 2  Location: [facility]09/009-A Reason for admit: Osteomyelitis [facility]) [M86.9]   Procedure: 12/20/18 surgery by Dr Gilliland/ Dr Oconnor [place]: [address] of Care: 1) Check operative dressing with vital signs. 2)please reinforce dressing with 4x4 gauze, kerlix, ace wrap if strikethrough noted. PT. Pain management. I&O. Resume Xarelto at noon today  PCP: Sammy Sweeney MD  Readmission Risk Score: 24%  Discharge Plan: Osmar said that he plans to return home with his wife. Wife will assist with dressing changes and he plans to follow at [address] [facility] clinic.   Follow for Dr Verenice Hill antibiotic plan at discharge

## 2018-12-20 NOTE — OP NOTE
Operative Report  Patient: Josie Pérez  YOB: 1935  MRN: [id number]  Date of Procedure: 12/20/2018    Pre-Op Diagnosis: OSTEOMYLITIS RIGHT 3RD TOE    Post-Op Diagnosis: Same       Procedure(s):  AMPUTATION RIGHT 3RD TOE at [facility]J    Anesthesia: Local    Surgeon(s):  Shonna Young DPM    Assistant: Jennifer Salamanca DPM PGY1    Estimated Blood Loss (mL): less than 50     Complications: None    Specimens:   ID Type Source Tests Collected by Time Destination   1 : RIGHT 3RD TOE TISSUE Tissue Toe ANAEROBIC AND AEROBIC CULTURE Shonna Young DPM 12/20/2018 7353    A : RIGHT 3RD TOE Bone Toe SURGICAL PATHOLOGY Shonna Young DPM 12/20/2018 0655        Implants:  * No implants in log *      Drains:  None    Injectables: 10cc 0.5% marcaine plain    Suture: 4-0 prolene     Findings: Consistent with diagnosis     Indications: Patient is a 82 y.o. male with a chief complaint of chronic right 3rd toe ulcer who is being seen by Dr. Tangela Clarke and being treated for osteomyelitis. At this time all conservative options have been exhausted and surgical intervention is necessary. The procedure has been explained to the patient and they understand the risks, benefits and possible complications including bleeding, infection, dehiscence, skin necrosis, further amputation, loss of limb, loss of life. No promises have been made as to surgical outcome. Procedure: The patient was transported from the pre-op holding to the operating room and placed in a supine position. A pneumatic tourniquet was not applied. A pre-operative injection of 10cc of 0.5% marcaine plain was injected into the right foot in a v block. The right foot was then prepped and draped in the normal aspetic manner. Attention was then directed to the right third digit. A skin marker was used to create a racquet type incision with a more prominent medial flap. Forceps were then applied to the digit to check for proper anesthesia.  A towel clamp was then

## 2018-12-21 VITALS
TEMPERATURE: 97.5 F | RESPIRATION RATE: 18 BRPM | WEIGHT: 230 LBS | OXYGEN SATURATION: 94 % | DIASTOLIC BLOOD PRESSURE: 55 MMHG | HEIGHT: 67 IN | BODY MASS INDEX: 36.1 KG/M2 | SYSTOLIC BLOOD PRESSURE: 97 MMHG | HEART RATE: 75 BPM

## 2018-12-21 PROCEDURE — 2580000003 HC RX 258: Performed by: INTERNAL MEDICINE

## 2018-12-21 PROCEDURE — 97110 THERAPEUTIC EXERCISES: CPT

## 2018-12-21 PROCEDURE — 97116 GAIT TRAINING THERAPY: CPT

## 2018-12-21 PROCEDURE — 6360000002 HC RX W HCPCS: Performed by: INTERNAL MEDICINE

## 2018-12-21 PROCEDURE — 6370000000 HC RX 637 (ALT 250 FOR IP): Performed by: STUDENT IN AN ORGANIZED HEALTH CARE EDUCATION/TRAINING PROGRAM

## 2018-12-21 PROCEDURE — 6370000000 HC RX 637 (ALT 250 FOR IP): Performed by: PODIATRIST

## 2018-12-21 RX ADMIN — CEFEPIME HYDROCHLORIDE 1 G: 1 INJECTION, POWDER, FOR SOLUTION INTRAMUSCULAR; INTRAVENOUS at 04:00

## 2018-12-21 RX ADMIN — OXYCODONE AND ACETAMINOPHEN 1 TABLET: 5; 325 TABLET ORAL at 08:59

## 2018-12-21 RX ADMIN — LEVOTHYROXINE SODIUM 100 MCG: 137 TABLET ORAL at 05:26

## 2018-12-21 RX ADMIN — ZOLPIDEM TARTRATE 5 MG: 5 TABLET ORAL at 02:20

## 2018-12-21 RX ADMIN — HYDROCHLOROTHIAZIDE 25 MG: 25 TABLET ORAL at 08:59

## 2018-12-21 RX ADMIN — CYCLOBENZAPRINE HYDROCHLORIDE 10 MG: 10 TABLET, FILM COATED ORAL at 08:59

## 2018-12-21 RX ADMIN — ASPIRIN 81 MG: 81 TABLET, COATED ORAL at 08:59

## 2018-12-21 RX ADMIN — HYDROXYZINE HYDROCHLORIDE 25 MG: 25 TABLET ORAL at 08:59

## 2018-12-21 RX ADMIN — DOCUSATE SODIUM 100 MG: 100 CAPSULE, LIQUID FILLED ORAL at 08:59

## 2018-12-21 RX ADMIN — DOXYCYCLINE HYCLATE 100 MG: 100 TABLET, COATED ORAL at 08:59

## 2018-12-21 RX ADMIN — METOPROLOL TARTRATE 50 MG: 50 TABLET, FILM COATED ORAL at 08:59

## 2018-12-21 RX ADMIN — LISINOPRIL 2.5 MG: 2.5 TABLET ORAL at 08:59

## 2018-12-21 RX ADMIN — FUROSEMIDE 40 MG: 40 TABLET ORAL at 08:59

## 2018-12-21 RX ADMIN — ALLOPURINOL 300 MG: 300 TABLET ORAL at 08:59

## 2018-12-21 ASSESSMENT — PAIN DESCRIPTION - PAIN TYPE
TYPE: ACUTE PAIN
TYPE: ACUTE PAIN

## 2018-12-21 ASSESSMENT — PAIN DESCRIPTION - PROGRESSION

## 2018-12-21 ASSESSMENT — PAIN DESCRIPTION - LOCATION
LOCATION: BACK
LOCATION: BACK

## 2018-12-21 ASSESSMENT — PAIN SCALES - GENERAL
PAINLEVEL_OUTOF10: 7
PAINLEVEL_OUTOF10: 5
PAINLEVEL_OUTOF10: 7

## 2018-12-21 NOTE — PROGRESS NOTES
Progress note: Infectious diseases    Patient - Nathalie Pastor,  Age - 82 y.o.    - 1935      Room Number - 8P-71/383-S   MRN -  [id number]   Acct # - [de-identified]  Date of Admission -  2018 11:01 AM    SUBJECTIVE:   No new issues  OBJECTIVE   VITALS    height is 5' 7\" (1.702 m) and weight is 230 lb (104.3 kg). His oral temperature is 98.3 °F (36.8 °C). His blood pressure is 133/67 and his pulse is 77. His respiration is 18 and oxygen saturation is 98%.        Wt Readings from Last 3 Encounters:   18 230 lb (104.3 kg)   18 227 lb (103 kg)   18 227 lb (103 kg)       I/O (24 Hours)    Intake/Output Summary (Last 24 hours) at 18 1921  Last data filed at 18 1752   Gross per 24 hour   Intake          2004.96 ml   Output              825 ml   Net          1179.96 ml       General Appearance  Awake, alert, oriented,  not  In acute distress  HEENT - normocephalic, atraumatic, pink conjunctiva,  anicteric sclera  Neck - Supple, no mass  Lungs -  Bilateral good air entry, no rhonchi, no wheeze  Cardiovascular - Heart sounds are normal.     Abdomen - soft, not distended, nontender,   Neurologic - oriented  Skin - No bruising or bleeding  Extremities - dressed right 3rd toe    MEDICATIONS:      sodium chloride flush  10 mL Intravenous 2 times per day    docusate sodium  100 mg Oral BID    lisinopril  2.5 mg Oral Daily    pravastatin  80 mg Oral Nightly    hydrochlorothiazide  25 mg Oral Daily    allopurinol  300 mg Oral Daily    magnesium oxide  400 mg Oral Daily    furosemide  40 mg Oral Daily    metoprolol tartrate  50 mg Oral BID    aspirin  81 mg Oral Daily    tamsulosin  0.4 mg Oral QPM    levothyroxine  100 mcg Oral Daily    rivaroxaban  20 mg Oral Dinner    doxycycline hyclate  100 mg Oral BID    cefepime  1 g Intravenous Q12H      sodium chloride 50 mL/hr at 18 1550

## 2018-12-21 NOTE — PROGRESS NOTES
Podiatric Progress Note  Osmar Johnson  Subjective :     12.21.18: Patient seen at bedside this morning with Dr. Christen Varma. Patient is s/p right 3rd toe amputation. Patient and wife relates difficulty sleeping while in hospital and would like to be d/c home if medically appropriate. Patient denies any N/V/F/C/SOB or CP. Patient has no further concerns at this point in time. 12.20.18: OR for right 3rd toe amputation    12.19.18  Patient seen at bedside this am with Dr. Christen Varma. Discussed MRI results of right foot with patient and schedule for surgery tomorrow. Patient is agreeable. Patient states his pain is improving from yesterday. Patient denies any N/V/F/C/SOB or CP. Patient has no further pedal concerns at this point in time. Current Medications:    No current facility-administered medications for this encounter. Objective     BP (!) 97/55   Pulse 75   Temp 97.5 °F (36.4 °C) (Oral)   Resp 18   Ht 5' 7\" (1.702 m)   Wt 230 lb (104.3 kg)   SpO2 94%   BMI 36.02 kg/m²      I/O:    Intake/Output Summary (Last 24 hours) at 12/21/18 1735  Last data filed at 12/21/18 1348   Gross per 24 hour   Intake           2324.2 ml   Output             2550 ml   Net           -225.8 ml              Wt Readings from Last 3 Encounters:   12/18/18 230 lb (104.3 kg)   11/21/18 227 lb (103 kg)   11/20/18 227 lb (103 kg)       LABS:    Recent Labs      12/19/18   0523   WBC  9.9   HGB  11.2*   HCT  34.1*   PLT  204        Recent Labs      12/19/18   0523   NA  138   K  3.7   CL  103   CO2  24   BUN  28*   CREATININE  0.9        Recent Labs      12/19/18   0523  12/19/18 2026   INR  3.75*  1.78*      No results for input(s): CKTOTAL, CKMB, CKMBINDEX, TROPONINI in the last 72 hours. Exam:    dressing intact and well maintained. No apparent strike through noted. Derm:  s/p right 3rd toe amputation. Sutures are intact, incision is well coapted. No purulent drainage or erythema noted.  Skin flaps are healthy with good

## 2018-12-21 NOTE — CARE COORDINATION
12/21/18, 10:26 AM    Discharge plan discussed by  and . Discharge plan reviewed with patient/ family. Patient/ family verbalize understanding of discharge plan and are in agreement with plan. Understanding was demonstrated using the teach back method. IMM Letter  IMM Letter given to Patient/Family/Significant other/Guardian/POA/by[de-identified] cm  IMM Letter date given[de-identified] 12/21/18  IMM Letter time given[de-identified] 0920  Observation Status Letter given to Patient/Family/Significant other/Guardian/POA/by[de-identified]  form also singed 12/18/18     Planning home today with wife on oral antibiotics. Declines need for HH. Wife assist with dressing care.  Followup in wound care clinic if ordered

## 2018-12-21 NOTE — DISCHARGE SUMMARY
Physician Discharge Summary     Patient ID:  Kg Hardin  [id number]    Physician: Sarahy Gamble DPM     Admition date: 12/18/2018    Discharge date: 12/21/2018  3:32 PM \    Date of Surgery: 12.20.18    Admission Diagnoses: Osteomyelitis [facility]) [M86.9]    Discharge Diagnoses:   Patient Active Problem List   Diagnosis    Cervical spinal cord compression ([address]    Essential hypertension    Leukocytosis    Hypokalemia    Spinal stenosis, lumbar region, with neurogenic claudication    Idiopathic hypotension    Chronic atrial fibrillation (HCC)    Hypokalemia    Prostatism    Elevated TSH    PAD (peripheral artery disease) (HCC)    Bilateral leg edema    Open wound of left lower leg    Lumbar spondylosis    Atherosclerosis of native artery of both lower extremities ([address]    Femoral-tibial bypass graft occlusion, left, initial encounter ([address]    Leg wound, left    Venous insufficiency of both lower extremities    Venous ulcer of left leg (HCC)    Decubitus ulcer of right heel, unstageable ([address]    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 ([address]    Diabetic ulcer of toe of right foot associated with type 2 diabetes mellitus, with necrosis of bone (HCC)    Lymphedema of both lower extremities    Chronic osteomyelitis of 3rd toe, right (HCC)    Osteomyelitis ([address]         Procedures: right 3rd toe amp    History, Physical Exam:  The patient is a pleasant 82-year-old male  Whom was admitted for a chronic non-healing wound on the lateral  aspect of the right third toe. MRI was positive for OM. Patient seen at bedside this morning with Dr. Marino Speaker. Patient is s/p right 3rd toe amputation. Patient and wife relates difficulty sleeping while in hospital and would like to be d/c home if medically appropriate. Patient denies any N/V/F/C/SOB or CP. Patient has no further concerns at this point in time. Exam:    dressing intact and well maintained.  No apparent strike through noted.      Derm:

## 2018-12-21 NOTE — PROGRESS NOTES
Discharge instructions given through teach back communication to patient and wife.   Patient discharged home with all belongings

## 2018-12-21 NOTE — DISCHARGE INSTR - DIET

## 2018-12-24 ENCOUNTER — HOSPITAL ENCOUNTER (EMERGENCY)
Age: 83
Discharge: HOME | End: 2018-12-24
Payer: MEDICARE

## 2018-12-24 VITALS
OXYGEN SATURATION: 98 % | TEMPERATURE: 97.9 F | HEIGHT: 68 IN | HEART RATE: 84 BPM | BODY MASS INDEX: 34.1 KG/M2 | WEIGHT: 225 LBS | RESPIRATION RATE: 20 BRPM

## 2018-12-24 DIAGNOSIS — R09.82 POST-NASAL DRIP: ICD-10-CM

## 2018-12-24 DIAGNOSIS — G89.4 CHRONIC PAIN SYNDROME: Primary | ICD-10-CM

## 2018-12-24 DIAGNOSIS — H65.111 ACUTE ALLERGIC OTITIS MEDIA OF RIGHT EAR, RECURRENCE NOT SPECIFIED: Primary | ICD-10-CM

## 2018-12-24 PROCEDURE — 99213 OFFICE O/P EST LOW 20 MIN: CPT | Performed by: NURSE PRACTITIONER

## 2018-12-24 PROCEDURE — 99212 OFFICE O/P EST SF 10 MIN: CPT

## 2018-12-24 RX ORDER — OXYCODONE HYDROCHLORIDE AND ACETAMINOPHEN 5; 325 MG/1; MG/1
1 TABLET ORAL EVERY 6 HOURS PRN
Qty: 120 TABLET | Refills: 0 | Status: SHIPPED | OUTPATIENT
Start: 2018-12-24 | End: 2019-02-13 | Stop reason: SDUPTHER

## 2018-12-24 RX ORDER — CETIRIZINE HYDROCHLORIDE 5 MG/1
5 TABLET ORAL DAILY
Qty: 30 TABLET | Refills: 0 | Status: SHIPPED | OUTPATIENT
Start: 2018-12-24 | End: 2019-01-23

## 2018-12-24 ASSESSMENT — ENCOUNTER SYMPTOMS
COUGH: 0
SINUS PAIN: 0
SORE THROAT: 0
WHEEZING: 0
DIARRHEA: 0
RHINORRHEA: 1
APNEA: 0
SHORTNESS OF BREATH: 0
STRIDOR: 0
VOMITING: 0
ABDOMINAL PAIN: 0
SINUS PRESSURE: 0
CHEST TIGHTNESS: 0
CHOKING: 0
FACIAL SWELLING: 0

## 2018-12-24 ASSESSMENT — PAIN DESCRIPTION - PAIN TYPE: TYPE: ACUTE PAIN

## 2018-12-24 ASSESSMENT — PAIN SCALES - GENERAL: PAINLEVEL_OUTOF10: 8

## 2018-12-24 ASSESSMENT — PAIN DESCRIPTION - DESCRIPTORS: DESCRIPTORS: ACHING

## 2018-12-24 ASSESSMENT — PAIN DESCRIPTION - ORIENTATION: ORIENTATION: RIGHT

## 2018-12-24 ASSESSMENT — PAIN DESCRIPTION - LOCATION: LOCATION: EAR

## 2018-12-24 NOTE — ED PROVIDER NOTES
Take 1 capsule by mouth daily       ALLERGIES     Patient is is allergic to pcn [penicillins]. FAMILY HISTORY     Patient's family history includes COPD in his sister; Cancer in his father and sister; Diabetes in his mother; Heart Disease in his father; High Blood Pressure in his sister. SOCIAL HISTORY     Patient  reports that he quit smoking about 27 years ago. He has never used smokeless tobacco. He reports that he does not drink alcohol or use drugs. PHYSICAL EXAM     ED TRIAGE VITALS   , Temp: 97.9 °F (36.6 °C), Pulse: 84, Resp: 20, SpO2: 98 %  Physical Exam   Constitutional: He is oriented to person, place, and time. Vital signs are normal. He appears well-developed and well-nourished. He is active and cooperative. Non-toxic appearance. He does not have a sickly appearance. He does not appear ill. No distress. HENT:   Head: Normocephalic and atraumatic. Right Ear: External ear normal. Tympanic membrane is erythematous and bulging. Left Ear: External ear normal. A foreign body (Dark dry cerumen obstructs TM) is present. Nose: Rhinorrhea present. Mouth/Throat: Uvula is midline and mucous membranes are normal. Uvula swelling present. Posterior oropharyngeal erythema present. No oropharyngeal exudate, posterior oropharyngeal edema or tonsillar abscesses. Eyes: Conjunctivae and EOM are normal.   Neck: Normal range of motion. Neck supple. No JVD present. No tracheal deviation present. No thyromegaly present. Pulmonary/Chest: Effort normal. No stridor. Musculoskeletal: Normal range of motion. Lymphadenopathy:     He has no cervical adenopathy. Neurological: He is alert and oriented to person, place, and time. Skin: Skin is warm and dry. He is not diaphoretic. Psychiatric: He has a normal mood and affect. His behavior is normal. Judgment and thought content normal.   Nursing note and vitals reviewed.       DIAGNOSTIC RESULTS   Labs:No results found for this visit on 12/24/18. IMAGING:  No orders to display     URGENT CARE COURSE:     Vitals:    12/24/18 1302   Pulse: 84   Resp: 20   Temp: 97.9 °F (36.6 °C)   TempSrc: Temporal   SpO2: 98%   Weight: 225 lb (102.1 kg)   Height: 5' 7.5\" (1.715 m)       Medications - No data to display  PROCEDURES:  None  FINAL IMPRESSION      1. Acute allergic otitis media of right ear, recurrence not specified    2. Post-nasal drip        DISPOSITION/PLAN   Decision To Discharge     The parent or patient representative was advised that at this point the patient can be treated safely at home, the parent or Patient representative should be aware of following interventions and  advised to the watch for the following:  #1. Any increasing pain not controlled with Motrin or Tylenol. #2. Any development of drainage from the ears redness of the auricle or posterior ear. #3.  Her development of the any fever chills, headache or stiffness of the neck the patient needs to be reevaluated by the primary care provider, return here or go to the emergency department for reevaluation. The patient or patient's representative are agreeable to the outpatient management at this time. They are advised to follow-up with her primary care provider in 2-3 days for reevaluation. The patient left ambulatory without any problems.     PATIENT REFERRED TO:  Randy Hameed MD  [facility] [place] [phone]    Schedule an appointment as soon as possible for a visit in 1 week      DISCHARGE MEDICATIONS:  New Prescriptions    CETIRIZINE (ZYRTEC) 5 MG TABLET    Take 1 tablet by mouth daily     Current Discharge Medication List          BRENNA Dasilva CNP, APRN - CNP  12/24/18 9067

## 2018-12-25 LAB
AEROBIC CULTURE: NORMAL
ANAEROBIC CULTURE: NORMAL
GRAM STAIN RESULT: NORMAL

## 2018-12-29 RX ORDER — TAMSULOSIN HYDROCHLORIDE 0.4 MG/1
CAPSULE ORAL
Qty: 90 CAPSULE | Refills: 3 | Status: SHIPPED | OUTPATIENT
Start: 2018-12-29

## 2019-02-13 ENCOUNTER — OFFICE VISIT (OUTPATIENT)
Dept: PHYSICAL MEDICINE AND REHAB | Age: 84
End: 2019-02-13
Payer: MEDICARE

## 2019-02-13 VITALS
WEIGHT: 225 LBS | BODY MASS INDEX: 34.1 KG/M2 | DIASTOLIC BLOOD PRESSURE: 62 MMHG | HEIGHT: 68 IN | SYSTOLIC BLOOD PRESSURE: 119 MMHG

## 2019-02-13 DIAGNOSIS — M25.511 CHRONIC RIGHT SHOULDER PAIN: ICD-10-CM

## 2019-02-13 DIAGNOSIS — M75.51 BURSITIS OF RIGHT SHOULDER: ICD-10-CM

## 2019-02-13 DIAGNOSIS — M47.816 SPONDYLOSIS OF LUMBAR REGION WITHOUT MYELOPATHY OR RADICULOPATHY: Primary | ICD-10-CM

## 2019-02-13 DIAGNOSIS — G89.4 CHRONIC PAIN SYNDROME: ICD-10-CM

## 2019-02-13 DIAGNOSIS — I73.9 PVD (PERIPHERAL VASCULAR DISEASE) (HCC): ICD-10-CM

## 2019-02-13 DIAGNOSIS — M47.816 LUMBAR SPONDYLOSIS: ICD-10-CM

## 2019-02-13 DIAGNOSIS — G89.29 CHRONIC RIGHT SHOULDER PAIN: ICD-10-CM

## 2019-02-13 PROCEDURE — 99214 OFFICE O/P EST MOD 30 MIN: CPT | Performed by: NURSE PRACTITIONER

## 2019-02-13 PROCEDURE — 4040F PNEUMOC VAC/ADMIN/RCVD: CPT | Performed by: NURSE PRACTITIONER

## 2019-02-13 PROCEDURE — 1123F ACP DISCUSS/DSCN MKR DOCD: CPT | Performed by: NURSE PRACTITIONER

## 2019-02-13 PROCEDURE — G8598 ASA/ANTIPLAT THER USED: HCPCS | Performed by: NURSE PRACTITIONER

## 2019-02-13 PROCEDURE — G8427 DOCREV CUR MEDS BY ELIG CLIN: HCPCS | Performed by: NURSE PRACTITIONER

## 2019-02-13 PROCEDURE — G8417 CALC BMI ABV UP PARAM F/U: HCPCS | Performed by: NURSE PRACTITIONER

## 2019-02-13 PROCEDURE — G8484 FLU IMMUNIZE NO ADMIN: HCPCS | Performed by: NURSE PRACTITIONER

## 2019-02-13 PROCEDURE — 1101F PT FALLS ASSESS-DOCD LE1/YR: CPT | Performed by: NURSE PRACTITIONER

## 2019-02-13 PROCEDURE — 1036F TOBACCO NON-USER: CPT | Performed by: NURSE PRACTITIONER

## 2019-02-13 RX ORDER — OXYCODONE HYDROCHLORIDE AND ACETAMINOPHEN 5; 325 MG/1; MG/1
1 TABLET ORAL EVERY 6 HOURS PRN
Qty: 120 TABLET | Refills: 0 | Status: SHIPPED | OUTPATIENT
Start: 2019-02-13 | End: 2019-03-15

## 2019-02-13 ASSESSMENT — ENCOUNTER SYMPTOMS
COLOR CHANGE: 1
BACK PAIN: 1

## 2019-02-20 ENCOUNTER — PREP FOR PROCEDURE (OUTPATIENT)
Dept: PHYSICAL MEDICINE AND REHAB | Age: 84
End: 2019-02-20

## 2019-03-02 ENCOUNTER — HOSPITAL ENCOUNTER (EMERGENCY)
Age: 84
Discharge: HOME | End: 2019-03-02
Payer: MEDICARE

## 2019-03-02 VITALS
TEMPERATURE: 100.3 F | WEIGHT: 220 LBS | DIASTOLIC BLOOD PRESSURE: 58 MMHG | BODY MASS INDEX: 33.34 KG/M2 | OXYGEN SATURATION: 96 % | HEIGHT: 68 IN | HEART RATE: 75 BPM | RESPIRATION RATE: 20 BRPM | SYSTOLIC BLOOD PRESSURE: 132 MMHG

## 2019-03-02 DIAGNOSIS — J10.1 INFLUENZA A: Primary | ICD-10-CM

## 2019-03-02 LAB
FLU A ANTIGEN: POSITIVE
INFLUENZA B AG, EIA: NEGATIVE

## 2019-03-02 PROCEDURE — 99214 OFFICE O/P EST MOD 30 MIN: CPT | Performed by: NURSE PRACTITIONER

## 2019-03-02 PROCEDURE — 99213 OFFICE O/P EST LOW 20 MIN: CPT

## 2019-03-02 PROCEDURE — 87804 INFLUENZA ASSAY W/OPTIC: CPT

## 2019-03-02 RX ORDER — OSELTAMIVIR PHOSPHATE 75 MG/1
75 CAPSULE ORAL 2 TIMES DAILY
Qty: 10 CAPSULE | Refills: 0 | Status: ON HOLD | OUTPATIENT
Start: 2019-03-02 | End: 2019-03-07 | Stop reason: ALTCHOICE

## 2019-03-02 RX ORDER — BENZONATATE 200 MG/1
200 CAPSULE ORAL 3 TIMES DAILY PRN
Qty: 21 CAPSULE | Refills: 0 | Status: SHIPPED | OUTPATIENT
Start: 2019-03-02 | End: 2019-03-09

## 2019-03-02 ASSESSMENT — ENCOUNTER SYMPTOMS
SINUS CONGESTION: 0
SINUS PAIN: 0
SINUS PRESSURE: 0
DIARRHEA: 0
SHORTNESS OF BREATH: 0
ABDOMINAL PAIN: 0
CHEST TIGHTNESS: 0
VOMITING: 0
BACK PAIN: 0
NAUSEA: 0
COUGH: 1
WHEEZING: 0
SORE THROAT: 0
RHINORRHEA: 0

## 2019-03-07 ENCOUNTER — HOSPITAL ENCOUNTER (OUTPATIENT)
Age: 84
Setting detail: OUTPATIENT SURGERY
Discharge: HOME | End: 2019-03-07
Attending: PAIN MEDICINE | Admitting: PAIN MEDICINE
Payer: MEDICARE

## 2019-03-07 ENCOUNTER — ANESTHESIA EVENT (OUTPATIENT)
Dept: OPERATING ROOM | Age: 84
End: 2019-03-07
Payer: MEDICARE

## 2019-03-07 ENCOUNTER — APPOINTMENT (OUTPATIENT)
Dept: GENERAL RADIOLOGY | Age: 84
End: 2019-03-07
Attending: PAIN MEDICINE
Payer: MEDICARE

## 2019-03-07 ENCOUNTER — ANESTHESIA (OUTPATIENT)
Dept: OPERATING ROOM | Age: 84
End: 2019-03-07
Payer: MEDICARE

## 2019-03-07 VITALS
SYSTOLIC BLOOD PRESSURE: 112 MMHG | OXYGEN SATURATION: 100 % | RESPIRATION RATE: 14 BRPM | DIASTOLIC BLOOD PRESSURE: 53 MMHG

## 2019-03-07 VITALS
HEART RATE: 58 BPM | HEIGHT: 68 IN | DIASTOLIC BLOOD PRESSURE: 52 MMHG | TEMPERATURE: 97.4 F | WEIGHT: 220 LBS | OXYGEN SATURATION: 95 % | RESPIRATION RATE: 12 BRPM | BODY MASS INDEX: 33.34 KG/M2 | SYSTOLIC BLOOD PRESSURE: 93 MMHG

## 2019-03-07 PROCEDURE — 3209999900 FLUORO FOR SURGICAL PROCEDURES

## 2019-03-07 PROCEDURE — 3600000056 HC PAIN LEVEL 4 BASE: Performed by: PAIN MEDICINE

## 2019-03-07 PROCEDURE — 2500000003 HC RX 250 WO HCPCS: Performed by: NURSE ANESTHETIST, CERTIFIED REGISTERED

## 2019-03-07 PROCEDURE — 2709999900 HC NON-CHARGEABLE SUPPLY: Performed by: PAIN MEDICINE

## 2019-03-07 PROCEDURE — 6360000002 HC RX W HCPCS: Performed by: NURSE ANESTHETIST, CERTIFIED REGISTERED

## 2019-03-07 PROCEDURE — 2580000003 HC RX 258: Performed by: NURSE ANESTHETIST, CERTIFIED REGISTERED

## 2019-03-07 PROCEDURE — 3600000057 HC PAIN LEVEL 4 ADDL 15 MIN: Performed by: PAIN MEDICINE

## 2019-03-07 PROCEDURE — 64635 DESTROY LUMB/SAC FACET JNT: CPT | Performed by: PAIN MEDICINE

## 2019-03-07 PROCEDURE — 7100000011 HC PHASE II RECOVERY - ADDTL 15 MIN: Performed by: PAIN MEDICINE

## 2019-03-07 PROCEDURE — 6360000002 HC RX W HCPCS: Performed by: PAIN MEDICINE

## 2019-03-07 PROCEDURE — 64636 DESTROY L/S FACET JNT ADDL: CPT | Performed by: PAIN MEDICINE

## 2019-03-07 PROCEDURE — 3700000001 HC ADD 15 MINUTES (ANESTHESIA): Performed by: PAIN MEDICINE

## 2019-03-07 PROCEDURE — 3700000000 HC ANESTHESIA ATTENDED CARE: Performed by: PAIN MEDICINE

## 2019-03-07 PROCEDURE — 2500000003 HC RX 250 WO HCPCS: Performed by: PAIN MEDICINE

## 2019-03-07 PROCEDURE — 7100000010 HC PHASE II RECOVERY - FIRST 15 MIN: Performed by: PAIN MEDICINE

## 2019-03-07 RX ORDER — LIDOCAINE HYDROCHLORIDE 10 MG/ML
INJECTION, SOLUTION INFILTRATION; PERINEURAL PRN
Status: DISCONTINUED | OUTPATIENT
Start: 2019-03-07 | End: 2019-03-07 | Stop reason: SDUPTHER

## 2019-03-07 RX ORDER — FENTANYL CITRATE 50 UG/ML
INJECTION, SOLUTION INTRAMUSCULAR; INTRAVENOUS PRN
Status: DISCONTINUED | OUTPATIENT
Start: 2019-03-07 | End: 2019-03-07 | Stop reason: SDUPTHER

## 2019-03-07 RX ORDER — LIDOCAINE HYDROCHLORIDE 10 MG/ML
INJECTION, SOLUTION EPIDURAL; INFILTRATION; INTRACAUDAL; PERINEURAL PRN
Status: DISCONTINUED | OUTPATIENT
Start: 2019-03-07 | End: 2019-03-07 | Stop reason: ALTCHOICE

## 2019-03-07 RX ORDER — PROPOFOL 10 MG/ML
INJECTION, EMULSION INTRAVENOUS PRN
Status: DISCONTINUED | OUTPATIENT
Start: 2019-03-07 | End: 2019-03-07 | Stop reason: SDUPTHER

## 2019-03-07 RX ORDER — ROPIVACAINE HYDROCHLORIDE 2 MG/ML
INJECTION, SOLUTION EPIDURAL; INFILTRATION; PERINEURAL PRN
Status: DISCONTINUED | OUTPATIENT
Start: 2019-03-07 | End: 2019-03-07 | Stop reason: ALTCHOICE

## 2019-03-07 RX ORDER — 0.9 % SODIUM CHLORIDE 0.9 %
VIAL (ML) INJECTION PRN
Status: DISCONTINUED | OUTPATIENT
Start: 2019-03-07 | End: 2019-03-07 | Stop reason: SDUPTHER

## 2019-03-07 RX ADMIN — PROPOFOL 40 MG: 10 INJECTION, EMULSION INTRAVENOUS at 10:41

## 2019-03-07 RX ADMIN — LIDOCAINE HYDROCHLORIDE 20 MG: 10 INJECTION, SOLUTION INFILTRATION; PERINEURAL at 10:41

## 2019-03-07 RX ADMIN — SODIUM CHLORIDE 5 ML: 9 INJECTION, SOLUTION INTRAMUSCULAR; INTRAVENOUS; SUBCUTANEOUS at 10:41

## 2019-03-07 RX ADMIN — FENTANYL CITRATE 50 MCG: 50 INJECTION INTRAMUSCULAR; INTRAVENOUS at 10:38

## 2019-03-07 RX ADMIN — SODIUM CHLORIDE 5 ML: 9 INJECTION, SOLUTION INTRAMUSCULAR; INTRAVENOUS; SUBCUTANEOUS at 10:38

## 2019-03-07 ASSESSMENT — PULMONARY FUNCTION TESTS
PIF_VALUE: 0

## 2019-03-07 ASSESSMENT — PAIN SCALES - GENERAL: PAINLEVEL_OUTOF10: 0

## 2019-03-07 ASSESSMENT — PAIN - FUNCTIONAL ASSESSMENT: PAIN_FUNCTIONAL_ASSESSMENT: 0-10

## 2019-03-07 ASSESSMENT — PAIN DESCRIPTION - DESCRIPTORS: DESCRIPTORS: TIGHTNESS;SQUEEZING

## 2019-03-25 ENCOUNTER — PREP FOR PROCEDURE (OUTPATIENT)
Dept: PHYSICAL MEDICINE AND REHAB | Age: 84
End: 2019-03-25

## 2019-03-28 ENCOUNTER — ANESTHESIA (OUTPATIENT)
Dept: OPERATING ROOM | Age: 84
End: 2019-03-28
Payer: MEDICARE

## 2019-03-28 ENCOUNTER — ANESTHESIA EVENT (OUTPATIENT)
Dept: OPERATING ROOM | Age: 84
End: 2019-03-28
Payer: MEDICARE

## 2019-03-28 ENCOUNTER — HOSPITAL ENCOUNTER (OUTPATIENT)
Age: 84
Setting detail: OUTPATIENT SURGERY
Discharge: HOME | End: 2019-03-28
Attending: PAIN MEDICINE | Admitting: PAIN MEDICINE
Payer: MEDICARE

## 2019-03-28 ENCOUNTER — APPOINTMENT (OUTPATIENT)
Dept: GENERAL RADIOLOGY | Age: 84
End: 2019-03-28
Attending: PAIN MEDICINE
Payer: MEDICARE

## 2019-03-28 VITALS
OXYGEN SATURATION: 100 % | DIASTOLIC BLOOD PRESSURE: 68 MMHG | SYSTOLIC BLOOD PRESSURE: 146 MMHG | RESPIRATION RATE: 7 BRPM

## 2019-03-28 VITALS
HEART RATE: 60 BPM | OXYGEN SATURATION: 94 % | TEMPERATURE: 97.5 F | DIASTOLIC BLOOD PRESSURE: 68 MMHG | SYSTOLIC BLOOD PRESSURE: 102 MMHG | WEIGHT: 220 LBS | HEIGHT: 68 IN | RESPIRATION RATE: 12 BRPM | BODY MASS INDEX: 33.34 KG/M2

## 2019-03-28 PROCEDURE — 64636 DESTROY L/S FACET JNT ADDL: CPT | Performed by: PAIN MEDICINE

## 2019-03-28 PROCEDURE — 3700000000 HC ANESTHESIA ATTENDED CARE: Performed by: PAIN MEDICINE

## 2019-03-28 PROCEDURE — 7100000011 HC PHASE II RECOVERY - ADDTL 15 MIN: Performed by: PAIN MEDICINE

## 2019-03-28 PROCEDURE — 3600000056 HC PAIN LEVEL 4 BASE: Performed by: PAIN MEDICINE

## 2019-03-28 PROCEDURE — 6360000002 HC RX W HCPCS: Performed by: PAIN MEDICINE

## 2019-03-28 PROCEDURE — 7100000010 HC PHASE II RECOVERY - FIRST 15 MIN: Performed by: PAIN MEDICINE

## 2019-03-28 PROCEDURE — 2709999900 HC NON-CHARGEABLE SUPPLY: Performed by: PAIN MEDICINE

## 2019-03-28 PROCEDURE — 2500000003 HC RX 250 WO HCPCS: Performed by: PAIN MEDICINE

## 2019-03-28 PROCEDURE — 3700000001 HC ADD 15 MINUTES (ANESTHESIA): Performed by: PAIN MEDICINE

## 2019-03-28 PROCEDURE — 3209999900 FLUORO FOR SURGICAL PROCEDURES

## 2019-03-28 PROCEDURE — 6360000002 HC RX W HCPCS: Performed by: NURSE ANESTHETIST, CERTIFIED REGISTERED

## 2019-03-28 PROCEDURE — 3600000057 HC PAIN LEVEL 4 ADDL 15 MIN: Performed by: PAIN MEDICINE

## 2019-03-28 PROCEDURE — 64635 DESTROY LUMB/SAC FACET JNT: CPT | Performed by: PAIN MEDICINE

## 2019-03-28 PROCEDURE — 2500000003 HC RX 250 WO HCPCS: Performed by: NURSE ANESTHETIST, CERTIFIED REGISTERED

## 2019-03-28 RX ORDER — FENTANYL CITRATE 50 UG/ML
INJECTION, SOLUTION INTRAMUSCULAR; INTRAVENOUS PRN
Status: DISCONTINUED | OUTPATIENT
Start: 2019-03-28 | End: 2019-03-28 | Stop reason: SDUPTHER

## 2019-03-28 RX ORDER — LIDOCAINE HYDROCHLORIDE 10 MG/ML
INJECTION, SOLUTION INFILTRATION; PERINEURAL PRN
Status: DISCONTINUED | OUTPATIENT
Start: 2019-03-28 | End: 2019-03-28 | Stop reason: SDUPTHER

## 2019-03-28 RX ORDER — PROPOFOL 10 MG/ML
INJECTION, EMULSION INTRAVENOUS PRN
Status: DISCONTINUED | OUTPATIENT
Start: 2019-03-28 | End: 2019-03-28 | Stop reason: SDUPTHER

## 2019-03-28 RX ORDER — LIDOCAINE HYDROCHLORIDE 10 MG/ML
INJECTION, SOLUTION EPIDURAL; INFILTRATION; INTRACAUDAL; PERINEURAL PRN
Status: DISCONTINUED | OUTPATIENT
Start: 2019-03-28 | End: 2019-03-28 | Stop reason: ALTCHOICE

## 2019-03-28 RX ORDER — OXYCODONE HYDROCHLORIDE AND ACETAMINOPHEN 5; 325 MG/1; MG/1
1 TABLET ORAL EVERY 6 HOURS PRN
COMMUNITY
End: 2019-04-11 | Stop reason: SDUPTHER

## 2019-03-28 RX ORDER — ROPIVACAINE HYDROCHLORIDE 2 MG/ML
INJECTION, SOLUTION EPIDURAL; INFILTRATION; PERINEURAL PRN
Status: DISCONTINUED | OUTPATIENT
Start: 2019-03-28 | End: 2019-03-28 | Stop reason: ALTCHOICE

## 2019-03-28 RX ADMIN — LIDOCAINE HYDROCHLORIDE 50 MG: 10 INJECTION, SOLUTION INFILTRATION; PERINEURAL at 11:25

## 2019-03-28 RX ADMIN — FENTANYL CITRATE 50 MCG: 50 INJECTION INTRAMUSCULAR; INTRAVENOUS at 11:11

## 2019-03-28 RX ADMIN — PROPOFOL 50 MG: 10 INJECTION, EMULSION INTRAVENOUS at 11:26

## 2019-03-28 ASSESSMENT — PULMONARY FUNCTION TESTS
PIF_VALUE: 0

## 2019-03-28 ASSESSMENT — PAIN SCALES - WONG BAKER: WONGBAKER_NUMERICALRESPONSE: 0

## 2019-04-01 ENCOUNTER — OFFICE VISIT (OUTPATIENT)
Dept: CARDIOLOGY CLINIC | Age: 84
End: 2019-04-01

## 2019-04-01 VITALS
WEIGHT: 220.3 LBS | DIASTOLIC BLOOD PRESSURE: 80 MMHG | SYSTOLIC BLOOD PRESSURE: 130 MMHG | HEIGHT: 68 IN | HEART RATE: 62 BPM | BODY MASS INDEX: 33.39 KG/M2

## 2019-04-01 DIAGNOSIS — I25.10 CORONARY ARTERY DISEASE INVOLVING NATIVE CORONARY ARTERY OF NATIVE HEART WITHOUT ANGINA PECTORIS: Primary | ICD-10-CM

## 2019-04-01 DIAGNOSIS — I48.20 CHRONIC ATRIAL FIBRILLATION (HCC): ICD-10-CM

## 2019-04-01 DIAGNOSIS — I10 ESSENTIAL HYPERTENSION: ICD-10-CM

## 2019-04-01 PROCEDURE — 99024 POSTOP FOLLOW-UP VISIT: CPT | Performed by: NUCLEAR MEDICINE

## 2019-04-01 NOTE — PROGRESS NOTES
[address]  [address]  [place] [address]  Dept: [phone]  Dept Fax: [phone]  Loc: [phone]    Visit Date: 4/1/2019    Mercedes Epstein is a 82 y.o. male who presents todayfor:  Chief Complaint   Patient presents with    Follow-up     8 mo fu appt    Coronary Artery Disease    Hypertension    Atrial Fibrillation   had peripheral bypass  Toe amputation   Known mild to moderate CAD  Known A fib   No chest pain  Limited patient  Bp is stable       HPI:  HPI  Past Medical History:   Diagnosis Date    Atrial fibrillation (HCC)     Bell's palsy     CAD (coronary artery disease)     Cancer (HCC)     skin CA removed with dry ice    DDD (degenerative disc disease)     DVT (deep venous thrombosis) (HCC)     Gout     Hernia     Hx of blood clots     left leg DVT    Hyperlipidemia     Hypertension     Irregular cardiac rhythm 07/2016    Movement disorder     back pain    Myocardial infarct ([address]     Thyroid disease     UTI (urinary tract infection)       Past Surgical History:   Procedure Laterality Date    BACK SURGERY      lower    CARDIAC CATHETERIZATION      ok    CARDIAC CATHETERIZATION      ok    CATARACT REMOVAL Bilateral 2010    CHOLECYSTECTOMY      COLON SURGERY      6-8 in of descending colon removed    COLOSTOMY      COLOSTOMY      reversed    EYE SURGERY      band    EYE SURGERY  2010    bilatera cataracts    HERNIA REPAIR      LUMBAR SPINE SURGERY Left 3/7/2019    L-facet RFA @ L3-4, L4-5, L5-S1 LEFT SIDE FIRST performed by Rafael Mahoney MD at [address] [facility] Right 3/28/2019    L-facet RFA @ L3-4, L4-5, L5-S1 RIGHT performed by Rafael Mahoney MD at [address] [place] [address]  06/29/2016    three   [address] [place] Bilateral 05/15/2018    LUMBAR FACET MBB L3-4, L4-5, L5-S1    OTHER SURGICAL HISTORY  06/29/2016    ACDF C4-5 Current Outpatient Medications   Medication Sig Dispense Refill    oxyCODONE-acetaminophen (PERCOCET) 5-325 MG per tablet Take 1 tablet by mouth every 6 hours as needed for Pain.  tamsulosin (FLOMAX) 0.4 MG capsule TAKE 1 CAPSULE DAILY 90 capsule 3    lidocaine (XYLOCAINE) 5 % ointment Apply topically as needed. 30 g 0    rivaroxaban (XARELTO) 20 MG TABS tablet TAKE 1 TABLET DAILY 90 tablet 3    Calcium Carbonate Antacid (TUMS PO) Take by mouth as needed      levothyroxine (SYNTHROID) 100 MCG tablet Take 100 mcg by mouth Daily      magnesium hydroxide (MILK OF MAGNESIA) 400 MG/5ML suspension Take 30 mLs by mouth daily as needed for Constipation      Pseudoephedrine HCl (SUDAFED PO) Take by mouth as needed      Cranberry 1000 MG CAPS Take 500 mg by mouth 2 times daily       NONFORMULARY Take 1 capsule by mouth daily       aspirin 81 MG EC tablet Take 1 tablet by mouth daily 1 tablet 0    Calcium Carbonate-Vitamin D (CALCIUM 600/VITAMIN D PO) Take 1 tablet by mouth 2 times daily      metoprolol tartrate (LOPRESSOR) 50 MG tablet TAKE 1 TABLET TWICE A  tablet 0    furosemide (LASIX) 40 MG tablet Take 1 tablet by mouth daily 90 tablet 2    hydrOXYzine (ATARAX) 25 MG tablet Take 25 mg by mouth 4 times daily as needed for Itching      loperamide (IMODIUM) 2 MG capsule Take 2 mg by mouth as needed for Diarrhea      potassium chloride (K-TAB) 10 MEQ extended release tablet Take 4 tablets by mouth daily (Patient taking differently: Take 10 mEq by mouth daily ) 360 tablet 3    docusate sodium (COLACE) 100 MG capsule Take 300 mg by mouth 2 times daily       magnesium oxide (MAG-OX) 400 (241.3 MG) MG TABS tablet Take 1 tablet by mouth daily 30 tablet 0    allopurinol (ZYLOPRIM) 300 MG tablet Take 300 mg by mouth daily      quinapril (ACCUPRIL) 40 MG tablet Take 40 mg by mouth daily.  pravastatin (PRAVACHOL) 80 MG tablet Take 80 mg by mouth nightly.         indapamide (LOZOL) 1.25 MG

## 2019-04-09 DIAGNOSIS — G89.4 CHRONIC PAIN SYNDROME: Primary | ICD-10-CM

## 2019-04-10 NOTE — TELEPHONE ENCOUNTER
OARRS reviewed. UDS: + for  Oxycodone POSITIVE CONSISTENT  Noroxycodone POSITIVE CONSISTENT. Narcan offered: Not offered   Last seen: 2/13/2019.  Follow-up:   Future Appointments   Date Time Provider Dacia Cordova   4/22/2019 10:10 AM BRENNA Arango - CNP [facility]   10/28/2019 12:00 PM Luis Tapia MD [address] [facility] [place]

## 2019-04-11 RX ORDER — OXYCODONE HYDROCHLORIDE AND ACETAMINOPHEN 5; 325 MG/1; MG/1
1 TABLET ORAL EVERY 6 HOURS PRN
Qty: 120 TABLET | Refills: 0 | Status: SHIPPED | OUTPATIENT
Start: 2019-04-11 | End: 2019-06-04 | Stop reason: SDUPTHER

## 2019-04-22 ENCOUNTER — OFFICE VISIT (OUTPATIENT)
Dept: PHYSICAL MEDICINE AND REHAB | Age: 84
End: 2019-04-22
Payer: MEDICARE

## 2019-04-22 VITALS
WEIGHT: 220.24 LBS | DIASTOLIC BLOOD PRESSURE: 68 MMHG | SYSTOLIC BLOOD PRESSURE: 151 MMHG | HEIGHT: 68 IN | HEART RATE: 57 BPM | BODY MASS INDEX: 33.38 KG/M2

## 2019-04-22 DIAGNOSIS — G89.4 CHRONIC PAIN SYNDROME: ICD-10-CM

## 2019-04-22 DIAGNOSIS — I73.9 PVD (PERIPHERAL VASCULAR DISEASE) (HCC): ICD-10-CM

## 2019-04-22 DIAGNOSIS — M75.51 BURSITIS OF RIGHT SHOULDER: ICD-10-CM

## 2019-04-22 DIAGNOSIS — M25.511 CHRONIC RIGHT SHOULDER PAIN: ICD-10-CM

## 2019-04-22 DIAGNOSIS — M47.816 SPONDYLOSIS OF LUMBAR REGION WITHOUT MYELOPATHY OR RADICULOPATHY: Primary | ICD-10-CM

## 2019-04-22 DIAGNOSIS — G89.29 CHRONIC RIGHT SHOULDER PAIN: ICD-10-CM

## 2019-04-22 PROCEDURE — 1036F TOBACCO NON-USER: CPT | Performed by: NURSE PRACTITIONER

## 2019-04-22 PROCEDURE — 99213 OFFICE O/P EST LOW 20 MIN: CPT | Performed by: NURSE PRACTITIONER

## 2019-04-22 PROCEDURE — G8427 DOCREV CUR MEDS BY ELIG CLIN: HCPCS | Performed by: NURSE PRACTITIONER

## 2019-04-22 PROCEDURE — G8598 ASA/ANTIPLAT THER USED: HCPCS | Performed by: NURSE PRACTITIONER

## 2019-04-22 PROCEDURE — 4040F PNEUMOC VAC/ADMIN/RCVD: CPT | Performed by: NURSE PRACTITIONER

## 2019-04-22 PROCEDURE — G8417 CALC BMI ABV UP PARAM F/U: HCPCS | Performed by: NURSE PRACTITIONER

## 2019-04-22 PROCEDURE — 1123F ACP DISCUSS/DSCN MKR DOCD: CPT | Performed by: NURSE PRACTITIONER

## 2019-04-22 ASSESSMENT — ENCOUNTER SYMPTOMS: BACK PAIN: 1

## 2019-04-22 NOTE — PROGRESS NOTES
[facility]  [address] [facility] [address]  Dept: [phone]  Dept Fax: [phone]: [phone]    Visit Date: 4/22/2019    Functionality Assessment/Goals Worksheet     On a scale of 0 (Does not Interfere) to 10 (Completely Interferes)     1. Which number describes how during the past week pain has interfered with       the following:  A. General Activity:  7  B. Mood: 7  C. Walking Ability:  7  D. Normal Work (Includes both work outside the home and housework):  7  E. Relations with Other People:   7  F. Sleep:   7  G. Enjoyment of Life:   7    2. Patient Prefers to Take their Pain Medications:     []  On a regular basis   [x]  Only when necessary    []  Does not take pain medications    3. What are the Patient's Goals/Expectations for Visiting Pain Management? [x]  Learn about my pain    [x]  Receive Medication   [x]  Physical Therapy     []  Treat Depression   [x]  Receive Injections    []  Treat Sleep   [x]  Deal with Anxiety and Stress   []  Treat Opoid Dependence/Addiction   []  Other:      HPI:   Nancy Clarke is a 82 y.o. male is here today for    Chief Complaint: Lower back pain, right shoulder pain     HPI   FU bilateral L-facet RFA, left side from 3/7/2019 and right side from 3/28/2019. Initially receiving 75% relief of pain across back and now receiving about 50% relief of lower back pain. Received 95% relief of right shoulder pain from injection. Percocet QID prn remains effective       Medications reviewed. Patient constipation  side effects with medications. Patient states he is taking medications as prescribed. Hedenies receiving pain medications from other sources. He had 1 ER visit since last visit . Pain scale with out pain medications or at its worst is 7-8/10. Pain scale with pain medications or at its best is 4/10.   Last dose of Perocet was today  Drug screen reviewed from 11/20/2018 and was appropriate  Pill count was completed today and was appropriate  Patient does not have naloxone available at home. Patient has not required use of naloxone at home since last office visit. The patientis allergic to pcn [penicillins]. Past Medical History  Osmar  has a past medical history of Atrial fibrillation ([facility] [address] Bell's palsy, CAD (coronary artery disease), Cancer ([facility] [address] DDD (degenerative disc disease), DVT (deep venous thrombosis) ([facility] [address] [facility] Gout, Hernia, Hx of blood clots, Hyperlipidemia, Hypertension, Irregular cardiac rhythm, Movement disorder, Myocardial infarct [facility]), Thyroid disease, and UTI (urinary tract infection). Past Surgical History  The patient  has a past surgical history that includes Colon surgery; colostomy; colostomy; Cholecystectomy; Neck surgery (06/29/2016); Eye surgery; hernia repair; other surgical history (06/29/2016); Cardiac catheterization; Cardiac catheterization; Neck surgery; back surgery; pr colsc flx w/removal lesion by hot bx forceps (Left, 7/17/2017); Upper gastrointestinal endoscopy (N/A, 7/17/2017); Cataract removal (Bilateral, 2010); other surgical history (Bilateral, 04/09/2018); pr inj dx/ther agnt paravert facet joint, lumbar/sac, 2nd level (Bilateral, 4/9/2018); Nerve Surgery (Bilateral, 05/15/2018); pr inj dx/ther agnt paravert facet joint, lumbar/sac, 2nd level (Bilateral, 5/15/2018); pr radiofreq neurotomy lumbar or sacral, w image guide,ea addl level (Left, 6/12/2018); eye surgery (2010); pr radiofreq neurotomy lumbar or sacral, w image guide,ea addl level (Right, 7/19/2018); pr office/outpt visit,procedure only (Left, 7/30/2018); Toe amputation (Right, 12/20/2018); Lumbar spine surgery (Left, 3/7/2019); and Lumbar spine surgery (Right, 3/28/2019).     Family History  This patient's family history includes COPD in his sister; Cancer in his father and sister; Diabetes in his mother; Heart Disease in his father; High Blood Pressure in his sister. Social History  Osmar  reports that he quit smoking about 27 years ago. He has never used smokeless tobacco. He reports that he does not drink alcohol or use drugs. Medications    Current Outpatient Medications:     oxyCODONE-acetaminophen (PERCOCET) 5-325 MG per tablet, Take 1 tablet by mouth every 6 hours as needed for Pain for up to 30 days. , Disp: 120 tablet, Rfl: 0    tamsulosin (FLOMAX) 0.4 MG capsule, TAKE 1 CAPSULE DAILY, Disp: 90 capsule, Rfl: 3    lidocaine (XYLOCAINE) 5 % ointment, Apply topically as needed. , Disp: 30 g, Rfl: 0    rivaroxaban (XARELTO) 20 MG TABS tablet, TAKE 1 TABLET DAILY, Disp: 90 tablet, Rfl: 3    Calcium Carbonate Antacid (TUMS PO), Take by mouth as needed, Disp: , Rfl:     levothyroxine (SYNTHROID) 100 MCG tablet, Take 100 mcg by mouth Daily, Disp: , Rfl:     magnesium hydroxide (MILK OF MAGNESIA) 400 MG/5ML suspension, Take 30 mLs by mouth daily as needed for Constipation, Disp: , Rfl:     Pseudoephedrine HCl (SUDAFED PO), Take by mouth as needed, Disp: , Rfl:     Cranberry 1000 MG CAPS, Take 500 mg by mouth 2 times daily , Disp: , Rfl:     NONFORMULARY, Take 1 capsule by mouth daily , Disp: , Rfl:     aspirin 81 MG EC tablet, Take 1 tablet by mouth daily, Disp: 1 tablet, Rfl: 0    Calcium Carbonate-Vitamin D (CALCIUM 600/VITAMIN D PO), Take 1 tablet by mouth 2 times daily, Disp: , Rfl:     metoprolol tartrate (LOPRESSOR) 50 MG tablet, TAKE 1 TABLET TWICE A DAY, Disp: 180 tablet, Rfl: 0    furosemide (LASIX) 40 MG tablet, Take 1 tablet by mouth daily, Disp: 90 tablet, Rfl: 2    hydrOXYzine (ATARAX) 25 MG tablet, Take 25 mg by mouth 4 times daily as needed for Itching, Disp: , Rfl:     loperamide (IMODIUM) 2 MG capsule, Take 2 mg by mouth as needed for Diarrhea, Disp: , Rfl:     potassium chloride (K-TAB) 10 MEQ extended release tablet, Take 4 tablets by mouth daily (Patient taking differently: Take 10 mEq by mouth daily ), Disp: 360 tablet, Rfl: 3    docusate sodium (COLACE) 100 MG capsule, Take 300 mg by mouth 2 times daily , Disp: , Rfl:     magnesium oxide (MAG-OX) 400 (241.3 MG) MG TABS tablet, Take 1 tablet by mouth daily, Disp: 30 tablet, Rfl: 0    allopurinol (ZYLOPRIM) 300 MG tablet, Take 300 mg by mouth daily, Disp: , Rfl:     quinapril (ACCUPRIL) 40 MG tablet, Take 40 mg by mouth daily. , Disp: , Rfl:     pravastatin (PRAVACHOL) 80 MG tablet, Take 80 mg by mouth nightly.  , Disp: , Rfl:     indapamide (LOZOL) 1.25 MG tablet, Take 1.25 mg by mouth every morning.  , Disp: , Rfl:     cyclobenzaprine (FLEXERIL) 10 MG tablet, Take 10 mg by mouth every 8 hours as needed. , Disp: , Rfl:     Subjective:      Review of Systems   Musculoskeletal: Positive for arthralgias, back pain and myalgias. Neurological: Positive for weakness. Negative for numbness. Objective:     Vitals:    04/22/19 1010   BP: (!) 144/66   Site: Left Upper Arm   Position: Sitting   Cuff Size: Medium Adult   Pulse: 57   Weight: 220 lb 3.8 oz (99.9 kg)   Height: 5' 7.99\" (1.727 m)       Physical Exam   Constitutional: He is oriented to person, place, and time. He appears well-developed and well-nourished. No distress. HENT:   Head: Normocephalic and atraumatic. Right Ear: External ear normal.   Left Ear: External ear normal.   Nose: Nose normal.   Mouth/Throat: Oropharynx is clear and moist. No oropharyngeal exudate. Eyes: Pupils are equal, round, and reactive to light. Conjunctivae and EOM are normal. Right eye exhibits no discharge. Left eye exhibits no discharge. No scleral icterus. Neck: Normal range of motion and full passive range of motion without pain. Neck supple. No muscular tenderness present. No neck rigidity. No edema, no erythema and normal range of motion present. No thyromegaly present. Cardiovascular: Normal rate, regular rhythm, normal heart sounds and intact distal pulses. Exam reveals no gallop and no friction rub.    No murmur heard.  Pulmonary/Chest: Effort normal and breath sounds normal. No respiratory distress. He has no wheezes. He has no rales. He exhibits no tenderness. Abdominal: Soft. Bowel sounds are normal. He exhibits no distension. There is no tenderness. There is no rebound and no guarding. Musculoskeletal:        Right shoulder: He exhibits decreased range of motion, tenderness, bony tenderness and pain. Right hip: He exhibits tenderness. Left hip: He exhibits tenderness. Right knee: He exhibits decreased range of motion and swelling. Tenderness found. Left knee: He exhibits decreased range of motion and swelling. Tenderness found. Right ankle: He exhibits swelling. Left ankle: He exhibits swelling. Cervical back: He exhibits decreased range of motion, tenderness and pain. Thoracic back: He exhibits tenderness. Lumbar back: He exhibits decreased range of motion, tenderness, edema and pain. Back:         Right foot: There is swelling. Left foot: There is swelling. Neurological: He is alert and oriented to person, place, and time. He has normal strength. He is not disoriented. He displays no atrophy. No cranial nerve deficit or sensory deficit. He exhibits normal muscle tone. He displays a negative Romberg sign. Gait abnormal. Coordination normal. He displays no Babinski's sign on the right side. He displays no Babinski's sign on the left side. Reflex Scores:       Tricep reflexes are 2+ on the right side and 2+ on the left side. Bicep reflexes are 2+ on the right side and 2+ on the left side. Brachioradialis reflexes are 2+ on the right side and 2+ on the left side. Patellar reflexes are 1+ on the right side and 1+ on the left side. Achilles reflexes are 0 on the right side and 0 on the left side. Gait-Walker  Motor 4/5   Skin: Skin is warm. No rash noted. He is not diaphoretic. No erythema. No pallor. Psychiatric: His mood appears not anxious. His affect is not angry, not blunt, not labile and not inappropriate. His speech is not rapid and/or pressured, not delayed, not tangential and not slurred. He is not agitated, not aggressive, not hyperactive, not slowed, not withdrawn, not actively hallucinating and not combative. Thought content is not paranoid and not delusional. Cognition and memory are not impaired. He does not express impulsivity or inappropriate judgment. He does not exhibit a depressed mood. He expresses no homicidal and no suicidal ideation. He expresses no suicidal plans and no homicidal plans. He is communicative. He exhibits normal recent memory and normal remote memory. He is attentive. Nursing note and vitals reviewed. CORAL test: negative   Yeomans test: negative   Gaenslen test: negative      Assessment:     1. Spondylosis of lumbar region without myelopathy or radiculopathy    2. Chronic right shoulder pain    3. PVD (peripheral vascular disease) ([address]    4. Bursitis of right shoulder    5. Chronic pain syndrome            Plan:      · OARRS reviewed. Current MED: 30.00  · Patient was offered naloxone for home. Refused. · Discussed long term side effects of medications, tolerance, dependency and addiction. · Previous UDS reviewed  · UDS preformed today for compliance. · Patient told can not receive any pain medications from any other source. · No evidence of abuse, diversion or aberrant behavior.  Medications and/or procedures to improve function and quality of life- patient understanding with this and that may not be pain free   Discussed with patient about safe storage of medications at home   Discussed possible weaning of medication dosing dependent on treatment/procedure results.  Discussed with patient about risks with procedure including infection, reaction to medication, increased pain, or bleeding.  Procedure notes reveiwed in detail.     Receiving about 50% relief of lower back pain from L-facet RFA and continues to receive that relief.  Receiving 90% relief from right shoulder injection. · Medications are effective, patient is compliant. Continue Percocet 5/325 QID prn. Filled 4/11/2019. · Discussed SCS in detail if needed       Meds. Prescribed:   No orders of the defined types were placed in this encounter. Return in about 3 months (around 7/22/2019), or if symptoms worsen or fail to improve, for follow up  for medications.          Electronically signed by Maurita Lesch, APRN - CNP on4/22/2019 at 10:50 AM

## 2019-05-16 ENCOUNTER — HOSPITAL ENCOUNTER (OUTPATIENT)
Dept: INTERVENTIONAL RADIOLOGY/VASCULAR | Age: 84
Discharge: HOME | End: 2019-05-16
Payer: MEDICARE

## 2019-05-16 DIAGNOSIS — I70.213 ATHEROSCLER OF NATIVE ARTERY OF BOTH LEGS WITH INTERMIT CLAUDICATION (HCC): ICD-10-CM

## 2019-05-16 DIAGNOSIS — I73.9 INTERMITTENT CLAUDICATION (HCC): ICD-10-CM

## 2019-05-16 PROCEDURE — 93925 LOWER EXTREMITY STUDY: CPT

## 2019-06-03 DIAGNOSIS — G89.4 CHRONIC PAIN SYNDROME: ICD-10-CM

## 2019-06-03 NOTE — TELEPHONE ENCOUNTER
Wanda Coppola called requesting a refill on the following medications:  Requested Prescriptions     Pending Prescriptions Disp Refills    oxyCODONE-acetaminophen (PERCOCET) 5-325 MG per tablet 120 tablet 0     Sig: Take 1 tablet by mouth every 6 hours as needed for Pain for up to 30 days.      Pharmacy verified:  .pv    [facility] on [address]    Date of last visit: 4/22/19  Date of next visit (if applicable): 8/30/6008

## 2019-06-04 RX ORDER — OXYCODONE HYDROCHLORIDE AND ACETAMINOPHEN 5; 325 MG/1; MG/1
1 TABLET ORAL EVERY 6 HOURS PRN
Qty: 120 TABLET | Refills: 0 | Status: SHIPPED | OUTPATIENT
Start: 2019-06-04 | End: 2019-07-04

## 2019-06-04 NOTE — TELEPHONE ENCOUNTER
OARRS reviewed. UDS: + for  Oxycodone consistent. Narcan offered: yes  Last seen: 4/22/2019.  Follow-up:   Future Appointments   Date Time Provider Dacia Tasha   6/5/2019  9:50 AM BRENNA Taylor Arm - CNP [facility]   10/28/2019 12:00 PM [address], MD [address] [facility]

## 2019-06-05 ENCOUNTER — OFFICE VISIT (OUTPATIENT)
Dept: PHYSICAL MEDICINE AND REHAB | Age: 84
End: 2019-06-05
Payer: MEDICARE

## 2019-06-05 VITALS
WEIGHT: 220 LBS | BODY MASS INDEX: 33.34 KG/M2 | HEART RATE: 88 BPM | DIASTOLIC BLOOD PRESSURE: 78 MMHG | HEIGHT: 68 IN | SYSTOLIC BLOOD PRESSURE: 133 MMHG

## 2019-06-05 DIAGNOSIS — M47.816 LUMBAR SPONDYLOSIS: ICD-10-CM

## 2019-06-05 DIAGNOSIS — M25.511 CHRONIC RIGHT SHOULDER PAIN: ICD-10-CM

## 2019-06-05 DIAGNOSIS — G89.29 CHRONIC RIGHT SHOULDER PAIN: ICD-10-CM

## 2019-06-05 DIAGNOSIS — M75.51 BURSITIS OF RIGHT SHOULDER: Primary | ICD-10-CM

## 2019-06-05 DIAGNOSIS — M47.816 SPONDYLOSIS OF LUMBAR REGION WITHOUT MYELOPATHY OR RADICULOPATHY: ICD-10-CM

## 2019-06-05 DIAGNOSIS — I73.9 PVD (PERIPHERAL VASCULAR DISEASE) (HCC): ICD-10-CM

## 2019-06-05 DIAGNOSIS — G89.4 CHRONIC PAIN SYNDROME: ICD-10-CM

## 2019-06-05 PROCEDURE — G8598 ASA/ANTIPLAT THER USED: HCPCS | Performed by: NURSE PRACTITIONER

## 2019-06-05 PROCEDURE — 1036F TOBACCO NON-USER: CPT | Performed by: NURSE PRACTITIONER

## 2019-06-05 PROCEDURE — 4040F PNEUMOC VAC/ADMIN/RCVD: CPT | Performed by: NURSE PRACTITIONER

## 2019-06-05 PROCEDURE — 99214 OFFICE O/P EST MOD 30 MIN: CPT | Performed by: NURSE PRACTITIONER

## 2019-06-05 PROCEDURE — 20610 DRAIN/INJ JOINT/BURSA W/O US: CPT | Performed by: NURSE PRACTITIONER

## 2019-06-05 PROCEDURE — G8417 CALC BMI ABV UP PARAM F/U: HCPCS | Performed by: NURSE PRACTITIONER

## 2019-06-05 PROCEDURE — G8427 DOCREV CUR MEDS BY ELIG CLIN: HCPCS | Performed by: NURSE PRACTITIONER

## 2019-06-05 PROCEDURE — 1123F ACP DISCUSS/DSCN MKR DOCD: CPT | Performed by: NURSE PRACTITIONER

## 2019-06-05 RX ORDER — METHYLPREDNISOLONE ACETATE 40 MG/ML
80 INJECTION, SUSPENSION INTRA-ARTICULAR; INTRALESIONAL; INTRAMUSCULAR; SOFT TISSUE ONCE
Status: COMPLETED | OUTPATIENT
Start: 2019-06-05 | End: 2019-06-05

## 2019-06-05 RX ADMIN — METHYLPREDNISOLONE ACETATE 80 MG: 40 INJECTION, SUSPENSION INTRA-ARTICULAR; INTRALESIONAL; INTRAMUSCULAR; SOFT TISSUE at 10:29

## 2019-06-05 ASSESSMENT — ENCOUNTER SYMPTOMS: BACK PAIN: 1

## 2019-06-05 NOTE — PROGRESS NOTES
[facility]  [address] [facility] [address]  Dept: [phone]  Dept Fax: [phone]: [phone]    Visit Date: 6/5/2019    Functionality Assessment/Goals Worksheet     On a scale of 0 (Does not Interfere) to 10 (Completely Interferes)     1. Which number describes how during the past week pain has interfered with       the following:  A. General Activity:  9  B. Mood: 9  C. Walking Ability:  9  D. Normal Work (Includes both work outside the home and housework):  10  E. Relations with Other People:   9  F. Sleep:   9  G. Enjoyment of Life:   9    2. Patient Prefers to Take their Pain Medications:     [x]  On a regular basis   [x]  Only when necessary    []  Does not take pain medications    3. What are the Patient's Goals/Expectations for Visiting Pain Management? [x]  Learn about my pain    [x]  Receive Medication   [x]  Physical Therapy     []  Treat Depression   [x]  Receive Injections    [x]  Treat Sleep   [x]  Deal with Anxiety and Stress   []  Treat Opoid Dependence/Addiction   []  Other:      HPI:   Mercedes Epstein is a 82 y.o. male is here today for    Chief Complaint: Lower back pain, Right shoulder pain     HPI   3 month FU. Continues to have pain across lower back as effects from bilateral L-facet RFA has worn off. Pian is elevated with position changes and activity. All across lower back. Right shoulder pain has increased since last visit as effects from right shoulder injection has worn off. This is the main complaint. Percocet prn helps decrease pain to a tolerable level. Medications reviewed. Patient constipation  side effects with medications. Patient states he is taking medications as prescribed. Hedenies receiving pain medications from other sources. He denies any ER visits since last visit.     Pain scale with out pain medications or at its worst is 8-10/10. Pain scale with pain medications or at its best is 4/10. Last dose of Perocet was today  Drug screen reviewed from 4/22/2019 and was appropriate  Pill count was completed today and was appropriate  Patient does not have naloxone available at home. Patient has not required use of naloxone at home since last office visit. The patientis allergic to pcn [penicillins]. Past Medical History  Osmar  has a past medical history of Atrial fibrillation ([facility] [address] Bell's palsy, CAD (coronary artery disease), Cancer ([facility] [address] DDD (degenerative disc disease), DVT (deep venous thrombosis) ([facility] [address] Gout, Hernia, Hx of blood clots, Hyperlipidemia, Hypertension, Irregular cardiac rhythm, Movement disorder, Myocardial infarct [facility]), Thyroid disease, and UTI (urinary tract infection). Past Surgical History  The patient  has a past surgical history that includes Colon surgery; colostomy; colostomy; Cholecystectomy; Neck surgery (06/29/2016); Eye surgery; hernia repair; other surgical history (06/29/2016); Cardiac catheterization; Cardiac catheterization; Neck surgery; back surgery; pr colsc flx w/removal lesion by hot bx forceps (Left, 7/17/2017); Upper gastrointestinal endoscopy (N/A, 7/17/2017); Cataract removal (Bilateral, 2010); other surgical history (Bilateral, 04/09/2018); pr inj dx/ther agnt paravert facet joint, lumbar/sac, 2nd level (Bilateral, 4/9/2018); Nerve Surgery (Bilateral, 05/15/2018); pr inj dx/ther agnt paravert facet joint, lumbar/sac, 2nd level (Bilateral, 5/15/2018); pr radiofreq neurotomy lumbar or sacral, w image guide,ea addl level (Left, 6/12/2018); eye surgery (2010); pr radiofreq neurotomy lumbar or sacral, w image guide,ea addl level (Right, 7/19/2018); pr office/outpt visit,procedure only (Left, 7/30/2018); Toe amputation (Right, 12/20/2018); Lumbar spine surgery (Left, 3/7/2019); and Lumbar spine surgery (Right, 3/28/2019).     Family History  This patient's family history includes COPD in his sister; Cancer in his father and sister; Diabetes in his mother; Heart Disease in his father; High Blood Pressure in his sister. Social History  Osmar  reports that he quit smoking about 27 years ago. He has never used smokeless tobacco. He reports that he does not drink alcohol or use drugs. Medications    Current Outpatient Medications:     oxyCODONE-acetaminophen (PERCOCET) 5-325 MG per tablet, Take 1 tablet by mouth every 6 hours as needed for Pain for up to 30 days. , Disp: 120 tablet, Rfl: 0    tamsulosin (FLOMAX) 0.4 MG capsule, TAKE 1 CAPSULE DAILY, Disp: 90 capsule, Rfl: 3    lidocaine (XYLOCAINE) 5 % ointment, Apply topically as needed. , Disp: 30 g, Rfl: 0    rivaroxaban (XARELTO) 20 MG TABS tablet, TAKE 1 TABLET DAILY, Disp: 90 tablet, Rfl: 3    Calcium Carbonate Antacid (TUMS PO), Take by mouth as needed, Disp: , Rfl:     levothyroxine (SYNTHROID) 100 MCG tablet, Take 100 mcg by mouth Daily, Disp: , Rfl:     magnesium hydroxide (MILK OF MAGNESIA) 400 MG/5ML suspension, Take 30 mLs by mouth daily as needed for Constipation, Disp: , Rfl:     Pseudoephedrine HCl (SUDAFED PO), Take by mouth as needed, Disp: , Rfl:     Cranberry 1000 MG CAPS, Take 500 mg by mouth 2 times daily , Disp: , Rfl:     NONFORMULARY, Take 1 capsule by mouth daily , Disp: , Rfl:     aspirin 81 MG EC tablet, Take 1 tablet by mouth daily, Disp: 1 tablet, Rfl: 0    Calcium Carbonate-Vitamin D (CALCIUM 600/VITAMIN D PO), Take 1 tablet by mouth 2 times daily, Disp: , Rfl:     metoprolol tartrate (LOPRESSOR) 50 MG tablet, TAKE 1 TABLET TWICE A DAY, Disp: 180 tablet, Rfl: 0    furosemide (LASIX) 40 MG tablet, Take 1 tablet by mouth daily, Disp: 90 tablet, Rfl: 2    hydrOXYzine (ATARAX) 25 MG tablet, Take 25 mg by mouth 4 times daily as needed for Itching, Disp: , Rfl:     loperamide (IMODIUM) 2 MG capsule, Take 2 mg by mouth as needed for Diarrhea, Disp: , Rfl:     potassium chloride (K-TAB) 10 MEQ extended release side.  Gait-Walker  Motor 4/5   Skin: Skin is warm. No rash noted. He is not diaphoretic. No erythema. No pallor. Psychiatric: His mood appears not anxious. His affect is not angry, not blunt, not labile and not inappropriate. His speech is not rapid and/or pressured, not delayed, not tangential and not slurred. He is not agitated, not aggressive, not hyperactive, not slowed, not withdrawn, not actively hallucinating and not combative. Thought content is not paranoid and not delusional. Cognition and memory are not impaired. He does not express impulsivity or inappropriate judgment. He does not exhibit a depressed mood. He expresses no homicidal and no suicidal ideation. He expresses no suicidal plans and no homicidal plans. He is communicative. He exhibits normal recent memory and normal remote memory. He is attentive. Nursing note and vitals reviewed. CORAL test: negative   Yeomans test: negative   Gaenslen test: negative      Assessment:     1. Bursitis of right shoulder    2. Lumbar spondylosis    3. Chronic right shoulder pain    4. Spondylosis of lumbar region without myelopathy or radiculopathy    5. PVD (peripheral vascular disease) ([facility] [address]    6. Chronic pain syndrome            Plan:      · OARRS reviewed. Current MED: 30.00  · Patient was offered naloxone for home. Refused  · Discussed long term side effects of medications, tolerance, dependency and addiction. · Previous UDS reviewed  · UDS preformed today for compliance. · Patient told can not receive any pain medications from any other source. · No evidence of abuse, diversion or aberrant behavior.  Medications and/or procedures to improve function and quality of life- patient understanding with this and that may not be pain free   Discussed with patient about safe storage of medications at home   Discussed possible weaning of medication dosing dependent on treatment/procedure results.     Discussed with patient about risks with procedure including infection, reaction to medication, increased pain, or bleeding.  Received over 50% relief from L-facet RFA- did not last more than 3 months.  Discussed SCS in future- having surgery for his PVD soon.  Ordered psych referral for SCS clearance    Plan right shoulder steroid injection today was very effective in past.   · Medications are effective, patient is compliant. Continue Percocet 5/325 QID prn. Ordered refill     Procedure  Visit Date: 6/5/2019    Irish Fowler is a 82 y.o. male presents today in the office for the following:  Chief Complaint   Patient presents with    Follow-up     3 MONTH FOLLOW UP SHOULDER PAIN       History of Present Illness   Osmar is here today for right shoulder injection. Pre-Op Diagnosis   Bursitis    Post-Op Diagnosis   Bursitis    Procedure Documentation   Patient identified. Consent signed. Site identified. Site was prepped with alcohol swap X 3. Then with a 26g needle entered the joint and after negative aspiration injected 2 cc of 0.25% marcaine MPF with 80 mg of depomedrol. Vitals:    06/05/19 1000   BP: 133/78   Site: Right Upper Arm   Position: Sitting   Cuff Size: Large Adult   Pulse: 88   Weight: 220 lb (99.8 kg)   Height: 5' 8\" (1.727 m)       Conclusion   No complications encountered. Patient discharged stable condition. Patient told if any problems to call office or go to ER. Orders Placed This Encounter   Medications    methylPREDNISolone acetate (DEPO-MEDROL) injection 80 mg       Electronically signed by BRENNA Johnson CNP on 6/5/2019 at 10:25 AM        Meds. Prescribed:   Orders Placed This Encounter   Medications    methylPREDNISolone acetate (DEPO-MEDROL) injection 80 mg       Return for after psycj eval for SCS clearance. , follow up after procedure.          Electronically signed by BRENNA Johnson CNP on6/5/2019 at 10:25 AM

## 2019-06-19 ENCOUNTER — TELEPHONE (OUTPATIENT)
Dept: CARDIOLOGY CLINIC | Age: 84
End: 2019-06-19

## 2019-06-19 ENCOUNTER — HOSPITAL ENCOUNTER (OUTPATIENT)
Age: 84
Discharge: HOME | End: 2019-06-19
Payer: MEDICARE

## 2019-06-19 LAB
EKG ATRIAL RATE: 67 BPM
EKG Q-T INTERVAL: 452 MS
EKG QRS DURATION: 102 MS
EKG QTC CALCULATION (BAZETT): 488 MS
EKG R AXIS: 67 DEGREES
EKG T AXIS: 9 DEGREES
EKG VENTRICULAR RATE: 70 BPM

## 2019-06-19 PROCEDURE — 93010 ELECTROCARDIOGRAM REPORT: CPT | Performed by: INTERNAL MEDICINE

## 2019-06-19 PROCEDURE — 93005 ELECTROCARDIOGRAM TRACING: CPT | Performed by: INTERNAL MEDICINE

## 2019-06-19 NOTE — TELEPHONE ENCOUNTER
Spoke with pt voiced understanding. pt has had  Stent placed  with Dr. Oleg Rubio in leg last week 6/13/19. Pt states sx is urgent . Pt will not stop plavix and ASA for sx.

## 2019-06-19 NOTE — TELEPHONE ENCOUNTER
Pre op Risk Assessment     Procedure Bilateral Leg Vascular Surgery  Physician Dr. Nydia Magana  Date of surgery/procedure 6/27/19    Last OV 4-1-19  Last Stress 4-6-18  Last Echo 7-27-18  Last Cath 6-27-14  Last Stent None In Epic  Is patient on blood thinners ASA and Xarelto  Hold Meds/how many days ?     Fax- [phone]

## 2019-07-17 ENCOUNTER — APPOINTMENT (OUTPATIENT)
Dept: CT IMAGING | Age: 84
DRG: 064 | End: 2019-07-17
Payer: MEDICARE

## 2019-07-17 ENCOUNTER — APPOINTMENT (OUTPATIENT)
Dept: GENERAL RADIOLOGY | Age: 84
DRG: 064 | End: 2019-07-17
Payer: MEDICARE

## 2019-07-17 ENCOUNTER — HOSPITAL ENCOUNTER (INPATIENT)
Age: 84
LOS: 7 days | Discharge: HOME HEALTH | DRG: 064 | End: 2019-07-24
Attending: FAMILY MEDICINE | Admitting: INTERNAL MEDICINE
Payer: MEDICARE

## 2019-07-17 DIAGNOSIS — R77.8 ELEVATED TROPONIN: ICD-10-CM

## 2019-07-17 DIAGNOSIS — A41.9 SEPTICEMIA (HCC): Primary | ICD-10-CM

## 2019-07-17 DIAGNOSIS — R41.82 ACUTE ALTERATION IN MENTAL STATUS: ICD-10-CM

## 2019-07-17 LAB
ALBUMIN SERPL-MCNC: 3.4 G/DL (ref 3.5–5.1)
ALP BLD-CCNC: 124 U/L (ref 38–126)
ALT SERPL-CCNC: 19 U/L (ref 11–66)
ANION GAP SERPL CALCULATED.3IONS-SCNC: 15 MEQ/L (ref 8–16)
APTT: 33.9 SECONDS (ref 22–38)
AST SERPL-CCNC: 27 U/L (ref 5–40)
BASOPHILS # BLD: 0.4 %
BASOPHILS ABSOLUTE: 0.1 THOU/MM3 (ref 0–0.1)
BILIRUB SERPL-MCNC: 0.6 MG/DL (ref 0.3–1.2)
BILIRUBIN URINE: NEGATIVE
BLOOD, URINE: NEGATIVE
BUN BLDV-MCNC: 28 MG/DL (ref 7–22)
C-REACTIVE PROTEIN: 9.81 MG/DL (ref 0–1)
CALCIUM SERPL-MCNC: 10.1 MG/DL (ref 8.5–10.5)
CHARACTER, URINE: CLEAR
CHLORIDE BLD-SCNC: 102 MEQ/L (ref 98–111)
CO2: 25 MEQ/L (ref 23–33)
COLOR: YELLOW
CREAT SERPL-MCNC: 1.4 MG/DL (ref 0.4–1.2)
EKG ATRIAL RATE: 97 BPM
EKG Q-T INTERVAL: 392 MS
EKG QRS DURATION: 102 MS
EKG QTC CALCULATION (BAZETT): 484 MS
EKG R AXIS: 35 DEGREES
EKG T AXIS: -15 DEGREES
EKG VENTRICULAR RATE: 92 BPM
EOSINOPHIL # BLD: 2.5 %
EOSINOPHILS ABSOLUTE: 0.5 THOU/MM3 (ref 0–0.4)
ERYTHROCYTE [DISTWIDTH] IN BLOOD BY AUTOMATED COUNT: 17.5 % (ref 11.5–14.5)
ERYTHROCYTE [DISTWIDTH] IN BLOOD BY AUTOMATED COUNT: 63.7 FL (ref 35–45)
GFR SERPL CREATININE-BSD FRML MDRD: 49 ML/MIN/1.73M2
GLUCOSE BLD-MCNC: 124 MG/DL (ref 70–108)
GLUCOSE URINE: NEGATIVE MG/DL
HCT VFR BLD CALC: 32.3 % (ref 42–52)
HEMOGLOBIN: 9.7 GM/DL (ref 14–18)
IMMATURE GRANS (ABS): 0.13 THOU/MM3 (ref 0–0.07)
IMMATURE GRANULOCYTES: 0.7 %
INR BLD: 2.34 (ref 0.85–1.13)
KETONES, URINE: NEGATIVE
LACTIC ACID, SEPSIS: 1.5 MMOL/L (ref 0.5–1.9)
LACTIC ACID, SEPSIS: 2.4 MMOL/L (ref 0.5–1.9)
LEUKOCYTE ESTERASE, URINE: NEGATIVE
LIPASE: 10.7 U/L (ref 5.6–51.3)
LYMPHOCYTES # BLD: 11.1 %
LYMPHOCYTES ABSOLUTE: 2.2 THOU/MM3 (ref 1–4.8)
MCH RBC QN AUTO: 30.3 PG (ref 26–33)
MCHC RBC AUTO-ENTMCNC: 30 GM/DL (ref 32.2–35.5)
MCV RBC AUTO: 100.9 FL (ref 80–94)
MONOCYTES # BLD: 6.4 %
MONOCYTES ABSOLUTE: 1.2 THOU/MM3 (ref 0.4–1.3)
NITRITE, URINE: NEGATIVE
NUCLEATED RED BLOOD CELLS: 0 /100 WBC
OSMOLALITY CALCULATION: 290 MOSMOL/KG (ref 275–300)
PH UA: 6.5 (ref 5–9)
PLATELET # BLD: 400 THOU/MM3 (ref 130–400)
PMV BLD AUTO: 8.8 FL (ref 9.4–12.4)
POTASSIUM REFLEX MAGNESIUM: 4.4 MEQ/L (ref 3.5–5.2)
PRO-BNP: 880.9 PG/ML (ref 0–1800)
PROTEIN UA: NEGATIVE
RBC # BLD: 3.2 MILL/MM3 (ref 4.7–6.1)
SEG NEUTROPHILS: 78.9 %
SEGMENTED NEUTROPHILS ABSOLUTE COUNT: 15.4 THOU/MM3 (ref 1.8–7.7)
SODIUM BLD-SCNC: 142 MEQ/L (ref 135–145)
SPECIFIC GRAVITY, URINE: 1.02 (ref 1–1.03)
TOTAL PROTEIN: 6.4 G/DL (ref 6.1–8)
TROPONIN T: 0.04 NG/ML
TSH SERPL DL<=0.05 MIU/L-ACNC: 3.43 UIU/ML (ref 0.4–4.2)
UROBILINOGEN, URINE: 0.2 EU/DL (ref 0–1)
WBC # BLD: 19.5 THOU/MM3 (ref 4.8–10.8)

## 2019-07-17 PROCEDURE — 84484 ASSAY OF TROPONIN QUANT: CPT

## 2019-07-17 PROCEDURE — 96365 THER/PROPH/DIAG IV INF INIT: CPT

## 2019-07-17 PROCEDURE — 99223 1ST HOSP IP/OBS HIGH 75: CPT | Performed by: NURSE PRACTITIONER

## 2019-07-17 PROCEDURE — 6360000002 HC RX W HCPCS: Performed by: FAMILY MEDICINE

## 2019-07-17 PROCEDURE — 86140 C-REACTIVE PROTEIN: CPT

## 2019-07-17 PROCEDURE — 85025 COMPLETE CBC W/AUTO DIFF WBC: CPT

## 2019-07-17 PROCEDURE — 2580000003 HC RX 258: Performed by: FAMILY MEDICINE

## 2019-07-17 PROCEDURE — 2709999900 HC NON-CHARGEABLE SUPPLY

## 2019-07-17 PROCEDURE — 83880 ASSAY OF NATRIURETIC PEPTIDE: CPT

## 2019-07-17 PROCEDURE — 2140000000 HC CCU INTERMEDIATE R&B

## 2019-07-17 PROCEDURE — 93010 ELECTROCARDIOGRAM REPORT: CPT | Performed by: NUCLEAR MEDICINE

## 2019-07-17 PROCEDURE — 84443 ASSAY THYROID STIM HORMONE: CPT

## 2019-07-17 PROCEDURE — 85730 THROMBOPLASTIN TIME PARTIAL: CPT

## 2019-07-17 PROCEDURE — 80053 COMPREHEN METABOLIC PANEL: CPT

## 2019-07-17 PROCEDURE — 99285 EMERGENCY DEPT VISIT HI MDM: CPT

## 2019-07-17 PROCEDURE — 83690 ASSAY OF LIPASE: CPT

## 2019-07-17 PROCEDURE — 70450 CT HEAD/BRAIN W/O DYE: CPT

## 2019-07-17 PROCEDURE — 71045 X-RAY EXAM CHEST 1 VIEW: CPT

## 2019-07-17 PROCEDURE — 81003 URINALYSIS AUTO W/O SCOPE: CPT

## 2019-07-17 PROCEDURE — 36415 COLL VENOUS BLD VENIPUNCTURE: CPT

## 2019-07-17 PROCEDURE — 87040 BLOOD CULTURE FOR BACTERIA: CPT

## 2019-07-17 PROCEDURE — 93005 ELECTROCARDIOGRAM TRACING: CPT | Performed by: FAMILY MEDICINE

## 2019-07-17 PROCEDURE — 6370000000 HC RX 637 (ALT 250 FOR IP): Performed by: NURSE PRACTITIONER

## 2019-07-17 PROCEDURE — 85610 PROTHROMBIN TIME: CPT

## 2019-07-17 PROCEDURE — 83605 ASSAY OF LACTIC ACID: CPT

## 2019-07-17 RX ORDER — OXYCODONE HYDROCHLORIDE AND ACETAMINOPHEN 5; 325 MG/1; MG/1
2 TABLET ORAL EVERY 6 HOURS PRN
Status: DISCONTINUED | OUTPATIENT
Start: 2019-07-17 | End: 2019-07-24 | Stop reason: HOSPADM

## 2019-07-17 RX ORDER — POTASSIUM CHLORIDE 750 MG/1
40 TABLET, FILM COATED, EXTENDED RELEASE ORAL DAILY
Status: DISCONTINUED | OUTPATIENT
Start: 2019-07-18 | End: 2019-07-24 | Stop reason: HOSPADM

## 2019-07-17 RX ORDER — RANITIDINE 300 MG/1
300 TABLET ORAL DAILY
COMMUNITY

## 2019-07-17 RX ORDER — METOPROLOL TARTRATE 50 MG/1
50 TABLET, FILM COATED ORAL 2 TIMES DAILY
Status: DISCONTINUED | OUTPATIENT
Start: 2019-07-17 | End: 2019-07-24 | Stop reason: HOSPADM

## 2019-07-17 RX ORDER — FUROSEMIDE 40 MG/1
40 TABLET ORAL DAILY
Status: DISCONTINUED | OUTPATIENT
Start: 2019-07-18 | End: 2019-07-24 | Stop reason: HOSPADM

## 2019-07-17 RX ORDER — PRAVASTATIN SODIUM 80 MG/1
80 TABLET ORAL NIGHTLY
Status: DISCONTINUED | OUTPATIENT
Start: 2019-07-17 | End: 2019-07-24 | Stop reason: HOSPADM

## 2019-07-17 RX ORDER — CLOPIDOGREL BISULFATE 75 MG/1
75 TABLET ORAL DAILY
COMMUNITY

## 2019-07-17 RX ORDER — DOCUSATE SODIUM 100 MG/1
300 CAPSULE, LIQUID FILLED ORAL 2 TIMES DAILY
Status: DISCONTINUED | OUTPATIENT
Start: 2019-07-17 | End: 2019-07-21

## 2019-07-17 RX ORDER — ACETAMINOPHEN 325 MG/1
650 TABLET ORAL EVERY 4 HOURS PRN
Status: DISCONTINUED | OUTPATIENT
Start: 2019-07-17 | End: 2019-07-24 | Stop reason: HOSPADM

## 2019-07-17 RX ORDER — ASPIRIN 81 MG/1
81 TABLET ORAL DAILY
Status: DISCONTINUED | OUTPATIENT
Start: 2019-07-18 | End: 2019-07-24 | Stop reason: HOSPADM

## 2019-07-17 RX ORDER — LEVOTHYROXINE SODIUM 0.1 MG/1
100 TABLET ORAL DAILY
Status: DISCONTINUED | OUTPATIENT
Start: 2019-07-18 | End: 2019-07-24 | Stop reason: HOSPADM

## 2019-07-17 RX ORDER — SODIUM CHLORIDE 0.9 % (FLUSH) 0.9 %
10 SYRINGE (ML) INJECTION PRN
Status: DISCONTINUED | OUTPATIENT
Start: 2019-07-17 | End: 2019-07-24 | Stop reason: HOSPADM

## 2019-07-17 RX ORDER — TAMSULOSIN HYDROCHLORIDE 0.4 MG/1
0.4 CAPSULE ORAL DAILY
Status: DISCONTINUED | OUTPATIENT
Start: 2019-07-18 | End: 2019-07-17

## 2019-07-17 RX ORDER — ONDANSETRON 2 MG/ML
4 INJECTION INTRAMUSCULAR; INTRAVENOUS EVERY 6 HOURS PRN
Status: DISCONTINUED | OUTPATIENT
Start: 2019-07-17 | End: 2019-07-24 | Stop reason: HOSPADM

## 2019-07-17 RX ORDER — OXYCODONE HYDROCHLORIDE AND ACETAMINOPHEN 5; 325 MG/1; MG/1
1 TABLET ORAL EVERY 6 HOURS PRN
Status: DISCONTINUED | OUTPATIENT
Start: 2019-07-17 | End: 2019-07-24 | Stop reason: HOSPADM

## 2019-07-17 RX ORDER — OXYCODONE HYDROCHLORIDE AND ACETAMINOPHEN 5; 325 MG/1; MG/1
1 TABLET ORAL EVERY 6 HOURS PRN
Status: ON HOLD | COMMUNITY
End: 2019-07-26 | Stop reason: SDUPTHER

## 2019-07-17 RX ORDER — HYDROCHLOROTHIAZIDE 25 MG/1
25 TABLET ORAL DAILY
Status: DISCONTINUED | OUTPATIENT
Start: 2019-07-18 | End: 2019-07-18

## 2019-07-17 RX ORDER — ALLOPURINOL 300 MG/1
300 TABLET ORAL DAILY
Status: DISCONTINUED | OUTPATIENT
Start: 2019-07-18 | End: 2019-07-24 | Stop reason: HOSPADM

## 2019-07-17 RX ORDER — TAMSULOSIN HYDROCHLORIDE 0.4 MG/1
0.4 CAPSULE ORAL NIGHTLY
Status: DISCONTINUED | OUTPATIENT
Start: 2019-07-17 | End: 2019-07-24 | Stop reason: HOSPADM

## 2019-07-17 RX ORDER — 0.9 % SODIUM CHLORIDE 0.9 %
500 INTRAVENOUS SOLUTION INTRAVENOUS ONCE
Status: COMPLETED | OUTPATIENT
Start: 2019-07-17 | End: 2019-07-17

## 2019-07-17 RX ORDER — SODIUM CHLORIDE 0.9 % (FLUSH) 0.9 %
10 SYRINGE (ML) INJECTION EVERY 12 HOURS SCHEDULED
Status: DISCONTINUED | OUTPATIENT
Start: 2019-07-17 | End: 2019-07-24 | Stop reason: HOSPADM

## 2019-07-17 RX ORDER — CALCIUM CARBONATE 200(500)MG
500 TABLET,CHEWABLE ORAL 3 TIMES DAILY PRN
Status: DISCONTINUED | OUTPATIENT
Start: 2019-07-17 | End: 2019-07-24 | Stop reason: HOSPADM

## 2019-07-17 RX ADMIN — OXYCODONE HYDROCHLORIDE AND ACETAMINOPHEN 1 TABLET: 5; 325 TABLET ORAL at 23:17

## 2019-07-17 RX ADMIN — DOCUSATE SODIUM 300 MG: 100 CAPSULE, LIQUID FILLED ORAL at 23:17

## 2019-07-17 RX ADMIN — TAMSULOSIN HYDROCHLORIDE 0.4 MG: 0.4 CAPSULE ORAL at 23:17

## 2019-07-17 RX ADMIN — CEFAZOLIN SODIUM 1 G: 1 INJECTION, POWDER, FOR SOLUTION INTRAMUSCULAR; INTRAVENOUS at 20:09

## 2019-07-17 RX ADMIN — VANCOMYCIN HYDROCHLORIDE 1000 MG: 1 INJECTION, POWDER, LYOPHILIZED, FOR SOLUTION INTRAVENOUS at 20:48

## 2019-07-17 RX ADMIN — RIVAROXABAN 15 MG: 15 TABLET, FILM COATED ORAL at 23:17

## 2019-07-17 RX ADMIN — SODIUM CHLORIDE 500 ML: 9 INJECTION, SOLUTION INTRAVENOUS at 18:36

## 2019-07-17 ASSESSMENT — PAIN SCALES - GENERAL: PAINLEVEL_OUTOF10: 6

## 2019-07-17 ASSESSMENT — PAIN SCALES - WONG BAKER: WONGBAKER_NUMERICALRESPONSE: 6

## 2019-07-17 NOTE — ED PROVIDER NOTES
[address]          CHIEF COMPLAINT       Chief Complaint   Patient presents with    Other       Nurses Notes reviewed and I agree except as noted inthe HPI. HISTORY OF PRESENT ILLNESS    Osmar Goncalves is a 82 y.o. male who presents to the Emergency Department for the evaluation of AMS. The patient has a history CAD, MI, A-fib, hypertension, hyperlipidemia, blood clots, thyroid disease, Bell's palsy, diabetes, neuropathy, lymphedema, and PAD among others. The patient was discharged from [facility] yesterday afternoon after treatment of left knee area wound and had a wound vac placed . Patient lives at home with his wife who is at bedside to give history on patient. The patient was admitted to [facility] on 07/11 with infection of left prosthetic vascular graft with occlusion of stent of the peripheral artery s/p angio on 06/27. Patient was discharge with a wound vac. Wife states patient has not been himself since he woke up this morning and his speech has been slurred. Patient has had similar episodes of slurred speech in the past.     Stroke was ruled out during admission to [facility]. Patient has a history of confusion and hallucinations as well which have been associated with UTI's in the past.     Patient has been unable to carry out a conversation today and has been \"yelling out\" according to wife. Patient follows commands upon arrival here and was doing so en route by EMS as well. Wife states patient spent most of the day in his recliner today with generalized weakness. Patient does not have any complaints when asked. He does not appear to be in any acute distress. The HPI was provided by the wife of the patient.         REVIEW OF SYSTEMS     Review of Systems   Unable to perform ROS: Mental status change     PAST MEDICAL HISTORY    has a past medical history of Atrial fibrillation ([facility] [address] Bell's palsy, times daily     FUROSEMIDE (LASIX) 40 MG TABLET    Take 1 tablet by mouth daily    HYDROXYZINE (ATARAX) 25 MG TABLET    Take 25 mg by mouth 4 times daily as needed for Itching    INDAPAMIDE (LOZOL) 1.25 MG TABLET    Take 1.25 mg by mouth every morning. LEVOTHYROXINE (SYNTHROID) 100 MCG TABLET    Take 100 mcg by mouth Daily    LIDOCAINE (XYLOCAINE) 5 % OINTMENT    Apply topically as needed. LOPERAMIDE (IMODIUM) 2 MG CAPSULE    Take 2 mg by mouth as needed for Diarrhea    MAGNESIUM HYDROXIDE (MILK OF MAGNESIA) 400 MG/5ML SUSPENSION    Take 30 mLs by mouth daily as needed for Constipation    MAGNESIUM OXIDE (MAG-OX) 400 (241.3 MG) MG TABS TABLET    Take 1 tablet by mouth daily    METOPROLOL TARTRATE (LOPRESSOR) 50 MG TABLET    TAKE 1 TABLET TWICE A DAY    NONFORMULARY    Take 1 capsule by mouth daily     POTASSIUM CHLORIDE (K-TAB) 10 MEQ EXTENDED RELEASE TABLET    Take 4 tablets by mouth daily    PRAVASTATIN (PRAVACHOL) 80 MG TABLET    Take 80 mg by mouth nightly. PSEUDOEPHEDRINE HCL (SUDAFED PO)    Take by mouth as needed    QUINAPRIL (ACCUPRIL) 40 MG TABLET    Take 40 mg by mouth daily. RIVAROXABAN (XARELTO) 20 MG TABS TABLET    TAKE 1 TABLET DAILY    TAMSULOSIN (FLOMAX) 0.4 MG CAPSULE    TAKE 1 CAPSULE DAILY            is allergic to pcn [penicillins]. FAMILY HISTORY     He indicated that his mother is . He indicated that his father is . He indicated that his sister is . family history includes COPD in his sister; Cancer in his father and sister; Diabetes in his mother; Heart Disease in his father; High Blood Pressure in his sister. SOCIAL HISTORY      reports that he quit smoking about 27 years ago. He has never used smokeless tobacco. He reports that he does not drink alcohol or use drugs. PHYSICAL EXAM     INITIAL VITALS:  height is 5' 8\" (1.727 m) and weight is 220 lb (99.8 kg). His oral temperature is 98.4 °F (36.9 °C).  His blood pressure is 108/50 (abnormal) is not new. His urinalysis is negative WBC count is 19.5 hemoglobin 9.7 with MCV of 100.9. BUN is 28 creatinine is 1.4 glucose 124. Troponin 0 0.041 but proBNP is 880.9. He is anticoagulated and INR is 2.3. CRP 9.8 lactic 2.4. CT head and chest x-ray are normal.  Patient has infected graft and just received a wound VAC placement. Most likely this is sepsis from above. Antibiotics are ordered here in the ED and patient may need to be admitted for further evaluation and treatment update his mental status is back to baseline. CONSULTS:    800pm Patient is handed over to the hospitalist service for admission    FINAL IMPRESSION      1. Septicemia ([address]    2. Acute alteration in mental status    3. Elevated troponin          DISPOSITION/PLAN     Patient is admitted. PATIENT REFERRED TO:  No follow-up provider specified. DISCHARGE MEDICATIONS:  New Prescriptions    No medications on file       (Please note that portions of this note were completed with a voice recognition program.Efforts were made to edit the dictations but occasionally words are mis-transcribed.)    Scribe:  Signed by: Thomas Camejo, 7/17/196:26 PM Scribing for and in the presence of Author Fatoumata MD    Provider:  I personally performed the services described in the documentation, reviewed and edited the documentation which was dictated to the scribe in mypresence, and it accurately records my words and actions.     Author Fatoumata MD 7/17/19 8:10 PM                      Author Fatoumata MD  07/17/19 2010

## 2019-07-18 PROBLEM — G93.41 METABOLIC ENCEPHALOPATHY: Status: ACTIVE | Noted: 2019-07-18

## 2019-07-18 LAB
ANION GAP SERPL CALCULATED.3IONS-SCNC: 11 MEQ/L (ref 8–16)
BASOPHILS # BLD: 0.5 %
BASOPHILS ABSOLUTE: 0.1 THOU/MM3 (ref 0–0.1)
BUN BLDV-MCNC: 22 MG/DL (ref 7–22)
CALCIUM SERPL-MCNC: 9.1 MG/DL (ref 8.5–10.5)
CHLORIDE BLD-SCNC: 103 MEQ/L (ref 98–111)
CO2: 22 MEQ/L (ref 23–33)
CREAT SERPL-MCNC: 1 MG/DL (ref 0.4–1.2)
EOSINOPHIL # BLD: 3.9 %
EOSINOPHILS ABSOLUTE: 0.7 THOU/MM3 (ref 0–0.4)
ERYTHROCYTE [DISTWIDTH] IN BLOOD BY AUTOMATED COUNT: 17.7 % (ref 11.5–14.5)
ERYTHROCYTE [DISTWIDTH] IN BLOOD BY AUTOMATED COUNT: 62.4 FL (ref 35–45)
GFR SERPL CREATININE-BSD FRML MDRD: 72 ML/MIN/1.73M2
GLUCOSE BLD-MCNC: 104 MG/DL (ref 70–108)
HCT VFR BLD CALC: 28.2 % (ref 42–52)
HEMOGLOBIN: 8.9 GM/DL (ref 14–18)
IMMATURE GRANS (ABS): 0.15 THOU/MM3 (ref 0–0.07)
IMMATURE GRANULOCYTES: 0.9 %
LACTIC ACID: 1.3 MMOL/L (ref 0.5–2.2)
LYMPHOCYTES # BLD: 13.3 %
LYMPHOCYTES ABSOLUTE: 2.3 THOU/MM3 (ref 1–4.8)
MCH RBC QN AUTO: 31 PG (ref 26–33)
MCHC RBC AUTO-ENTMCNC: 31.6 GM/DL (ref 32.2–35.5)
MCV RBC AUTO: 98.3 FL (ref 80–94)
MONOCYTES # BLD: 7.2 %
MONOCYTES ABSOLUTE: 1.2 THOU/MM3 (ref 0.4–1.3)
NUCLEATED RED BLOOD CELLS: 0 /100 WBC
PLATELET # BLD: 336 THOU/MM3 (ref 130–400)
PMV BLD AUTO: 8.8 FL (ref 9.4–12.4)
POTASSIUM REFLEX MAGNESIUM: 3.6 MEQ/L (ref 3.5–5.2)
PROCALCITONIN: 0.11 NG/ML (ref 0.01–0.09)
RBC # BLD: 2.87 MILL/MM3 (ref 4.7–6.1)
SEG NEUTROPHILS: 74.2 %
SEGMENTED NEUTROPHILS ABSOLUTE COUNT: 12.6 THOU/MM3 (ref 1.8–7.7)
SODIUM BLD-SCNC: 136 MEQ/L (ref 135–145)
TROPONIN T: 0.04 NG/ML
TROPONIN T: 0.05 NG/ML
WBC # BLD: 17 THOU/MM3 (ref 4.8–10.8)

## 2019-07-18 PROCEDURE — 6360000002 HC RX W HCPCS: Performed by: NURSE PRACTITIONER

## 2019-07-18 PROCEDURE — 83605 ASSAY OF LACTIC ACID: CPT

## 2019-07-18 PROCEDURE — 84145 PROCALCITONIN (PCT): CPT

## 2019-07-18 PROCEDURE — 85025 COMPLETE CBC W/AUTO DIFF WBC: CPT

## 2019-07-18 PROCEDURE — 2709999900 HC NON-CHARGEABLE SUPPLY

## 2019-07-18 PROCEDURE — 6370000000 HC RX 637 (ALT 250 FOR IP): Performed by: NURSE PRACTITIONER

## 2019-07-18 PROCEDURE — 2140000000 HC CCU INTERMEDIATE R&B

## 2019-07-18 PROCEDURE — 99233 SBSQ HOSP IP/OBS HIGH 50: CPT | Performed by: INTERNAL MEDICINE

## 2019-07-18 PROCEDURE — 84484 ASSAY OF TROPONIN QUANT: CPT

## 2019-07-18 PROCEDURE — 80048 BASIC METABOLIC PNL TOTAL CA: CPT

## 2019-07-18 PROCEDURE — 6370000000 HC RX 637 (ALT 250 FOR IP): Performed by: INTERNAL MEDICINE

## 2019-07-18 PROCEDURE — 2580000003 HC RX 258: Performed by: NURSE PRACTITIONER

## 2019-07-18 PROCEDURE — 36415 COLL VENOUS BLD VENIPUNCTURE: CPT

## 2019-07-18 RX ORDER — POLYETHYLENE GLYCOL 3350 17 G/17G
17 POWDER, FOR SOLUTION ORAL DAILY
Status: DISCONTINUED | OUTPATIENT
Start: 2019-07-18 | End: 2019-07-24 | Stop reason: HOSPADM

## 2019-07-18 RX ORDER — NYSTATIN 100000 U/G
1 CREAM TOPICAL PRN
COMMUNITY

## 2019-07-18 RX ORDER — SODIUM CHLORIDE 9 MG/ML
INJECTION, SOLUTION INTRAVENOUS CONTINUOUS
Status: DISCONTINUED | OUTPATIENT
Start: 2019-07-18 | End: 2019-07-18

## 2019-07-18 RX ORDER — LANOLIN ALCOHOL/MO/W.PET/CERES
9 CREAM (GRAM) TOPICAL NIGHTLY
Status: DISCONTINUED | OUTPATIENT
Start: 2019-07-18 | End: 2019-07-19

## 2019-07-18 RX ADMIN — PRAVASTATIN SODIUM 80 MG: 80 TABLET ORAL at 00:32

## 2019-07-18 RX ADMIN — METOPROLOL TARTRATE 50 MG: 50 TABLET, FILM COATED ORAL at 09:49

## 2019-07-18 RX ADMIN — Medication 10 ML: at 20:32

## 2019-07-18 RX ADMIN — DOCUSATE SODIUM 300 MG: 100 CAPSULE, LIQUID FILLED ORAL at 09:50

## 2019-07-18 RX ADMIN — SODIUM CHLORIDE: 9 INJECTION, SOLUTION INTRAVENOUS at 02:36

## 2019-07-18 RX ADMIN — POTASSIUM CHLORIDE 40 MEQ: 750 TABLET, FILM COATED, EXTENDED RELEASE ORAL at 09:50

## 2019-07-18 RX ADMIN — DOCUSATE SODIUM 300 MG: 100 CAPSULE, LIQUID FILLED ORAL at 20:31

## 2019-07-18 RX ADMIN — TAMSULOSIN HYDROCHLORIDE 0.4 MG: 0.4 CAPSULE ORAL at 20:32

## 2019-07-18 RX ADMIN — ASPIRIN 81 MG: 81 TABLET ORAL at 09:50

## 2019-07-18 RX ADMIN — OXYCODONE HYDROCHLORIDE AND ACETAMINOPHEN 1 TABLET: 5; 325 TABLET ORAL at 23:09

## 2019-07-18 RX ADMIN — RIVAROXABAN 15 MG: 15 TABLET, FILM COATED ORAL at 20:32

## 2019-07-18 RX ADMIN — ALLOPURINOL 300 MG: 300 TABLET ORAL at 09:52

## 2019-07-18 RX ADMIN — PRAVASTATIN SODIUM 80 MG: 80 TABLET ORAL at 23:09

## 2019-07-18 RX ADMIN — METOPROLOL TARTRATE 50 MG: 50 TABLET, FILM COATED ORAL at 20:32

## 2019-07-18 RX ADMIN — FUROSEMIDE 40 MG: 40 TABLET ORAL at 09:49

## 2019-07-18 RX ADMIN — Medication 9 MG: at 20:32

## 2019-07-18 RX ADMIN — POLYETHYLENE GLYCOL 3350 17 G: 17 POWDER, FOR SOLUTION ORAL at 20:32

## 2019-07-18 RX ADMIN — MEROPENEM 1 G: 1 INJECTION, POWDER, FOR SOLUTION INTRAVENOUS at 02:35

## 2019-07-18 RX ADMIN — LEVOTHYROXINE SODIUM 100 MCG: 100 TABLET ORAL at 05:56

## 2019-07-18 RX ADMIN — OXYCODONE HYDROCHLORIDE AND ACETAMINOPHEN 2 TABLET: 5; 325 TABLET ORAL at 10:46

## 2019-07-18 RX ADMIN — Medication 10 ML: at 09:55

## 2019-07-18 ASSESSMENT — PAIN SCALES - GENERAL
PAINLEVEL_OUTOF10: 0
PAINLEVEL_OUTOF10: 8
PAINLEVEL_OUTOF10: 0

## 2019-07-18 ASSESSMENT — ENCOUNTER SYMPTOMS
WHEEZING: 0
CONSTIPATION: 1
CHEST TIGHTNESS: 0
BACK PAIN: 0
VOMITING: 0
SORE THROAT: 0
COUGH: 0
COLOR CHANGE: 0
PHOTOPHOBIA: 0
TROUBLE SWALLOWING: 0
NAUSEA: 0
SHORTNESS OF BREATH: 0

## 2019-07-18 NOTE — PROGRESS NOTES
Patient tried using urinal in chair, standing at the chair and tried using urinal and was unsuccessful both times. . Patient walked to BR and sat on toilet and he was successful. Urinated 350 ml this AM and then urinated 400 ml this evening while sitting on toilet.

## 2019-07-18 NOTE — FLOWSHEET NOTE
Pt is a 81yo . Pt was sitting in a chair and the pt's wife was sitting on the bed. Pt is a [place] and served in [employer] for much of his career. Pt shared stories of times where he felt that God had protected him. Wife often finishes stories for pt. Spiritual care to continue to allow pt to continue life review. 07/18/19 1005   Encounter Summary   Services provided to: Patient and family together   [facility]   Support System Spouse   Continue Visiting Yes  (7/18)   Complexity of Encounter Moderate   Length of Encounter 15 minutes   Spiritual/Confucianist   Type Spiritual support   Assessment Calm; Approachable   Intervention Active listening;Explored feelings, thoughts, concerns;Explored coping resources;Sustaining presence/ Ministry of presence   Outcome Connection/belonging;Comfort;Engaged in conversation;Expressed feelings/needs/concerns

## 2019-07-18 NOTE — PROGRESS NOTES
Pharmacy Medication History Note      List of current medications patient is taking is complete. Source of information: patient medication list, [facility] discharge medications    Changes made to medication list:  Medications added/doses adjusted:  Fluocinonide 0.05% cream - PRN  Nystatin 100,000 cream - PRN    Other notes (ex. Recent course of antibiotics, Coumadin dosing):  Denies use of other OTC or herbal medications.       Allergies reviewed      Electronically signed by Pradip Simmons on 7/18/2019 at 8:49 AM

## 2019-07-18 NOTE — H&P
treatment of left knee area wound and had a wound vac placed . The patient was admitted to [facility] on 07/11 with infection of left prosthetic vascular graft with occlusion of stent of the peripheral artery s/p angio on 06/27. Patient was discharge with a wound vac. Wife states patient has not been himself since he woke up this morning and his speech has been slurred. Patient has had similar episodes of slurred speech in the past. Stroke was ruled out during admission to [facility]. Patient has a history of confusion and hallucinations as well which have been associated with UTI's in the past. Patient has been unable to carry out a conversation today and has been \"yelling out\" according to wife. Patient admitted to [facility]   Constitutional: Negative for fatigue and fever. HENT: Negative for sore throat and trouble swallowing. Eyes: Negative for photophobia and visual disturbance. Respiratory: Negative for cough, chest tightness, shortness of breath and wheezing. Cardiovascular: Positive for leg swelling. Negative for chest pain and palpitations. Gastrointestinal: Positive for constipation. Negative for nausea and vomiting. Genitourinary: Negative for flank pain, hematuria and urgency. Musculoskeletal: Positive for joint swelling. Negative for back pain and neck stiffness. Skin: Positive for wound. Negative for color change, pallor and rash. Allergic/Immunologic: Negative for food allergies and immunocompromised state. Neurological: Positive for weakness. Negative for dizziness and numbness. Hematological: Negative for adenopathy. Psychiatric/Behavioral: Positive for confusion. Negative for agitation. The patient is not nervous/anxious.           PMH:  Per HPI  SHX:  Former smoker, quit 1991, former ETOH use, no loner, denies any drug use   FHX: Mother: Diabetes, Cancer Father: CODP, Cancer, HTN   Allergies: PCN  Medications:       meropenem  1 g Intravenous Q12H

## 2019-07-18 NOTE — PROGRESS NOTES
Present to pt room for left knee and foot wounds. Pt recently discharged from [facility] with wound vac to left medial knee area. Dr. Caterina Zheng in room with nurse for wound assessment. Removed wound vac. 2 open areas noted. Assessment and photo below. Cleansed wounds with normal saline nd gauze. Pat dry with clean gauze. Saline moist gauze applied to wound areas, covered with ABD pad, secured with kerlix. Foot wounds assessment and photos below. Pt follows with Dr. Chiquita Cha for routine foot care. Betadine moist gauze applied to area, covered with ABD pad, secured with kerlix. Staff to apply dakins moist gauze to wound beds, cover with ABD pad, secure with kerlix twice daily. Wounds do not show s/s of infection. No odor. No increased redness to bethanie wound. Pedal pulses palpable +1 to dorsal and post tibial regions bilaterally. Plan to reapply home wound vac upon discharge from [facility] to left medial knee wounds and pt to continue home health services through [facility]. Home vac in pt room on charging unit. Wife to bring in new canister. Pt to continue to follow with vascular physician Dr. Romeo Mast and podiatry upon discharge. Pt states that his buttock have been uncomfortable. Stood pt up with slipper socks, gait belt, walker and 2 assist. Pt noted to have MASD to bethanie rectal area from stool seepage. Pt wife states that he has had that since a recent bowel prep. Cleansed area with baby wipes. Dried. Applied zinc barrier cream. Applied waffle cushion in chair with new chux. Staff to continue to monitor. Pt sitting in recliner, wife at bedside, call light in reach. Left heel blanchable erythema with healed wound calloused area. Recommend heels to be offloaded at all times to prevent skin break down with pillows or offloading boots. Left knee eschar 0.7cm x 0.8cm. Stable, no drainage. Open to air. Left 4th dorsal toe neuropathic vs arterial wound 0.5cm x 0.4cm. 50% red, 50% yellow. Dry and stable. No drainage.  Leave

## 2019-07-18 NOTE — PROGRESS NOTES
Assessment and Plan:        1. Metabolic encephalopathy- multifactorial- stress, sleep deprivation, infection;; try to ensure restful sleep  2. Chronic afib- rate controlled; continue NOAC  3. S/p surgery for infected graft at [facility]. Has wound vac.  4. ?cellulitis leg- Dr Yolis Hanna seeing      CC:  Confusion, weakness  HPI:  Pt with recent removal fo infected graft, brought to ER with above. Has hx of chronic afib, pad, hbp. Was confused at [facility]. ROS (12 point review of systems completed. Pertinent positives noted. Otherwise ROS is negative) :     PMH:  Per HPI  SHX:   Lives with wife  FHX: nc  Allergies: See above    Medications:       meropenem  1 g Intravenous Q12H    melatonin  9 mg Oral Nightly    allopurinol  300 mg Oral Daily    aspirin  81 mg Oral Daily    docusate sodium  300 mg Oral BID    furosemide  40 mg Oral Daily    levothyroxine  100 mcg Oral Daily    metoprolol tartrate  50 mg Oral BID    potassium chloride  40 mEq Oral Daily    pravastatin  80 mg Oral Nightly    sodium chloride flush  10 mL Intravenous 2 times per day    tamsulosin  0.4 mg Oral Nightly    rivaroxaban  15 mg Oral Daily       Vital Signs:   BP (!) 110/57   Pulse 102   Temp 98.5 °F (36.9 °C) (Oral)   Resp 16   Ht 5' 8\" (1.727 m)   Wt 220 lb 3.8 oz (99.9 kg)   SpO2 93%   BMI 33.49 kg/m²      Intake/Output Summary (Last 24 hours) at 7/18/2019 0935  Last data filed at 7/18/2019 0554  Gross per 24 hour   Intake 682.12 ml   Output 575 ml   Net 107.12 ml        General:   Chronically ill appearing    HEENT:  normocephalic and atraumatic. No scleral icterus. PERR. Neck: supple. No JVD. No thyromegaly. Lungs: clear to auscultation. No retractions  Cardiac: irregular   without murmur. Abdomen: soft. Nontender. Bowel sounds positive. Extremities:  No clubbing, cyanosis, or edema x 4. Vasculature: capillary refill sluggish.   Unable to palpate pulses in feet  Psych:  Alert, can name month, not

## 2019-07-18 NOTE — CONSULTS
CONSULTATION NOTE :ID       Patient - Ba Garcia,  Age - 82 y.o.    - 1935      Room Number - 3B-37/037-A   N -  [id number]   [facility]  Date of Admission -  2019  6:08 PM  Patient's PCP: Angeles Inman MD     Requesting Physician: Stephani Contreras MD    REASON FOR CONSULTATION   Sepsis wound infection. HISTORY OF PRESENT ILLNESS       This is a very pleasant 82 y.o. male who was admitted to the hospital with a chief complaints of weakness, inability to get out of chair and confusion. He was just discharged from [facility] after he had debridement of an infected graft on his left leg. He was given antibiotics, after debridement antibiotic was stopped and he was discharged home. During his stay he was confused and was worked up for stroke. According to his wife patient is still confused, he has not still, been sleeping very well. There was no report of fever. He has no drainage from his wound. Currently he is on a wound vacuum therapy to treat his postsurgical wound. There was not any urinary complaints, he has no cough or chest pain. His medical record was reviewed. He has complex medical history. He has severe PVD requiring multiple stent and angioplasty. He has coronary artery disease, atrial fibrillation and history of nonhealing wound.     PAST MEDICAL  HISTORY       Past Medical History:   Diagnosis Date    Atrial fibrillation ([address]     Bell's palsy     CAD (coronary artery disease)     Cancer ([address]     skin CA removed with dry ice    DDD (degenerative disc disease)     DVT (deep venous thrombosis) (HCC)     Gout     Hernia     Hx of blood clots     left leg DVT    Hyperlipidemia     Hypertension     Irregular cardiac rhythm 2016    Movement disorder     back pain    Myocardial infarct ([address]     Thyroid disease     UTI (urinary tract infection)        PAST SURGICAL HISTORY     Past Surgical History:

## 2019-07-19 LAB
ANION GAP SERPL CALCULATED.3IONS-SCNC: 13 MEQ/L (ref 8–16)
BUN BLDV-MCNC: 21 MG/DL (ref 7–22)
CALCIUM SERPL-MCNC: 9.2 MG/DL (ref 8.5–10.5)
CHLORIDE BLD-SCNC: 105 MEQ/L (ref 98–111)
CO2: 24 MEQ/L (ref 23–33)
CREAT SERPL-MCNC: 1.1 MG/DL (ref 0.4–1.2)
ERYTHROCYTE [DISTWIDTH] IN BLOOD BY AUTOMATED COUNT: 17.3 % (ref 11.5–14.5)
ERYTHROCYTE [DISTWIDTH] IN BLOOD BY AUTOMATED COUNT: 61.1 FL (ref 35–45)
GFR SERPL CREATININE-BSD FRML MDRD: 64 ML/MIN/1.73M2
GLUCOSE BLD-MCNC: 113 MG/DL (ref 70–108)
GLUCOSE BLD-MCNC: 121 MG/DL (ref 70–108)
GLUCOSE BLD-MCNC: 124 MG/DL (ref 70–108)
HCT VFR BLD CALC: 27.9 % (ref 42–52)
HEMOGLOBIN: 8.5 GM/DL (ref 14–18)
MCH RBC QN AUTO: 29.9 PG (ref 26–33)
MCHC RBC AUTO-ENTMCNC: 30.5 GM/DL (ref 32.2–35.5)
MCV RBC AUTO: 98.2 FL (ref 80–94)
PLATELET # BLD: 316 THOU/MM3 (ref 130–400)
PMV BLD AUTO: 8.5 FL (ref 9.4–12.4)
POTASSIUM SERPL-SCNC: 4.3 MEQ/L (ref 3.5–5.2)
RBC # BLD: 2.84 MILL/MM3 (ref 4.7–6.1)
SODIUM BLD-SCNC: 142 MEQ/L (ref 135–145)
WBC # BLD: 12.2 THOU/MM3 (ref 4.8–10.8)

## 2019-07-19 PROCEDURE — 97110 THERAPEUTIC EXERCISES: CPT

## 2019-07-19 PROCEDURE — 99233 SBSQ HOSP IP/OBS HIGH 50: CPT | Performed by: INTERNAL MEDICINE

## 2019-07-19 PROCEDURE — 85027 COMPLETE CBC AUTOMATED: CPT

## 2019-07-19 PROCEDURE — 6370000000 HC RX 637 (ALT 250 FOR IP): Performed by: INTERNAL MEDICINE

## 2019-07-19 PROCEDURE — 97166 OT EVAL MOD COMPLEX 45 MIN: CPT

## 2019-07-19 PROCEDURE — 82948 REAGENT STRIP/BLOOD GLUCOSE: CPT

## 2019-07-19 PROCEDURE — 2580000003 HC RX 258: Performed by: NURSE PRACTITIONER

## 2019-07-19 PROCEDURE — 6370000000 HC RX 637 (ALT 250 FOR IP): Performed by: NURSE PRACTITIONER

## 2019-07-19 PROCEDURE — 36415 COLL VENOUS BLD VENIPUNCTURE: CPT

## 2019-07-19 PROCEDURE — 2709999900 HC NON-CHARGEABLE SUPPLY

## 2019-07-19 PROCEDURE — 97162 PT EVAL MOD COMPLEX 30 MIN: CPT

## 2019-07-19 PROCEDURE — 80048 BASIC METABOLIC PNL TOTAL CA: CPT

## 2019-07-19 PROCEDURE — 2140000000 HC CCU INTERMEDIATE R&B

## 2019-07-19 RX ORDER — HALOPERIDOL 1 MG/1
1 TABLET ORAL EVERY 6 HOURS PRN
Status: DISCONTINUED | OUTPATIENT
Start: 2019-07-19 | End: 2019-07-21

## 2019-07-19 RX ORDER — TRAZODONE HYDROCHLORIDE 50 MG/1
25 TABLET ORAL NIGHTLY PRN
Status: DISCONTINUED | OUTPATIENT
Start: 2019-07-19 | End: 2019-07-20

## 2019-07-19 RX ORDER — LANOLIN ALCOHOL/MO/W.PET/CERES
9 CREAM (GRAM) TOPICAL NIGHTLY
Status: DISCONTINUED | OUTPATIENT
Start: 2019-07-19 | End: 2019-07-24 | Stop reason: HOSPADM

## 2019-07-19 RX ADMIN — DOCUSATE SODIUM 300 MG: 100 CAPSULE, LIQUID FILLED ORAL at 21:18

## 2019-07-19 RX ADMIN — DOCUSATE SODIUM 300 MG: 100 CAPSULE, LIQUID FILLED ORAL at 08:52

## 2019-07-19 RX ADMIN — RIVAROXABAN 20 MG: 20 TABLET, FILM COATED ORAL at 17:27

## 2019-07-19 RX ADMIN — TAMSULOSIN HYDROCHLORIDE 0.4 MG: 0.4 CAPSULE ORAL at 21:19

## 2019-07-19 RX ADMIN — Medication 10 ML: at 08:54

## 2019-07-19 RX ADMIN — ALLOPURINOL 300 MG: 300 TABLET ORAL at 08:53

## 2019-07-19 RX ADMIN — FUROSEMIDE 40 MG: 40 TABLET ORAL at 08:52

## 2019-07-19 RX ADMIN — OXYCODONE HYDROCHLORIDE AND ACETAMINOPHEN 1 TABLET: 5; 325 TABLET ORAL at 22:56

## 2019-07-19 RX ADMIN — POLYETHYLENE GLYCOL 3350 17 G: 17 POWDER, FOR SOLUTION ORAL at 21:21

## 2019-07-19 RX ADMIN — HYOSCYAMINE SULFATE: 16 SOLUTION at 00:46

## 2019-07-19 RX ADMIN — LEVOTHYROXINE SODIUM 100 MCG: 100 TABLET ORAL at 06:26

## 2019-07-19 RX ADMIN — PRAVASTATIN SODIUM 80 MG: 80 TABLET ORAL at 21:19

## 2019-07-19 RX ADMIN — Medication 9 MG: at 21:19

## 2019-07-19 RX ADMIN — Medication 10 ML: at 21:14

## 2019-07-19 RX ADMIN — OXYCODONE HYDROCHLORIDE AND ACETAMINOPHEN 1 TABLET: 5; 325 TABLET ORAL at 13:51

## 2019-07-19 RX ADMIN — ASPIRIN 81 MG: 81 TABLET ORAL at 08:53

## 2019-07-19 RX ADMIN — HYOSCYAMINE SULFATE: 16 SOLUTION at 08:51

## 2019-07-19 RX ADMIN — METOPROLOL TARTRATE 50 MG: 50 TABLET, FILM COATED ORAL at 21:19

## 2019-07-19 RX ADMIN — POTASSIUM CHLORIDE 40 MEQ: 750 TABLET, FILM COATED, EXTENDED RELEASE ORAL at 08:51

## 2019-07-19 RX ADMIN — METOPROLOL TARTRATE 50 MG: 50 TABLET, FILM COATED ORAL at 08:52

## 2019-07-19 ASSESSMENT — PAIN DESCRIPTION - FREQUENCY
FREQUENCY: INTERMITTENT
FREQUENCY: CONTINUOUS
FREQUENCY: CONTINUOUS
FREQUENCY: INTERMITTENT

## 2019-07-19 ASSESSMENT — PAIN - FUNCTIONAL ASSESSMENT
PAIN_FUNCTIONAL_ASSESSMENT: ACTIVITIES ARE NOT PREVENTED

## 2019-07-19 ASSESSMENT — PAIN DESCRIPTION - ONSET
ONSET: ON-GOING

## 2019-07-19 ASSESSMENT — PAIN DESCRIPTION - DESCRIPTORS
DESCRIPTORS: ACHING
DESCRIPTORS: SHARP
DESCRIPTORS: SHARP
DESCRIPTORS: ACHING

## 2019-07-19 ASSESSMENT — PAIN DESCRIPTION - ORIENTATION
ORIENTATION: LOWER
ORIENTATION: LEFT
ORIENTATION: LEFT
ORIENTATION: UPPER;LEFT

## 2019-07-19 ASSESSMENT — PAIN SCALES - GENERAL
PAINLEVEL_OUTOF10: 10
PAINLEVEL_OUTOF10: 5
PAINLEVEL_OUTOF10: 0
PAINLEVEL_OUTOF10: 7

## 2019-07-19 ASSESSMENT — PAIN DESCRIPTION - PROGRESSION
CLINICAL_PROGRESSION: NOT CHANGED

## 2019-07-19 ASSESSMENT — PAIN DESCRIPTION - PAIN TYPE
TYPE: SURGICAL PAIN
TYPE: CHRONIC PAIN

## 2019-07-19 ASSESSMENT — PAIN DESCRIPTION - LOCATION
LOCATION: BACK
LOCATION: LEG

## 2019-07-19 ASSESSMENT — PAIN SCALES - WONG BAKER: WONGBAKER_NUMERICALRESPONSE: 4

## 2019-07-19 NOTE — PROGRESS NOTES
Assessment and Plan:        1. Metabolic encephalopathy- multifactorial- stress, sleep deprivation, infection;; try to ensure restful sleep. Slept poorly pm 7.18, confused this am and restless. Will try desyrel this pm with his melatonin  2. Chronic afib- rate controlled; continue NOAC  3. S/p surgery for infected graft at [facility]. Has wound vac.  4. - Dr Etienne Heart seeing for wound- advocates local therapy      CC:  Confusion, weakness  HPI:  Pt with recent removal fo infected graft, brought to ER with above. Has hx of chronic afib, pad, hbp. Was confused at [facility]. ROS (12 point review of systems completed. Pertinent positives noted. Otherwise ROS is negative) :     PMH:  Per HPI  SHX:   Lives with wife  FHX: nc  Allergies: See above    Medications:       rivaroxaban  20 mg Oral Daily    melatonin  9 mg Oral Nightly    polyethylene glycol  17 g Oral Daily    sodium hypochlorite   Irrigation Daily    allopurinol  300 mg Oral Daily    aspirin  81 mg Oral Daily    docusate sodium  300 mg Oral BID    furosemide  40 mg Oral Daily    levothyroxine  100 mcg Oral Daily    metoprolol tartrate  50 mg Oral BID    potassium chloride  40 mEq Oral Daily    pravastatin  80 mg Oral Nightly    sodium chloride flush  10 mL Intravenous 2 times per day    tamsulosin  0.4 mg Oral Nightly       Vital Signs:   BP (!) 115/59   Pulse 86   Temp 98.2 °F (36.8 °C) (Oral)   Resp 18   Ht 5' 8\" (1.727 m)   Wt 218 lb 0.6 oz (98.9 kg)   SpO2 95%   BMI 33.15 kg/m²      Intake/Output Summary (Last 24 hours) at 7/19/2019 0610  Last data filed at 7/19/2019 0352  Gross per 24 hour   Intake 1521.3 ml   Output 1400 ml   Net 121.3 ml        General:   Chronically ill appearing, restless    HEENT:  normocephalic and atraumatic. No scleral icterus. PERR. Neck: supple. No JVD. No thyromegaly. Lungs: clear to auscultation. No retractions  Cardiac: irregular   without murmur. Abdomen: soft. Nontender.   Bowel sounds

## 2019-07-19 NOTE — PROGRESS NOTES
Mobility:       Functional Mobility  Functional - Mobility Device: Rolling Walker  Activity: Other(15ft)  Assist Level: Contact guard assistance  Functional Mobility Comments: mildly unsteady with narrow NEELIMA     Balance:  Balance  Standing Balance: Contact guard assistance    Transfers:  Sit to stand: Contact guard assistance  Stand to sit: Contact guard assistance       Upper Extremity Assessment:   LUE AROM : WFL  RUE AROM : Exceptions(shoulder flexion to 45 degrees, AAROM/SROM  to 90 degrees; distal WFL)    LUE Strength  Gross LUE Strength: (4-/5 grossly)  RUE Strength  Gross RUE Strength: (shoulder 3-/5, elbow 4/5)    Sensation  Overall Sensation Status: WFL       Activity Tolerance: Patient limited by fatigue       Assessment:  Assessment: Pt demo decreased balance & endurance for ADLs at [facility]. Continued OT recommended to educate Pt on safety & compensatory strategies prn for increased safety upon returning home. Performance deficits / Impairments: Decreased functional mobility , Decreased ADL status, Decreased endurance, Decreased balance, Decreased safe awareness  Prognosis: Fair  REQUIRES OT FOLLOW UP: Yes  Decision Making: Medium Complexity  Safety Devices in place: Yes  Type of devices: All fall risk precautions in place, Call light within reach, Gait belt, Chair alarm in place    Treatment Initiated: Treatment and education initiated within context of evaluation. Evaluation time included review of current medical information, gathering information related to past medical, social and functional history, completion of standardized testing, formal and informal observation of tasks, assessment of data and development of plan of care and goals. Treatment time included skilled education and facilitation of tasks to increase safety and independence with ADL's for improved functional independence and quality of life.     Discharge Recommendations:  Continue to assess pending progress, 24 hour supervision or

## 2019-07-20 PROCEDURE — 2709999900 HC NON-CHARGEABLE SUPPLY

## 2019-07-20 PROCEDURE — 99232 SBSQ HOSP IP/OBS MODERATE 35: CPT | Performed by: INTERNAL MEDICINE

## 2019-07-20 PROCEDURE — 6370000000 HC RX 637 (ALT 250 FOR IP): Performed by: INTERNAL MEDICINE

## 2019-07-20 PROCEDURE — 1200000000 HC SEMI PRIVATE

## 2019-07-20 PROCEDURE — 2580000003 HC RX 258: Performed by: NURSE PRACTITIONER

## 2019-07-20 PROCEDURE — 6370000000 HC RX 637 (ALT 250 FOR IP): Performed by: NURSE PRACTITIONER

## 2019-07-20 PROCEDURE — 1200000003 HC TELEMETRY R&B

## 2019-07-20 PROCEDURE — 6360000002 HC RX W HCPCS

## 2019-07-20 PROCEDURE — 6360000002 HC RX W HCPCS: Performed by: INTERNAL MEDICINE

## 2019-07-20 RX ORDER — LORAZEPAM 2 MG/ML
1 INJECTION INTRAMUSCULAR ONCE
Status: COMPLETED | OUTPATIENT
Start: 2019-07-20 | End: 2019-07-20

## 2019-07-20 RX ORDER — HALOPERIDOL 5 MG/ML
5 INJECTION INTRAMUSCULAR ONCE
Status: COMPLETED | OUTPATIENT
Start: 2019-07-20 | End: 2019-07-20

## 2019-07-20 RX ORDER — ZOLPIDEM TARTRATE 5 MG/1
5 TABLET ORAL NIGHTLY
Status: DISCONTINUED | OUTPATIENT
Start: 2019-07-21 | End: 2019-07-21

## 2019-07-20 RX ORDER — TRAZODONE HYDROCHLORIDE 50 MG/1
50 TABLET ORAL NIGHTLY
Status: DISCONTINUED | OUTPATIENT
Start: 2019-07-20 | End: 2019-07-21

## 2019-07-20 RX ORDER — HALOPERIDOL 5 MG/ML
5 INJECTION INTRAMUSCULAR EVERY 6 HOURS PRN
Status: DISCONTINUED | OUTPATIENT
Start: 2019-07-20 | End: 2019-07-20

## 2019-07-20 RX ORDER — LORAZEPAM 2 MG/ML
INJECTION INTRAMUSCULAR
Status: COMPLETED
Start: 2019-07-20 | End: 2019-07-20

## 2019-07-20 RX ADMIN — TRAZODONE HYDROCHLORIDE 25 MG: 50 TABLET ORAL at 02:38

## 2019-07-20 RX ADMIN — DOCUSATE SODIUM 300 MG: 100 CAPSULE, LIQUID FILLED ORAL at 20:53

## 2019-07-20 RX ADMIN — HYOSCYAMINE SULFATE: 16 SOLUTION at 13:01

## 2019-07-20 RX ADMIN — PRAVASTATIN SODIUM 80 MG: 80 TABLET ORAL at 21:02

## 2019-07-20 RX ADMIN — TAMSULOSIN HYDROCHLORIDE 0.4 MG: 0.4 CAPSULE ORAL at 21:02

## 2019-07-20 RX ADMIN — Medication 10 ML: at 13:02

## 2019-07-20 RX ADMIN — HALOPERIDOL 1 MG: 1 TABLET ORAL at 23:32

## 2019-07-20 RX ADMIN — LORAZEPAM 1 MG: 2 INJECTION INTRAMUSCULAR; INTRAVENOUS at 04:05

## 2019-07-20 RX ADMIN — METOPROLOL TARTRATE 50 MG: 50 TABLET, FILM COATED ORAL at 21:02

## 2019-07-20 RX ADMIN — Medication 10 ML: at 03:17

## 2019-07-20 RX ADMIN — LEVOTHYROXINE SODIUM 100 MCG: 100 TABLET ORAL at 12:54

## 2019-07-20 RX ADMIN — POTASSIUM CHLORIDE 40 MEQ: 750 TABLET, FILM COATED, EXTENDED RELEASE ORAL at 12:54

## 2019-07-20 RX ADMIN — METOPROLOL TARTRATE 50 MG: 50 TABLET, FILM COATED ORAL at 12:53

## 2019-07-20 RX ADMIN — HALOPERIDOL LACTATE 5 MG: 5 INJECTION INTRAMUSCULAR at 03:16

## 2019-07-20 RX ADMIN — Medication 10 ML: at 21:02

## 2019-07-20 RX ADMIN — TRAZODONE HYDROCHLORIDE 50 MG: 50 TABLET ORAL at 21:02

## 2019-07-20 RX ADMIN — RIVAROXABAN 20 MG: 20 TABLET, FILM COATED ORAL at 18:41

## 2019-07-20 RX ADMIN — FUROSEMIDE 40 MG: 40 TABLET ORAL at 12:53

## 2019-07-20 RX ADMIN — DOCUSATE SODIUM 300 MG: 100 CAPSULE, LIQUID FILLED ORAL at 12:54

## 2019-07-20 RX ADMIN — Medication 9 MG: at 21:03

## 2019-07-20 RX ADMIN — ALLOPURINOL 300 MG: 300 TABLET ORAL at 12:54

## 2019-07-20 RX ADMIN — Medication 10 ML: at 04:07

## 2019-07-20 RX ADMIN — ASPIRIN 81 MG: 81 TABLET ORAL at 12:53

## 2019-07-20 RX ADMIN — LORAZEPAM 1 MG: 2 INJECTION INTRAMUSCULAR at 04:05

## 2019-07-20 RX ADMIN — OXYCODONE HYDROCHLORIDE AND ACETAMINOPHEN 1 TABLET: 5; 325 TABLET ORAL at 21:03

## 2019-07-20 ASSESSMENT — PAIN DESCRIPTION - ORIENTATION
ORIENTATION: LEFT
ORIENTATION: LOWER;MID

## 2019-07-20 ASSESSMENT — PAIN DESCRIPTION - DESCRIPTORS
DESCRIPTORS: SHARP
DESCRIPTORS: SQUEEZING

## 2019-07-20 ASSESSMENT — PAIN DESCRIPTION - ONSET
ONSET: ON-GOING
ONSET: ON-GOING

## 2019-07-20 ASSESSMENT — PAIN - FUNCTIONAL ASSESSMENT
PAIN_FUNCTIONAL_ASSESSMENT: ACTIVITIES ARE NOT PREVENTED
PAIN_FUNCTIONAL_ASSESSMENT: ACTIVITIES ARE NOT PREVENTED

## 2019-07-20 ASSESSMENT — PAIN SCALES - WONG BAKER: WONGBAKER_NUMERICALRESPONSE: 4

## 2019-07-20 ASSESSMENT — PAIN DESCRIPTION - FREQUENCY
FREQUENCY: CONTINUOUS
FREQUENCY: CONTINUOUS

## 2019-07-20 ASSESSMENT — PAIN DESCRIPTION - LOCATION
LOCATION: LEG
LOCATION: BACK

## 2019-07-20 ASSESSMENT — PAIN SCALES - GENERAL
PAINLEVEL_OUTOF10: 5
PAINLEVEL_OUTOF10: 5

## 2019-07-20 ASSESSMENT — PAIN DESCRIPTION - PROGRESSION
CLINICAL_PROGRESSION: NOT CHANGED
CLINICAL_PROGRESSION: NOT CHANGED

## 2019-07-20 ASSESSMENT — PAIN DESCRIPTION - PAIN TYPE
TYPE: SURGICAL PAIN
TYPE: CHRONIC PAIN

## 2019-07-20 NOTE — PROGRESS NOTES
Patient was transferred from  after getting report from [place]. Vital signs and assessment were completed and patient was oriented to the room. Patient oriented to person only and had some expressive aphasia. 2 person skin check was performed by Tiago Diaz RN and Bouchra Chance RN on all but bandaged LLE d/t a recent dressing change. This will be passed along to oncoming night nurse.

## 2019-07-20 NOTE — PROGRESS NOTES
Assessment and Plan:        1. Metabolic encephalopathy- multifactorial- stress, sleep deprivation, infection;; try to ensure restful sleep. Slept poorly pm 7.18, just awakened after 6 hr sleep. Drowsy but not agitated. Continue to work on sleep hygiene. 2. Chronic afib- rate controlled; continue NOAC  3. S/p surgery for infected graft at [facility]. Has wound vac.  4. - Dr Tammy Bean seeing for wound- advocates local therapy  5. I DO NOT BELIEVE HE HAD SEPSIS ON ADMISSION      CC:  Confusion, weakness  HPI:  Pt with recent removal fo infected graft, brought to ER with above. Has hx of chronic afib, pad, hbp. Was confused at [facility]. ROS (12 point review of systems completed. Pertinent positives noted. Otherwise ROS is negative) :     PMH:  Per HPI  SHX:   Lives with wife  FHX: nc  Allergies: See above    Medications:       traZODone  50 mg Oral Nightly    rivaroxaban  20 mg Oral Daily    melatonin  9 mg Oral Nightly    polyethylene glycol  17 g Oral Daily    sodium hypochlorite   Irrigation Daily    allopurinol  300 mg Oral Daily    aspirin  81 mg Oral Daily    docusate sodium  300 mg Oral BID    furosemide  40 mg Oral Daily    levothyroxine  100 mcg Oral Daily    metoprolol tartrate  50 mg Oral BID    potassium chloride  40 mEq Oral Daily    pravastatin  80 mg Oral Nightly    sodium chloride flush  10 mL Intravenous 2 times per day    tamsulosin  0.4 mg Oral Nightly       Vital Signs:   /66   Pulse 85   Temp 97.1 °F (36.2 °C) (Axillary)   Resp 18   Ht 5' 8\" (1.727 m)   Wt 223 lb 8.7 oz (101.4 kg)   SpO2 95%   BMI 33.99 kg/m²      Intake/Output Summary (Last 24 hours) at 7/20/2019 1253  Last data filed at 7/20/2019 0506  Gross per 24 hour   Intake 910 ml   Output 1700 ml   Net -790 ml        General:   Chronically ill appearing, mumbles    HEENT:  normocephalic and atraumatic. No scleral icterus. PERR. Neck: supple. No JVD. No thyromegaly. Lungs: clear to auscultation.

## 2019-07-21 LAB
ANION GAP SERPL CALCULATED.3IONS-SCNC: 12 MEQ/L (ref 8–16)
BUN BLDV-MCNC: 16 MG/DL (ref 7–22)
CALCIUM SERPL-MCNC: 8.7 MG/DL (ref 8.5–10.5)
CHLORIDE BLD-SCNC: 103 MEQ/L (ref 98–111)
CO2: 21 MEQ/L (ref 23–33)
CREAT SERPL-MCNC: 0.7 MG/DL (ref 0.4–1.2)
GFR SERPL CREATININE-BSD FRML MDRD: > 90 ML/MIN/1.73M2
GLUCOSE BLD-MCNC: 115 MG/DL (ref 70–108)
POTASSIUM SERPL-SCNC: 3.7 MEQ/L (ref 3.5–5.2)
SODIUM BLD-SCNC: 136 MEQ/L (ref 135–145)

## 2019-07-21 PROCEDURE — 6370000000 HC RX 637 (ALT 250 FOR IP): Performed by: INTERNAL MEDICINE

## 2019-07-21 PROCEDURE — 2580000003 HC RX 258: Performed by: NURSE PRACTITIONER

## 2019-07-21 PROCEDURE — 1200000003 HC TELEMETRY R&B

## 2019-07-21 PROCEDURE — 1200000000 HC SEMI PRIVATE

## 2019-07-21 PROCEDURE — 80048 BASIC METABOLIC PNL TOTAL CA: CPT

## 2019-07-21 PROCEDURE — 6370000000 HC RX 637 (ALT 250 FOR IP): Performed by: NURSE PRACTITIONER

## 2019-07-21 PROCEDURE — 99232 SBSQ HOSP IP/OBS MODERATE 35: CPT | Performed by: INTERNAL MEDICINE

## 2019-07-21 PROCEDURE — 36415 COLL VENOUS BLD VENIPUNCTURE: CPT

## 2019-07-21 RX ORDER — QUETIAPINE FUMARATE 25 MG/1
25 TABLET, FILM COATED ORAL NIGHTLY
Status: DISCONTINUED | OUTPATIENT
Start: 2019-07-21 | End: 2019-07-24 | Stop reason: HOSPADM

## 2019-07-21 RX ORDER — DOCUSATE SODIUM 100 MG/1
200 CAPSULE, LIQUID FILLED ORAL 2 TIMES DAILY
Status: DISCONTINUED | OUTPATIENT
Start: 2019-07-21 | End: 2019-07-24 | Stop reason: HOSPADM

## 2019-07-21 RX ORDER — HALOPERIDOL 2 MG/ML
1 SOLUTION ORAL EVERY 6 HOURS PRN
Status: DISCONTINUED | OUTPATIENT
Start: 2019-07-21 | End: 2019-07-24 | Stop reason: HOSPADM

## 2019-07-21 RX ADMIN — METOPROLOL TARTRATE 50 MG: 50 TABLET, FILM COATED ORAL at 09:49

## 2019-07-21 RX ADMIN — METOPROLOL TARTRATE 50 MG: 50 TABLET, FILM COATED ORAL at 21:38

## 2019-07-21 RX ADMIN — RIVAROXABAN 20 MG: 20 TABLET, FILM COATED ORAL at 18:14

## 2019-07-21 RX ADMIN — ASPIRIN 81 MG: 81 TABLET ORAL at 09:49

## 2019-07-21 RX ADMIN — LEVOTHYROXINE SODIUM 100 MCG: 100 TABLET ORAL at 09:49

## 2019-07-21 RX ADMIN — TAMSULOSIN HYDROCHLORIDE 0.4 MG: 0.4 CAPSULE ORAL at 21:37

## 2019-07-21 RX ADMIN — ALLOPURINOL 300 MG: 300 TABLET ORAL at 09:49

## 2019-07-21 RX ADMIN — PRAVASTATIN SODIUM 80 MG: 80 TABLET ORAL at 21:38

## 2019-07-21 RX ADMIN — HALOPERIDOL 1 MG: 2 SOLUTION ORAL at 22:51

## 2019-07-21 RX ADMIN — FUROSEMIDE 40 MG: 40 TABLET ORAL at 09:49

## 2019-07-21 RX ADMIN — POTASSIUM CHLORIDE 40 MEQ: 750 TABLET, FILM COATED, EXTENDED RELEASE ORAL at 09:49

## 2019-07-21 RX ADMIN — OXYCODONE HYDROCHLORIDE AND ACETAMINOPHEN 1 TABLET: 5; 325 TABLET ORAL at 09:34

## 2019-07-21 RX ADMIN — QUETIAPINE FUMARATE 25 MG: 25 TABLET ORAL at 21:38

## 2019-07-21 RX ADMIN — Medication 9 MG: at 21:38

## 2019-07-21 RX ADMIN — ZOLPIDEM TARTRATE 5 MG: 5 TABLET, FILM COATED ORAL at 00:07

## 2019-07-21 RX ADMIN — Medication 10 ML: at 09:30

## 2019-07-21 RX ADMIN — HYOSCYAMINE SULFATE: 16 SOLUTION at 09:30

## 2019-07-21 ASSESSMENT — PAIN DESCRIPTION - PROGRESSION: CLINICAL_PROGRESSION: GRADUALLY IMPROVING

## 2019-07-21 ASSESSMENT — PAIN SCALES - GENERAL
PAINLEVEL_OUTOF10: 7
PAINLEVEL_OUTOF10: 7
PAINLEVEL_OUTOF10: 5

## 2019-07-21 ASSESSMENT — PAIN SCALES - WONG BAKER: WONGBAKER_NUMERICALRESPONSE: 0

## 2019-07-21 NOTE — PLAN OF CARE
Problem: Falls - Risk of:  Goal: Will remain free from falls  Description  Will remain free from falls  Outcome: Ongoing  Note:   No falls this shift. Bed alarm on, Falling star program in place. Call light within reach. Problem: Risk for Impaired Skin Integrity  Goal: Tissue integrity - skin and mucous membranes  Description  Structural intactness and normal physiological function of skin and  mucous membranes. Outcome: Ongoing  Note:   Skin integrity was unchanged this shift. Problem: Pain:  Goal: Pain level will decrease  Description  Pain level will decrease  Outcome: Ongoing  Note:   Patient has chronic back pain 5/10 that is tolerable to him. Percocet was given prior to dressing change on LLE. Pt. Tolerated dressing change well. Problem: DISCHARGE BARRIERS  Goal: Patient's continuum of care needs are met  Outcome: Ongoing  Note:   It needs to be determined if patient will return home with wife or enter a nursing home. Problem: Infection, Septic Shock:  Goal: Will show no infection signs and symptoms  Description  Will show no infection signs and symptoms  Outcome: Ongoing  Note:   Patient was afebrile. Wounds were pink, with no/scant serosanguinous drainage. Care plan reviewed with patient and wife. Patient and wife verbalize understanding of the plan of care and contribute to goal setting.

## 2019-07-21 NOTE — PROGRESS NOTES
This nurse along with the help of Karen, PCT changed dressing per orders after premedicating patient, no drainage or smell noted. Patient tolerated well.

## 2019-07-21 NOTE — PROGRESS NOTES
Assessment and Plan:        1. Metabolic encephalopathy- multifactorial- stress, sleep deprivation, infection;; try to ensure restful sleep. Slept poorly pm 7.18, did not sleep well pm 7.20   Continue to work on sleep hygiene. Will try seroquel tonight. Keep busy during day. 2. Chronic afib- rate controlled; continue NOAC  3. S/p surgery for infected graft at [facility]. Has wound vac.  4. - Dr Tammy Bean seeing for wound- advocates local therapy  5. I DO NOT BELIEVE HE HAD SEPSIS ON ADMISSION      CC:  Confusion, weakness  HPI:  Pt with recent removal fo infected graft, brought to ER with above. Has hx of chronic afib, pad, hbp. Was confused at [facility]. Continues with restlessness at night. ROS (12 point review of systems completed. Pertinent positives noted. Otherwise ROS is negative) :     PMH:  Per HPI  SHX:   Lives with wife  FHX: nc  Allergies: See above    Medications:       docusate sodium  200 mg Oral BID    QUEtiapine  25 mg Oral Nightly    rivaroxaban  20 mg Oral Daily    melatonin  9 mg Oral Nightly    polyethylene glycol  17 g Oral Daily    sodium hypochlorite   Irrigation Daily    allopurinol  300 mg Oral Daily    aspirin  81 mg Oral Daily    furosemide  40 mg Oral Daily    levothyroxine  100 mcg Oral Daily    metoprolol tartrate  50 mg Oral BID    potassium chloride  40 mEq Oral Daily    pravastatin  80 mg Oral Nightly    sodium chloride flush  10 mL Intravenous 2 times per day    tamsulosin  0.4 mg Oral Nightly       Vital Signs:   BP (!) 117/56   Pulse 73   Temp 97.9 °F (36.6 °C) (Oral)   Resp 18   Ht 5' 8\" (1.727 m)   Wt 223 lb 8.7 oz (101.4 kg)   SpO2 98%   BMI 33.99 kg/m²      Intake/Output Summary (Last 24 hours) at 7/21/2019 1543  Last data filed at 7/20/2019 2050  Gross per 24 hour   Intake 180 ml   Output 1420 ml   Net -1240 ml        General:   Chronically ill appearing, mumbles    HEENT:  normocephalic and atraumatic. No scleral icterus. PERR.   Neck:

## 2019-07-22 ENCOUNTER — APPOINTMENT (OUTPATIENT)
Dept: MRI IMAGING | Age: 84
DRG: 064 | End: 2019-07-22
Payer: MEDICARE

## 2019-07-22 PROCEDURE — 97116 GAIT TRAINING THERAPY: CPT

## 2019-07-22 PROCEDURE — 6370000000 HC RX 637 (ALT 250 FOR IP): Performed by: NURSE PRACTITIONER

## 2019-07-22 PROCEDURE — 1200000000 HC SEMI PRIVATE

## 2019-07-22 PROCEDURE — 1200000003 HC TELEMETRY R&B

## 2019-07-22 PROCEDURE — 70551 MRI BRAIN STEM W/O DYE: CPT

## 2019-07-22 PROCEDURE — 6370000000 HC RX 637 (ALT 250 FOR IP): Performed by: INTERNAL MEDICINE

## 2019-07-22 PROCEDURE — 99233 SBSQ HOSP IP/OBS HIGH 50: CPT | Performed by: FAMILY MEDICINE

## 2019-07-22 PROCEDURE — 97110 THERAPEUTIC EXERCISES: CPT

## 2019-07-22 RX ADMIN — LEVOTHYROXINE SODIUM 100 MCG: 100 TABLET ORAL at 05:37

## 2019-07-22 RX ADMIN — HYOSCYAMINE SULFATE: 16 SOLUTION at 15:15

## 2019-07-22 RX ADMIN — POTASSIUM CHLORIDE 40 MEQ: 750 TABLET, FILM COATED, EXTENDED RELEASE ORAL at 09:47

## 2019-07-22 RX ADMIN — TAMSULOSIN HYDROCHLORIDE 0.4 MG: 0.4 CAPSULE ORAL at 21:42

## 2019-07-22 RX ADMIN — QUETIAPINE FUMARATE 25 MG: 25 TABLET ORAL at 21:42

## 2019-07-22 RX ADMIN — Medication 9 MG: at 21:42

## 2019-07-22 RX ADMIN — ALLOPURINOL 300 MG: 300 TABLET ORAL at 09:47

## 2019-07-22 RX ADMIN — RIVAROXABAN 20 MG: 20 TABLET, FILM COATED ORAL at 18:40

## 2019-07-22 RX ADMIN — PRAVASTATIN SODIUM 80 MG: 80 TABLET ORAL at 21:42

## 2019-07-22 RX ADMIN — FUROSEMIDE 40 MG: 40 TABLET ORAL at 09:47

## 2019-07-22 RX ADMIN — METOPROLOL TARTRATE 50 MG: 50 TABLET, FILM COATED ORAL at 09:47

## 2019-07-22 RX ADMIN — ASPIRIN 81 MG: 81 TABLET ORAL at 09:47

## 2019-07-22 RX ADMIN — METOPROLOL TARTRATE 50 MG: 50 TABLET, FILM COATED ORAL at 21:42

## 2019-07-22 ASSESSMENT — PAIN SCALES - GENERAL: PAINLEVEL_OUTOF10: 2

## 2019-07-22 ASSESSMENT — PAIN - FUNCTIONAL ASSESSMENT: PAIN_FUNCTIONAL_ASSESSMENT: ACTIVITIES ARE NOT PREVENTED

## 2019-07-22 ASSESSMENT — PAIN DESCRIPTION - DESCRIPTORS: DESCRIPTORS: SQUEEZING

## 2019-07-22 ASSESSMENT — PAIN DESCRIPTION - FREQUENCY: FREQUENCY: CONTINUOUS

## 2019-07-22 ASSESSMENT — PAIN DESCRIPTION - LOCATION: LOCATION: BACK

## 2019-07-22 ASSESSMENT — PAIN DESCRIPTION - PROGRESSION: CLINICAL_PROGRESSION: NOT CHANGED

## 2019-07-22 ASSESSMENT — PAIN SCALES - WONG BAKER
WONGBAKER_NUMERICALRESPONSE: 0
WONGBAKER_NUMERICALRESPONSE: 2

## 2019-07-22 ASSESSMENT — PAIN DESCRIPTION - PAIN TYPE: TYPE: CHRONIC PAIN

## 2019-07-22 ASSESSMENT — PAIN DESCRIPTION - ONSET: ONSET: ON-GOING

## 2019-07-22 ASSESSMENT — PAIN DESCRIPTION - ORIENTATION: ORIENTATION: LOWER;MID

## 2019-07-22 NOTE — PLAN OF CARE
Problem: Falls - Risk of:  Goal: Will remain free from falls  Description  Will remain free from falls  7/22/2019 0249 by Helena Cardozo RN  Outcome: Ongoing  Note:   No falls noted this shift. Continue falling star program. Bed alarm on, bed in low position. Call light and personal belongings in reach. Patient uses call light appropriately. Problem: Falls - Risk of:  Goal: Absence of physical injury  Description  Absence of physical injury  7/22/2019 0249 by Helena Cardozo RN  Outcome: Ongoing  Note:   No physical injuries noted this shift. Safe environment provided, hourly rounding completed by nursing and tech staff. Will continue to assess. Problem: Risk for Impaired Skin Integrity  Goal: Tissue integrity - skin and mucous membranes  Description  Structural intactness and normal physiological function of skin and  mucous membranes. 7/22/2019 0249 by Helena Cardozo RN  Outcome: Ongoing  Note:   No new signs or symptoms of skin breakdown noted this shift, encouraging patient to turn and reposition self in bed q2h       Problem: Pain:  Goal: Pain level will decrease  Description  Pain level will decrease  7/22/2019 0249 by Helena Cardozo RN  Outcome: Ongoing  Note:   Patient does not complain of pain this shift. Problem: Pain:  Goal: Control of acute pain  Description  Control of acute pain  7/22/2019 0249 by Helena Cardozo RN  Outcome: Ongoing     Problem: Pain:  Goal: Control of chronic pain  Description  Control of chronic pain  7/22/2019 0249 by Helena Cardozo RN  Outcome: Ongoing  Note:   Chronic pain managed with distraction, repositioning and prn medications, see MAR for use. Medication education provided to patient .      Problem: DISCHARGE BARRIERS  Goal: Patient's continuum of care needs are met  7/22/2019 0249 by Helena Cardozo RN  Outcome: Ongoing  Note:    will be in today to discuss options after leaving the hospital.      Problem: Infection, Septic

## 2019-07-22 NOTE — FLOWSHEET NOTE
[facility]  OCCUPATIONAL THERAPY MISSED TREATMENT NOTE  [facility] RENAL TELEMETRY 6K  6K-18/018-A      Date: 2019  Patient Name: Leigha Almaraz        CSN: [id number]   : 1935  (82 y.o.)  Gender: male   Referring Practitioner: Dr. Katty Cabrera  Diagnosis: sepsis         REASON FOR MISSED TREATMENT: Patient Eating

## 2019-07-22 NOTE — PROGRESS NOTES
1515: Reviewed pt status and plan of care with Dr. Lynnette Johnson. Will plan to see pt on Tuesday 7/23.

## 2019-07-22 NOTE — PROGRESS NOTES
Decubitus ulcer of right heel, unstageable ([facility]) L89.610    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 ([address] E11.621, L97.422    Diabetic ulcer of toe of right foot associated with type 2 diabetes mellitus, with necrosis of bone ([facility]) E11.621, L97.514    Lymphedema of both lower extremities I89.0    Chronic osteomyelitis of 3rd toe, right ([facility]) M86.671    Osteomyelitis ([address] M86.9    Sepsis ([address] A41.9    Septicemia ([address] A41.9    Delirium R41.0    Elevated troponin R74.8    Lactic acid acidosis D59.1    Metabolic encephalopathy O69.69         ASSESSMENT/PLAN   Peripheral vascular disease status post multiple interventions.   Encephalopathy: He is sleep deprived  Chronic atrial fibrillation  History of infected graft status post removal  Continue local wound care  No sign of sepsis  Ok with discharge plan  Aby Arndt MD, FACP 7/22/2019 1:01 PM

## 2019-07-22 NOTE — FLOWSHEET NOTE
Pt is a 81yo . Pt was sitting up in a chair and his wife was present.  received a consult for spiritual care support. Pt and his wife shared many stories. Pt is focused primarily on his time in [facility]. Pt's wife shared that he was talking with his  mother yesterday. Pt's wife shared about some animosity within pt's family. Spiritual care to continue to allow pt and family to share stories and concerns. 19 0930   Encounter Summary   Services provided to: Patient and family together   Referral/Consult From: Physician   Support System Spouse; Family members   Place of [employer]   (none)   Continue Visiting Yes  ()   Complexity of Encounter Moderate   Length of Encounter 30 minutes   Spiritual/Lutheran   Type Spiritual support   Assessment Calm; Approachable   Intervention Active listening;Explored feelings, thoughts, concerns;Explored coping resources;Sustaining presence/ Ministry of presence   Outcome Connection/belonging;Comfort;Engaged in conversation

## 2019-07-23 ENCOUNTER — APPOINTMENT (OUTPATIENT)
Dept: INTERVENTIONAL RADIOLOGY/VASCULAR | Age: 84
DRG: 064 | End: 2019-07-23
Payer: MEDICARE

## 2019-07-23 LAB
ALBUMIN SERPL-MCNC: 2.5 G/DL (ref 3.5–5.1)
ALP BLD-CCNC: 106 U/L (ref 38–126)
ALT SERPL-CCNC: 16 U/L (ref 11–66)
ANION GAP SERPL CALCULATED.3IONS-SCNC: 15 MEQ/L (ref 8–16)
AST SERPL-CCNC: 23 U/L (ref 5–40)
BASOPHILS # BLD: 0.7 %
BASOPHILS ABSOLUTE: 0.1 THOU/MM3 (ref 0–0.1)
BILIRUB SERPL-MCNC: 0.5 MG/DL (ref 0.3–1.2)
BLOOD CULTURE, ROUTINE: NORMAL
BLOOD CULTURE, ROUTINE: NORMAL
BUN BLDV-MCNC: 18 MG/DL (ref 7–22)
CALCIUM SERPL-MCNC: 8.8 MG/DL (ref 8.5–10.5)
CHLORIDE BLD-SCNC: 106 MEQ/L (ref 98–111)
CO2: 19 MEQ/L (ref 23–33)
CREAT SERPL-MCNC: 0.8 MG/DL (ref 0.4–1.2)
EOSINOPHIL # BLD: 5.5 %
EOSINOPHILS ABSOLUTE: 0.6 THOU/MM3 (ref 0–0.4)
ERYTHROCYTE [DISTWIDTH] IN BLOOD BY AUTOMATED COUNT: 17.7 % (ref 11.5–14.5)
ERYTHROCYTE [DISTWIDTH] IN BLOOD BY AUTOMATED COUNT: 64.2 FL (ref 35–45)
GFR SERPL CREATININE-BSD FRML MDRD: > 90 ML/MIN/1.73M2
GLUCOSE BLD-MCNC: 115 MG/DL (ref 70–108)
HCT VFR BLD CALC: 33.2 % (ref 42–52)
HEMOGLOBIN: 9.9 GM/DL (ref 14–18)
IMMATURE GRANS (ABS): 0.04 THOU/MM3 (ref 0–0.07)
IMMATURE GRANULOCYTES: 0.4 %
LYMPHOCYTES # BLD: 24 %
LYMPHOCYTES ABSOLUTE: 2.4 THOU/MM3 (ref 1–4.8)
MCH RBC QN AUTO: 29.8 PG (ref 26–33)
MCHC RBC AUTO-ENTMCNC: 29.8 GM/DL (ref 32.2–35.5)
MCV RBC AUTO: 100 FL (ref 80–94)
MONOCYTES # BLD: 8.7 %
MONOCYTES ABSOLUTE: 0.9 THOU/MM3 (ref 0.4–1.3)
NUCLEATED RED BLOOD CELLS: 0 /100 WBC
PLATELET # BLD: 336 THOU/MM3 (ref 130–400)
PMV BLD AUTO: 8.8 FL (ref 9.4–12.4)
POTASSIUM SERPL-SCNC: 3.7 MEQ/L (ref 3.5–5.2)
RBC # BLD: 3.32 MILL/MM3 (ref 4.7–6.1)
SEG NEUTROPHILS: 60.7 %
SEGMENTED NEUTROPHILS ABSOLUTE COUNT: 6.1 THOU/MM3 (ref 1.8–7.7)
SODIUM BLD-SCNC: 140 MEQ/L (ref 135–145)
TOTAL PROTEIN: 6.6 G/DL (ref 6.1–8)
WBC # BLD: 10 THOU/MM3 (ref 4.8–10.8)

## 2019-07-23 PROCEDURE — 36415 COLL VENOUS BLD VENIPUNCTURE: CPT

## 2019-07-23 PROCEDURE — 97116 GAIT TRAINING THERAPY: CPT

## 2019-07-23 PROCEDURE — 99233 SBSQ HOSP IP/OBS HIGH 50: CPT | Performed by: FAMILY MEDICINE

## 2019-07-23 PROCEDURE — 85025 COMPLETE CBC W/AUTO DIFF WBC: CPT

## 2019-07-23 PROCEDURE — 97530 THERAPEUTIC ACTIVITIES: CPT

## 2019-07-23 PROCEDURE — 6370000000 HC RX 637 (ALT 250 FOR IP): Performed by: INTERNAL MEDICINE

## 2019-07-23 PROCEDURE — 6370000000 HC RX 637 (ALT 250 FOR IP): Performed by: FAMILY MEDICINE

## 2019-07-23 PROCEDURE — 80053 COMPREHEN METABOLIC PANEL: CPT

## 2019-07-23 PROCEDURE — 6360000002 HC RX W HCPCS: Performed by: PHYSICIAN ASSISTANT

## 2019-07-23 PROCEDURE — 93880 EXTRACRANIAL BILAT STUDY: CPT

## 2019-07-23 PROCEDURE — 97110 THERAPEUTIC EXERCISES: CPT

## 2019-07-23 PROCEDURE — 92523 SPEECH SOUND LANG COMPREHEN: CPT

## 2019-07-23 PROCEDURE — 6370000000 HC RX 637 (ALT 250 FOR IP): Performed by: NURSE PRACTITIONER

## 2019-07-23 PROCEDURE — 99223 1ST HOSP IP/OBS HIGH 75: CPT | Performed by: PSYCHIATRY & NEUROLOGY

## 2019-07-23 PROCEDURE — 1200000000 HC SEMI PRIVATE

## 2019-07-23 PROCEDURE — 6370000000 HC RX 637 (ALT 250 FOR IP): Performed by: PSYCHIATRY & NEUROLOGY

## 2019-07-23 PROCEDURE — APPNB60 APP NON BILLABLE TIME 46-60 MINS: Performed by: PHYSICIAN ASSISTANT

## 2019-07-23 RX ORDER — HALOPERIDOL 5 MG/ML
2 INJECTION INTRAMUSCULAR EVERY 6 HOURS PRN
Status: DISCONTINUED | OUTPATIENT
Start: 2019-07-23 | End: 2019-07-24 | Stop reason: HOSPADM

## 2019-07-23 RX ORDER — DONEPEZIL HYDROCHLORIDE 5 MG/1
5 TABLET, FILM COATED ORAL NIGHTLY
Status: DISCONTINUED | OUTPATIENT
Start: 2019-07-23 | End: 2019-07-24 | Stop reason: HOSPADM

## 2019-07-23 RX ADMIN — OXYCODONE HYDROCHLORIDE AND ACETAMINOPHEN 1 TABLET: 5; 325 TABLET ORAL at 09:14

## 2019-07-23 RX ADMIN — HALOPERIDOL LACTATE 2 MG: 5 INJECTION INTRAMUSCULAR at 02:12

## 2019-07-23 RX ADMIN — LEVOTHYROXINE SODIUM 100 MCG: 100 TABLET ORAL at 06:04

## 2019-07-23 RX ADMIN — RIVAROXABAN 20 MG: 20 TABLET, FILM COATED ORAL at 18:10

## 2019-07-23 RX ADMIN — FUROSEMIDE 40 MG: 40 TABLET ORAL at 09:14

## 2019-07-23 RX ADMIN — PRAVASTATIN SODIUM 80 MG: 80 TABLET ORAL at 21:37

## 2019-07-23 RX ADMIN — QUETIAPINE FUMARATE 25 MG: 25 TABLET ORAL at 21:36

## 2019-07-23 RX ADMIN — METOPROLOL TARTRATE 50 MG: 50 TABLET, FILM COATED ORAL at 09:14

## 2019-07-23 RX ADMIN — DOCUSATE SODIUM 200 MG: 100 CAPSULE, LIQUID FILLED ORAL at 09:14

## 2019-07-23 RX ADMIN — Medication 9 MG: at 21:37

## 2019-07-23 RX ADMIN — POTASSIUM CHLORIDE 40 MEQ: 750 TABLET, FILM COATED, EXTENDED RELEASE ORAL at 09:14

## 2019-07-23 RX ADMIN — HYOSCYAMINE SULFATE: 16 SOLUTION at 09:14

## 2019-07-23 RX ADMIN — DOCUSATE SODIUM 200 MG: 100 CAPSULE, LIQUID FILLED ORAL at 21:36

## 2019-07-23 RX ADMIN — METOPROLOL TARTRATE 50 MG: 50 TABLET, FILM COATED ORAL at 21:37

## 2019-07-23 RX ADMIN — ASPIRIN 81 MG: 81 TABLET ORAL at 09:14

## 2019-07-23 RX ADMIN — TAMSULOSIN HYDROCHLORIDE 0.4 MG: 0.4 CAPSULE ORAL at 21:37

## 2019-07-23 RX ADMIN — DONEPEZIL HYDROCHLORIDE 5 MG: 5 TABLET, FILM COATED ORAL at 21:36

## 2019-07-23 RX ADMIN — ALLOPURINOL 300 MG: 300 TABLET ORAL at 09:14

## 2019-07-23 ASSESSMENT — PAIN SCALES - WONG BAKER
WONGBAKER_NUMERICALRESPONSE: 0
WONGBAKER_NUMERICALRESPONSE: 4
WONGBAKER_NUMERICALRESPONSE: 0

## 2019-07-23 ASSESSMENT — PAIN DESCRIPTION - PAIN TYPE: TYPE: CHRONIC PAIN

## 2019-07-23 ASSESSMENT — PAIN SCALES - GENERAL
PAINLEVEL_OUTOF10: 5
PAINLEVEL_OUTOF10: 4
PAINLEVEL_OUTOF10: 0

## 2019-07-23 ASSESSMENT — PAIN DESCRIPTION - ORIENTATION: ORIENTATION: LOWER

## 2019-07-23 ASSESSMENT — PAIN DESCRIPTION - DESCRIPTORS: DESCRIPTORS: ACHING

## 2019-07-23 ASSESSMENT — PAIN DESCRIPTION - LOCATION: LOCATION: BACK

## 2019-07-23 NOTE — PROGRESS NOTES
within unit hallway at short distance at hurried pace, no LOB noted. Pt requires mod cues for walker safety- lengthy seated rest break after trial of mobility, mod fatigue noted with cues for deep breathing provided. ADDITIONAL ACTIVITIES:  Completed BUE exercises x10-15 reps x1 set using mod resistance band in all joints/planes to increase strength and endurance required for ADLs. Pt required lenghty rest break between each exercise and mod v/c and tactile cues for proper technique. ASSESSMENT:  Activity Tolerance:  Patient tolerance of  treatment: fair. Pt limited by fatigue. Discharge Recommendations: Continue to assess pending progress, 24 hour supervision or assist, Home with Home health OT  Equipment Recommendations: Other: Monitor for needing pending progress  Plan: Times per week: 5x  Current Treatment Recommendations: Functional Mobility Training, Endurance Training, Self-Care / ADL, Safety Education & Training    Patient Education  Patient Education: Safety with transfers and mobility, BUE exercises, deep breathing, increasing activity. Goals  Short term goals  Time Frame for Short term goals: 2 weeks  Short term goal 1: Complete sit-stand with S & 0-1 vcs for safe technique for increased ease of toilet t/fs  Short term goal 2: Tolerate standing 2-3 min with S for increased ease of sinkside grooming  Short term goal 3: Complete mobility to/from bathroom with S, RW, & 0-2 vcs for safety  Long term goals  Time Frame for Long term goals : No LTG set d/t short ELOS    Following session, patient left in safe position with all fall risk precautions in place.

## 2019-07-23 NOTE — CONSULTS
NEUROLOGY CONSULT NOTE       Requesting Physician: Jade Correa MD     Reason for Consult:  Evaluate for \"abnormal MRI with multifocal acute CVA\"    History of Present Illness:  Osmar Edwards is a 82 y.o. male admitted to [address] [place] [address] on 7/17/2019. This 82year old male was just discharged from [facility] on 7/16 after treatment for an infected vascular graft LLE. Had been having hallucinations, and wife has noted visual hallucinations at times over the past few years, but she figures this is always due to current or recent infection. After discharge he was having trouble getting up. Yelling and not conversant on July 17, and squad was called. He has not been sleeping at night, and has continued to be confused, particularly at night. He and wife both have noted he's had word finding difficulty since he was at [facility]. No periods of extreme lethargy. He has a tremor. Denies headache, focal weakness, or numbness/paresthesias. Right-handed.       Past Medical History:        Diagnosis Date    [facility]     Bell's palsy     CAD (coronary artery disease)     Cancer (HCC)     skin CA removed with dry ice    DDD (degenerative disc disease)     [facility]) ([facility])     Gout     Hernia     Hx of blood clots     left leg DVT    Hyperlipidemia     Hypertension     Irregular cardiac rhythm 07/2016    Movement disorder     back pain    Myocardial infarct (HCC)     Thyroid disease     UTI (urinary tract infection)            Procedure Laterality Date    BACK SURGERY      lower    CARDIAC CATHETERIZATION      ok    CARDIAC CATHETERIZATION      ok    CATARACT REMOVAL Bilateral 2010    CHOLECYSTECTOMY      COLON SURGERY      6-8 in of descending colon removed    COLOSTOMY      COLOSTOMY      reversed    EYE SURGERY      band    EYE SURGERY  2010    bilatera cataracts    HERNIA REPAIR      LUMBAR SPINE SURGERY Left 3/7/2019    L-facet RFA @ L3-4, [facility]       Social History:  Social History     Tobacco Use   Smoking Status Former Smoker    Last attempt to quit: 1991    Years since quittin.9   Smokeless Tobacco Never Used     Social History     Substance and Sexual Activity   Alcohol Use No     Social History     Substance and Sexual Activity   Drug Use No         Family History:       Problem Relation Age of Onset    Diabetes Mother     Cancer Father     Heart Disease Father     COPD Sister     Cancer Sister     High Blood Pressure Sister        Review of Systems:  CONSTITUTIONAL:  No night sweats  EYE:  No recent visual changes  ENT:  negative for dysphagia  RESPIRATORY:  negative for dyspnea  CARDIOVASCULAR:  negative for chest pain  GASTROINTESTINAL:  negative for nausea; + constipation  HEMATOLOGIC/LYMPHATIC:  Anticoagulated on Xarelto  MUSCULOSKELETAL:  Chronic pain  BEHAVIOR/PSYCH:  Hallucinations; combative at night  SKIN: chronic itchiness of skin  GENITOURINARY: negative for dysuria  NEUROLOGIC:  see HPI    Allergies:     Allergies   Allergen Reactions    Pcn [Penicillins] Swelling        Current Medications:     haloperidol lactate (HALDOL) injection 2 mg Q6H PRN   docusate sodium (COLACE) capsule 200 mg BID   QUEtiapine (SEROQUEL) tablet 25 mg Nightly   haloperidol (HALDOL) 2 MG/ML solution 1 mg Q6H PRN   rivaroxaban (XARELTO) tablet 20 mg Daily   melatonin tablet 9 mg Nightly   polyethylene glycol (GLYCOLAX) packet 17 g Daily   sodium hypochlorite (DAKINS) 0.25 % external solution Daily   allopurinol (ZYLOPRIM) tablet 300 mg Daily   aspirin EC tablet 81 mg Daily   calcium carbonate (TUMS) chewable tablet 500 mg TID PRN   furosemide (LASIX) tablet 40 mg Daily   levothyroxine (SYNTHROID) tablet 100 mcg Daily   magnesium hydroxide (MILK OF MAGNESIA) 400 MG/5ML suspension 30 mL Daily PRN   metoprolol tartrate (LOPRESSOR) tablet 50 mg BID   potassium chloride (KLOR-CON) extended release tablet 40 mEq Daily

## 2019-07-23 NOTE — PROGRESS NOTES
[address] [address]  SPEECH THERAPY  [facility] RENAL TELEMETRY 6K  Speech - Language - Cognitive Evaluation    SLP Individual Minutes  Time In: 1675  Time Out: 9823  Minutes: 36  Timed Code Treatment Minutes: 0 Minutes       Date: 2019  Patient Name: Rosalinda Davenport      MRN: [id number]    : 1935  (82 y.o.)  Gender: male   Referring Physician:  Lara Francois MD  Diagnosis: Sepsis  Secondary Diagnosis:  Cognitive impairment  Date of Last MBS:  N/A  Diet:  Regular diet with thin liquids    History of Present Illness/Injury: Patient admitted to [facility] with above diagnosis. See physician H&P for full report. Per chart review, \"82 y. o. male who presents to the Emergency Department for the evaluation of AMS. The patient has a history CAD, MI, A-fib, hypertension, hyperlipidemia, blood clots, thyroid disease, Bell's palsy, diabetes, neuropathy, lymphedema, and PAD among others. The patient was discharged from [facility] yesterday afternoon after treatment of left knee area wound and had a wound vac placed . The patient was admitted to [facility] on  with infection of left prosthetic vascular graft with occlusion of stent of the peripheral artery s/p angio on . Patient was discharge with a wound vac. Wife states patient has not been himself since he woke up this morning and his speech has been slurred. Patient has had similar episodes of slurred speech in the past. Stroke was ruled out during admission to [facility]. Patient has a history of confusion and hallucinations as well which have been associated with UTI's in the past. Patient has been unable to carry out a conversation today and has been \"yelling out\" according to wife. Patient admitted to step-down. \" ST consulted to evaluate patient cognitive status and determine POC.     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 [x] Reviewed goals and Plan of Care     [] OTHER:   Method: [x] Discussion [] Demonstration [] Hand-out     [] OTHER:   Evaluation of Education:     [x] Verbalizes understanding: wife [] Demonstrates with assistance     [] Demonstrates without assistance [x]Needs further instruction: patient     [x] No evidence of learning: patient  [] Family not present    PLAN / TREATMENT RECOMMENDATIONS:  [x] Skilled SLP intervention on acute care 3-5 x per week or until goals met and/or pt plateaus in function. Specific interventions for next session may include: orientation and basic attention tasks. SHORT TERM GOALS:  Short-term Goals  Timeframe for Short-term Goals: 2 weeks  Goal 1: Patient will complete basic orientation tasks with 80% accuracy and mod cues in order to improve awareness in hospital environment. Goal 2: Patient will complete immediate, delayed, and working memory tasks with 80% accuracy and mod cues in order to improve retention of novel information. Goal 3: Patient will complete basic visual/verbal reasoning tasks with 80% accuracy and mod cues in order to improve completion of ADLs and IADLs. Goal 4: Patient will complete basic sustained attention tasks for three minutes with less than five cues to remain on task in order to improve completion of ADLs and IADLs. Goal 5: Patient will demonstrate understanding of and implement S.O.S. strategies with 80% accuracy and mod cues in order to improve speech intelligiblity. LONG TERM GOALS:  No LTGs established due to short ELOS.     Stanley Lucas M.S., CF-SLP

## 2019-07-23 NOTE — PROGRESS NOTES
Hospitalist Progress Note      Patient:  Aric Gagnon      Unit/Bed:6K-18/018-A    YOB: 1935    MRN: [id number]       Acct: [de-identified]     PCP: Fiordaliza Eng MD    Date of Admission: 7/17/2019    Assessment/Plan:    1. Metabolic encephalopathy -- multifactorial but per Dr. Lucas Joel feels most likely sleep deprivation, stress, infection --> MRI head 7/22 with +new small CVAs  -- waxes and wanes - worse at night  -- CT head 7/17 (-) acute process  -- per wife pt with issues during [facility] admission --> was told there was work up but per Parvin no neuro w/u done  -- agreeable to checking MRI 7/22/19 --> see #2  -- cont seroquel started 7/21 pm for now -> agreeable to psych c/s 7/22 (p) 7/23  -- prn haldol -> received 7/20 x 2 pm, 7/21 pm  -- LFT WNL 7/17  2. acute ischemic CVA posterior left frontal lobe, posterior insula and superior left temporal lobe -- likely contributing to #1 as in frontal lobe -- likely POA -- already on ASA, xarelto - taken off plavix at [address] [facility] -- c/s neuro for recs as ?need to change ASA to plavix - ? CTA head/neck vs carotids;  ?echo but would change mgmt -- hold parameters on BP meds - ?hypotension cause CVA. PT/OT following - c/s ST 7/23 for eval  3. Insomnia -- see above - did not do well with  trazodone 7/20, ambien 7/21 -- continues on melatonin 9 mg nightly  -- using prn haldol -- c/s psych 7/22 for assist  4. Infected LLE PAD graft s/p excision of 20 cm graft and CryoVein 7/11/19 -- at [facility] by Dr. Shonna Bueno --> cx there grew serratia marcenscens --  apprec ID assist -- no signs of infxn - cont local care and wound vac  - no atbx needed per ID (tx with clinda and cipro at [facility])  5. LActic acidosis -- NOT sepsis -- LA 2.4 on admission 7/17 and repeat 1.5 on 7/17 --> given  mL bolus in ER and only IVF 7/18 then stopped   6.  Leukocytosis -- was 19.5 on admission -> improved see #4 -- ID eval apprec -- NO sepsis on admission per day    tamsulosin  0.4 mg Oral Nightly     PRN Meds: haloperidol lactate, haloperidol, calcium carbonate, magnesium hydroxide, sodium chloride flush, ondansetron, acetaminophen, oxyCODONE-acetaminophen **OR** oxyCODONE-acetaminophen      Intake/Output Summary (Last 24 hours) at 7/23/2019 1218  Last data filed at 7/22/2019 1943  Gross per 24 hour   Intake 500 ml   Output 495 ml   Net 5 ml       Diet:  DIET CARB CONTROL; Exam:  /65   Pulse 70   Temp 97.9 °F (36.6 °C) (Axillary)   Resp 18   Ht 5' 8\" (1.727 m)   Wt 223 lb 8.7 oz (101.4 kg)   SpO2 95%   BMI 33.99 kg/m²     General appearance: No apparent distress, chronically ill appearing, appears stated age and cooperative. Oriented x person and place. Up in chair. HEENT: Pupils equal, round, and reactive to light. Conjunctivae/corneas clear. Neck: Supple, with full range of motion. No jugular venous distention. Trachea midline. Respiratory:  Normal respiratory effort. Clear to auscultation, bilaterally without Rales/Wheezes/Rhonchi. No respiratory distress or accessory muscle use. Cardiovascular: irreg, irreg with normal S1/S2 without murmurs, rubs or gallops. Abdomen: Soft, non-tender, non-distended with normal bowel sounds. No rebound or guarding  Musculoskeletal: No clubbing, cyanosis or edema bilaterally. Full range of motion without deformity. Skin: multiple bruises, LLE wrapped kerlix to thigh, right foot wrapped  Neurologic:  Neurovascularly intact without any focal sensory/motor deficits.  Cranial nerves: II-XII intact, grossly non-focal.  Psychiatric: Alert and oriented x 2 - still little confused  Capillary Refill: Brisk,< 3 seconds   Peripheral Pulses: feet wrapped, shoes on      Labs:   Recent Labs     07/23/19  0559   WBC 10.0   HGB 9.9*   HCT 33.2*        Recent Labs     07/21/19  0602 07/23/19  0559    140   K 3.7 3.7    106   CO2 21* 19*   BUN 16 18   CREATININE 0.7 0.8   CALCIUM 8.7 8.8     Recent

## 2019-07-23 NOTE — FLOWSHEET NOTE
Patient was in recliner with his wife present in the room. They met when they were both serving in the [employer] during the [place]. They shared stories about their life together. Wife did most of the talking, explaining the long tim patient has had in healing from leg surgery, including trip to [place] and many problems. They are VERY pleased with care patient has received at [facility] and with the knowledge and attentiveness of staff. Both seemed pleased to have a non-medical staff visitor. 07/22/19 6579   Encounter Summary   Services provided to: Patient;Significant other   Referral/Consult From: Family   Support System Spouse; Family members   Continue Visiting Yes  (7/22)   Complexity of Encounter Moderate   Length of Encounter 1 hour   Spiritual/Orthodoxy   Type Spiritual struggle   Assessment Approachable   Intervention Active listening;Explored feelings, thoughts, concerns;Explored coping resources; Discussed illness/injury and it's impact; Discussed belief system/Holiness practices/scot;Discussed relationship with God   Outcome Comfort;Expressed gratitude;Engaged in conversation;Expressed feelings/needs/concerns;Receptive

## 2019-07-23 NOTE — PLAN OF CARE
Problem: Falls - Risk of:  Goal: Will remain free from falls  Description  Will remain free from falls  Outcome: Ongoing  Patient remained free from falls throughout the shift. Bed alarm on at all times, call light within reach. Problem: Risk for Impaired Skin Integrity  Goal: Tissue integrity - skin and mucous membranes  Description  Structural intactness and normal physiological function of skin and  mucous membranes. Outcome: Ongoing  No evidence of new skin breakdown throughout the shift. Patient able to reposition self as needed. Problem: Pain:  Goal: Pain level will decrease  Description  Pain level will decrease  Outcome: Ongoing  Patient denied any pain throughout the shift on the 0-10 pain scale. Continue to monitor. Problem: DISCHARGE BARRIERS  Goal: Patient's continuum of care needs are met  Outcome: Ongoing  Patient plans to return home after discharge. Wife would prefer to go home instead of rehab facility which was recommended. Family included in discharge planning. Problem: Infection, Septic Shock:  Goal: Will show no infection signs and symptoms  Description  Will show no infection signs and symptoms  Outcome: Ongoing  No S/Sx of infection throughout the shift. Care plan reviewed with patient. Patient verbalize understanding of the plan of care and contribute to goal setting.

## 2019-07-23 NOTE — CONSULTS
Department of Psychiatry  Consult Service  Psychiatric Assessment     Reason for Consult: Delirium, insomnia, failed multiple medications    HISTORY OF PRESENT ILLNESS:      Jan D Meryle Leventhal is a 82 y.o. male with a history of delirium associated with infection and other general medical conditions who presented with confusion and weakness, recently admitted to [facility] on 07/11 with infection of left prosthetic vascular graft with occlusion of stent of the peripheral artery s/p angio on 06/27. Patient was discharge with a wound vac. He was confused at [facility], stroke w/u negative. Wife states patient has not been himself since he woke up the morning of admission with slurred speech, confusion. He has had similar episodes in the past that include hallucinations and insomnia, usually associated with UTI's and other infections. Wife says that he is not confused at baseline. He became combative last night, wife says he has never become physically aggressive. He reportedly grabbed her face when she was telling him good bye, and was pulling equipment. He was started on Seroquel 2 days ago at 25 mg due to not sleeping at night. He was given Haldol 2mg IM last night. It is unclear if he was able to sleep or not, but he did calm down and has not been agitated since. He continues with confusion on exam, although he is pleasant. He orients to self only, is unaware that he is currently in a hospital. wife plans on taking him home today and does not have any needs a request from us at this time. Coincidently, she says that she has an appointment tomorrow for him at [facility] with Dr. Nima Pollack to obtain surgical clearance for pain management device.     PSYCHIATRIC HISTORY:      · Outpatient psychiatric provider: None  · Suicide attempts: None  · Inpatient psychiatric admissions: None    Past psychiatric medications includes:     None prior to this admission  Adverse reactions from psychotropic medications:    Denies      Lifetime Psychiatric Review of Systems      ·    Obsessions and Compulsions: denies      ·    Leslie or Hypomania: denies  ·    Hallucinations: denies  ·    Panic Attacks:  denies   ·    Delusions:  denies  ·    Phobias: denies       Past Medical History:        Diagnosis Date    [facility]     Bell's palsy     CAD (coronary artery disease)     Cancer ([facility]     skin CA removed with dry ice    DDD (degenerative disc disease)     DVT (deep venous thrombosis) (HCC)     Gout     Hernia     Hx of blood clots     left leg DVT    Hyperlipidemia     Hypertension     Irregular cardiac rhythm 07/2016    Movement disorder     back pain    [facility]ocardial infarct [facility]     Thyroid disease     UTI (urinary tract infection)        Past Surgical History:        Procedure Laterality Date    BACK SURGERY      lower    CARDIAC CATHETERIZATION      ok    CARDIAC CATHETERIZATION      ok    CATARACT REMOVAL Bilateral 2010    CHOLECYSTECTOMY      COLON SURGERY      6-8 in of descending colon removed    COLOSTOMY      COLOSTOMY      reversed    EYE SURGERY      band    EYE SURGERY  2010    bilatera cataracts    HERNIA REPAIR      LUMBAR SPINE SURGERY Left 3/7/2019    L-facet RFA @ L3-4, L4-5, L5-S1 LEFT SIDE FIRST performed by Gideon Salas MD at [facility] Right 3/28/2019    L-facet RFA @ L3-4, L4-5, L5-S1 RIGHT performed by Gideon Salas MD at [address] [place] [address]  06/29/2016    three   [address] [place] Bilateral 05/15/2018    LUMBAR FACET MBB L3-4, L4-5, L5-S1    OTHER SURGICAL HISTORY  06/29/2016    ACDF C4-5    OTHER SURGICAL HISTORY Bilateral 04/09/2018    Lumbar facet medial branch block at L3-4, L45, L5-S1    MT COLSC FLX W/REMOVAL LESION BY HOT BX FORCEPS Left 7/17/2017    COLONOSCOPY POLYPECTOMY HOT BIOPSY performed by Jayshree Trimble MD at [facility]    MT INJ DX/THER AGNT PARAVERT FACET JOINT, LUMBAR/SAC, 2ND LEVEL Bilateral 4/9/2018    LUMBAR FACET MBB @ L3-4,L4-5 and L5-S1, BILATERAL performed by Sabine Merrill MD at [address] [facility] DX/THER AGNT PARAVERT FACET JOINT, LUMBAR/SAC, 2ND LEVEL Bilateral 5/15/2018    bilateral L-facet MBB # 2 @ L3-4, L4-5, L5-S1. performed by Sabine Merrill MD at [address] [facility] OFFICE/OUTPT VISIT,PROCEDURE ONLY Left 7/30/2018    Left superficial femoral to anterior tibial cadaver saphenous vein bypass; left below knee embolectomy and exploration of left posterior tibial performed by Oralia Solis MD at [facility] Left 6/12/2018    LUMBAR RFA  L3-4,  L4-5,  L5-S1  LEFT SIDE performed by Sabine Merrill MD at [address] W IMAGE [facility] Right 7/19/2018    LUMBAR RFA  L3-4,  L4-5,  L5-S1  RIGHT SIDE performed by Sabine Merrill MD at [facility] Right 12/20/2018    AMPUTATION RIGHT 3RD TOE performed by Maura Severin, DPM at [facility] 7/17/2017    EGD CONTROL HEMORRHAGE performed by Gisele Fields MD at [facility]       Medications Prior to Admission:   Medications Prior to Admission: fluocinonide (LIDEX) 0.05 % cream, Apply 1 Units topically as needed  nystatin (MYCOSTATIN) 104508 UNIT/GM cream, Apply 1 Units topically as needed for Dry Skin  ranitidine (ZANTAC) 300 MG tablet, Take 300 mg by mouth daily  oxyCODONE-acetaminophen (PERCOCET) 5-325 MG per tablet, Take 1 tablet by mouth every 6 hours as needed for Pain (take 1-2 tabs every 6-8 hrs).   tamsulosin (FLOMAX) 0.4 MG capsule, TAKE 1 CAPSULE DAILY  rivaroxaban (XARELTO) 20 MG TABS tablet, TAKE 1 TABLET DAILY  levothyroxine (SYNTHROID) 100 MCG tablet, Take 100 mcg by mouth Daily  magnesium hydroxide (MILK OF MAGNESIA) 400 MG/5ML suspension, Take 30 mLs by mouth daily as needed for Constipation  Cranberry 1000 MG CAPS, Take 500 mg by mouth 2 times daily   NONFORMULARY, Take 1 capsule by mouth daily   aspirin 81 MG EC tablet, Take 1 tablet by mouth daily  Calcium Carbonate-Vitamin D (CALCIUM 600/VITAMIN D PO), Take 1 tablet by mouth 2 times daily  metoprolol tartrate (LOPRESSOR) 50 MG tablet, TAKE 1 TABLET TWICE A DAY  furosemide (LASIX) 40 MG tablet, Take 1 tablet by mouth daily  hydrOXYzine (ATARAX) 25 MG tablet, Take 25 mg by mouth 4 times daily as needed for Itching  loperamide (IMODIUM) 2 MG capsule, Take 2 mg by mouth as needed for Diarrhea  potassium chloride (K-TAB) 10 MEQ extended release tablet, Take 4 tablets by mouth daily (Patient taking differently: Take 10 mEq by mouth 2 times daily )  docusate sodium (COLACE) 100 MG capsule, Take 300 mg by mouth 2 times daily   magnesium oxide (MAG-OX) 400 (241.3 MG) MG TABS tablet, Take 1 tablet by mouth daily  allopurinol (ZYLOPRIM) 300 MG tablet, Take 300 mg by mouth daily  quinapril (ACCUPRIL) 40 MG tablet, Take 40 mg by mouth daily. pravastatin (PRAVACHOL) 80 MG tablet, Take 80 mg by mouth nightly. cyclobenzaprine (FLEXERIL) 10 MG tablet, Take 10 mg by mouth every 8 hours as needed. clopidogrel (PLAVIX) 75 MG tablet, Take 75 mg by mouth daily  lidocaine (XYLOCAINE) 5 % ointment, Apply topically as needed. Calcium Carbonate Antacid (TUMS PO), Take by mouth as needed  Pseudoephedrine HCl (SUDAFED PO), Take by mouth as needed  indapamide (LOZOL) 1.25 MG tablet, Take 1.25 mg by mouth every morning.       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

## 2019-07-24 ENCOUNTER — OFFICE VISIT (OUTPATIENT)
Dept: PSYCHIATRY | Age: 84
End: 2019-07-24
Payer: MEDICARE

## 2019-07-24 VITALS
HEIGHT: 68 IN | RESPIRATION RATE: 18 BRPM | DIASTOLIC BLOOD PRESSURE: 51 MMHG | BODY MASS INDEX: 31.64 KG/M2 | OXYGEN SATURATION: 97 % | HEART RATE: 85 BPM | WEIGHT: 208.8 LBS | TEMPERATURE: 97.8 F | SYSTOLIC BLOOD PRESSURE: 98 MMHG

## 2019-07-24 DIAGNOSIS — G89.4 CHRONIC PAIN SYNDROME: ICD-10-CM

## 2019-07-24 DIAGNOSIS — F01.A0 MILD VASCULAR NEUROCOGNITIVE DISORDER: ICD-10-CM

## 2019-07-24 DIAGNOSIS — F43.23 ADJUSTMENT DISORDER WITH MIXED ANXIETY AND DEPRESSED MOOD: ICD-10-CM

## 2019-07-24 PROBLEM — F06.70 MILD VASCULAR NEUROCOGNITIVE DISORDER: Status: ACTIVE | Noted: 2019-07-24

## 2019-07-24 PROBLEM — I99.9 MILD VASCULAR NEUROCOGNITIVE DISORDER: Status: ACTIVE | Noted: 2019-07-24

## 2019-07-24 LAB
ANION GAP SERPL CALCULATED.3IONS-SCNC: 13 MEQ/L (ref 8–16)
BUN BLDV-MCNC: 18 MG/DL (ref 7–22)
CALCIUM SERPL-MCNC: 8.8 MG/DL (ref 8.5–10.5)
CHLORIDE BLD-SCNC: 105 MEQ/L (ref 98–111)
CHOLESTEROL, TOTAL: 75 MG/DL (ref 100–199)
CO2: 21 MEQ/L (ref 23–33)
CREAT SERPL-MCNC: 0.8 MG/DL (ref 0.4–1.2)
GFR SERPL CREATININE-BSD FRML MDRD: > 90 ML/MIN/1.73M2
GLUCOSE BLD-MCNC: 112 MG/DL (ref 70–108)
HCT VFR BLD CALC: 30.9 % (ref 42–52)
HDLC SERPL-MCNC: 44 MG/DL
HEMOGLOBIN: 9.3 GM/DL (ref 14–18)
LDL CHOLESTEROL CALCULATED: 17 MG/DL
POTASSIUM SERPL-SCNC: 3.7 MEQ/L (ref 3.5–5.2)
SODIUM BLD-SCNC: 139 MEQ/L (ref 135–145)
TRIGL SERPL-MCNC: 71 MG/DL (ref 0–199)
VITAMIN B-12: 802 PG/ML (ref 211–911)

## 2019-07-24 PROCEDURE — 80061 LIPID PANEL: CPT

## 2019-07-24 PROCEDURE — 97116 GAIT TRAINING THERAPY: CPT

## 2019-07-24 PROCEDURE — 85014 HEMATOCRIT: CPT

## 2019-07-24 PROCEDURE — 90792 PSYCH DIAG EVAL W/MED SRVCS: CPT | Performed by: PSYCHIATRY & NEUROLOGY

## 2019-07-24 PROCEDURE — 36415 COLL VENOUS BLD VENIPUNCTURE: CPT

## 2019-07-24 PROCEDURE — 80048 BASIC METABOLIC PNL TOTAL CA: CPT

## 2019-07-24 PROCEDURE — 6370000000 HC RX 637 (ALT 250 FOR IP): Performed by: INTERNAL MEDICINE

## 2019-07-24 PROCEDURE — 6370000000 HC RX 637 (ALT 250 FOR IP): Performed by: NURSE PRACTITIONER

## 2019-07-24 PROCEDURE — 99239 HOSP IP/OBS DSCHRG MGMT >30: CPT | Performed by: FAMILY MEDICINE

## 2019-07-24 PROCEDURE — 85018 HEMOGLOBIN: CPT

## 2019-07-24 PROCEDURE — 1036F TOBACCO NON-USER: CPT | Performed by: PSYCHIATRY & NEUROLOGY

## 2019-07-24 PROCEDURE — 97110 THERAPEUTIC EXERCISES: CPT

## 2019-07-24 PROCEDURE — 82607 VITAMIN B-12: CPT

## 2019-07-24 RX ORDER — METOPROLOL TARTRATE 50 MG/1
50 TABLET, FILM COATED ORAL 2 TIMES DAILY
Qty: 180 TABLET | Refills: 0
Start: 2019-07-24

## 2019-07-24 RX ORDER — QUINAPRIL 40 MG/1
40 TABLET ORAL DAILY
Qty: 30 TABLET | Refills: 3
Start: 2019-07-24

## 2019-07-24 RX ORDER — DONEPEZIL HYDROCHLORIDE 5 MG/1
5 TABLET, FILM COATED ORAL NIGHTLY
Qty: 30 TABLET | Refills: 0 | Status: SHIPPED | OUTPATIENT
Start: 2019-07-24

## 2019-07-24 RX ORDER — FUROSEMIDE 40 MG/1
40 TABLET ORAL DAILY
Qty: 90 TABLET | Refills: 2
Start: 2019-07-24

## 2019-07-24 RX ORDER — INDAPAMIDE 1.25 MG/1
1.25 TABLET, FILM COATED ORAL EVERY MORNING
Qty: 30 TABLET | Refills: 0
Start: 2019-07-24

## 2019-07-24 RX ORDER — LANOLIN ALCOHOL/MO/W.PET/CERES
9 CREAM (GRAM) TOPICAL NIGHTLY
Refills: 3 | COMMUNITY
Start: 2019-07-24

## 2019-07-24 RX ADMIN — LEVOTHYROXINE SODIUM 100 MCG: 100 TABLET ORAL at 06:30

## 2019-07-24 RX ADMIN — DOCUSATE SODIUM 200 MG: 100 CAPSULE, LIQUID FILLED ORAL at 08:58

## 2019-07-24 RX ADMIN — POTASSIUM CHLORIDE 40 MEQ: 750 TABLET, FILM COATED, EXTENDED RELEASE ORAL at 08:58

## 2019-07-24 RX ADMIN — ALLOPURINOL 300 MG: 300 TABLET ORAL at 08:58

## 2019-07-24 RX ADMIN — OXYCODONE HYDROCHLORIDE AND ACETAMINOPHEN 1 TABLET: 5; 325 TABLET ORAL at 06:29

## 2019-07-24 RX ADMIN — ASPIRIN 81 MG: 81 TABLET ORAL at 08:58

## 2019-07-24 ASSESSMENT — PAIN SCALES - GENERAL
PAINLEVEL_OUTOF10: 0
PAINLEVEL_OUTOF10: 7

## 2019-07-24 ASSESSMENT — PAIN DESCRIPTION - ONSET: ONSET: GRADUAL

## 2019-07-24 ASSESSMENT — PAIN DESCRIPTION - PAIN TYPE: TYPE: ACUTE PAIN

## 2019-07-24 ASSESSMENT — PAIN DESCRIPTION - FREQUENCY: FREQUENCY: INTERMITTENT

## 2019-07-24 ASSESSMENT — PAIN DESCRIPTION - DESCRIPTORS: DESCRIPTORS: ACHING

## 2019-07-24 ASSESSMENT — PAIN DESCRIPTION - LOCATION: LOCATION: LEG

## 2019-07-24 ASSESSMENT — PAIN DESCRIPTION - PROGRESSION: CLINICAL_PROGRESSION: GRADUALLY IMPROVING

## 2019-07-24 ASSESSMENT — PAIN - FUNCTIONAL ASSESSMENT: PAIN_FUNCTIONAL_ASSESSMENT: ACTIVITIES ARE NOT PREVENTED

## 2019-07-24 ASSESSMENT — PAIN DESCRIPTION - ORIENTATION: ORIENTATION: MID;LEFT

## 2019-07-24 ASSESSMENT — PAIN SCALES - WONG BAKER: WONGBAKER_NUMERICALRESPONSE: 0

## 2019-07-24 NOTE — DISCHARGE SUMMARY
cardio eval  -- cont aSA, statin, lopressor  -- last echo 7/2018 EF 40%, mod global hypokinesis of LV, mod/dilated LA, mildly enlarged RA, mild/mod AR  -- last stress here 4/2018 (-) ischemia  33. Chronic afib -- rate stable - cont lopressor, xarelto  34. Chronic anticoagulation on xarelto -- for afib and ?hx DVT -- continue  35. CAD with hx stents -- cont lopressor, ASA, statin -- see #9 -- holding quinipril with PEYTON  -- last echo 7/2018 EF 40%, mod global hypokinesis of LV, mod/dilated LA, mildly enlarged RA, mild/mod AR  -- last stress here 4/2018 (-) ischemia  36. LV dysfunction -- per echo 7/2018 EF 40% - no signs of CHF - no prior hx of CHF -- cont home lopressor, lasix - holding quinapril with PEYTON on admission   37. Essential HTN -- cont lopressor 50 mg bid, lasix  -- home quinapril and indapamide held - parameters added 7/23 with #2 -- monitor and adjust prn  38. HLD -- cont statin  39. Chronic normocytic anemia -- down to 8-9's during admission -- likely related to recent surgery 7/11 as prior in 2018 10-11's -- no other signs of bleeding -- monitor as on xarelto  40. Hypothyroidism -- cont synthroid - TSH 7/17/19 WNL  41. BLE arterial ulcers and chronic venous stasis -- follows with Dr. Sheilla Nyhan podiatry for foot wound and Dr Denise Morrell vascular at [facility] -- per wife Adalberto Ovalles following wound vac and wound at home  43. BPH -- flomax  43. Chronic lymphedema -- cont home lasix - not much edema on exam  44. Mild/mod AR -- per echo 7/2018  45. Chronic back pain -- per wife pt being w/u per pain mgmt for pain pump for his back -> needs to see psych Dr. Golden Lindsay 7/24 or can't get a pump  46. Generalized weakness -- PT/OT - TCU consulted but wife wishing to take pt home  52.  Code status -- due to multiple co-morbidities palliative care consulted 7/23 and pt limited code no x 4    Jan was HD stable, afebrile, WBC normal.  Wife was anxious to d/c him so he can go to his appt with Dr. Golden Lindsay psychiatry already set up for plans for 0845   BP: 127/76 118/69 (!) 144/63 (!) 98/51   Pulse: 89 83 91 85   Resp: 18 18 18 18   Temp: 97.6 °F (36.4 °C) 96.2 °F (35.7 °C) 97.5 °F (36.4 °C) 97.8 °F (36.6 °C)   TempSrc: Oral Axillary Oral Oral   SpO2: 95% 97% 95% 97%   Weight:   208 lb 12.8 oz (94.7 kg)    Height:         Weight: Weight: 208 lb 12.8 oz (94.7 kg)     24 hour intake/output:    Intake/Output Summary (Last 24 hours) at 7/24/2019 1025  Last data filed at 7/24/2019 0435  Gross per 24 hour   Intake 955 ml   Output --   Net 955 ml       General appearance - oriented to person, place, and chronically ill appearing  Chest - clear to auscultation, no wheezes, rales or rhonchi, symmetric air entry, no tachypnea, retractions or cyanosis  Heart - irregularly irregular rhythm with rate controlled  Abdomen - soft, nontender, nondistended, no masses or organomegaly  bowel sounds normal  Obese: No; Protuberant: No   Neurological - alert, oriented, normal speech, no focal findings or movement disorder noted, cranial nerves II through XII intact  Extremities - left leg with wound vac and kerlix bandaged, NO edema BLE  Skin - left leg wounds bandaged, no other lesions    Significant Diagnostics:   Radiology:   VL DUP CAROTID BILATERAL   Final Result      No hemodynamically significant stenosis in either internal carotid artery. Antegrade vertebral artery blood flow bilaterally. **This report has been created using voice recognition software. It may contain minor errors which are inherent in voice recognition technology. **      Final report electronically signed by Dr. Vamsi Bazzi on 7/24/2019 6:37 AM      MRI BRAIN WO CONTRAST   Final Result       1. A few, punctate foci of restricted diffusion are noted involving the posterior left frontal lobe, posterior insula and superior left temporal lobe. These areas demonstrate corresponding hyperintense T2 signal and are most consistent with areas of    acute ischemia.  The appearance may represent an mill/mm3    Hemoglobin 9.9 (L) 14.0 - 18.0 gm/dl    Hematocrit 33.2 (L) 42.0 - 52.0 %    .0 (H) 80.0 - 94.0 fL    MCH 29.8 26.0 - 33.0 pg    MCHC 29.8 (L) 32.2 - 35.5 gm/dl    RDW-CV 17.7 (H) 11.5 - 14.5 %    RDW-SD 64.2 (H) 35.0 - 45.0 fL    Platelets 303 941 - 070 thou/mm3    MPV 8.8 (L) 9.4 - 12.4 fL    Seg Neutrophils 60.7 %    Lymphocytes 24.0 %    Monocytes 8.7 %    Eosinophils 5.5 %    Basophils 0.7 %    Immature Granulocytes 0.4 %    Segs Absolute 6.1 1.8 - 7.7 thou/mm3    Lymphocytes # 2.4 1.0 - 4.8 thou/mm3    Monocytes # 0.9 0.4 - 1.3 thou/mm3    Eosinophils # 0.6 (H) 0.0 - 0.4 thou/mm3    Basophils # 0.1 0.0 - 0.1 thou/mm3    Immature Grans (Abs) 0.04 0.00 - 0.07 thou/mm3    nRBC 0 /100 wbc   Anion Gap    Collection Time: 07/23/19  5:59 AM   Result Value Ref Range    Anion Gap 15.0 8.0 - 16.0 meq/L   Glomerular Filtration Rate, Estimated    Collection Time: 07/23/19  5:59 AM   Result Value Ref Range    Est, Glom Filt Rate >90 ml/min/1.73m2   Lipid Panel    Collection Time: 07/24/19  6:05 AM   Result Value Ref Range    Cholesterol, Total 75 (L) 100 - 199 mg/dL    Triglycerides 71 0 - 199 mg/dL    HDL 44 mg/dL    LDL Calculated 17 mg/dL   Basic Metabolic Panel    Collection Time: 07/24/19  6:05 AM   Result Value Ref Range    Sodium 139 135 - 145 meq/L    Potassium 3.7 3.5 - 5.2 meq/L    Chloride 105 98 - 111 meq/L    CO2 21 (L) 23 - 33 meq/L    Glucose 112 (H) 70 - 108 mg/dL    BUN 18 7 - 22 mg/dL    CREATININE 0.8 0.4 - 1.2 mg/dL    Calcium 8.8 8.5 - 10.5 mg/dL   Hemoglobin and Hematocrit, Blood    Collection Time: 07/24/19  6:05 AM   Result Value Ref Range    Hemoglobin 9.3 (L) 14.0 - 18.0 gm/dl    Hematocrit 30.9 (L) 42.0 - 52.0 %   Vitamin B12    Collection Time: 07/24/19  6:05 AM   Result Value Ref Range    Vitamin B-12 802 211 - 911 pg/mL   Anion Gap    Collection Time: 07/24/19  6:05 AM   Result Value Ref Range    Anion Gap 13.0 8.0 - 16.0 meq/L   Glomerular Filtration Rate, Estimated

## 2019-07-24 NOTE — PROGRESS NOTES
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 Current Outpatient Medications   Medication Sig Dispense Refill    quinapril (ACCUPRIL) 40 MG tablet Take 1 tablet by mouth daily --- HOLD until f/u with PCP due to low BP 30 tablet 3    metoprolol tartrate (LOPRESSOR) 50 MG tablet Take 1 tablet by mouth 2 times daily -- HOLD FOR SBP <110 180 tablet 0    furosemide (LASIX) 40 MG tablet Take 1 tablet by mouth daily -- HOLD FOR SBP <110 90 tablet 2    melatonin 3 MG TABS tablet Take 3 tablets by mouth nightly  3    donepezil (ARICEPT) 5 MG tablet Take 1 tablet by mouth nightly 30 tablet 0    indapamide (LOZOL) 1.25 MG tablet Take 1 tablet by mouth every morning -- HOLD UNTIL SEEN BY PCP/DR. KRAUS 30 tablet 0    fluocinonide (LIDEX) 0.05 % cream Apply 1 Units topically as needed      nystatin (MYCOSTATIN) 192308 UNIT/GM cream Apply 1 Units topically as needed for Dry Skin      ranitidine (ZANTAC) 300 MG tablet Take 300 mg by mouth daily      oxyCODONE-acetaminophen (PERCOCET) 5-325 MG per tablet Take 1 tablet by mouth every 6 hours as needed for Pain (take 1-2 tabs every 6-8 hrs).  clopidogrel (PLAVIX) 75 MG tablet Take 75 mg by mouth daily      tamsulosin (FLOMAX) 0.4 MG capsule TAKE 1 CAPSULE DAILY 90 capsule 3    lidocaine (XYLOCAINE) 5 % ointment Apply topically as needed.  30 g 0    rivaroxaban (XARELTO) 20 MG TABS tablet TAKE 1 TABLET DAILY 90 tablet 3    Calcium Carbonate Antacid (TUMS PO) Take by mouth as needed      levothyroxine (SYNTHROID) 100 MCG tablet Take 100 mcg by mouth Daily      magnesium hydroxide (MILK OF MAGNESIA) 400 MG/5ML suspension Take 30 mLs by mouth daily as needed for Constipation      Pseudoephedrine HCl (SUDAFED PO) Take by mouth as needed      Cranberry 1000 MG CAPS Take 500 mg by mouth 2 times daily       NONFORMULARY Take 1 capsule by mouth daily       aspirin 81 MG EC tablet Take 1 tablet by mouth daily 1 tablet 0    Calcium Carbonate-Vitamin D (CALCIUM 600/VITAMIN D PO) Take 1 tablet by mouth 2 times daily      hydrOXYzine (ATARAX) 25 MG tablet Take 25 mg by mouth 4 times daily as needed for Itching      loperamide (IMODIUM) 2 MG capsule Take 2 mg by mouth as needed for Diarrhea      potassium chloride (K-TAB) 10 MEQ extended release tablet Take 4 tablets by mouth daily (Patient taking differently: Take 10 mEq by mouth 2 times daily ) 360 tablet 3    docusate sodium (COLACE) 100 MG capsule Take 300 mg by mouth 2 times daily       magnesium oxide (MAG-OX) 400 (241.3 MG) MG TABS tablet Take 1 tablet by mouth daily 30 tablet 0    allopurinol (ZYLOPRIM) 300 MG tablet Take 300 mg by mouth daily      pravastatin (PRAVACHOL) 80 MG tablet Take 80 mg by mouth nightly.  cyclobenzaprine (FLEXERIL) 10 MG tablet Take 10 mg by mouth every 8 hours as needed. No current facility-administered medications for this visit.

## 2019-07-24 NOTE — PROGRESS NOTES
Reviewed discharge instructions, medications, and follow up appointments with patient and patient's wife. No IV an no tele. DNR/Living will original paperwork given back to patient's wife. Belonging with patient. Denies any further questions or concerns at this time. Home health care aware of discharge home today. Patient discharged via wheelchair out the main lobby.

## 2019-07-24 NOTE — PROGRESS NOTES
Pt dressing changed per order scant amount of blood on medial knee dressing, patient does ask for pain medication after the dressing change.

## 2019-07-25 ENCOUNTER — APPOINTMENT (OUTPATIENT)
Dept: GENERAL RADIOLOGY | Age: 84
End: 2019-07-25
Payer: MEDICARE

## 2019-07-25 ENCOUNTER — HOSPITAL ENCOUNTER (OUTPATIENT)
Age: 84
Setting detail: OBSERVATION
Discharge: NURSING, REHAB OR LONG-TERM CARE | End: 2019-07-26
Attending: INTERNAL MEDICINE | Admitting: INTERNAL MEDICINE
Payer: MEDICARE

## 2019-07-25 ENCOUNTER — APPOINTMENT (OUTPATIENT)
Dept: CT IMAGING | Age: 84
End: 2019-07-25
Payer: MEDICARE

## 2019-07-25 ENCOUNTER — CARE COORDINATION (OUTPATIENT)
Dept: CASE MANAGEMENT | Age: 84
End: 2019-07-25

## 2019-07-25 DIAGNOSIS — R53.1 GENERAL WEAKNESS: Primary | ICD-10-CM

## 2019-07-25 DIAGNOSIS — G89.4 CHRONIC PAIN SYNDROME: ICD-10-CM

## 2019-07-25 DIAGNOSIS — Z86.73 HISTORY OF CVA IN ADULTHOOD: ICD-10-CM

## 2019-07-25 DIAGNOSIS — S81.802D OPEN WOUND OF LEFT LOWER LEG, SUBSEQUENT ENCOUNTER: ICD-10-CM

## 2019-07-25 PROBLEM — R62.7 FAILURE TO THRIVE IN ADULT: Status: ACTIVE | Noted: 2019-07-25

## 2019-07-25 LAB
ALBUMIN SERPL-MCNC: 3 G/DL (ref 3.5–5.1)
ALP BLD-CCNC: 112 U/L (ref 38–126)
ALT SERPL-CCNC: 15 U/L (ref 11–66)
ANION GAP SERPL CALCULATED.3IONS-SCNC: 13 MEQ/L (ref 8–16)
AST SERPL-CCNC: 25 U/L (ref 5–40)
BASOPHILS # BLD: 1 %
BASOPHILS ABSOLUTE: 0.1 THOU/MM3 (ref 0–0.1)
BILIRUB SERPL-MCNC: 0.5 MG/DL (ref 0.3–1.2)
BILIRUBIN DIRECT: < 0.2 MG/DL (ref 0–0.3)
BUN BLDV-MCNC: 23 MG/DL (ref 7–22)
CALCIUM SERPL-MCNC: 9.4 MG/DL (ref 8.5–10.5)
CHLORIDE BLD-SCNC: 106 MEQ/L (ref 98–111)
CO2: 22 MEQ/L (ref 23–33)
CREAT SERPL-MCNC: 0.8 MG/DL (ref 0.4–1.2)
EKG ATRIAL RATE: 80 BPM
EKG Q-T INTERVAL: 404 MS
EKG QRS DURATION: 104 MS
EKG QTC CALCULATION (BAZETT): 474 MS
EKG R AXIS: 45 DEGREES
EKG T AXIS: 72 DEGREES
EKG VENTRICULAR RATE: 83 BPM
EOSINOPHIL # BLD: 7 %
EOSINOPHILS ABSOLUTE: 0.7 THOU/MM3 (ref 0–0.4)
ERYTHROCYTE [DISTWIDTH] IN BLOOD BY AUTOMATED COUNT: 17.6 % (ref 11.5–14.5)
ERYTHROCYTE [DISTWIDTH] IN BLOOD BY AUTOMATED COUNT: 62.5 FL (ref 35–45)
GFR SERPL CREATININE-BSD FRML MDRD: > 90 ML/MIN/1.73M2
GLUCOSE BLD-MCNC: 111 MG/DL (ref 70–108)
HCT VFR BLD CALC: 31 % (ref 42–52)
HEMOGLOBIN: 9.3 GM/DL (ref 14–18)
IMMATURE GRANS (ABS): 0.04 THOU/MM3 (ref 0–0.07)
IMMATURE GRANULOCYTES: 0.4 %
LYMPHOCYTES # BLD: 21.3 %
LYMPHOCYTES ABSOLUTE: 2 THOU/MM3 (ref 1–4.8)
MCH RBC QN AUTO: 29.9 PG (ref 26–33)
MCHC RBC AUTO-ENTMCNC: 30 GM/DL (ref 32.2–35.5)
MCV RBC AUTO: 99.7 FL (ref 80–94)
MONOCYTES # BLD: 8.8 %
MONOCYTES ABSOLUTE: 0.8 THOU/MM3 (ref 0.4–1.3)
NUCLEATED RED BLOOD CELLS: 0 /100 WBC
OSMOLALITY CALCULATION: 285.6 MOSMOL/KG (ref 275–300)
PLATELET # BLD: 340 THOU/MM3 (ref 130–400)
PMV BLD AUTO: 8.9 FL (ref 9.4–12.4)
POTASSIUM SERPL-SCNC: 4.6 MEQ/L (ref 3.5–5.2)
RBC # BLD: 3.11 MILL/MM3 (ref 4.7–6.1)
SEG NEUTROPHILS: 61.5 %
SEGMENTED NEUTROPHILS ABSOLUTE COUNT: 5.9 THOU/MM3 (ref 1.8–7.7)
SODIUM BLD-SCNC: 141 MEQ/L (ref 135–145)
TOTAL PROTEIN: 6.7 G/DL (ref 6.1–8)
TROPONIN T: 0.03 NG/ML
WBC # BLD: 9.6 THOU/MM3 (ref 4.8–10.8)

## 2019-07-25 PROCEDURE — 82248 BILIRUBIN DIRECT: CPT

## 2019-07-25 PROCEDURE — 70450 CT HEAD/BRAIN W/O DYE: CPT

## 2019-07-25 PROCEDURE — 85025 COMPLETE CBC W/AUTO DIFF WBC: CPT

## 2019-07-25 PROCEDURE — 93005 ELECTROCARDIOGRAM TRACING: CPT | Performed by: PHYSICIAN ASSISTANT

## 2019-07-25 PROCEDURE — 99219 PR INITIAL OBSERVATION CARE/DAY 50 MINUTES: CPT | Performed by: INTERNAL MEDICINE

## 2019-07-25 PROCEDURE — 2709999900 HC NON-CHARGEABLE SUPPLY

## 2019-07-25 PROCEDURE — G0378 HOSPITAL OBSERVATION PER HR: HCPCS

## 2019-07-25 PROCEDURE — 84484 ASSAY OF TROPONIN QUANT: CPT

## 2019-07-25 PROCEDURE — 36415 COLL VENOUS BLD VENIPUNCTURE: CPT

## 2019-07-25 PROCEDURE — 99285 EMERGENCY DEPT VISIT HI MDM: CPT

## 2019-07-25 PROCEDURE — 80053 COMPREHEN METABOLIC PANEL: CPT

## 2019-07-25 PROCEDURE — 71045 X-RAY EXAM CHEST 1 VIEW: CPT

## 2019-07-25 RX ORDER — SODIUM CHLORIDE 9 MG/ML
INJECTION, SOLUTION INTRAVENOUS CONTINUOUS
Status: DISCONTINUED | OUTPATIENT
Start: 2019-07-26 | End: 2019-07-26

## 2019-07-25 RX ORDER — OYSTER SHELL CALCIUM WITH VITAMIN D 500; 200 MG/1; [IU]/1
1 TABLET, FILM COATED ORAL 2 TIMES DAILY
Status: DISCONTINUED | OUTPATIENT
Start: 2019-07-26 | End: 2019-07-26

## 2019-07-25 RX ORDER — CALCIUM CARBONATE 200(500)MG
500 TABLET,CHEWABLE ORAL 3 TIMES DAILY PRN
Status: DISCONTINUED | OUTPATIENT
Start: 2019-07-25 | End: 2019-07-26 | Stop reason: HOSPADM

## 2019-07-25 RX ORDER — TAMSULOSIN HYDROCHLORIDE 0.4 MG/1
0.4 CAPSULE ORAL DAILY
Status: DISCONTINUED | OUTPATIENT
Start: 2019-07-26 | End: 2019-07-26

## 2019-07-25 RX ORDER — POLYETHYLENE GLYCOL 3350 17 G/17G
17 POWDER, FOR SOLUTION ORAL DAILY
COMMUNITY

## 2019-07-25 RX ORDER — PRAVASTATIN SODIUM 80 MG/1
80 TABLET ORAL NIGHTLY
Status: DISCONTINUED | OUTPATIENT
Start: 2019-07-26 | End: 2019-07-26 | Stop reason: HOSPADM

## 2019-07-25 RX ORDER — CLOPIDOGREL BISULFATE 75 MG/1
75 TABLET ORAL DAILY
Status: DISCONTINUED | OUTPATIENT
Start: 2019-07-26 | End: 2019-07-26 | Stop reason: HOSPADM

## 2019-07-25 RX ORDER — LISINOPRIL 20 MG/1
20 TABLET ORAL DAILY
Status: DISCONTINUED | OUTPATIENT
Start: 2019-07-26 | End: 2019-07-26 | Stop reason: HOSPADM

## 2019-07-25 RX ORDER — FUROSEMIDE 40 MG/1
40 TABLET ORAL DAILY
Status: DISCONTINUED | OUTPATIENT
Start: 2019-07-26 | End: 2019-07-26 | Stop reason: HOSPADM

## 2019-07-25 RX ORDER — ALLOPURINOL 300 MG/1
300 TABLET ORAL DAILY
Status: DISCONTINUED | OUTPATIENT
Start: 2019-07-26 | End: 2019-07-26 | Stop reason: HOSPADM

## 2019-07-25 RX ORDER — TRAZODONE HYDROCHLORIDE 100 MG/1
100 TABLET ORAL NIGHTLY PRN
Status: DISCONTINUED | OUTPATIENT
Start: 2019-07-25 | End: 2019-07-26 | Stop reason: HOSPADM

## 2019-07-25 RX ORDER — POTASSIUM CHLORIDE 750 MG/1
10 TABLET, FILM COATED, EXTENDED RELEASE ORAL 2 TIMES DAILY
Status: DISCONTINUED | OUTPATIENT
Start: 2019-07-26 | End: 2019-07-26

## 2019-07-25 RX ORDER — ONDANSETRON 2 MG/ML
4 INJECTION INTRAMUSCULAR; INTRAVENOUS EVERY 6 HOURS PRN
Status: DISCONTINUED | OUTPATIENT
Start: 2019-07-25 | End: 2019-07-26 | Stop reason: HOSPADM

## 2019-07-25 RX ORDER — HYDROXYZINE HYDROCHLORIDE 25 MG/1
25 TABLET, FILM COATED ORAL 4 TIMES DAILY PRN
Status: DISCONTINUED | OUTPATIENT
Start: 2019-07-25 | End: 2019-07-26 | Stop reason: HOSPADM

## 2019-07-25 RX ORDER — SODIUM CHLORIDE 0.9 % (FLUSH) 0.9 %
10 SYRINGE (ML) INJECTION EVERY 12 HOURS SCHEDULED
Status: DISCONTINUED | OUTPATIENT
Start: 2019-07-26 | End: 2019-07-26 | Stop reason: HOSPADM

## 2019-07-25 RX ORDER — METOPROLOL TARTRATE 50 MG/1
50 TABLET, FILM COATED ORAL 2 TIMES DAILY
Status: DISCONTINUED | OUTPATIENT
Start: 2019-07-26 | End: 2019-07-26

## 2019-07-25 RX ORDER — HYDROCHLOROTHIAZIDE 25 MG/1
25 TABLET ORAL DAILY
Status: DISCONTINUED | OUTPATIENT
Start: 2019-07-26 | End: 2019-07-26 | Stop reason: HOSPADM

## 2019-07-25 RX ORDER — LANOLIN ALCOHOL/MO/W.PET/CERES
9 CREAM (GRAM) TOPICAL NIGHTLY
Status: DISCONTINUED | OUTPATIENT
Start: 2019-07-26 | End: 2019-07-26 | Stop reason: HOSPADM

## 2019-07-25 RX ORDER — POLYETHYLENE GLYCOL 3350 17 G/17G
17 POWDER, FOR SOLUTION ORAL DAILY
Status: DISCONTINUED | OUTPATIENT
Start: 2019-07-26 | End: 2019-07-26

## 2019-07-25 RX ORDER — LEVOTHYROXINE SODIUM 0.1 MG/1
100 TABLET ORAL DAILY
Status: DISCONTINUED | OUTPATIENT
Start: 2019-07-26 | End: 2019-07-26 | Stop reason: HOSPADM

## 2019-07-25 RX ORDER — SODIUM CHLORIDE 0.9 % (FLUSH) 0.9 %
10 SYRINGE (ML) INJECTION PRN
Status: DISCONTINUED | OUTPATIENT
Start: 2019-07-25 | End: 2019-07-26 | Stop reason: HOSPADM

## 2019-07-25 RX ORDER — DOCUSATE SODIUM 100 MG/1
300 CAPSULE, LIQUID FILLED ORAL 2 TIMES DAILY
Status: DISCONTINUED | OUTPATIENT
Start: 2019-07-26 | End: 2019-07-26 | Stop reason: HOSPADM

## 2019-07-25 RX ORDER — LIDOCAINE 50 MG/G
OINTMENT TOPICAL PRN
Status: DISCONTINUED | OUTPATIENT
Start: 2019-07-25 | End: 2019-07-26 | Stop reason: HOSPADM

## 2019-07-25 RX ORDER — POLYETHYLENE GLYCOL 3350 17 G/17G
17 POWDER, FOR SOLUTION ORAL DAILY
Status: DISCONTINUED | OUTPATIENT
Start: 2019-07-26 | End: 2019-07-25

## 2019-07-25 RX ORDER — CYCLOBENZAPRINE HCL 10 MG
10 TABLET ORAL EVERY 8 HOURS PRN
Status: DISCONTINUED | OUTPATIENT
Start: 2019-07-25 | End: 2019-07-26 | Stop reason: HOSPADM

## 2019-07-25 RX ORDER — FAMOTIDINE 20 MG/1
40 TABLET, FILM COATED ORAL DAILY
Status: DISCONTINUED | OUTPATIENT
Start: 2019-07-26 | End: 2019-07-26 | Stop reason: HOSPADM

## 2019-07-25 RX ORDER — DONEPEZIL HYDROCHLORIDE 5 MG/1
5 TABLET, FILM COATED ORAL NIGHTLY
Status: DISCONTINUED | OUTPATIENT
Start: 2019-07-26 | End: 2019-07-26

## 2019-07-25 RX ORDER — OXYCODONE HYDROCHLORIDE AND ACETAMINOPHEN 5; 325 MG/1; MG/1
1 TABLET ORAL EVERY 6 HOURS PRN
Status: DISCONTINUED | OUTPATIENT
Start: 2019-07-25 | End: 2019-07-26 | Stop reason: HOSPADM

## 2019-07-25 RX ORDER — ASPIRIN 81 MG/1
81 TABLET ORAL DAILY
Status: DISCONTINUED | OUTPATIENT
Start: 2019-07-26 | End: 2019-07-26 | Stop reason: HOSPADM

## 2019-07-25 NOTE — ED TRIAGE NOTES
Patient to room 24 via [facility] for complaint of lethargy and increased confusion. Patient was discharged from [facility] yesterday. Patient with history of knee surgery within the past two weeks, wound vac is in place. Patient was admitted here due to an infection at the wound site. Per EMS, family grew concerned and feels that he is becoming more confused and weak. EMS states that they know patient, and that he was speaking and smiling with them en route. EMS also reports that they did not attempt to start an IV because he is known to pull them out. Family is en route to the hospital.  Patient able to answer questions but is short when answering at this time. Patient noted to be fall risk, wristband placed on patient as well as placard outside of door. [place] tab in place.

## 2019-07-25 NOTE — ED PROVIDER NOTES
nightly  Qty: 30 tablet, Refills: 0      indapamide (LOZOL) 1.25 MG tablet Take 1 tablet by mouth every morning -- HOLD UNTIL SEEN BY PCP/DR. KRAUS  Qty: 30 tablet, Refills: 0      ranitidine (ZANTAC) 300 MG tablet Take 300 mg by mouth daily      oxyCODONE-acetaminophen (PERCOCET) 5-325 MG per tablet Take 1 tablet by mouth every 6 hours as needed for Pain (take 1-2 tabs every 6-8 hrs). clopidogrel (PLAVIX) 75 MG tablet Take 75 mg by mouth daily      tamsulosin (FLOMAX) 0.4 MG capsule TAKE 1 CAPSULE DAILY  Qty: 90 capsule, Refills: 3      rivaroxaban (XARELTO) 20 MG TABS tablet TAKE 1 TABLET DAILY  Qty: 90 tablet, Refills: 3      Calcium Carbonate Antacid (TUMS PO) Take by mouth as needed      levothyroxine (SYNTHROID) 100 MCG tablet Take 100 mcg by mouth Daily      magnesium hydroxide (MILK OF MAGNESIA) 400 MG/5ML suspension Take 30 mLs by mouth daily as needed for Constipation      Cranberry 1000 MG CAPS Take 500 mg by mouth 2 times daily       NONFORMULARY Take 1 capsule by mouth daily       aspirin 81 MG EC tablet Take 1 tablet by mouth daily  Qty: 1 tablet, Refills: 0      Calcium Carbonate-Vitamin D (CALCIUM 600/VITAMIN D PO) Take 1 tablet by mouth 2 times daily      hydrOXYzine (ATARAX) 25 MG tablet Take 25 mg by mouth 4 times daily as needed for Itching      potassium chloride (K-TAB) 10 MEQ extended release tablet Take 4 tablets by mouth daily  Qty: 360 tablet, Refills: 3      docusate sodium (COLACE) 100 MG capsule Take 300 mg by mouth 2 times daily       magnesium oxide (MAG-OX) 400 (241.3 MG) MG TABS tablet Take 1 tablet by mouth daily  Qty: 30 tablet, Refills: 0      allopurinol (ZYLOPRIM) 300 MG tablet Take 300 mg by mouth daily      pravastatin (PRAVACHOL) 80 MG tablet Take 80 mg by mouth nightly. cyclobenzaprine (FLEXERIL) 10 MG tablet Take 10 mg by mouth every 8 hours as needed.         fluocinonide (LIDEX) 0.05 % cream Apply 1 Units topically as needed      nystatin (MYCOSTATIN) 175261

## 2019-07-26 VITALS
SYSTOLIC BLOOD PRESSURE: 110 MMHG | DIASTOLIC BLOOD PRESSURE: 54 MMHG | RESPIRATION RATE: 16 BRPM | TEMPERATURE: 97.5 F | OXYGEN SATURATION: 98 % | HEIGHT: 68 IN | BODY MASS INDEX: 32.36 KG/M2 | HEART RATE: 67 BPM | WEIGHT: 213.5 LBS

## 2019-07-26 PROBLEM — E43 SEVERE MALNUTRITION (HCC): Status: ACTIVE | Noted: 2019-07-26

## 2019-07-26 LAB
BACTERIA: ABNORMAL
BILIRUBIN URINE: NEGATIVE
BLOOD, URINE: NEGATIVE
CASTS: ABNORMAL /LPF
CASTS: ABNORMAL /LPF
CHARACTER, URINE: CLEAR
COLOR: YELLOW
CRYSTALS: ABNORMAL
EPITHELIAL CELLS, UA: ABNORMAL /HPF
GLUCOSE, URINE: NEGATIVE MG/DL
KETONES, URINE: NEGATIVE
LEUKOCYTE ESTERASE, URINE: ABNORMAL
MISCELLANEOUS LAB TEST RESULT: ABNORMAL
NITRITE, URINE: NEGATIVE
PH UA: 6.5 (ref 5–9)
PROTEIN UA: NEGATIVE MG/DL
RBC URINE: ABNORMAL /HPF
RENAL EPITHELIAL, UA: ABNORMAL
SPECIFIC GRAVITY UA: 1.03 (ref 1–1.03)
UROBILINOGEN, URINE: 0.2 EU/DL (ref 0–1)
WBC UA: ABNORMAL /HPF
YEAST: ABNORMAL

## 2019-07-26 PROCEDURE — 2709999900 HC NON-CHARGEABLE SUPPLY

## 2019-07-26 PROCEDURE — 99217 PR OBSERVATION CARE DISCHARGE MANAGEMENT: CPT | Performed by: INTERNAL MEDICINE

## 2019-07-26 PROCEDURE — 97605 NEG PRS WND THER DME<=50SQCM: CPT

## 2019-07-26 PROCEDURE — 81001 URINALYSIS AUTO W/SCOPE: CPT

## 2019-07-26 PROCEDURE — 93010 ELECTROCARDIOGRAM REPORT: CPT | Performed by: INTERNAL MEDICINE

## 2019-07-26 PROCEDURE — G0378 HOSPITAL OBSERVATION PER HR: HCPCS

## 2019-07-26 PROCEDURE — 6370000000 HC RX 637 (ALT 250 FOR IP): Performed by: INTERNAL MEDICINE

## 2019-07-26 RX ORDER — OXYCODONE HYDROCHLORIDE AND ACETAMINOPHEN 5; 325 MG/1; MG/1
1 TABLET ORAL EVERY 6 HOURS PRN
Qty: 12 TABLET | Refills: 0 | Status: SHIPPED | OUTPATIENT
Start: 2019-07-26 | End: 2019-09-05 | Stop reason: SDUPTHER

## 2019-07-26 RX ORDER — TRAZODONE HYDROCHLORIDE 50 MG/1
50 TABLET ORAL NIGHTLY PRN
DISCHARGE
Start: 2019-07-26 | End: 2019-09-05

## 2019-07-26 RX ORDER — METOPROLOL TARTRATE 50 MG/1
50 TABLET, FILM COATED ORAL 2 TIMES DAILY
Status: DISCONTINUED | OUTPATIENT
Start: 2019-07-26 | End: 2019-07-26 | Stop reason: HOSPADM

## 2019-07-26 RX ORDER — POTASSIUM CHLORIDE 750 MG/1
10 TABLET, FILM COATED, EXTENDED RELEASE ORAL 2 TIMES DAILY
Status: DISCONTINUED | OUTPATIENT
Start: 2019-07-26 | End: 2019-07-26 | Stop reason: HOSPADM

## 2019-07-26 RX ORDER — DONEPEZIL HYDROCHLORIDE 5 MG/1
5 TABLET, FILM COATED ORAL NIGHTLY
Status: DISCONTINUED | OUTPATIENT
Start: 2019-07-26 | End: 2019-07-26 | Stop reason: HOSPADM

## 2019-07-26 RX ORDER — OYSTER SHELL CALCIUM WITH VITAMIN D 500; 200 MG/1; [IU]/1
1 TABLET, FILM COATED ORAL 2 TIMES DAILY
Status: DISCONTINUED | OUTPATIENT
Start: 2019-07-26 | End: 2019-07-26 | Stop reason: HOSPADM

## 2019-07-26 RX ORDER — POLYETHYLENE GLYCOL 3350 17 G/17G
17 POWDER, FOR SOLUTION ORAL DAILY
Status: DISCONTINUED | OUTPATIENT
Start: 2019-07-26 | End: 2019-07-26 | Stop reason: HOSPADM

## 2019-07-26 RX ORDER — TAMSULOSIN HYDROCHLORIDE 0.4 MG/1
0.4 CAPSULE ORAL NIGHTLY
Status: DISCONTINUED | OUTPATIENT
Start: 2019-07-26 | End: 2019-07-26 | Stop reason: HOSPADM

## 2019-07-26 RX ADMIN — DONEPEZIL HYDROCHLORIDE 5 MG: 5 TABLET, FILM COATED ORAL at 00:50

## 2019-07-26 RX ADMIN — CLOPIDOGREL 75 MG: 75 TABLET, FILM COATED ORAL at 09:12

## 2019-07-26 RX ADMIN — RIVAROXABAN 20 MG: 20 TABLET, FILM COATED ORAL at 00:52

## 2019-07-26 RX ADMIN — OXYCODONE HYDROCHLORIDE AND ACETAMINOPHEN 1 TABLET: 5; 325 TABLET ORAL at 09:12

## 2019-07-26 RX ADMIN — TRAZODONE HYDROCHLORIDE 100 MG: 100 TABLET ORAL at 00:49

## 2019-07-26 RX ADMIN — METOPROLOL TARTRATE 50 MG: 50 TABLET, FILM COATED ORAL at 00:52

## 2019-07-26 RX ADMIN — CYCLOBENZAPRINE HYDROCHLORIDE 10 MG: 10 TABLET, FILM COATED ORAL at 00:49

## 2019-07-26 RX ADMIN — POLYETHYLENE GLYCOL 3350 17 G: 17 POWDER, FOR SOLUTION ORAL at 00:49

## 2019-07-26 RX ADMIN — CALCIUM CARBONATE-VITAMIN D TAB 500 MG-200 UNIT 1 TABLET: 500-200 TAB at 09:14

## 2019-07-26 RX ADMIN — POTASSIUM CHLORIDE 10 MEQ: 750 TABLET, FILM COATED, EXTENDED RELEASE ORAL at 09:20

## 2019-07-26 RX ADMIN — OXYCODONE HYDROCHLORIDE AND ACETAMINOPHEN 1 TABLET: 5; 325 TABLET ORAL at 15:12

## 2019-07-26 RX ADMIN — PRAVASTATIN SODIUM 80 MG: 80 TABLET ORAL at 00:53

## 2019-07-26 RX ADMIN — TAMSULOSIN HYDROCHLORIDE 0.4 MG: 0.4 CAPSULE ORAL at 00:52

## 2019-07-26 RX ADMIN — OXYCODONE HYDROCHLORIDE AND ACETAMINOPHEN 1 TABLET: 5; 325 TABLET ORAL at 00:49

## 2019-07-26 RX ADMIN — ALLOPURINOL 300 MG: 300 TABLET ORAL at 09:14

## 2019-07-26 RX ADMIN — POTASSIUM CHLORIDE 10 MEQ: 750 TABLET, FILM COATED, EXTENDED RELEASE ORAL at 00:49

## 2019-07-26 RX ADMIN — LISINOPRIL 20 MG: 20 TABLET ORAL at 09:13

## 2019-07-26 RX ADMIN — FUROSEMIDE 40 MG: 40 TABLET ORAL at 09:13

## 2019-07-26 RX ADMIN — HYDROCHLOROTHIAZIDE 25 MG: 25 TABLET ORAL at 09:15

## 2019-07-26 RX ADMIN — FAMOTIDINE 40 MG: 20 TABLET ORAL at 09:12

## 2019-07-26 RX ADMIN — DOCUSATE SODIUM 300 MG: 100 CAPSULE, LIQUID FILLED ORAL at 00:49

## 2019-07-26 RX ADMIN — MAGNESIUM GLUCONATE 500 MG ORAL TABLET 400 MG: 500 TABLET ORAL at 09:13

## 2019-07-26 RX ADMIN — ASPIRIN 81 MG: 81 TABLET ORAL at 09:12

## 2019-07-26 RX ADMIN — LEVOTHYROXINE SODIUM 100 MCG: 100 TABLET ORAL at 06:23

## 2019-07-26 RX ADMIN — METOPROLOL TARTRATE 50 MG: 50 TABLET, FILM COATED ORAL at 09:13

## 2019-07-26 RX ADMIN — CALCIUM CARBONATE-VITAMIN D TAB 500 MG-200 UNIT 1 TABLET: 500-200 TAB at 00:52

## 2019-07-26 RX ADMIN — Medication 9 MG: at 00:53

## 2019-07-26 ASSESSMENT — PAIN DESCRIPTION - PAIN TYPE
TYPE: ACUTE PAIN
TYPE: CHRONIC PAIN

## 2019-07-26 ASSESSMENT — PAIN DESCRIPTION - FREQUENCY
FREQUENCY: CONTINUOUS
FREQUENCY: CONTINUOUS

## 2019-07-26 ASSESSMENT — PAIN DESCRIPTION - ORIENTATION
ORIENTATION: LEFT
ORIENTATION: LEFT

## 2019-07-26 ASSESSMENT — PAIN SCALES - GENERAL
PAINLEVEL_OUTOF10: 8
PAINLEVEL_OUTOF10: 5
PAINLEVEL_OUTOF10: 7

## 2019-07-26 ASSESSMENT — PAIN DESCRIPTION - ONSET
ONSET: ON-GOING
ONSET: ON-GOING

## 2019-07-26 ASSESSMENT — PAIN DESCRIPTION - LOCATION
LOCATION: KNEE
LOCATION: KNEE

## 2019-07-26 ASSESSMENT — PAIN DESCRIPTION - DESCRIPTORS
DESCRIPTORS: SORE
DESCRIPTORS: ACHING

## 2019-07-26 ASSESSMENT — PAIN DESCRIPTION - PROGRESSION
CLINICAL_PROGRESSION: NOT CHANGED
CLINICAL_PROGRESSION: NOT CHANGED

## 2019-07-26 ASSESSMENT — PAIN - FUNCTIONAL ASSESSMENT
PAIN_FUNCTIONAL_ASSESSMENT: PREVENTS OR INTERFERES SOME ACTIVE ACTIVITIES AND ADLS
PAIN_FUNCTIONAL_ASSESSMENT: PREVENTS OR INTERFERES SOME ACTIVE ACTIVITIES AND ADLS

## 2019-07-26 NOTE — PROGRESS NOTES
Discharged to [facility] per L[facility]/stretcher to [facility]. Report called to facility. Orders & MAR faxed . Wound Vac to portable unit. Wife to follow squad.

## 2019-07-26 NOTE — H&P
Diabetes Mother     Cancer Father     Heart Disease Father     COPD Sister     Cancer Sister     High Blood Pressure Sister        REVIEW OF SYSTEMS:   14 point review of system performed, pertinent positives as noted in the HPI, all other systems negative/at baseline. PHYSICAL EXAM:    /65   Pulse 82   Temp 97.4 °F (36.3 °C) (Oral)   Resp 18   Ht 5' 8\" (1.727 m)   Wt 213 lb 8 oz (96.8 kg)   SpO2 96%   BMI 32.46 kg/m²       General appearance:  No apparent distress, appears stated age and cooperative. HEENT:  Normal cephalic, atraumatic without obvious deformity. Pupils equal, round, and reactive to light. Extra ocular muscles intact. Conjunctivae/corneas clear. Neck: Supple, with full range of motion. No jugular venous distention. Trachea midline. Respiratory:  Normal respiratory effort. Clear to auscultation, bilaterally without Rales/Wheezes/Rhonchi. Cardiovascular:  Regular rate and rhythm with normal S1/S2 without murmurs, rubs or gallops. Abdomen: Soft, non-tender, non-distended with normal bowel sounds. Musculoskeletal:  No clubbing, cyanosis or edema bilaterally. Full range of motion without deformity. Skin: Wound vac on left knee, wound on foot  Neurologic:  Neurovascularly intact without any focal sensory/motor deficits.  Cranial nerves: II-XII intact, grossly non-focal.  Psychiatric:  Alert and oriented, thought content appropriate, limited insight and verbal interaction  Capillary Refill: Brisk,< 3 seconds   Peripheral Pulses: +2 palpable, equal bilaterally       Labs:     Recent Labs     07/24/19 0605 07/25/19 2009   WBC  --  9.6   HGB 9.3* 9.3*   HCT 30.9* 31.0*   PLT  --  340     Recent Labs     07/24/19  0605 07/25/19 2009    141   K 3.7 4.6    106   CO2 21* 22*   BUN 18 23*   CREATININE 0.8 0.8   CALCIUM 8.8 9.4     Recent Labs     07/25/19 2009   AST 25   ALT 15   BILIDIR <0.2   BILITOT 0.5   ALKPHOS 112     No results for input(s): INR in the last 72 hours. No results for input(s): Gunnar Ganser in the last 72 hours. Urinalysis:      Lab Results   Component Value Date    NITRU NEGATIVE 07/26/2019    WBCUA 5-10 07/26/2019    BACTERIA NONE 07/26/2019    RBCUA 3-5 07/26/2019    BLOODU NEGATIVE 07/26/2019    SPECGRAV 1.027 07/26/2019    GLUCOSEU NEGATIVE 07/17/2019       Radiology:   I have reviewed the imaging studies with the following interpretation:  CT Head WO Contrast   Final Result   Subtle low attenuation in the left corona radiata/posterior insula which could reflect changes related to recently diagnosed small areas of acute ischemia. No acute hemorrhage. **This report has been created using voice recognition software. It may contain minor errors which are inherent in voice recognition technology. **      Final report electronically signed by Dr. Merlene Felton on 7/25/2019 8:37 PM      XR CHEST PORTABLE   Final Result   Cardiomegaly. Possible layering left pleural fluid versus prominent pericardial fat pad. **This report has been created using voice recognition software. It may contain minor errors which are inherent in voice recognition technology. **      Final report electronically signed by Dr. Merlene Felton on 7/25/2019 8:26 PM            Diet:  DIET GENERAL;    DVT prophylaxis: [] Lovenox                                 [] SCDs                                 [] SQ Heparin                                 [] Encourage ambulation           [x] Already on Anticoagulation    Code Status: Limited      PT/OT Eval Status: only if we need to repeat evals, otherwise hope to move to ECF quickly    Disposition:    [] Home       [x] TCU       [] Rehab       [] Psych       [x] SNF       [] [place]       [] Other-       Assessment and Plan:    Principal Problem:    Failure to thrive in adult  Active Problems:    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

## 2019-07-26 NOTE — PROGRESS NOTES
Nutrition Assessment    Type and Reason for Visit: Initial, Positive Nutrition Screen, Consult(poor intake, wounds)    Nutrition Recommendations: Recommend MVI. ONS initiated. Nutrition Assessment:   Pt. severely malnourished AEB weight loss, moderate loss of fat over the past few weeks (attributes to infection, decreased appetite). At risk for further nutritional compromise r/t increased nutrient needs for wound healing; underlying medical condition (hx CVA, CAD, gout). Will send Ensure Enlive TID as pt agreeable. Recommend MVI. Wife reviewing menu for pt. Food choices/meal selection. Malnutrition Assessment:  · Malnutrition Status: Meets the criteria for severe malnutrition  · Context: Acute illness or injury  · Findings of the 6 clinical characteristics of malnutrition (Minimum of 2 out of 6 clinical characteristics is required to make the diagnosis of moderate or severe Protein Calorie Malnutrition based on AND/ASPEN Guidelines):  1. Weight Loss-5% loss or greater(6.1% 6/22/19 to 7/17/19), in 1 month  2.  Fat Loss-Moderate subcutaneous fat loss, Orbital, Triceps    Nutrition Risk Level: High    Nutrient Needs:  · Estimated Daily Total Kcal: 3880-4553 kcals (18-20 kcals/kgm wt of 97 kgm)  · Estimated Daily Protein (g): 98+ grams/day (1.4+ grams/kgm IBW of 70 kgm)    Nutrition Diagnosis:   · Problem: Severe malnutrition  · Etiology: related to Insufficient energy/nutrient consumption     Signs and symptoms:  as evidenced by Weight loss, Moderate loss of subcutaneous fat    Objective Information:  · Nutrition-Focused Physical Findings: only a few teeth, partial plate; no swallowing issues - able to select foods as tolerated, Rx includes Colace, Lasix  · Wound Type: Multiple, Wound Consult Pending(wound vac)  · Current Nutrition Therapies:  · Oral Diet Orders: General   · Oral Diet intake: Unable to assess  · Oral Nutrition Supplement (ONS) Orders: Standard High Calorie Oral Supplement(TID)  · ONS

## 2019-07-26 NOTE — CARE COORDINATION
7/26/19, 2:10 PM    Discharge plan discussed by  and . Discharge plan reviewed with patient/ family. Patient/ family verbalize understanding of discharge plan and are in agreement with plan. Understanding was demonstrated using the teach back method. Services After Discharge  Services At/After Discharge: Skilled Therapy([facility])         Discharge planned for today, [address] [place] skilled [place]. [facility] to transport by stretcher as needed per nurse. Alysia from [facility] here to speak with wife about patient not being a candidate for TCU/ Rehab. Nurse to call report and fax discharge summary.

## 2019-07-26 NOTE — DISCHARGE INSTR - COC
Continuity of Care Form    Patient Name: Jaleel Earl   :  1935  MRN:  [id number]    Admit date:  2019  Discharge date:  n      Code Status Order: Limited   Advance Directives:   [address] Directive Type of Healthcare Directive Copy in [facility] [address] Agent's Name Healthcare Agent's Phone Number    [phone]  Yes, patient has an advance directive for healthcare treatment  Durable power of  for health care  No, copy requested from [address] [place], [place] [address]e    --  --          Admitting Physician:  Jairon Seymour DO  PCP: Bryan Galvan MD    Discharging Nurse: Aren Vance Unit/Room#: [address]  Discharging Unit Phone Number: [phone]    Emergency Contact:   Extended Emergency Contact Information  Primary Emergency Contact: [address] Phone: [phone]  Relation: Child  [facility]  Address: [address] [place] [address] Phone: [phone]  Mobile Phone: [phone]  Relation: Spouse    Past Surgical History:  Past Surgical History:   Procedure Laterality Date    BACK SURGERY      lower    CARDIAC CATHETERIZATION      ok    CARDIAC CATHETERIZATION      ok    CATARACT REMOVAL Bilateral 2010    CHOLECYSTECTOMY      COLON SURGERY      6-8 in of descending colon removed    COLOSTOMY      COLOSTOMY      reversed    EYE SURGERY      band    EYE SURGERY  2010    bilatera cataracts    HERNIA REPAIR      LUMBAR SPINE SURGERY Left 3/7/2019    L-facet RFA @ L3-4, L4-5, L5-S1 LEFT SIDE FIRST performed by Aparna Murray MD at [address] [facility] Right 3/28/2019    L-facet RFA @ L3-4, L4-5, L5-S1 RIGHT performed by Aparna Murray MD at [facility]  Failure to thrive in adult R62.7    Severe malnutrition (HCC) E43       Isolation/Infection:   Isolation          No Isolation            Nurse Assessment:  Last Vital Signs: BP (!) 110/54   Pulse 67   Temp 97.5 °F (36.4 °C) (Oral)   Resp 16   Ht 5' 8\" (1.727 m)   Wt 213 lb 8 oz (96.8 kg)   SpO2 98%   BMI 32.46 kg/m²     Last documented pain score (0-10 scale): Pain Level: 8  Last Weight:   Wt Readings from Last 1 Encounters:   07/26/19 213 lb 8 oz (96.8 kg)     Mental Status:  disoriented, alert and intermittant confusion    IV Access:  - None    Nursing Mobility/ADLs:  Walking   Dependent  Transfer  Dependent  Bathing  Dependent  Dressing  Dependent  Toileting  Dependent  Feeding  Dependent  Med Admin  Dependent  Med Delivery   whole    Wound Care Documentation and Therapy:  Wound 08/09/18 Toe (Comment  which one) Right Third #1 (Active)   Number of days: 350       Wound 08/09/18 Toe (Comment  which one) Right Fourth #2 (Active)   Number of days: 350       Wound 08/09/18 Foot Left;Medial #3 (Active)   Number of days: 350       Wound 11/21/18 Foot Left;Dorsal #4 (Active)   Number of days: 246       Wound 07/17/19 Foot Anterior; Left 3 cm x 3.5 cm (Active)   Wound Arterial 7/26/2019  3:18 AM   Dressing Status Clean;Dry; Intact 7/26/2019  3:18 AM   Dressing Changed Changed/New 7/25/2019 11:00 PM   Drainage Amount None 7/26/2019  3:18 AM   Number of days: 8       Wound 07/17/19 Foot Left;Medial 1.5 x 2 cm (Active)   Wound Arterial 7/25/2019 11:00 PM   Dressing Status Clean;Dry; Intact 7/26/2019  3:18 AM   Dressing Changed Changed/New 7/25/2019 11:00 PM   Dressing/Treatment Moist to dry;4x4 7/26/2019  3:18 AM   Drainage Amount None 7/26/2019  3:18 AM   Number of days: 8       Wound 07/17/19 Knee Left;Medial open incision with wound vac / 9cm x 2.5 cm (Active)   Drainage Amount None 7/26/2019  3:18 AM   Number of days: 8        Elimination:  Continence:   · Bowel:  Yes  · Bladder: Yes  Urinary Catheter: None

## 2019-07-26 NOTE — DISCHARGE SUMMARY
Hospital Medicine Discharge Summary      Patient Identification:   Osmar Bhatt   : 1935  MRN: [id number]   Account: [de-identified]      Patient's PCP: Liv Trimble MD    Admit Date: 2019     Discharge Date:   2019     Admitting Physician: Lauri Araujo DO     Discharge Physician: Zacarias Levy MD     Discharge Diagnoses:    Physical debility and fall at home  Suspicion for sundowning syndrome   Severe protein calorie malnutrition  Insomnia  Recent acute ischemic stroke left MCA,-,left frontal, posterior insula and left temporal  Recent infection of  left lower extremity peripheral artery disease graft status post excision of the graft and CryoVein on 2019 at [facility]  Peripheral artery disease status post femoropopliteal bypass, 2018 and multiple interventions on 2019  Essential hypertension  Hyperlipidemia  Chronic atrial fibrillation  Coronary artery disease status post stents without angina  Chronic systolic congestive heart failure, EF of 40%  Moderate aortic regurgitation  Acquired hypothyroidism  Bilateral lower extremity arterial ulcers and venous stasis chronic wound-on the left metatarsal head medially, 1 cm in diameter, left foot dorsal, approximately around 3 to 4 cm in diameter, left medial knee area wound VAC present at the surgery site  Benign prostate hypertrophy  Chronic lymphedema  Chronic low back pain status post surgery approximately 3 years ago-since then has been having some weakness in legs as per family  History of right third toe amputation  History of gout  History of DVT   hx of smoking    The patient was seen and examined on day of discharge and this discharge summary is in conjunction with any daily progress note from day of discharge. Hospital Course:   Berto King is a 82 y.o. male admitted to [facility] on 2019 for fall at home and confusion and unable to get out of wheelchair.     Patient was in hospital

## 2019-08-12 ENCOUNTER — HOSPITAL ENCOUNTER (OUTPATIENT)
Dept: WOUND CARE | Age: 84
Discharge: HOME | End: 2019-08-12
Payer: COMMERCIAL

## 2019-08-12 VITALS
WEIGHT: 205 LBS | RESPIRATION RATE: 16 BRPM | BODY MASS INDEX: 31.07 KG/M2 | HEART RATE: 69 BPM | SYSTOLIC BLOOD PRESSURE: 125 MMHG | OXYGEN SATURATION: 94 % | HEIGHT: 68 IN | DIASTOLIC BLOOD PRESSURE: 59 MMHG | TEMPERATURE: 97.7 F

## 2019-08-12 DIAGNOSIS — L97.919 VENOUS ULCER OF RIGHT LEG (HCC): ICD-10-CM

## 2019-08-12 DIAGNOSIS — L89.890 PRESSURE INJURY OF LEFT FOOT, UNSTAGEABLE (HCC): ICD-10-CM

## 2019-08-12 DIAGNOSIS — L97.523 DIABETIC ULCER OF TOE OF LEFT FOOT ASSOCIATED WITH TYPE 2 DIABETES MELLITUS, WITH NECROSIS OF MUSCLE (HCC): ICD-10-CM

## 2019-08-12 DIAGNOSIS — E11.621 DIABETIC ULCER OF TOE OF LEFT FOOT ASSOCIATED WITH TYPE 2 DIABETES MELLITUS, WITH NECROSIS OF MUSCLE (HCC): ICD-10-CM

## 2019-08-12 DIAGNOSIS — I83.019 VENOUS ULCER OF RIGHT LEG (HCC): ICD-10-CM

## 2019-08-12 PROCEDURE — 99214 OFFICE O/P EST MOD 30 MIN: CPT | Performed by: NURSE PRACTITIONER

## 2019-08-12 PROCEDURE — 99214 OFFICE O/P EST MOD 30 MIN: CPT

## 2019-08-12 PROCEDURE — 2709999900 HC NON-CHARGEABLE SUPPLY

## 2019-08-12 ASSESSMENT — PAIN DESCRIPTION - LOCATION: LOCATION: BACK

## 2019-08-12 ASSESSMENT — PAIN SCALES - GENERAL: PAINLEVEL_OUTOF10: 5

## 2019-08-12 ASSESSMENT — PAIN DESCRIPTION - PAIN TYPE: TYPE: CHRONIC PAIN

## 2019-08-14 PROBLEM — I83.019 VENOUS ULCER OF RIGHT LEG (HCC): Status: ACTIVE | Noted: 2019-08-14

## 2019-08-14 PROBLEM — L97.919 VENOUS ULCER OF RIGHT LEG (HCC): Status: ACTIVE | Noted: 2019-08-14

## 2019-08-14 PROBLEM — E11.621 DIABETIC ULCER OF TOE OF LEFT FOOT ASSOCIATED WITH TYPE 2 DIABETES MELLITUS, WITH NECROSIS OF MUSCLE (HCC): Status: ACTIVE | Noted: 2019-08-14

## 2019-08-14 PROBLEM — L97.425 DIABETIC ULCER OF LEFT MIDFOOT ASSOCIATED WITH TYPE 2 DIABETES MELLITUS, WITH MUSCLE INVOLVEMENT WITHOUT EVIDENCE OF NECROSIS (HCC): Status: ACTIVE | Noted: 2018-08-09

## 2019-08-14 PROBLEM — L97.513 DIABETIC ULCER OF TOE OF RIGHT FOOT ASSOCIATED WITH TYPE 2 DIABETES MELLITUS, WITH NECROSIS OF MUSCLE (HCC): Status: ACTIVE | Noted: 2018-08-09

## 2019-08-14 PROBLEM — L97.523 DIABETIC ULCER OF TOE OF LEFT FOOT ASSOCIATED WITH TYPE 2 DIABETES MELLITUS, WITH NECROSIS OF MUSCLE (HCC): Status: ACTIVE | Noted: 2019-08-14

## 2019-08-14 PROBLEM — L89.890 PRESSURE INJURY OF LEFT FOOT, UNSTAGEABLE (HCC): Status: ACTIVE | Noted: 2019-08-14

## 2019-08-14 NOTE — PROGRESS NOTES
[facility]          Progress Note       Osmar Lewis RECORD NUMBER:  [id number]  AGE: 82 y.o. GENDER: male  : 1935  EPISODE DATE:  2019    Subjective:     Chief Complaint   Patient presents with    Wound Check     Bilateral feet and lower legs         HISTORY of PRESENT ILLNESS HPI     Jaleel Earl is a 82 y.o. male Established patient referred by Dr. Chiquita Cha, who presents today for wound/ulcer evaluation. History of Wound Context: Patient has multiple nonhealing wounds to the bilateral lower legs and feet which he has been following with Dr. Chiquita Cha. Dr. Chiquita Cha asked if we could follow his lower leg and bilateral feet wounds/ulcer while he has left thigh/knee wounds following an infection of peripheral artery disease graft excision on 2019 at [place]. He did have a wound VAC on these areas which was discontinued at [place] last week and the areas are since scabbed and stable. He has significant wound to the left dorsal foot with exposed tendon as well as pressure ulcers to the left lateral foot and heel. He has history of PVD, venous insufficiency, and bilateral lower leg lymphedema. He is currently in a nursing home and has been having some on and off confusion and weakness following stroke. Wound/Ulcer Pain Timing/Severity: mild  Quality of pain: aching, tender  Severity:  5 / 10   Modifying Factors: Pain is relieved/improved with rest  Associated Signs/Symptoms: edema, drainage and pain    Interval History:   Patient presents today for follow up on wound/ulcer's progression. The patient is currently not on antibiotics. Current dressing care includes Iodoflex and bordered foam to the left dorsal foot and left medial first metatarsal and Betadine to the scabbed areas on the bilateral lower legs daily.         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 (HCC)     Atrial fibrillation (HCC)  Bell's palsy     CAD (coronary artery disease)     Cancer (HCC)     skin CA removed with dry ice    DDD (degenerative disc disease)     DVT (deep venous thrombosis) (HCC)     Gout     Hernia     Hx of blood clots     left leg DVT    Hyperlipidemia     Hypertension     Irregular cardiac rhythm 07/2016    Movement disorder     back pain    Myocardial infarct ([facility] [address]     Thyroid disease     UTI (urinary tract infection)        PAST SURGICAL HISTORY    Past Surgical History:   Procedure Laterality Date    BACK SURGERY      lower    CARDIAC CATHETERIZATION      ok    CARDIAC CATHETERIZATION      ok    CATARACT REMOVAL Bilateral 2010    CHOLECYSTECTOMY      COLON SURGERY      6-8 in of descending colon removed    COLOSTOMY      COLOSTOMY      reversed    EYE SURGERY      band    EYE SURGERY  2010    bilatera cataracts    HERNIA REPAIR      LUMBAR SPINE SURGERY Left 3/7/2019    L-facet RFA @ L3-4, L4-5, L5-S1 LEFT SIDE FIRST performed by Fawn Shepherd MD at [address] [facility] Right 3/28/2019    L-facet RFA @ L3-4, L4-5, L5-S1 RIGHT performed by Fawn Shepherd MD at [address] [place] [address]  06/29/2016    three   [address] [place] Bilateral 05/15/2018    LUMBAR FACET MBB L3-4, L4-5, L5-S1    OTHER SURGICAL HISTORY  06/29/2016    ACDF C4-5    OTHER SURGICAL HISTORY Bilateral 04/09/2018    Lumbar facet medial branch block at L3-4, L45, L5-S1    [place] COLSC FLX W/REMOVAL LESION BY HOT BX FORCEPS Left 7/17/2017    COLONOSCOPY POLYPECTOMY HOT BIOPSY performed by Linzie Olszewski, MD at [address] [facility]    [place] INJ DX/THER AGNT PARAVERT FACET JOINT, LUMBAR/SAC, 2ND LEVEL Bilateral 4/9/2018    LUMBAR FACET MBB @ L3-4,L4-5 and L5-S1, BILATERAL performed by Fawn Shepherd MD at [facility] DX/THER AGNT PARAVERT FACET JOINT, LUMBAR/SAC, 2ND LEVEL Bilateral 5/15/2018    bilateral L-facet MBB # 2 @ L3-4, Elevated TSH R79.89    PAD (peripheral artery disease) ([facility]) I73.9    Bilateral leg edema R60.0    Open wound of left lower leg S81.802A    Lumbar spondylosis M47.816    Atherosclerosis of native artery of both lower extremities ([facility]) I70.203    Femoral-tibial bypass graft occlusion, left, initial encounter [facility]) T82.898A    Leg wound, left S81.802A    Venous insufficiency of both lower extremities I87.2    Venous ulcer of left leg ([facility]) I83.029, L97.929    Decubitus ulcer of right heel, unstageable ([facility]) L89.610    Diabetic ulcer of left midfoot associated with type 2 diabetes mellitus, with muscle involvement without evidence of necrosis ([facility]) E11.621, L97.425    Diabetic ulcer of toe of right foot associated with type 2 diabetes mellitus, with necrosis of muscle ([facility]) E11.621, L97.513    Lymphedema of both lower extremities I89.0    Chronic osteomyelitis of 3rd toe, right ([facility]) M86.671    Osteomyelitis ([facility]) M86.9    Sepsis ([facility]) A41.9    Septicemia ([facility]) A41.9    Delirium R41.0    Elevated troponin R74.8    Lactic acid acidosis N13.9    Metabolic encephalopathy U02.33    Acute ischemic left middle cerebral artery (MCA) stroke ([facility]) I83.065    Adjustment disorder with mixed anxiety and depressed mood F43.23    Chronic pain syndrome G89.4    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 8XXA, L08.9    Coronary artery disease involving native coronary artery of native heart without angina pectoris I25.10    Chronic anticoagulation Z79.01    LV dysfunction I51.9    Hyperlipidemia E78.5    Chronic anemia D64.9    Chronic acquired lymphedema I89.0    Hypothyroidism E03.9    Moderate aortic regurgitation I35.1    Generalized weakness R53.1    Failure to thrive in adult R62.7    Severe malnutrition ([facility]) E43    Diabetic ulcer of toe of left foot associated with type 2 diabetes mellitus, with necrosis of muscle ([facility]) E11.621, D41.900   

## 2019-08-21 PROBLEM — L97.511 DIABETIC ULCER OF TOE OF RIGHT FOOT ASSOCIATED WITH TYPE 2 DIABETES MELLITUS, LIMITED TO BREAKDOWN OF SKIN (HCC): Status: ACTIVE | Noted: 2018-08-09

## 2019-09-03 ENCOUNTER — HOSPITAL ENCOUNTER (OUTPATIENT)
Dept: WOUND CARE | Age: 84
Discharge: HOME | End: 2019-09-03
Payer: MEDICARE

## 2019-09-03 VITALS
OXYGEN SATURATION: 97 % | RESPIRATION RATE: 16 BRPM | SYSTOLIC BLOOD PRESSURE: 120 MMHG | DIASTOLIC BLOOD PRESSURE: 58 MMHG | HEART RATE: 58 BPM | TEMPERATURE: 97.8 F

## 2019-09-03 PROCEDURE — 2709999900 HC NON-CHARGEABLE SUPPLY

## 2019-09-03 PROCEDURE — 99214 OFFICE O/P EST MOD 30 MIN: CPT

## 2019-09-03 ASSESSMENT — PAIN DESCRIPTION - PAIN TYPE: TYPE: CHRONIC PAIN

## 2019-09-03 ASSESSMENT — PAIN SCALES - GENERAL: PAINLEVEL_OUTOF10: 5

## 2019-09-03 ASSESSMENT — PAIN DESCRIPTION - LOCATION: LOCATION: BACK

## 2019-09-03 NOTE — PROGRESS NOTES
SURGERY  2010    bilatera cataracts    HERNIA REPAIR      LUMBAR SPINE SURGERY Left 3/7/2019    L-facet RFA @ L3-4, L4-5, L5-S1 LEFT SIDE FIRST performed by Sergo Bajwa MD at [address] Right 3/28/2019    L-facet RFA @ L3-4, L4-5, L5-S1 RIGHT performed by Sergo Bajwa MD at [address] [facility]  06/29/2016    three   [address] [place] Bilateral 05/15/2018    LUMBAR FACET MBB L3-4, L4-5, L5-S1    OTHER SURGICAL HISTORY  06/29/2016    ACDF C4-5    OTHER SURGICAL HISTORY Bilateral 04/09/2018    Lumbar facet medial branch block at L3-4, L45, L5-S1    [place] COLSC FLX W/REMOVAL LESION BY HOT BX FORCEPS Left 7/17/2017    COLONOSCOPY POLYPECTOMY HOT BIOPSY performed by Zoila Kingsley MD at [place] [address] DX/THER AGNT PARAVERT FACET JOINT, LUMBAR/SAC, 2ND LEVEL Bilateral 4/9/2018    LUMBAR FACET MBB @ L3-4,L4-5 and L5-S1, BILATERAL performed by Sergo Bajwa MD at [facility] DX/THER AGNT PARAVERT FACET JOINT, LUMBAR/SAC, 2ND LEVEL Bilateral 5/15/2018    bilateral L-facet MBB # 2 @ L3-4, L4-5, L5-S1. performed by Sergo Bajwa MD at [facility] OFFICE/OUTPT VISIT,PROCEDURE ONLY Left 7/30/2018    Left superficial femoral to anterior tibial cadaver saphenous vein bypass; left below knee embolectomy and exploration of left posterior tibial [facility] 6/12/2018    LUMBAR RFA  L3-4,  L4-5,  L5-S1  LEFT SIDE performed by Sergo Bajwa MD at [address] [facility] W IMAGE [facility] Right 7/19/2018    LUMBAR RFA  L3-4,  L4-5,  L5-S1  RIGHT SIDE performed by Sergo Bajwa MD at [facility] [address] Right 12/20/2018    AMPUTATION RIGHT 3RD TOE performed by Toro Snow DPM bilateral feet. Dorsal left foot ulcer is full thickness with exposed EHL tendon, and granular base. No probe to bone, no erythema, no malodor, no drainage. Multiple eschars to bilateral feet and legs. No lifting, no fluctuant or boggy feeling    Vascular:  DP/PT pulses are not palpable, CFT is sluggish to the digits, right leg is cool from the tibial tuberosity to the digits    Neurologic:  Gross and protective sensation is intact    M/S:  Foot and ankle are in a rectus position. No pain with palpation      Wound 07/17/19 Foot Anterior; Left #1 (Active)   Wound Image   9/3/2019  2:31 PM   Dressing Status Intact; Old drainage 9/3/2019  2:31 PM   Dressing Changed Changed/New 9/3/2019  2:31 PM   Wound Cleansed Rinsed/Irrigated with saline 9/3/2019  2:31 PM   Wound Length (cm) 2.9 cm 9/3/2019  2:31 PM   Wound Width (cm) 2.8 cm 9/3/2019  2:31 PM   Wound Depth (cm) 0.3 cm 9/3/2019  2:31 PM   Wound Surface Area (cm^2) 8.12 cm^2 9/3/2019  2:31 PM   Change in Wound Size % (l*w) -47.64 9/3/2019  2:31 PM   Wound Volume (cm^3) 2.44 cm^3 9/3/2019  2:31 PM   Wound Healing % -48 9/3/2019  2:31 PM   Wound Assessment Pink;Yellow;Red 9/3/2019  2:31 PM   Drainage Amount Moderate 9/3/2019  2:31 PM   Drainage Description Serosanguinous; Yellow 9/3/2019  2:31 PM   Odor None 9/3/2019  2:31 PM   Margins Attached edges 9/3/2019  2:31 PM   Exposed structure Tendon 9/3/2019  2:31 PM   Rhoda-wound Assessment Dry;Pink 9/3/2019  2:31 PM   [place]%Wound Bed 50 9/3/2019  2:31 PM   Red%Wound Bed 30 9/3/2019  2:31 PM   Yellow%Wound Bed 20 9/3/2019  2:31 PM   Number of days: 47       Wound 07/17/19 Foot Left;Medial #3 (Active)   Wound Image   9/3/2019  2:37 PM   Dressing Status Intact; Old drainage 9/3/2019  2:37 PM   Dressing Changed Changed/New 9/3/2019  2:37 PM   Wound Cleansed Rinsed/Irrigated with saline 9/3/2019  2:37 PM   Wound Length (cm) 0.7 cm 9/3/2019  2:37 PM   Wound Width (cm) 0.5 cm 9/3/2019  2:37 PM   Wound Depth (cm) 0.4 cm 9/3/2019 Attached edges 9/3/2019  2:31 PM   Rhoda-wound Assessment Dry 9/3/2019  2:31 PM   Red%Wound Bed 50 9/3/2019  2:31 PM   Yellow%Wound Bed 20 9/3/2019  2:31 PM   Black%Wound Bed 10 8/12/2019  1:33 PM   Other%Wound Bed 30epi 9/3/2019  2:31 PM   Number of days: 22       Wound 09/03/19 Toe (Comment  which one) Right (Active)   Wound Length (cm) 0.7 cm 9/3/2019  2:58 PM   Wound Width (cm) 0.4 cm 9/3/2019  2:58 PM   Wound Depth (cm) 0.05 cm 9/3/2019  2:58 PM   Wound Surface Area (cm^2) 0.28 cm^2 9/3/2019  2:58 PM   Wound Volume (cm^3) 0.01 cm^3 9/3/2019  2:58 PM   Wound Assessment Yellow;Black 9/3/2019  2:58 PM   Drainage Amount Scant 9/3/2019  2:58 PM   Drainage Description Serosanguinous 9/3/2019  2:58 PM   Odor None 9/3/2019  2:58 PM   Margins Attached edges 9/3/2019  2:58 PM   Rhoda-wound Assessment Dry 9/3/2019  2:58 PM   Yellow%Wound Bed 50 9/3/2019  2:58 PM   Black%Wound Bed 50 9/3/2019  2:58 PM   Number of days: 0       LABS      CBC:   Lab Results   Component Value Date    WBC 9.6 07/25/2019    HGB 9.3 07/25/2019    HCT 31.0 07/25/2019    MCV 99.7 07/25/2019     07/25/2019     BMP:   Lab Results   Component Value Date     07/25/2019    K 4.6 07/25/2019    K 3.6 07/18/2019     07/25/2019    CO2 22 07/25/2019    PHOS 3.5 09/08/2016    BUN 23 07/25/2019    CREATININE 0.8 07/25/2019     PT/INR:   Lab Results   Component Value Date    INR 2.34 07/17/2019     Prealbumin:   Lab Results   Component Value Date    PREALBUMIN 18.3 08/11/2016     Albumin:  Lab Results   Component Value Date    LABALBU 3.0 07/25/2019     Sed Rate:  Lab Results   Component Value Date    SEDRATE 31 07/20/2013     CRP:   Lab Results   Component Value Date    CRP 9.81 07/17/2019     Micro:   Lab Results   Component Value Date    BC No growth-preliminary  No growth   07/17/2019      Hemoglobin A1C: No results found for: LABA1C    Assessment:     Ulcer Identification:  Ulcer Type: arterial  Contributing Factors: arterial

## 2019-09-05 DIAGNOSIS — G89.4 CHRONIC PAIN SYNDROME: ICD-10-CM

## 2019-09-05 DIAGNOSIS — S81.802D OPEN WOUND OF LEFT LOWER LEG, SUBSEQUENT ENCOUNTER: ICD-10-CM

## 2019-09-05 RX ORDER — OXYCODONE HYDROCHLORIDE AND ACETAMINOPHEN 5; 325 MG/1; MG/1
1 TABLET ORAL EVERY 6 HOURS PRN
Qty: 120 TABLET | Refills: 0 | Status: SHIPPED | OUTPATIENT
Start: 2019-09-05 | End: 2019-10-05

## 2019-09-05 NOTE — TELEPHONE ENCOUNTER
OARRS reviewed. UDS: + for  Oxycodone consistent. Narcan offered: yes  Last seen: 6/5/2019.  Follow-up:   Future Appointments   Date Time Provider Dacia Cordova   9/17/2019 10:50 AM BRENNA Mcnamara - CNP [facility]   9/17/2019  1:45 PM Vick [facility], [address] [place] [address]   10/28/2019 12:00 PM Lisa Beasley MD [address] [place] [facility]

## 2019-09-05 NOTE — TELEPHONE ENCOUNTER
Kin Chan called requesting a refill on the following medications:  Requested Prescriptions     Pending Prescriptions Disp Refills    oxyCODONE-acetaminophen (PERCOCET) 5-325 MG per tablet 12 tablet 0     Sig: Take 1 tablet by mouth every 6 hours as needed for Pain (take 1 tabs every 6hrs PRN) for up to 3 days.      Pharmacy verified: [facility] [address] ++++ pt had emergency surgery, was hospitalized and has been in a rehab facility, which is why he hasn't called for a refill the last couple months      Date of last visit: 6/5/19  Date of next visit (if applicable): 1/66/5956

## (undated) DEVICE — SUTURE PERMA-HAND SZ 3-0 L30IN NONABSORBABLE BLK L60MM KS 622H

## (undated) DEVICE — SPONGE GZ W4XL4IN COT 12 PLY TYP VII WVN C FLD DSGN

## (undated) DEVICE — GLOVE SURG SZ 65 THK91MIL LTX FREE SYN POLYISOPRENE

## (undated) DEVICE — TOWEL,OR,DSP,ST,BLUE,DLX,4/PK,20PK/CS: Brand: MEDLINE

## (undated) DEVICE — 6 ML SYRINGE LUER-LOCK TIP: Brand: MONOJECT

## (undated) DEVICE — CHLORAPREP 26ML CLEAR

## (undated) DEVICE — LOOP VES W25MM THK1MM MAXI RED SIL FLD REPELLENT 100 PER

## (undated) DEVICE — CONTAINER,SPECIMEN,PNEU TUBE,4OZ,OR STRL: Brand: MEDLINE

## (undated) DEVICE — SHEET,DRAPE,3/4,53X77,STERILE: Brand: MEDLINE

## (undated) DEVICE — PREP SOL PVP IODINE 4%  4 OZ/BTL

## (undated) DEVICE — Device: Brand: RETRACT-O-TAPE 18G X 30.5CM BLUNT NEEDLE

## (undated) DEVICE — GLOVE ORANGE PI 8   MSG9080

## (undated) DEVICE — CURVED SHARP RF CANNULA, RADIOPAQUE MARKER: Brand: RADIOPAQUE RADIOFREQUENCY CANNULA

## (undated) DEVICE — SKIN AFFIX SURG ADHESIVE 72/CS 0.55ML: Brand: MEDLINE

## (undated) DEVICE — SET CATH 20GA L1.75IN RAD ART POLYUR RADPQ W/ INTEGR

## (undated) DEVICE — SYRINGE MED 10ML LUERLOCK TIP W/O SFTY DISP

## (undated) DEVICE — NEEDLE SPNL 22GA L3.5IN BLK HUB S STL REG WALL FIT STYL W/

## (undated) DEVICE — 3M™ IOBAN™ 2 ANTIMICROBIAL INCISE DRAPE 6650EZ: Brand: IOBAN™ 2

## (undated) DEVICE — GLOVE ORANGE PI 7 1/2   MSG9075

## (undated) DEVICE — AGENT HEMSTAT W2XL4IN OXIDIZED REGENERATED CELOS ABSRB SFT

## (undated) DEVICE — SPLINT ARMBRD W3XL10.5IN POLYFOAM DLX A LN

## (undated) DEVICE — NEEDLE SYR 18GA L1.5IN RED PLAS HUB S STL BLNT FILL W/O

## (undated) DEVICE — HYPODERMIC SAFETY NEEDLE: Brand: MAGELLAN

## (undated) DEVICE — GOWN,SIRUS,NON REINFRCD,LARGE,SET IN SL: Brand: MEDLINE

## (undated) DEVICE — TUBING PRSS 36 M F

## (undated) DEVICE — COVER ARMBRD W13XL28.5IN IMPERV BLU FOR OP RM

## (undated) DEVICE — ULTRACLEAN ACCESSORY ELECTRODE 4" (10.16 CM) COATED BLADE WITH EXTENDED INSULATION: Brand: ULTRACLEAN

## (undated) DEVICE — GAUZE,SPONGE,4"X4",12PLY,STERILE,LF,2'S: Brand: MEDLINE

## (undated) DEVICE — 35 ML SYRINGE LUER-LOCK TIP: Brand: MONOJECT

## (undated) DEVICE — SUTURE PROL SZ 8-0 L24IN NONABSORBABLE BLU L6.5MM BV130-5 8732H

## (undated) DEVICE — SOLUTION IV IRRIG WATER 1000ML POUR BRL 2F7114

## (undated) DEVICE — SET CATH 20GA L1.5IN RAD ART POLYUR RADPQ W/ INTEGR 0.018IN

## (undated) DEVICE — SUTURE GOR TX SZ 5-0 L30IN NONABSORBABLE L13MM THC-13 1/2 6M12A

## (undated) DEVICE — SOLUTION SURG PREP POV IOD 7.5% 4 OZ

## (undated) DEVICE — TOWEL,OR,DSP,ST,WHITE,DLX,XR,4/PK,20PK/C: Brand: MEDLINE

## (undated) DEVICE — BLADE CLIPPER GEN PURP NS

## (undated) DEVICE — SUTURE MCRYL SZ 4-0 L27IN ABSRB UD L19MM PS-2 1/2 CIR PRIM Y426H

## (undated) DEVICE — SUTURE PROL SZ 6-0 L18IN NONABSORBABLE BLU L9.3MM BV-1 3/8 8806H

## (undated) DEVICE — 4-PORT MANIFOLD: Brand: NEPTUNE 2

## (undated) DEVICE — MARKER,SKIN,WI/RULER AND LABELS: Brand: MEDLINE

## (undated) DEVICE — DRESSING GRMCDL 6 12FR D1N CNTR HOLE 4MM ANTMCRBL PRTCTVE DI

## (undated) DEVICE — SUTURE PROL SZ 7-0 L24IN NONABSORBABLE BLU L9.3MM CC 3/8 8704H

## (undated) DEVICE — FOGARTY - HYDRAGRIP SURGICAL - CLAMP INSERTS: Brand: FOGARTY SOFTJAW

## (undated) DEVICE — PRESSURE MONITORING SET: Brand: TRUWAVE

## (undated) DEVICE — IMPREGNATED GAUZE DRESSING: Brand: CUTICERIN 7.5X7.5CM CTN 50

## (undated) DEVICE — GLOVE ORANGE PI 7   MSG9070

## (undated) DEVICE — Z DISCONTINUED USE 2537982 ELECTRODE DISPER W/ CRD DISP

## (undated) DEVICE — CHLORAPREP 26ML ORANGE

## (undated) DEVICE — Z DISCONTINUED BY MEDLINE USE 2711682 TRAY SKIN PREP DRY W/ PREM GLV

## (undated) DEVICE — KIT CATHETER 18GA L445CM NDL 20GA 0025IN RAD ART W WNG CLP

## (undated) DEVICE — SOLUTION IV IRRIG POUR BRL 0.9% SODIUM CHL 2F7124

## (undated) DEVICE — PATIENT RETURN ELECTRODE, SINGLE-USE, CONTACT QUALITY MONITORING, ADULT, WITH 9FT CORD, FOR PATIENTS WEIGING OVER 33LBS. (15KG): Brand: MEGADYNE

## (undated) DEVICE — SUTURE NONABSORBABLE MONOFILAMENT 4-0 RB-1 36 IN BLU PROLENE 8557H

## (undated) DEVICE — SYRINGE IRRIG 60ML SFT PLIABLE BLB EZ TO GRP 1 HND USE W/

## (undated) DEVICE — APPLIER CLP L9.375IN APER 2.1MM CLS L3.8MM 20 SM TI CLP

## (undated) DEVICE — LOOP VES W13MM THK09MM MINI RED SIL FLD REPELLENT

## (undated) DEVICE — POSITIONER HD W8XH4XL8.5IN RASPBERRY FOAM SLT

## (undated) DEVICE — GOWN,SIRUS,NONRNF,SETINSLV,XL,20/CS: Brand: MEDLINE

## (undated) DEVICE — TETRA-FLEX CF WOVEN LATEX FREE ELASTIC BANDAGE 6" X 5.5 YD: Brand: TETRA-FLEX™CF

## (undated) DEVICE — BLANKET THER AD W24XL60IN FAB COVERING SUP SFT ULT THN LTWT

## (undated) DEVICE — BANDAGE,GAUZE,4.5"X4.1YD,STERILE,LF: Brand: MEDLINE

## (undated) DEVICE — DRAPE THER FLUID WARMING 66X44 IN FLAT SLUSH DBL DISC ORS

## (undated) DEVICE — FOGARTY SPRING CLIPS 6MM: Brand: FOGARTY SOFTJAW

## (undated) DEVICE — SHEARS ENDOSCP L9CM CRV HARM FOCS +

## (undated) DEVICE — INTENDED FOR TISSUE SEPARATION, AND OTHER PROCEDURES THAT REQUIRE A SHARP SURGICAL BLADE TO PUNCTURE OR CUT.: Brand: BARD-PARKER ® CARBON RIB-BACK BLADES

## (undated) DEVICE — DECANTER VI VENT W/ VLV FOR ASEP TRNSF OF FLD

## (undated) DEVICE — BANDAGE COMPR E SGL LAYERED CLSR BGE W/ CLP W4INXL15FT

## (undated) DEVICE — SUTURE N ABSRB MFIL TAPR PNT PREM NDL EXP PTFE CV SUT DBL 7M10

## (undated) DEVICE — TOTAL TRAY, DB, 100% SILI FOLEY, 16FR 10: Brand: MEDLINE

## (undated) DEVICE — SUTURE SURGLON SZ 4-0 L18IN NONABSORBABLE BLK NO NDL TIE 8886191831

## (undated) DEVICE — APPLICATOR PREP 26ML 0.7% IOD POVACRYLEX 74% ISO ALC ST

## (undated) DEVICE — BOOT,SUTURE,STANDARD,YELLOW-IN-BLUE: Brand: MEDLINE

## (undated) DEVICE — 48" PROBE COVER W/GEL, ULTRASOUND, STERILE: Brand: SITE-RITE

## (undated) DEVICE — PACK PROCEDURE SURG SET UP SRMC

## (undated) DEVICE — SPONGE LAP W18XL18IN WHT COT 4 PLY FLD STRUNG RADPQ DISP ST

## (undated) DEVICE — 3M™ WARMING BLANKET, UPPER BODY, 10 PER CASE, 42268: Brand: BAIR HUGGER™

## (undated) DEVICE — GAUZE,SPONGE,8"X4",12PLY,XRAY,STRL,LF: Brand: MEDLINE

## (undated) DEVICE — BLADE OPHTH D5MM 15DEG GRN W/ RND KNURLED HNDL MICRO-SHARP

## (undated) DEVICE — FOGARTY ARTERIAL EMBOLECTOMY CATHETER 3F 40CM: Brand: FOGARTY

## (undated) DEVICE — GLOVE ORANGE PI 8 1/2   MSG9085